# Patient Record
Sex: FEMALE | Race: WHITE | NOT HISPANIC OR LATINO | Employment: OTHER | URBAN - METROPOLITAN AREA
[De-identification: names, ages, dates, MRNs, and addresses within clinical notes are randomized per-mention and may not be internally consistent; named-entity substitution may affect disease eponyms.]

---

## 2017-01-03 ENCOUNTER — TELEPHONE (OUTPATIENT)
Dept: MEDICAL GROUP | Facility: MEDICAL CENTER | Age: 32
End: 2017-01-03

## 2017-01-03 DIAGNOSIS — R31.29 MICROSCOPIC HEMATURIA: ICD-10-CM

## 2017-01-03 NOTE — TELEPHONE ENCOUNTER
1. Caller Name: Faustina caregiver                      Call Back Number: [phone]    2. Message: States that Griselda has some blood in her urine and wants to provide a UA for the lab. Pt is wheelchair bound. Can you write this order for the Pt please. Thanks    3. Patient approves office to leave a detailed voicemail/MyChart message: N\A

## 2017-01-05 ENCOUNTER — HOSPITAL ENCOUNTER (OUTPATIENT)
Facility: MEDICAL CENTER | Age: 32
End: 2017-01-05
Attending: INTERNAL MEDICINE
Payer: MEDICARE

## 2017-01-05 LAB
APPEARANCE UR: ABNORMAL
BILIRUB UR QL STRIP.AUTO: NEGATIVE
COLOR UR AUTO: YELLOW
CULTURE IF INDICATED INDCX: YES UA CULTURE
EPITHELIAL CELLS 1715: ABNORMAL /HPF
GLUCOSE UR STRIP.AUTO-MCNC: NEGATIVE MG/DL
KETONES UR STRIP.AUTO-MCNC: NEGATIVE MG/DL
LEUKOCYTE ESTERASE UR QL STRIP.AUTO: ABNORMAL
MICRO URNS: ABNORMAL
MUCOUS THREADS URNS QL MICRO: ABNORMAL /HPF
NITRITE UR QL STRIP.AUTO: NEGATIVE
PH UR: 6.5 [PH]
PROT UR QL STRIP: NEGATIVE MG/DL
RBC #/AREA URNS HPF: ABNORMAL /HPF
RBC UR QL AUTO: ABNORMAL
SP GR UR STRIP.AUTO: 1.02
WBC #/AREA URNS HPF: ABNORMAL /HPF

## 2017-01-05 PROCEDURE — 87086 URINE CULTURE/COLONY COUNT: CPT

## 2017-01-05 PROCEDURE — 81001 URINALYSIS AUTO W/SCOPE: CPT

## 2017-01-07 LAB
BACTERIA UR CULT: ABNORMAL
BACTERIA UR CULT: ABNORMAL
SIGNIFICANT IND 70042: ABNORMAL
SOURCE SOURCE: ABNORMAL

## 2017-01-16 RX ORDER — CITALOPRAM 20 MG/1
TABLET ORAL
Qty: 90 TAB | Refills: 0 | Status: SHIPPED | OUTPATIENT
Start: 2017-01-16 | End: 2017-04-04 | Stop reason: SDUPTHER

## 2017-01-24 ENCOUNTER — APPOINTMENT (OUTPATIENT)
Dept: MEDICAL GROUP | Facility: MEDICAL CENTER | Age: 32
End: 2017-01-24
Payer: MEDICARE

## 2017-01-27 RX ORDER — MELOXICAM 15 MG/1
TABLET ORAL
Qty: 90 TAB | Refills: 0 | Status: SHIPPED | OUTPATIENT
Start: 2017-01-27 | End: 2017-03-31

## 2017-01-27 NOTE — TELEPHONE ENCOUNTER
Was the patient seen in the last year in this department? Yes 10/27/17 Diallo    Does patient have an active prescription for medications requested? No     Received Request Via: Pharmacy

## 2017-02-09 ENCOUNTER — APPOINTMENT (OUTPATIENT)
Dept: RADIOLOGY | Facility: MEDICAL CENTER | Age: 32
End: 2017-02-09
Attending: NURSE PRACTITIONER
Payer: MEDICARE

## 2017-02-13 RX ORDER — TRAMADOL HYDROCHLORIDE 50 MG/1
TABLET ORAL
Qty: 60 TAB | Refills: 1 | Status: SHIPPED
Start: 2017-02-13 | End: 2017-03-06 | Stop reason: SDUPTHER

## 2017-02-16 DIAGNOSIS — G43.809 OTHER MIGRAINE WITHOUT STATUS MIGRAINOSUS, NOT INTRACTABLE: ICD-10-CM

## 2017-02-17 RX ORDER — TOPIRAMATE 25 MG/1
CAPSULE, EXTENDED RELEASE ORAL
Qty: 30 EACH | Refills: 5 | Status: SHIPPED | OUTPATIENT
Start: 2017-02-17 | End: 2017-03-16 | Stop reason: SDUPTHER

## 2017-02-22 RX ORDER — ZOLPIDEM TARTRATE 5 MG/1
TABLET ORAL
Refills: 2 | Status: CANCELLED | OUTPATIENT
Start: 2017-02-22

## 2017-02-22 RX ORDER — ZOLPIDEM TARTRATE 5 MG/1
TABLET ORAL
Qty: 30 TAB | Refills: 2 | Status: SHIPPED
Start: 2017-02-22 | End: 2017-03-16 | Stop reason: SDUPTHER

## 2017-03-06 RX ORDER — TRAMADOL HYDROCHLORIDE 50 MG/1
TABLET ORAL
Qty: 60 TAB | Refills: 0 | Status: SHIPPED
Start: 2017-03-06 | End: 2017-06-21 | Stop reason: SDUPTHER

## 2017-03-15 ENCOUNTER — APPOINTMENT (OUTPATIENT)
Dept: RADIOLOGY | Facility: MEDICAL CENTER | Age: 32
End: 2017-03-15
Attending: NURSE PRACTITIONER
Payer: MEDICARE

## 2017-03-16 DIAGNOSIS — G43.819 OTHER MIGRAINE WITHOUT STATUS MIGRAINOSUS, INTRACTABLE: ICD-10-CM

## 2017-03-16 RX ORDER — ZOLPIDEM TARTRATE 5 MG/1
TABLET ORAL
Qty: 30 TAB | Refills: 2 | Status: SHIPPED | OUTPATIENT
Start: 2017-03-16 | End: 2017-07-17 | Stop reason: SDUPTHER

## 2017-03-17 RX ORDER — TOPIRAMATE 25 MG/1
CAPSULE, EXTENDED RELEASE ORAL
Qty: 90 EACH | Refills: 0 | Status: SHIPPED | OUTPATIENT
Start: 2017-03-17 | End: 2017-03-21

## 2017-03-21 ENCOUNTER — TELEPHONE (OUTPATIENT)
Dept: NEUROLOGY | Facility: MEDICAL CENTER | Age: 32
End: 2017-03-21

## 2017-03-24 ENCOUNTER — OFFICE VISIT (OUTPATIENT)
Dept: MEDICAL GROUP | Facility: MEDICAL CENTER | Age: 32
End: 2017-03-24
Payer: MEDICARE

## 2017-03-24 VITALS
DIASTOLIC BLOOD PRESSURE: 64 MMHG | BODY MASS INDEX: 20.4 KG/M2 | RESPIRATION RATE: 16 BRPM | TEMPERATURE: 97.4 F | HEIGHT: 67 IN | SYSTOLIC BLOOD PRESSURE: 118 MMHG | OXYGEN SATURATION: 96 % | WEIGHT: 130 LBS | HEART RATE: 76 BPM

## 2017-03-24 DIAGNOSIS — G89.29 CHRONIC LOW BACK PAIN, UNSPECIFIED BACK PAIN LATERALITY, WITH SCIATICA PRESENCE UNSPECIFIED: ICD-10-CM

## 2017-03-24 DIAGNOSIS — N39.0 RECURRENT URINARY TRACT INFECTION: ICD-10-CM

## 2017-03-24 DIAGNOSIS — R53.81 DEBILITY: ICD-10-CM

## 2017-03-24 DIAGNOSIS — R47.01 APHASIA: ICD-10-CM

## 2017-03-24 DIAGNOSIS — R25.2 SPASTICITY: ICD-10-CM

## 2017-03-24 DIAGNOSIS — G89.4 CHRONIC PAIN SYNDROME: ICD-10-CM

## 2017-03-24 DIAGNOSIS — M54.5 CHRONIC LOW BACK PAIN, UNSPECIFIED BACK PAIN LATERALITY, WITH SCIATICA PRESENCE UNSPECIFIED: ICD-10-CM

## 2017-03-24 DIAGNOSIS — R13.10 DYSPHAGIA, UNSPECIFIED TYPE: ICD-10-CM

## 2017-03-24 DIAGNOSIS — F33.42 RECURRENT MAJOR DEPRESSIVE DISORDER, IN FULL REMISSION (HCC): Chronic | ICD-10-CM

## 2017-03-24 DIAGNOSIS — G35 MS (MULTIPLE SCLEROSIS) (HCC): ICD-10-CM

## 2017-03-24 DIAGNOSIS — Z91.81 AT HIGH RISK FOR FALLS: ICD-10-CM

## 2017-03-24 DIAGNOSIS — Z00.00 MEDICARE ANNUAL WELLNESS VISIT, SUBSEQUENT: ICD-10-CM

## 2017-03-24 PROCEDURE — G0439 PPPS, SUBSEQ VISIT: HCPCS | Mod: 25 | Performed by: NURSE PRACTITIONER

## 2017-03-24 PROCEDURE — 99213 OFFICE O/P EST LOW 20 MIN: CPT | Mod: 25 | Performed by: NURSE PRACTITIONER

## 2017-03-24 PROCEDURE — G8420 CALC BMI NORM PARAMETERS: HCPCS | Performed by: NURSE PRACTITIONER

## 2017-03-24 PROCEDURE — G8510 SCR DEP NEG, NO PLAN REQD: HCPCS | Performed by: NURSE PRACTITIONER

## 2017-03-24 PROCEDURE — 1036F TOBACCO NON-USER: CPT | Performed by: NURSE PRACTITIONER

## 2017-03-24 PROCEDURE — G8482 FLU IMMUNIZE ORDER/ADMIN: HCPCS | Performed by: NURSE PRACTITIONER

## 2017-03-24 RX ORDER — BACLOFEN 10 MG/1
10 TABLET ORAL 3 TIMES DAILY
Qty: 90 TAB | Refills: 11
Start: 2017-03-24 | End: 2017-09-26

## 2017-03-24 RX ORDER — POLYETHYLENE GLYCOL 3350 17 G/17G
17 POWDER, FOR SOLUTION ORAL DAILY
COMMUNITY
End: 2017-03-24

## 2017-03-24 ASSESSMENT — PATIENT HEALTH QUESTIONNAIRE - PHQ9: CLINICAL INTERPRETATION OF PHQ2 SCORE: 1

## 2017-03-24 NOTE — PROGRESS NOTES
CC: Prior patient of Dr. Castro here today for annual Medicare wellness visit as well as for need of letters and referrals and to discuss pain management.    HPI:   Griselda presents today with the following.    1. Medicare annual wellness visit, subsequent  Screening performed below.    2. Recurrent urinary tract infection  Patient states she does tend to have recurrent urinary tract infections possibly related to her debility from her MS but she is not currently symptomatic.    3. MS (multiple sclerosis) (CMS-HCC)  Patient follows with Dr. renteria for this. She has caregivers who visit her house frequently and she has an aunt who also helps her out and is with her today.    4. Aphasia  Patient uses a Ipad for communication.    5. Chronic pain syndrome  Patient is currently on tramadol for pain. They are asking today whether I can give her a medical marijuana card. She uses medications for generalized back and body pain.    6. Dysphagia, unspecified type  Patient occasionally has difficulty with swallowing.    7. Recurrent major depressive disorder, in full remission (CMS-HCC)  Patient on medication and reporting no new depression.    8. Debility  Family is requesting a prescription to have her electronic wheelchair repaired as well as any new additional devices. We are also asking today for a letter for support animal.    9. Chronic low back pain, unspecified back pain laterality, with sciatica presence unspecified  Patient currently on tramadol for this.    10. 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    11. At high risk for falls  Patient needs assistance with any mobility.      Depression Screening    Little interest or pleasure in doing things?  0 - not at all  Feeling down, depressed , or hopeless? 1 - several days  Trouble falling or staying asleep, or sleeping too much?     Feeling tired or having little energy?     Poor appetite or overeating?     Feeling bad about yourself - or that you are a failure  or have let yourself or your family down?    Trouble concentrating on things, such as reading the newspaper or watching television?    Moving or speaking so slowly that other people could have noticed.  Or the opposite - being so fidgety or restless that you have been moving around a lot more than usual?     Thoughts that you would be better off dead, or of hurting yourself?     Patient Health Questionnaire Score:    If depressive symptoms identified deferred to follow up visit unless specifically addressed in assessment and plan.      Screening for Cognitive Impairment    Three Minute Recall (banana, sunrise, fence)  3/3    Draw clock face with all 12 numbers set to the hand to show 10 minures past 11 o'clock  0    If cognitive concerns identified deferred to follow up visit unless specifically addressed in assessment and plan.    Fall Risk Assessment    Has the patient had two or more falls in the last year or any fall with injury in the last year?  No  If Fall Risk identified deferred to follow up visit unless specifically addressed in assessment and plan.    Safety Assessment    Throw rugs on floor.  Yes  Handrails on all stairs.  Yes  Good lighting in all hallways.  Yes  Difficulty hearing.  No  Patient counseled about all safety risks that were identified.    Functional Assessment ADLs    Are there any barriers preventing you from cooking for yourself or meeting nutritional needs?  Yes.    Are there any barriers preventing you from driving safely or obtaining transportation?  Yes.    Are there any barriers preventing you from using a telephone or calling for help?  No.    Are there any barriers preventing you from shopping?  No.    Are there any barriers preventing you from taking care of your own finances?  Yes.    Are there any barriers preventing you from managing your medications?  No.    Are currently engaing any exercise or physical activity?  No.       Health Maintenance Summary                IMM HEP B  VACCINE Overdue 1985     IMM DTaP/Tdap/Td Vaccine Overdue 6/21/2002     Annual Wellness Visit Overdue 8/3/2016      Done 8/3/2015 Visit Dx: Medicare annual wellness visit, subsequent    PAP SMEAR Next Due 4/7/2017      Done 4/7/2014 PATHOLOGY GYN SPECIMEN          Patient Care Team:  SELVIN Garcia as PCP - General (Family Medicine)  Osman Orellana M.D. as Consulting Physician (Pain Management)  Catrachito Vega M.D. as Consulting Physician (Anesthesia)  Melissa P Bloch, M.D. as Consulting Physician (Neurology)  Madhu Lobo M.D. as Consulting Physician (Urology)        Patient Active Problem List    Diagnosis Date Noted   • MS (multiple sclerosis) (CMS-HCC) 03/14/2013     Priority: Medium   • Aphasia 08/10/2010     Priority: Medium   • Chronic pain 07/03/2016     Priority: Low   • Debility 07/03/2016     Priority: Low   • Recurrent urinary tract infection 03/24/2017   • Chronic pain syndrome 03/24/2017   • Recurrent major depressive disorder, in full remission (CMS-HCC) 03/24/2017   • Dysphagia 07/01/2016   • At high risk for falls 08/03/2015   • Chronic low back pain 06/15/2015   • Spasticity 10/31/2013       Current Outpatient Prescriptions   Medication Sig Dispense Refill   • baclofen (LIORESAL) 10 MG Tab Take 1 Tab by mouth 3 times a day. 90 Tab 11   • Misc. Devices Misc Please eval and fix electric W/C. Please eval also for W/C needs. 1 Each 11   • Misc. Devices Misc Please allow patient to have support/therapy dog in appt. Regardless of weight due to multiple chronic illnesses and debility. 1 Each 11   • topiramate (TOPAMAX) 25 MG Tab Take 1 Tab by mouth 2 times a day. 60 Tab 11   • zolpidem (AMBIEN) 5 MG Tab TAKE 1 TABLET BY MOUTH EVERY NIGHT AT BEDTIME AS NEEDED 30 Tab 2   • tramadol (ULTRAM) 50 MG Tab TAKE 1 TO 2 TABLETS BY MOUTH EVERY 4 HOURS AS NEEDED FOR MODERATE PAIN 60 Tab 0   • meloxicam (MOBIC) 15 MG tablet TAKE 1 TABLET BY MOUTH EVERY DAY 90 Tab 0   • citalopram (CELEXA) 20 MG Tab  "TAKE 1 TABLET BY MOUTH EVERY DAY 90 Tab 0   • promethazine (PHENERGAN) 25 MG Tab TAKE 1 TABLET BY MOUTH EVERY 6 HOURS AS NEEDED FOR NAUSEA AND VOMITING 30 Tab 0   • SUMAtriptan Succinate 6 MG/0.5ML Solution Auto-injector INJECT 0.5 MLS IN THE MUSCLE ONCE FOR 1 DOSE 4 mL 0   • tamsulosin (FLOMAX) 0.4 MG capsule TAKE ONE CAPSULE BY MOUTH EVERY DAY 90 Cap 1   • baclofen (LIORESAL) 20 MG tablet Take 2 Tabs by mouth every 6 hours. 90 Tab 3   • Calcium Carbonate Antacid (TUMS PO) Take  by mouth as needed.     • sumatriptan (IMITREX) 100 MG tablet TAKE 1 TABLET BY MOUTH AT ONSET OF HEADACHE. MAY REPEAT IN 2 HOURS. MAX 2 TABLETS A DAY 9 Tab 0   • Multiple Vitamins-Minerals (MULTIVITAMIN GUMMIES ADULTS PO) Take 1 Tab by mouth every day at 6 PM.     • vitamin D (CHOLECALCIFEROL) 1000 UNIT Tab Take 1,000 Units by mouth 4 times a day.     • Diclofenac Sodium 1 % Gel Apply 4 g to knees and 3 g to ankles up to qid prn 100 g 2   • lactulose 10 GM/15ML Solution Take 15 mL by mouth every four hours as needed.     • clindamycin-benzoyl peroxide (BENZACLIN) gel APPLY TO THE FACE EVERY DAY 50 g 0   • Melatonin 5 MG Cap Take 1 Cap by mouth every bedtime.     • [] CASCARA SAGRADA PO Take 2 Tabs by mouth every bedtime.     • [] baclofen (LIORESAL) 10 MG Tab Take 20 mg by mouth every day at 6 PM. prn       No current facility-administered medications for this visit.         Allergies as of 2017 - Manish as Reviewed 2017   Allergen Reaction Noted   • Bactrim  2016   • Fentanyl  2016        ROS: As per HPI.    /64 mmHg  Pulse 76  Temp(Src) 36.3 °C (97.4 °F)  Resp 16  Ht 1.702 m (5' 7\")  Wt 58.968 kg (130 lb)  BMI 20.36 kg/m2  SpO2 96%    Physical Exam:  Gen:         Alert and oriented, No apparent distress. Patient unable to speak but alert and uses touch pad for communication.  Neck:        No Lymphadenopathy or Bruits.  Lungs:     Clear to auscultation bilaterally  CV:          Regular rate " and rhythm. No murmurs, rubs or gallops.  Abd:         Soft non tender, non distended. Normal active bowel sounds.  No  Hepatosplenomegaly, No pulsatile masses.                   Ext:          No clubbing, cyanosis, edema. Spasticity of the joints and immobility.      Assessment and Plan.   31 y.o. female with the following issues.    1. Medicare annual wellness visit, subsequent  Annual wellness topics discussed, review of chronic medical problems completed    - Annual Wellness Visit - Includes PPPS Subsequent ()    2. Recurrent urinary tract infection  Patient will be monitored for any changes or UTI symptoms immediately.    3. MS (multiple sclerosis) (CMS-HCC)  Request for support dog and wheelchair evaluation provided.  - Misc. Devices Misc; Please eval and fix electric W/C. Please eval also for W/C needs.  Dispense: 1 Each; Refill: 11  - Misc. Devices Misc; Please allow patient to have support/therapy dog in appt. Regardless of weight due to multiple chronic illnesses and debility.  Dispense: 1 Each; Refill: 11    4. Aphasia  Patient does well with touch pad communication device.    5. Chronic pain syndrome  I advised patient that we do not provide medical marijuana cards and if she feels she needs to go on marijuana, she will need to come off tramadol because we cannot prescribe tramadol when somebody is on a drug which is considered illegal in the federal system.    6. Dysphagia, unspecified type  No recent episodes of aspiration.    7. Recurrent major depressive disorder, in full remission (CMS-HCC)  Patient will continue on her antidepressant.    8. Debility  Patient has previously been through therapy.    9. Chronic low back pain, unspecified back pain laterality, with sciatica presence unspecified  Patient currently treating with tramadol and meloxicam as needed. She does not appear to be abusing her medications.    10. Spasticity  She takes muscle relaxants regularly.    11. At high risk for  falls  Patient has currently good home assistance.

## 2017-03-24 NOTE — MR AVS SNAPSHOT
"        Griselda Swanson   3/24/2017 8:20 AM   Office Visit   MRN: [id number]    Department:  [address] [place] [address]   Dept Phone:  [phone]    Description:  Female : 1985   Provider:  SELVIN Garcia           Reason for Visit     Establish Care esteban patient    Orders Needed therapy dog/ medical mariguana card      Allergies as of 3/24/2017     Allergen Noted Reactions    Bactrim 2016       Reaction unknown    Fentanyl 2016       Makes her agressive      You were diagnosed with     Medicare annual wellness visit, subsequent   [032047]       Recurrent urinary tract infection   [234942]       MS (multiple sclerosis) (CMS-HCC)   [561777]       Aphasia   [784.3.ICD-9-CM]       Chronic pain syndrome   [338.4.ICD-9-CM]       Dysphagia, unspecified type   [3196665]       Recurrent major depressive disorder, in full remission (CMS-HCC)   [1695816]       Debility   [708961]       Chronic low back pain, unspecified back pain laterality, with sciatica presence unspecified   [8406949]       Spasticity   [807485]       At high risk for falls   [248213]         Vital Signs     Blood Pressure Pulse Temperature Respirations Height Weight    118/64 mmHg 76 36.3 °C (97.4 °F) 16 1.702 m (5' 7\") 58.968 kg (130 lb)    Body Mass Index Oxygen Saturation Smoking Status             20.36 kg/m2 96% Former Smoker         Basic Information     Date Of Birth Sex Race Ethnicity Preferred Language    1985 Female White Non- English      Your appointments     2017 10:00 AM   ANNUAL EXAM PREVENTATIVE with LA[facility] Taylor   [facility] [address]    [address]  [place] [id number] [phone]            Sep 25, 2017 10:00 AM   Established Patient with SELVIN Garcia   [facility] [address]    [address]  [place] [id number] [phone]           You will be receiving a confirmation call a few days before your " appointment from our automated call confirmation system.              Problem List              ICD-10-CM Priority Class Noted - Resolved    Aphasia R47.01 Medium  8/10/2010 - Present    MS (multiple sclerosis) (CMS-HCC) G35 Medium  3/14/2013 - Present    Spasticity R25.2   10/31/2013 - Present    Chronic low back pain M54.5, G89.29   6/15/2015 - Present    At high risk for falls Z91.81   8/3/2015 - Present    Dysphagia R13.10   7/1/2016 - Present    Chronic pain G89.29 Low  7/3/2016 - Present    Debility R53.81 Low  7/3/2016 - Present    Recurrent urinary tract infection N39.0   3/24/2017 - Present    Chronic pain syndrome G89.4   3/24/2017 - Present    Recurrent major depressive disorder, in full remission (CMS-HCC) (Chronic) F33.42   3/24/2017 - Present      Health Maintenance        Date Due Completion Dates    IMM HEP B VACCINE (1 of 3 - Primary Series) 1983 ---    IMM DTaP/Tdap/Td Vaccine (1 - Tdap) 6/21/2002 ---    PAP SMEAR 4/7/2017 4/7/2014            Current Immunizations     Influenza TIV (IM) 9/20/2013  9:00 PM, 11/2/2011  3:46 PM    Influenza Vaccine Quad Inj (Pf) 1/1/2016, 10/20/2014  4:41 PM    Influenza Vaccine Quad Inj (Preserved) 10/27/2016  1:52 PM, 11/1/2015 11:01 AM    Pneumococcal Vaccine (PCV7) Historical Data 1/1/2016    Tuberculin Skin Test  Incomplete      Below and/or attached are the medications your provider expects you to take. Review all of your home medications and newly ordered medications with your provider and/or pharmacist. Follow medication instructions as directed by your provider and/or pharmacist. Please keep your medication list with you and share with your provider. Update the information when medications are discontinued, doses are changed, or new medications (including over-the-counter products) are added; and carry medication information at all times in the event of emergency situations     Allergies:  BACTRIM - (reactions not documented)     FENTANYL - (reactions  not documented)               Medications  Valid as of: March 24, 2017 -  8:52 AM    Generic Name Brand Name Tablet Size Instructions for use    Baclofen (Tab) LIORESAL 10 MG Take 20 mg by mouth every day at 6 PM. prn        Baclofen (Tab) LIORESAL 20 MG Take 2 Tabs by mouth every 6 hours.        Baclofen (Tab) LIORESAL 10 MG Take 1 Tab by mouth 3 times a day.        Calcium Carbonate Antacid   Take  by mouth as needed.        Cascara Sagrada   Take 2 Tabs by mouth every bedtime.        Cholecalciferol (Tab) cholecalciferol 1000 UNIT Take 1,000 Units by mouth 4 times a day.        Citalopram Hydrobromide (Tab) CELEXA 20 MG TAKE 1 TABLET BY MOUTH EVERY DAY        Clindamycin Phos-Benzoyl Perox (Gel) BENZACLIN 1-5 % APPLY TO THE FACE EVERY DAY        Diclofenac Sodium (Gel) Diclofenac Sodium 1 % Apply 4 g to knees and 3 g to ankles up to qid prn        Lactulose Encephalopathy (Solution) lactulose 10 GM/15ML Take 15 mL by mouth every four hours as needed.        Melatonin (Cap) Melatonin 5 MG Take 1 Cap by mouth every bedtime.        Meloxicam (Tab) MOBIC 15 MG TAKE 1 TABLET BY MOUTH EVERY DAY        Misc. Devices (Misc) Misc. Devices  Please eval and fix electric W/C. Please eval also for W/C needs.        Misc. Devices (Misc) Misc. Devices  Please allow patient to have support/therapy dog in appt. Regardless of weight due to multiple chronic illnesses and debility.        Multiple Vitamins-Minerals   Take 1 Tab by mouth every day at 6 PM.        Promethazine HCl (Tab) PHENERGAN 25 MG TAKE 1 TABLET BY MOUTH EVERY 6 HOURS AS NEEDED FOR NAUSEA AND VOMITING        SUMAtriptan Succinate (Tab) IMITREX 100 MG TAKE 1 TABLET BY MOUTH AT ONSET OF HEADACHE. MAY REPEAT IN 2 HOURS. MAX 2 TABLETS A DAY        SUMAtriptan Succinate (Solution Auto-injector) SUMAtriptan Succinate 6 MG/0.5ML INJECT 0.5 MLS IN THE MUSCLE ONCE FOR 1 DOSE        Tamsulosin HCl (Cap) FLOMAX 0.4 MG TAKE ONE CAPSULE BY MOUTH EVERY DAY        Topiramate  (Tab) TOPAMAX 25 MG Take 1 Tab by mouth 2 times a day.        TraMADol HCl (Tab) ULTRAM 50 MG TAKE 1 TO 2 TABLETS BY MOUTH EVERY 4 HOURS AS NEEDED FOR MODERATE PAIN        Zolpidem Tartrate (Tab) AMBIEN 5 MG TAKE 1 TABLET BY MOUTH EVERY NIGHT AT BEDTIME AS NEEDED        .                 Medicines prescribed today were sent to:     [id number] - [place], [place] - [address] [employer] [facility] [address]    [address] [place] [address]    Phone: [phone] Fax: [phone]    Open 24 Hours?: No    [facility] - [place], [place] - [address]    [address] [place] [address]    Phone: [phone] Fax: [phone]    Open 24 Hours?: No      Medication refill instructions:       If your prescription bottle indicates you have medication refills left, it is not necessary to call your provider’s office. Please contact your pharmacy and they will refill your medication.    If your prescription bottle indicates you do not have any refills left, you may request refills at any time through one of the following ways: The online [facility] system (except Urgent Care), by calling your provider’s office, or by asking your pharmacy to contact your provider’s office with a refill request. Medication refills are processed only during regular business hours and may not be available until the next business day. Your provider may request additional information or to have a follow-up visit with you prior to refilling your medication.   *Please Note: Medication refills are assigned a new Rx number when refilled electronically. Your pharmacy may indicate that no refills were authorized even though a new prescription for the same medication is available at the pharmacy. Please request the medicine by name with the pharmacy before contacting your provider for a refill.        Other Notes About Your Plan     Patient is enrolled in [facility] with Dr. Castro.           Amy Access Code: Activation code  not generated  Current MyChart Status: Active

## 2017-03-31 DIAGNOSIS — G89.29 OTHER CHRONIC PAIN: ICD-10-CM

## 2017-03-31 RX ORDER — MELOXICAM 15 MG/1
15 TABLET ORAL
Qty: 90 TAB | Refills: 0 | Status: CANCELLED | OUTPATIENT
Start: 2017-03-31

## 2017-03-31 RX ORDER — MELOXICAM 15 MG/1
15 TABLET ORAL
Qty: 90 TAB | Refills: 3 | Status: SHIPPED | OUTPATIENT
Start: 2017-03-31 | End: 2018-05-03 | Stop reason: SDUPTHER

## 2017-04-04 RX ORDER — CITALOPRAM 20 MG/1
TABLET ORAL
Qty: 90 TAB | Refills: 3 | Status: SHIPPED | OUTPATIENT
Start: 2017-04-04 | End: 2018-04-04 | Stop reason: SDUPTHER

## 2017-04-06 DIAGNOSIS — G35 MS (MULTIPLE SCLEROSIS) (HCC): ICD-10-CM

## 2017-04-24 RX ORDER — TAMSULOSIN HYDROCHLORIDE 0.4 MG/1
0.4 CAPSULE ORAL
Qty: 90 CAP | Refills: 11 | Status: SHIPPED | OUTPATIENT
Start: 2017-04-24 | End: 2018-08-20 | Stop reason: SDUPTHER

## 2017-04-24 RX ORDER — TAMSULOSIN HYDROCHLORIDE 0.4 MG/1
CAPSULE ORAL
Refills: 0 | Status: CANCELLED | OUTPATIENT
Start: 2017-04-24

## 2017-04-25 DIAGNOSIS — Z79.899 MEDICATION MANAGEMENT: ICD-10-CM

## 2017-04-25 RX ORDER — CLINDAMYCIN AND BENZOYL PEROXIDE 10; 50 MG/G; MG/G
GEL TOPICAL
Qty: 50 G | Refills: 2 | Status: SHIPPED | OUTPATIENT
Start: 2017-04-25 | End: 2017-04-26 | Stop reason: SDUPTHER

## 2017-04-26 DIAGNOSIS — Z79.899 MEDICATION MANAGEMENT: ICD-10-CM

## 2017-04-26 RX ORDER — CLINDAMYCIN AND BENZOYL PEROXIDE 10; 50 MG/G; MG/G
GEL TOPICAL
Qty: 50 G | Refills: 11 | Status: SHIPPED | OUTPATIENT
Start: 2017-04-26 | End: 2019-04-19 | Stop reason: SDUPTHER

## 2017-05-02 ENCOUNTER — HOSPITAL ENCOUNTER (OUTPATIENT)
Dept: RADIOLOGY | Facility: MEDICAL CENTER | Age: 32
End: 2017-05-02
Attending: NURSE PRACTITIONER
Payer: MEDICARE

## 2017-05-02 DIAGNOSIS — R10.84 ABDOMINAL PAIN, GENERALIZED: ICD-10-CM

## 2017-05-02 DIAGNOSIS — Z87.440 HISTORY OF URINARY TRACT INFECTION: ICD-10-CM

## 2017-05-02 PROCEDURE — 76700 US EXAM ABDOM COMPLETE: CPT

## 2017-05-03 ENCOUNTER — TELEPHONE (OUTPATIENT)
Dept: MEDICAL GROUP | Facility: MEDICAL CENTER | Age: 32
End: 2017-05-03

## 2017-05-03 NOTE — TELEPHONE ENCOUNTER
Future Appointments       Provider Department Center    5/9/2017 1:40 PM SELVIN Garcia; [facility]  [address]    9/25/2017 10:00 AM LYNNE Garcia. [address]        ANNUAL WELLNESS VISIT PRE-VISIT PLANNING     1.  Reviewed note from last office visit with PCP: YES    2.  If any orders were placed at last visit, do we have Results/Consult Notes?        •  Labs - Labs ordered, completed and results are in chart.       •  Imaging - Imaging was not ordered at last office visit.       •  Referrals - No referrals were ordered at last office visit.    3.  Immunizations were updated in [facility] using [employer]?: Yes       •  [employer] Recommendations: HEPATITIS A , HEPATITIS B and TDAP       •  Is patient due for Tdap? YES. Patient was not notified of copay.       •  Is patient due for Shingles? NO     4.  Patient is due for the following Health Maintenance Topics:   Health Maintenance Due   Topic Date Due   • IMM HEP B VACCINE (1 of 3 - Primary Series) 1985   • IMM DTaP/Tdap/Td Vaccine (1 - Tdap) 06/21/2002   • PAP SMEAR  04/07/2017           5.  Reviewed/Updated the following with patient:       •   Preferred Pharmacy? YES       •   Preferred Lab? YES       •   Medications? YES. Was Abstract Encounter opened and chart updated? YES       •   Social History? YES. Was Abstract Encounter opened and chart updated? YES       •   Family History? YES. Was Abstract Encounter opened and chart updated? YES    6.  Care Team Updated:       •   DME Company (gait device, O2, CPAP, etc.): NO       •   Other Specialists (eye doctor, derm, GYN, cardiology, endo, etc): YES    7.  Patient has the following Care Path diagnoses on Problem List:  NONE    8.  Specialty Comments was updated with diagnosis information provided by [facility]: N\A    9.  Patient was advised: “This is a free wellness visit. The provider will screen for medical conditions to help you stay  healthy. If you have other concerns to address you may be asked to discuss these at a separate visit or there may be an additional fee.”     10.  Patient was informed to arrive 15 min prior to their scheduled appointment and bring in their medication bottles?  YES

## 2017-05-09 ENCOUNTER — HOSPITAL ENCOUNTER (OUTPATIENT)
Facility: MEDICAL CENTER | Age: 32
End: 2017-05-09
Attending: NURSE PRACTITIONER
Payer: MEDICARE

## 2017-05-09 ENCOUNTER — OFFICE VISIT (OUTPATIENT)
Dept: MEDICAL GROUP | Facility: MEDICAL CENTER | Age: 32
End: 2017-05-09
Payer: MEDICARE

## 2017-05-09 VITALS
RESPIRATION RATE: 16 BRPM | WEIGHT: 130 LBS | OXYGEN SATURATION: 100 % | TEMPERATURE: 98.6 F | HEIGHT: 67 IN | SYSTOLIC BLOOD PRESSURE: 100 MMHG | HEART RATE: 90 BPM | DIASTOLIC BLOOD PRESSURE: 60 MMHG | BODY MASS INDEX: 20.4 KG/M2

## 2017-05-09 DIAGNOSIS — Z23 NEED FOR TDAP VACCINATION: ICD-10-CM

## 2017-05-09 DIAGNOSIS — F33.42 RECURRENT MAJOR DEPRESSIVE DISORDER, IN FULL REMISSION (HCC): Chronic | ICD-10-CM

## 2017-05-09 DIAGNOSIS — G35 MS (MULTIPLE SCLEROSIS) (HCC): ICD-10-CM

## 2017-05-09 DIAGNOSIS — N39.0 RECURRENT URINARY TRACT INFECTION: ICD-10-CM

## 2017-05-09 DIAGNOSIS — Z01.419 ROUTINE GYNECOLOGICAL EXAMINATION: ICD-10-CM

## 2017-05-09 DIAGNOSIS — R53.81 DEBILITY: ICD-10-CM

## 2017-05-09 DIAGNOSIS — R30.0 DYSURIA: ICD-10-CM

## 2017-05-09 DIAGNOSIS — G43.009 MIGRAINE WITHOUT AURA AND WITHOUT STATUS MIGRAINOSUS, NOT INTRACTABLE: ICD-10-CM

## 2017-05-09 LAB
APPEARANCE UR: CLEAR
BACTERIA #/AREA URNS HPF: ABNORMAL /HPF
BILIRUB UR QL STRIP.AUTO: NEGATIVE
COLOR UR: ABNORMAL
CULTURE IF INDICATED INDCX: YES UA CULTURE
EPI CELLS #/AREA URNS HPF: ABNORMAL /HPF
GLUCOSE UR STRIP.AUTO-MCNC: NEGATIVE MG/DL
KETONES UR STRIP.AUTO-MCNC: NEGATIVE MG/DL
LEUKOCYTE ESTERASE UR QL STRIP.AUTO: ABNORMAL
MICRO URNS: ABNORMAL
NITRITE UR QL STRIP.AUTO: NEGATIVE
PH UR STRIP.AUTO: 6.5 [PH]
PROT UR QL STRIP: NEGATIVE MG/DL
RBC # URNS HPF: ABNORMAL /HPF
RBC UR QL AUTO: NEGATIVE
SP GR UR STRIP.AUTO: 1.01
WBC #/AREA URNS HPF: ABNORMAL /HPF

## 2017-05-09 PROCEDURE — 87086 URINE CULTURE/COLONY COUNT: CPT

## 2017-05-09 PROCEDURE — 1036F TOBACCO NON-USER: CPT | Performed by: NURSE PRACTITIONER

## 2017-05-09 PROCEDURE — 90471 IMMUNIZATION ADMIN: CPT | Performed by: NURSE PRACTITIONER

## 2017-05-09 PROCEDURE — 90715 TDAP VACCINE 7 YRS/> IM: CPT | Performed by: NURSE PRACTITIONER

## 2017-05-09 PROCEDURE — 99214 OFFICE O/P EST MOD 30 MIN: CPT | Mod: 25 | Performed by: NURSE PRACTITIONER

## 2017-05-09 PROCEDURE — G8420 CALC BMI NORM PARAMETERS: HCPCS | Performed by: NURSE PRACTITIONER

## 2017-05-09 PROCEDURE — 81001 URINALYSIS AUTO W/SCOPE: CPT

## 2017-05-09 RX ORDER — SUMATRIPTAN 100 MG/1
TABLET, FILM COATED ORAL
Qty: 9 TAB | Refills: 11 | Status: SHIPPED | OUTPATIENT
Start: 2017-05-09 | End: 2018-07-14 | Stop reason: SDUPTHER

## 2017-05-09 ASSESSMENT — ENCOUNTER SYMPTOMS
FOCAL WEAKNESS: 1
DEPRESSION: 1

## 2017-05-09 ASSESSMENT — PAIN SCALES - GENERAL: PAINLEVEL: NO PAIN

## 2017-05-09 NOTE — MR AVS SNAPSHOT
"        Griselda Swanson   2017 2:00 PM   Office Visit   MRN: [id number]    Department:  [address] [place] [address]   Dept Phone:  [phone]    Description:  Female : 1985   Provider:  SELVIN Garcia           Reason for Visit     Annual Exam PAP      Allergies as of 2017     Allergen Noted Reactions    Bactrim 2016       Reaction unknown    Fentanyl 2016       Makes her agressive      You were diagnosed with     Routine gynecological examination   [V72.31.ICD-9-CM]       Need for Tdap vaccination   [236330]       Migraine without aura and without status migrainosus, not intractable   [052806]       Dysuria   [788.1.ICD-9-CM]       MS (multiple sclerosis) (CMS-[facility])   [102539]       Debility   [220223]         Vital Signs     Blood Pressure Pulse Temperature Respirations Height Weight    100/60 mmHg 90 37 °C (98.6 °F) 16 1.702 m (5' 7.01\") 58.968 kg (130 lb)    Body Mass Index Oxygen Saturation Smoking Status             20.36 kg/m2 100% Former Smoker         Basic Information     Date Of Birth Sex Race Ethnicity Preferred Language    1985 Female White Non- English      Your appointments     Sep 25, 2017 10:00 AM   Established Patient with SELVIN Garcia   [facility] [address]    [address] [facility] [address]  [place] [address]   [phone]           You will be receiving a confirmation call a few days before your appointment from our automated call confirmation system.              Problem List              ICD-10-CM Priority Class Noted - Resolved    Aphasia R47.01 Medium  8/10/2010 - Present    MS (multiple sclerosis) (CMS-[facility]) G35 Medium  3/14/2013 - Present    Spasticity R25.2   10/31/2013 - Present    Chronic low back pain M54.5, G89.29   6/15/2015 - Present    At high risk for falls Z91.81   8/3/2015 - Present    Dysphagia R13.10   2016 - Present    Chronic pain G89.29 Low  7/3/2016 - Present    Debility R53.81 Low  7/3/2016 - " Present    Recurrent urinary tract infection N39.0   3/24/2017 - Present    Chronic pain syndrome G89.4   3/24/2017 - Present    Recurrent major depressive disorder, in full remission (CMS-HCC) (Chronic) F33.42   3/24/2017 - Present      Health Maintenance        Date Due Completion Dates    IMM HEP B VACCINE (1 of 3 - Primary Series) 1985 ---    IMM DTaP/Tdap/Td Vaccine (1 - Tdap) 6/21/2002 ---    PAP SMEAR 4/7/2017 4/7/2014            Current Immunizations     Influenza TIV (IM) 9/20/2013  9:00 PM, 11/2/2011  3:46 PM    Influenza Vaccine Quad Inj (Pf) 1/1/2016, 10/20/2014  4:41 PM    Influenza Vaccine Quad Inj (Preserved) 10/27/2016  1:52 PM, 11/1/2015 11:01 AM    Pneumococcal Vaccine (PCV7) Historical Data 1/1/2016    Tdap Vaccine  Incomplete    Tuberculin Skin Test  Incomplete      Below and/or attached are the medications your provider expects you to take. Review all of your home medications and newly ordered medications with your provider and/or pharmacist. Follow medication instructions as directed by your provider and/or pharmacist. Please keep your medication list with you and share with your provider. Update the information when medications are discontinued, doses are changed, or new medications (including over-the-counter products) are added; and carry medication information at all times in the event of emergency situations     Allergies:  BACTRIM - (reactions not documented)     FENTANYL - (reactions not documented)               Medications  Valid as of: May 09, 2017 -  2:18 PM    Generic Name Brand Name Tablet Size Instructions for use    Baclofen (Tab) LIORESAL 10 MG Take 20 mg by mouth every day at 6 PM. prn        Baclofen (Tab) LIORESAL 20 MG Take 2 Tabs by mouth every 6 hours.        Baclofen (Tab) LIORESAL 10 MG Take 1 Tab by mouth 3 times a day.        Calcium Carbonate Antacid   Take  by mouth as needed.        Cascara [place]   Take 2 Tabs by mouth every bedtime.        Cholecalciferol  (Tab) cholecalciferol 1000 UNIT Take 1,000 Units by mouth 4 times a day.        Citalopram Hydrobromide (Tab) CELEXA 20 MG TAKE 1 TABLET BY MOUTH EVERY DAY        Clindamycin Phos-Benzoyl Perox (Gel) BENZACLIN 1-5 % APPLY TO THE FACE EVERY DAY        Diclofenac Sodium (Gel) Diclofenac Sodium 1 % APPLY 4 GRAMS TO KNEES AND 3 GRAMS TO ANKLES UP TO FOUR TIMES DAILY AS NEEDED        Lactulose Encephalopathy (Solution) lactulose 10 GM/15ML Take 15 mL by mouth every four hours as needed.        Meloxicam (Tab) MOBIC 15 MG Take 1 Tab by mouth 1 time daily as needed.        Misc. Devices (Misc) Misc. Devices  Please allow patient to have support/therapy dog in appt. Regardless of weight due to multiple chronic illnesses and debility.        Misc. Devices (Misc) Misc. Devices  Please eval and fix electric W/C. Please eval also for W/C needs.        Misc. Devices (Misc) Misc. Devices  W/C stabilizer needs eval and possible replacement        Multiple Vitamins-Minerals   Take 1 Tab by mouth every day at 6 PM.        Promethazine HCl (Tab) PHENERGAN 25 MG TAKE 1 TABLET BY MOUTH EVERY 6 HOURS AS NEEDED FOR NAUSEA AND VOMITING        SUMAtriptan Succinate (Solution Auto-injector) SUMAtriptan Succinate 6 MG/0.5ML INJECT 0.5 MLS IN THE MUSCLE ONCE FOR 1 DOSE        SUMAtriptan Succinate (Tab) IMITREX 100 MG TAKE 1 TABLET BY MOUTH AT ONSET OF HEADACHE. MAY REPEAT IN 2 HOURS. MAX 2 TABLETS A DAY        Tamsulosin HCl (Cap) FLOMAX 0.4 MG Take 1 Cap by mouth every day.        Topiramate (Tab) TOPAMAX 25 MG Take 1 Tab by mouth 2 times a day.        TraMADol HCl (Tab) ULTRAM 50 MG TAKE 1 TO 2 TABLETS BY MOUTH EVERY 4 HOURS AS NEEDED FOR MODERATE PAIN        Zolpidem Tartrate (Tab) AMBIEN 5 MG TAKE 1 TABLET BY MOUTH EVERY NIGHT AT BEDTIME AS NEEDED        .                 Medicines prescribed today were sent to:     [id number] - [place] - [address] AT [facility]  [address]    [address]  [address]    Phone: [phone] Fax: [phone]    Open 24 Hours?: No      Medication refill instructions:       If your prescription bottle indicates you have medication refills left, it is not necessary to call your provider’s office. Please contact your pharmacy and they will refill your medication.    If your prescription bottle indicates you do not have any refills left, you may request refills at any time through one of the following ways: The online Ep[employer] system (except Urgent Care), by calling your provider’s office, or by asking your pharmacy to contact your provider’s office with a refill request. Medication refills are processed only during regular business hours and may not be available until the next business day. Your provider may request additional information or to have a follow-up visit with you prior to refilling your medication.   *Please Note: Medication refills are assigned a new Rx number when refilled electronically. Your pharmacy may indicate that no refills were authorized even though a new prescription for the same medication is available at the pharmacy. Please request the medicine by name with the pharmacy before contacting your provider for a refill.        Your To Do List     Standing Orders Interval Expires    URINALYSIS,CULTURE IF INDICATED   5/9/2018      Referral     A referral request has been sent to our patient care coordination department. Please allow 3-5 business days for us to process this request and contact you either by phone or mail. If you do not hear from us by the 5th business day, please call us at [phone].        Other Notes About Your Plan     Patient is enrolled in [facility] with Dr. Castro.           Ep[employer]hyte Access Code: Activation code not generated  Current Ep[employer] Status: Active

## 2017-05-09 NOTE — PROGRESS NOTES
Subjective:      Griselda Swanson is a 31 y.o. female who presents with Annual Exam            Annual Exam    Griselda Swanson is here today accompanied by her mother and caregiver for her urinary concerns and need for wheelchair equipment and physical therapy. She also needs refills on her headache medication.        1. Routine gynecological examination  Patient reports her last GYN exam was about 2 years ago. Her previous gynecologist is now retired so she needs to establish with a new GYN. She is not sexually active and believes that her last Pap smear was normal.    2. Need for Tdap vaccination  Patient does not believe she has had a TD AP in 10 years.    3. Migraine without aura and without status migrainosus, not intractable  Patient has occasional migraines for which she uses Imitrex and is running out of medication and needs refills.    4. Dysuria  Patient has been complaining to her caregiver that she has had some dysuria. This is been present for about 2 days she has had no flank pain, fever or nausea. They bring with them a urine test today.    5. MS (multiple sclerosis) (CMS-HCC)  Patient has spasticity and contractures for which she is in a wheelchair and family would like to have her return to physical therapy.    6. Debility  Patient would like physical therapy because of her debility secondary to her MS.    7. Recurrent urinary tract infection  Patient has history of recurrent UTI because of her debility although she has not recently had a urinary tract infection.    8. Recurrent major depressive disorder, in full remission (CMS-HCC)  Patient continues on citalopram which apparently has been helpful.    Current Outpatient Prescriptions   Medication Sig Dispense Refill   • sumatriptan (IMITREX) 100 MG tablet TAKE 1 TABLET BY MOUTH AT ONSET OF HEADACHE. MAY REPEAT IN 2 HOURS. MAX 2 TABLETS A DAY 9 Tab 11   • Misc. Devices Misc W/C stabilizer needs eval and possible replacement 1 Each 11   •  clindamycin-benzoyl peroxide (BENZACLIN) gel APPLY TO THE FACE EVERY DAY 50 g 11   • Diclofenac Sodium 1 % Gel APPLY 4 GRAMS TO KNEES AND 3 GRAMS TO ANKLES UP TO FOUR TIMES DAILY AS NEEDED 100 g 0   • tamsulosin (FLOMAX) 0.4 MG capsule Take 1 Cap by mouth every day. 90 Cap 11   • Misc. Devices Misc Please eval and fix electric W/C. Please eval also for W/C needs. 1 Each 11   • citalopram (CELEXA) 20 MG Tab TAKE 1 TABLET BY MOUTH EVERY DAY 90 Tab 3   • meloxicam (MOBIC) 15 MG tablet Take 1 Tab by mouth 1 time daily as needed. 90 Tab 3   • baclofen (LIORESAL) 10 MG Tab Take 1 Tab by mouth 3 times a day. 90 Tab 11   • Misc. Devices Misc Please allow patient to have support/therapy dog in appt. Regardless of weight due to multiple chronic illnesses and debility. 1 Each 11   • topiramate (TOPAMAX) 25 MG Tab Take 1 Tab by mouth 2 times a day. 60 Tab 11   • zolpidem (AMBIEN) 5 MG Tab TAKE 1 TABLET BY MOUTH EVERY NIGHT AT BEDTIME AS NEEDED 30 Tab 2   • tramadol (ULTRAM) 50 MG Tab TAKE 1 TO 2 TABLETS BY MOUTH EVERY 4 HOURS AS NEEDED FOR MODERATE PAIN 60 Tab 0   • promethazine (PHENERGAN) 25 MG Tab TAKE 1 TABLET BY MOUTH EVERY 6 HOURS AS NEEDED FOR NAUSEA AND VOMITING 30 Tab 0   • SUMAtriptan Succinate 6 MG/0.5ML Solution Auto-injector INJECT 0.5 MLS IN THE MUSCLE ONCE FOR 1 DOSE 4 mL 0   • baclofen (LIORESAL) 20 MG tablet Take 2 Tabs by mouth every 6 hours. 90 Tab 3   • lactulose 10 GM/15ML Solution Take 15 mL by mouth every four hours as needed.     • Calcium Carbonate Antacid (TUMS PO) Take  by mouth as needed.     • Multiple Vitamins-Minerals (MULTIVITAMIN GUMMIES ADULTS PO) Take 1 Tab by mouth every day at 6 PM.     • [] CASCARA SAGRADA PO Take 2 Tabs by mouth every bedtime.     • [] baclofen (LIORESAL) 10 MG Tab Take 20 mg by mouth every day at 6 PM. prn     • vitamin D (CHOLECALCIFEROL) 1000 UNIT Tab Take 1,000 Units by mouth 4 times a day.       No current facility-administered medications for this  "visit.     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 difficile colitis    • Other specified disorder of intestines      constipation   • Indigestion    • MEDICAL HOME    • Coma (CMS-HCC) 2008     Spontaneous -    • ADEM (acute disseminated encephalomyelitis)    • Renal disorder      kidney stones   • Pain    • Heart burn    • Back pain    • Psychiatric problem      depression and anxiety   • Breath shortness      since 2014   • Urinary incontinence        Review of Systems   Neurological: Positive for focal weakness.   Psychiatric/Behavioral: Positive for depression.   All other systems reviewed and are negative.         Objective:     /60 mmHg  Pulse 90  Temp(Src) 37 °C (98.6 °F)  Resp 16  Ht 1.702 m (5' 7.01\")  Wt 58.968 kg (130 lb)  BMI 20.36 kg/m2  SpO2 100%     Physical Exam   Constitutional: She is oriented to person, place, and time. She appears well-developed and well-nourished. No distress.   HENT:   Head: Normocephalic and atraumatic.   Right Ear: External ear normal.   Left Ear: External ear normal.   Nose: Nose normal.   Eyes: Right eye exhibits no discharge. Left eye exhibits no discharge.   Neck: Normal range of motion. Neck supple. No thyromegaly present.   Cardiovascular: Normal rate, regular rhythm and normal heart sounds.  Exam reveals no gallop and no friction rub.    No murmur heard.  Pulmonary/Chest: Effort normal and " breath sounds normal. She has no wheezes. She has no rales.   Musculoskeletal: She exhibits no edema or tenderness.   Contractures of the legs and spasticity of the upper and lower extremities.   Neurological: She is alert and oriented to person, place, and time. She displays normal reflexes.   Patient unable to speak and uses a BiPAP for communication. She is alert and orientated.   Skin: Skin is warm and dry. No rash noted. She is not diaphoretic.   Psychiatric: She has a normal mood and affect. Her behavior is normal. Judgment and thought content normal.   Nursing note and vitals reviewed.              Assessment/Plan:     1. Routine gynecological examination  Patient will be referred to gynecology for her routine GYN exam.  - REFERRAL TO GYNECOLOGY    2. Need for Tdap vaccination  I have placed the below orders and discussed them with an approved delegating provider. The MA is performing the below orders under the direction of Dr. Doss    - Tdap =>6yo IM    3. Migraine without aura and without status migrainosus, not intractable  I have refilled her Imitrex with the understanding she should not overuse the medication.  - sumatriptan (IMITREX) 100 MG tablet; TAKE 1 TABLET BY MOUTH AT ONSET OF HEADACHE. MAY REPEAT IN 2 HOURS. MAX 2 TABLETS A DAY  Dispense: 9 Tab; Refill: 11    4. Dysuria  Urine will be sent out for culture and a standing order was placed as needed.  - URINALYSIS,CULTURE IF INDICATED; Standing    5. MS (multiple sclerosis) (CMS-[facility])  Patient will get into physical therapy if possible to prevent further debilitation.  - REFERRAL TO PHYSICAL THERAPY Reason for Therapy: Eval/Treat/Report    6. Debility  Patient referred to physical therapy and wheelchair accessories or repair requested.  - REFERRAL TO PHYSICAL THERAPY Reason for Therapy: Eval/Treat/Report    7. Recurrent urinary tract infection  Prevention of UTI's teaching was provided including:    Wipe front to back to prevent e.coli  contamination. Increase fluid intake, especially water. Do not hold urine , void when feel need. Finally, be sure to void after intercourse.  - URINALYSIS,CULTURE IF INDICATED; Standing    8. Recurrent major depressive disorder, in full remission (CMS-[facility])  Patient satisfied with current medication.

## 2017-05-11 LAB
BACTERIA UR CULT: NORMAL
SIGNIFICANT IND 70042: NORMAL
SOURCE SOURCE: NORMAL

## 2017-06-14 ENCOUNTER — APPOINTMENT (OUTPATIENT)
Dept: RADIOLOGY | Facility: MEDICAL CENTER | Age: 32
End: 2017-06-14
Attending: EMERGENCY MEDICINE
Payer: MEDICARE

## 2017-06-14 ENCOUNTER — HOSPITAL ENCOUNTER (EMERGENCY)
Facility: MEDICAL CENTER | Age: 32
End: 2017-06-14
Attending: EMERGENCY MEDICINE
Payer: MEDICARE

## 2017-06-14 VITALS
HEIGHT: 67 IN | RESPIRATION RATE: 18 BRPM | WEIGHT: 130 LBS | OXYGEN SATURATION: 98 % | BODY MASS INDEX: 20.4 KG/M2 | DIASTOLIC BLOOD PRESSURE: 62 MMHG | SYSTOLIC BLOOD PRESSURE: 102 MMHG | HEART RATE: 107 BPM | TEMPERATURE: 99 F

## 2017-06-14 DIAGNOSIS — T07.XXXA MULTIPLE CONTUSIONS: ICD-10-CM

## 2017-06-14 PROCEDURE — 99284 EMERGENCY DEPT VISIT MOD MDM: CPT

## 2017-06-14 PROCEDURE — 700111 HCHG RX REV CODE 636 W/ 250 OVERRIDE (IP): Performed by: EMERGENCY MEDICINE

## 2017-06-14 PROCEDURE — 73590 X-RAY EXAM OF LOWER LEG: CPT | Mod: RT

## 2017-06-14 PROCEDURE — 73620 X-RAY EXAM OF FOOT: CPT | Mod: RT

## 2017-06-14 PROCEDURE — 73560 X-RAY EXAM OF KNEE 1 OR 2: CPT | Mod: RT

## 2017-06-14 PROCEDURE — 96375 TX/PRO/DX INJ NEW DRUG ADDON: CPT

## 2017-06-14 PROCEDURE — 700105 HCHG RX REV CODE 258: Performed by: EMERGENCY MEDICINE

## 2017-06-14 PROCEDURE — 96374 THER/PROPH/DIAG INJ IV PUSH: CPT

## 2017-06-14 PROCEDURE — 302128 INFUSION PUMP: Performed by: EMERGENCY MEDICINE

## 2017-06-14 PROCEDURE — 36415 COLL VENOUS BLD VENIPUNCTURE: CPT

## 2017-06-14 PROCEDURE — 96361 HYDRATE IV INFUSION ADD-ON: CPT

## 2017-06-14 PROCEDURE — 73600 X-RAY EXAM OF ANKLE: CPT | Mod: RT

## 2017-06-14 RX ORDER — SODIUM CHLORIDE 9 MG/ML
1000 INJECTION, SOLUTION INTRAVENOUS ONCE
Status: COMPLETED | OUTPATIENT
Start: 2017-06-14 | End: 2017-06-14

## 2017-06-14 RX ORDER — MORPHINE SULFATE 4 MG/ML
4 INJECTION, SOLUTION INTRAMUSCULAR; INTRAVENOUS ONCE
Status: COMPLETED | OUTPATIENT
Start: 2017-06-14 | End: 2017-06-14

## 2017-06-14 RX ORDER — ONDANSETRON 2 MG/ML
4 INJECTION INTRAMUSCULAR; INTRAVENOUS
Status: DISCONTINUED | OUTPATIENT
Start: 2017-06-14 | End: 2017-06-15 | Stop reason: HOSPADM

## 2017-06-14 RX ADMIN — ONDANSETRON 4 MG: 2 INJECTION INTRAMUSCULAR; INTRAVENOUS at 20:27

## 2017-06-14 RX ADMIN — MORPHINE SULFATE 4 MG: 4 INJECTION INTRAVENOUS at 20:27

## 2017-06-14 RX ADMIN — SODIUM CHLORIDE 1000 ML: 9 INJECTION, SOLUTION INTRAVENOUS at 20:26

## 2017-06-14 ASSESSMENT — LIFESTYLE VARIABLES: DO YOU DRINK ALCOHOL: NO

## 2017-06-14 ASSESSMENT — PAIN SCALES - WONG BAKER: WONGBAKER_NUMERICALRESPONSE: HURTS EVEN MORE

## 2017-06-14 NOTE — ED AVS SNAPSHOT
Home Care Instructions                                                                                                                Griselda Swanson   MRN: [id number]    Department:  [facility], Emergency Dept   Date of Visit:  6/14/2017            [facility], Emergency Dept    [address]    [place] [address]    Phone:  [phone]      You were seen by     Manish Stack M.D.      Your Diagnosis Was     Multiple contusions     T14.8       These are the medications you received during your hospitalization from 06/14/2017 1921 to 06/14/2017 2220     Date/Time Order Dose Route Action    06/14/2017 2027 morphine (pf) 4 mg/ml injection 4 mg 4 mg Intravenous Given    06/14/2017 2027 ondansetron (ZOFRAN) syringe/vial injection 4 mg 4 mg Intravenous Given    06/14/2017 2026 NS infusion 1,000 mL 1,000 mL Intravenous New Bag      Follow-up Information     1. Follow up with Your Physician.    Specialty:  Emergency Medicine    Why:  tomorrow as planned    Contact information    Varies        Medication Information     Review all of your home medications and newly ordered medications with your primary doctor and/or pharmacist as soon as possible. Follow medication instructions as directed by your doctor and/or pharmacist.     Please keep your complete medication list with you and share with your physician. Update the information when medications are discontinued, doses are changed, or new medications (including over-the-counter products) are added; and carry medication information at all times in the event of emergency situations.               Medication List      ASK your doctor about these medications        Instructions    Morning Afternoon Evening Bedtime    * baclofen 10 MG Tabs   Commonly known as:  LIORESAL   Ask about: Should I take this medication?        Take 20 mg by mouth every day at 6 PM. prn   Dose:  20 mg                        * baclofen 20 MG tablet   Commonly  known as:  LIORESAL        Take 2 Tabs by mouth every 6 hours.   Dose:  40 mg                        * baclofen 10 MG Tabs   Commonly known as:  LIORESAL        Take 1 Tab by mouth 3 times a day.   Dose:  10 mg                        CASCARA SAGRADA PO   Ask about: Should I take this medication?        Take 2 Tabs by mouth every bedtime.   Dose:  2 Tab                        citalopram 20 MG Tabs   Commonly known as:  CELEXA        TAKE 1 TABLET BY MOUTH EVERY DAY                        clindamycin-benzoyl peroxide gel   Commonly known as:  BENZACLIN        APPLY TO THE FACE EVERY DAY                        Diclofenac Sodium 1 % Gel        APPLY 4 GRAMS TO KNEE AND 3 GRAMS TO ANKLES UP TO FOUR TIMES DAILY AS NEEDED                        lactulose 10 GM/15ML Soln        Take 15 mL by mouth every four hours as needed.   Dose:  15 mL                        meloxicam 15 MG tablet   Commonly known as:  MOBIC        Take 1 Tab by mouth 1 time daily as needed.   Dose:  15 mg                        * Misc. Devices Misc        Please allow patient to have support/therapy dog in appt. Regardless of weight due to multiple chronic illnesses and debility.                        * Misc. Devices Misc        Please eval and fix electric W/C. Please eval also for W/C needs.                        * Misc. Devices Misc        W/C stabilizer needs eval and possible replacement                        MULTIVITAMIN GUMMIES ADULTS PO        Take 1 Tab by mouth every day at 6 PM.   Dose:  1 Tab                        promethazine 25 MG Tabs   Commonly known as:  PHENERGAN        TAKE 1 TABLET BY MOUTH EVERY 6 HOURS AS NEEDED FOR NAUSEA AND VOMITING                        * SUMAtriptan Succinate 6 MG/0.5ML Soaj        INJECT 0.5 MLS IN THE MUSCLE ONCE FOR 1 DOSE                        * sumatriptan 100 MG tablet   Commonly known as:  IMITREX        TAKE 1 TABLET BY MOUTH AT ONSET OF HEADACHE. MAY REPEAT IN 2 HOURS. MAX 2 TABLETS A DAY                          tamsulosin 0.4 MG capsule   Commonly known as:  FLOMAX        Take 1 Cap by mouth every day.   Dose:  0.4 mg                        topiramate 25 MG Tabs   Commonly known as:  TOPAMAX        Take 1 Tab by mouth 2 times a day.   Dose:  25 mg                        tramadol 50 MG Tabs   Commonly known as:  ULTRAM        TAKE 1 TO 2 TABLETS BY MOUTH EVERY 4 HOURS AS NEEDED FOR MODERATE PAIN                        TUMS PO        Take  by mouth as needed.                        vitamin D 1000 UNIT Tabs   Commonly known as:  cholecalciferol        Take 1,000 Units by mouth 4 times a day.   Dose:  1000 Units                        zolpidem 5 MG Tabs   Commonly known as:  AMBIEN        Doctor's comments:  We do not fill controlled substances for 90 day supply.   TAKE 1 TABLET BY MOUTH EVERY NIGHT AT BEDTIME AS NEEDED                        * Notice:  This list has 8 medication(s) that are the same as other medications prescribed for you. Read the directions carefully, and ask your doctor or other care provider to review them with you.            Procedures and tests performed during your visit     DX-ANKLE 2- VIEWS RIGHT    DX-FOOT-2- RIGHT    DX-KNEE 2- RIGHT    DX-TIBIA AND FIBULA RIGHT    INFUSION PUMP        Discharge Instructions       Take Ultram 100 mg every 6 hours and Tylenol 650 mg as needed for pain. Apply ice to the areas which are painful. Follow-up with ortho tomorrow as planned.          Patient Information     Patient Information    Following emergency treatment: all patient requiring follow-up care must return either to a private physician or a clinic if your condition worsens before you are able to obtain further medical attention, please return to the emergency room.     Billing Information    At [facility], we work to make the billing process streamlined for our patients.  Our Representatives are here to answer any questions you may have regarding your hospital bill.  If you have  insurance coverage and have supplied your insurance information to us, we will submit a claim to your insurer on your behalf.  Should you have any questions regarding your bill, we can be reached online or by phone as follows:  Online: You are able pay your bills online or live chat with our representatives about any billing questions you may have. We are here to help Monday - Friday from 8:00am to 7:30pm and 9:00am - 12:00pm on Saturdays.  Please visit [email or web address]  for more information.   Phone:  [phone] or [phone]    Please note that your emergency physician, surgeon, pathologist, radiologist, anesthesiologist, and other specialists are not employed by [facility] and will therefore bill separately for their services.  Please contact them directly for any questions concerning their bills at the numbers below:     Emergency Physician Services:  [phone]  [facility]:  [phone]  [facility]:  [phone]  [facility]:  [phone]    1. Your final bill may vary from the amount quoted upon discharge if all procedures are not complete at that time, or if your doctor has additional procedures of which we are not aware. You will receive an additional bill if you return to the Emergency Department at [facility] hospital for suture removal regardless of the facility of which the sutures were placed.     2. Please arrange for settlement of this account at the emergency registration.    3. All self-pay accounts are due in full at the time of treatment.  If you are unable to meet this obligation then payment is expected within 4-5 days.     4. If you have had radiology studies (CT, X-ray, Ultrasound, MRI), you have received a preliminary result during your emergency department visit. Please contact the radiology department [phone] to receive a copy of your final result. Please discuss the Final result with your  primary physician or with the follow up physician provided.     Crisis Hotline:  [place] Crisis Hotline:  0-337-IMTRYCV or 1-576.239.7514  [place] Crisis Hotline:    1-645.446.6862 or 883-625-2085         ED Discharge Follow Up Questions    1. In order to provide you with very good care, we would like to follow up with a phone call in the next few days.  May we have your permission to contact you?     YES /  NO    2. What is the best phone number to call you? (       )_____-__________    3. What is the best time to call you?      Morning  /  Afternoon  /  Evening                   Patient Signature:  ____________________________________________________________    Date:  ____________________________________________________________      Your appointments     Aug 10, 2017 11:00 AM   New Patient with Katherine Velazquez M.D.   [facility] ([address], [address]  [place] [address]   [phone]            Sep 25, 2017 10:00 AM   Established Patient with SELVIN Garcia   [address]    [address] [facility] [address]  [place] [address]   [phone]           You will be receiving a confirmation call a few days before your appointment from our automated call confirmation system.

## 2017-06-14 NOTE — ED AVS SNAPSHOT
6/14/2017    Griselda Diallochristos Doranreji  [address] [facility] [place] [address]    Dear Griselda:    [facility] wants to ensure your discharge home is safe and you or your loved ones have had all of your questions answered regarding your care after you leave the hospital.    Below is a list of resources and contact information should you have any questions regarding your hospital stay, follow-up instructions, or active medical symptoms.    Questions or Concerns Regarding… Contact   Medical Questions Related to Your Discharge  (7 days a week, 8am-5pm) Contact a Nurse Care Coordinator   [phone]   Medical Questions Not Related to Your Discharge  (24 hours a day / 7 days a week)  Contact the Nurse Health Line   [phone]    Medications or Discharge Instructions Refer to your discharge packet   or contact your [facility] Primary Care Provider   [phone]   Follow-up Appointment(s) Schedule your appointment via Taecanet   or contact Scheduling [phone]   Billing Review your statement via Taecanet  or contact Billing [phone]   Medical Records Review your records via Taecanet   or contact Medical Records [phone]     You may receive a telephone call within two days of discharge. This call is to make certain you understand your discharge instructions and have the opportunity to have any questions answered. You can also easily access your medical information, test results and upcoming appointments via the Taecanet free online health management tool. You can learn more and sign up at IroFit/Taecanet. For assistance setting up your Taecanet account, please call [phone].    Once again, we want to ensure your discharge home is safe and that you have a clear understanding of any next steps in your care. If you have any questions or concerns, please do not hesitate to contact us, we are here for you. Thank you for choosing [facility] for your healthcare needs.    Sincerely,    Your [facility] Healthcare Team

## 2017-06-14 NOTE — ED AVS SNAPSHOT
SeeChange Health Access Code: Activation code not generated  Current SeeChange Health Status: Active    LicenseMetricshart  A secure, online tool to manage your health information     [employer]’s SeeChange Health® is a secure, online tool that connects you to your personalized health information from the privacy of your home -- day or night - making it very easy for you to manage your healthcare. Once the activation process is completed, you can even access your medical information using the SeeChange Health mary anne, which is available for free in the Apple Mary [place] store or Google Play store.     SeeChange Health provides the following levels of access (as shown below):   My Chart Features   [facility] Primary Care Doctor [facility]  Specialists [facility]  Urgent  Care Non-[facility]  Primary Care  Doctor   Email your healthcare team securely and privately 24/7 X X X X   Manage appointments: schedule your next appointment; view details of past/upcoming appointments X      Request prescription refills. X      View recent personal medical records, including lab and immunizations X X X X   View health record, including health history, allergies, medications X X X X   Read reports about your outpatient visits, procedures, consult and ER notes X X X X   See your discharge summary, which is a recap of your hospital and/or ER visit that includes your diagnosis, lab results, and care plan. X X       How to register for SeeChange Health:  1. Go to  [email or web address].  2. Click on the Sign Up Now box, which takes you to the New Member Sign Up page. You will need to provide the following information:  a. Enter your SeeChange Health Access Code exactly as it appears at the top of this page. (You will not need to use this code after you’ve completed the sign-up process. If you do not sign up before the expiration date, you must request a new code.)   b. Enter your date of birth.   c. Enter your home email address.   d. Click Submit, and follow the next screen’s instructions.  3. Create a SeeChange Health ID. This will  be your Bonfire.[email or web address] login ID and cannot be changed, so think of one that is secure and easy to remember.  4. Create a Bonfire.com password. You can change your password at any time.  5. Enter your Password Reset Question and Answer. This can be used at a later time if you forget your password.   6. Enter your e-mail address. This allows you to receive e-mail notifications when new information is available in [email or web address].[email or web address].  7. Click Sign Up. You can now view your health information.    For assistance activating your Bonfire.com account, call [phone]

## 2017-06-15 NOTE — ED NOTES
Discharge instructions given to patient, a verbal understanding of all instructions was stated. IV removed, cathlon intact, site without s/s of infection. Pt preferred to walk out accompanied by caregiver. VSS, all belongings accounted for.
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
## 2017-06-15 NOTE — ED NOTES
Pt. Assisted on and off of bedpan. Caregiver at bedside. Pt. And caregiver updated on the plan of care for X-Ray results to be read. Call light within reach. Will continue to monitor.

## 2017-06-15 NOTE — DISCHARGE INSTRUCTIONS
Take Ultram 100 mg every 6 hours and Tylenol 650 mg as needed for pain. Apply ice to the areas which are painful. Follow-up with ortho tomorrow as planned.

## 2017-06-15 NOTE — ED NOTES
Assumed care of this pt. Labs drawn and sent to lab. Caregiver at bedside. Pt. And caregiver updated on the plan of care for ERP to see.

## 2017-06-15 NOTE — ED NOTES
Pt. Medicated per MAR. Pt. And caregiver updated on the plan of care for X-Rays. Call light within reach. Care giver at bedside. Will continue to monitor.

## 2017-06-21 RX ORDER — TRAMADOL HYDROCHLORIDE 50 MG/1
50 TABLET ORAL EVERY 4 HOURS PRN
Qty: 60 TAB | Refills: 2 | Status: SHIPPED
Start: 2017-06-21 | End: 2017-07-24 | Stop reason: SDUPTHER

## 2017-07-05 ENCOUNTER — TELEPHONE (OUTPATIENT)
Dept: MEDICAL GROUP | Facility: MEDICAL CENTER | Age: 32
End: 2017-07-05

## 2017-07-05 NOTE — TELEPHONE ENCOUNTER
1. Caller Name: Luda sister                      Call Back Number: [phone]    2. Message: States that Griselda is still having right knee pain. States had some xrays done on her knee when she was in the ER and were negative. Pt is still having pain, knee looks crooked, and bent she is requesting an MRI.     3. Patient approves office to leave a detailed voicemail/MyChart message: N\A

## 2017-07-05 NOTE — TELEPHONE ENCOUNTER
It may take a while to get approval on this and make an appointment so I suggested instead going to the [facility] urgent care where she can be seen right away and they can order MRI if they feel it is necessary.

## 2017-07-17 RX ORDER — ZOLPIDEM TARTRATE 5 MG/1
5 TABLET ORAL NIGHTLY PRN
Qty: 30 TAB | Refills: 3 | Status: SHIPPED | OUTPATIENT
Start: 2017-07-17 | End: 2018-02-08 | Stop reason: SDUPTHER

## 2017-07-21 ENCOUNTER — HOSPITAL ENCOUNTER (OUTPATIENT)
Dept: PHYSICAL THERAPY | Facility: REHABILITATION | Age: 32
End: 2017-07-21
Attending: NURSE PRACTITIONER
Payer: MEDICARE

## 2017-07-21 PROCEDURE — G8979 MOBILITY GOAL STATUS: HCPCS | Mod: CM

## 2017-07-21 PROCEDURE — 97163 PT EVAL HIGH COMPLEX 45 MIN: CPT

## 2017-07-21 PROCEDURE — G8978 MOBILITY CURRENT STATUS: HCPCS | Mod: CM

## 2017-07-21 PROCEDURE — G8980 MOBILITY D/C STATUS: HCPCS | Mod: CM

## 2017-07-24 RX ORDER — TRAMADOL HYDROCHLORIDE 50 MG/1
50 TABLET ORAL EVERY 4 HOURS PRN
Qty: 60 TAB | Refills: 4 | Status: SHIPPED
Start: 2017-07-24 | End: 2017-09-18 | Stop reason: SDUPTHER

## 2017-07-25 ENCOUNTER — APPOINTMENT (OUTPATIENT)
Dept: PHYSICAL THERAPY | Facility: REHABILITATION | Age: 32
End: 2017-07-25
Attending: NURSE PRACTITIONER
Payer: MEDICARE

## 2017-07-27 ENCOUNTER — APPOINTMENT (OUTPATIENT)
Dept: PHYSICAL THERAPY | Facility: REHABILITATION | Age: 32
End: 2017-07-27
Attending: NURSE PRACTITIONER
Payer: MEDICARE

## 2017-08-01 ENCOUNTER — APPOINTMENT (OUTPATIENT)
Dept: PHYSICAL THERAPY | Facility: REHABILITATION | Age: 32
End: 2017-08-01
Attending: NURSE PRACTITIONER
Payer: MEDICARE

## 2017-08-04 ENCOUNTER — APPOINTMENT (OUTPATIENT)
Dept: PHYSICAL THERAPY | Facility: REHABILITATION | Age: 32
End: 2017-08-04
Attending: NURSE PRACTITIONER
Payer: MEDICARE

## 2017-08-08 ENCOUNTER — APPOINTMENT (OUTPATIENT)
Dept: PHYSICAL THERAPY | Facility: REHABILITATION | Age: 32
End: 2017-08-08
Attending: NURSE PRACTITIONER
Payer: MEDICARE

## 2017-08-10 ENCOUNTER — APPOINTMENT (OUTPATIENT)
Dept: PHYSICAL THERAPY | Facility: REHABILITATION | Age: 32
End: 2017-08-10
Attending: NURSE PRACTITIONER
Payer: MEDICARE

## 2017-08-14 ENCOUNTER — TELEPHONE (OUTPATIENT)
Dept: MEDICAL GROUP | Facility: MEDICAL CENTER | Age: 32
End: 2017-08-14

## 2017-08-14 NOTE — TELEPHONE ENCOUNTER
This was an outpatient physical therapy referral. If she wanted through home health, please let me know and I will change it.

## 2017-08-14 NOTE — TELEPHONE ENCOUNTER
1. Caller Name: Faustina Patient Aunt                                         Call Back Number: [phone] (home)         Patient approves a detailed voicemail message: N\A    Faustina called for the patient she wanted to know if it was through home health to have someone come to the patients home to work with the patient with physical therapy, patient has a referral for physical therapy? Please advise

## 2017-08-14 NOTE — TELEPHONE ENCOUNTER
1. Caller Name: Faustina                      Call Back Number: [phone] (home)       2. Message: Patient needs a updated referral for home health care for PT, and also a wheelchair evaluation order due to her chair being over 5 yrs old     3. Patient approves office to leave a detailed voicemail/MyChart message: N\A

## 2017-08-15 ENCOUNTER — HOME HEALTH ADMISSION (OUTPATIENT)
Dept: HOME HEALTH SERVICES | Facility: HOME HEALTHCARE | Age: 32
End: 2017-08-15
Payer: MEDICARE

## 2017-08-15 ENCOUNTER — APPOINTMENT (OUTPATIENT)
Dept: PHYSICAL THERAPY | Facility: REHABILITATION | Age: 32
End: 2017-08-15
Attending: NURSE PRACTITIONER
Payer: MEDICARE

## 2017-08-15 DIAGNOSIS — G35 MS (MULTIPLE SCLEROSIS) (HCC): ICD-10-CM

## 2017-08-15 DIAGNOSIS — R53.81 DEBILITY: ICD-10-CM

## 2017-08-15 NOTE — TELEPHONE ENCOUNTER
patient needs to be referred to Occupational therapy  For the wheelchair evaluation, can you please place an order for this as well? Thank you

## 2017-08-15 NOTE — TELEPHONE ENCOUNTER
Faustina has been advised about the message below, I gave Faustina the phone number to [facility] at [phone], to call to set up appointment.

## 2017-08-15 NOTE — TELEPHONE ENCOUNTER
I thought that was done by physical therapy but I went ahead and put in occupational therapy referral through as well.

## 2017-08-16 ENCOUNTER — HOME CARE VISIT (OUTPATIENT)
Dept: HOME HEALTH SERVICES | Facility: HOME HEALTHCARE | Age: 32
End: 2017-08-16
Payer: MEDICARE

## 2017-08-16 VITALS
SYSTOLIC BLOOD PRESSURE: 98 MMHG | TEMPERATURE: 97.9 F | DIASTOLIC BLOOD PRESSURE: 68 MMHG | HEART RATE: 78 BPM | HEIGHT: 68 IN | RESPIRATION RATE: 16 BRPM

## 2017-08-16 PROCEDURE — 665998 HH PPS REVENUE CREDIT

## 2017-08-16 PROCEDURE — 665001 SOC-HOME HEALTH

## 2017-08-16 PROCEDURE — G0162 HHC RN E&M PLAN SVS, 15 MIN: HCPCS

## 2017-08-16 PROCEDURE — 665999 HH PPS REVENUE DEBIT

## 2017-08-16 SDOH — ECONOMIC STABILITY: HOUSING INSECURITY: UNSAFE APPLIANCES: 0

## 2017-08-16 SDOH — ECONOMIC STABILITY: HOUSING INSECURITY: UNSAFE COOKING RANGE AREA: 0

## 2017-08-16 ASSESSMENT — PATIENT HEALTH QUESTIONNAIRE - PHQ9
1. LITTLE INTEREST OR PLEASURE IN DOING THINGS: 00
2. FEELING DOWN, DEPRESSED, IRRITABLE, OR HOPELESS: 00

## 2017-08-16 ASSESSMENT — ACTIVITIES OF DAILY LIVING (ADL)
OASIS_M1830: 06
HOME_HEALTH_OASIS: 01

## 2017-08-16 ASSESSMENT — ENCOUNTER SYMPTOMS
VOMITING: DENIES
NAUSEA: DENIES

## 2017-08-17 PROCEDURE — 665999 HH PPS REVENUE DEBIT

## 2017-08-17 PROCEDURE — 665998 HH PPS REVENUE CREDIT

## 2017-08-18 ENCOUNTER — TELEPHONE (OUTPATIENT)
Dept: MEDICAL GROUP | Facility: MEDICAL CENTER | Age: 32
End: 2017-08-18

## 2017-08-18 ENCOUNTER — HOME CARE VISIT (OUTPATIENT)
Dept: HOME HEALTH SERVICES | Facility: HOME HEALTHCARE | Age: 32
End: 2017-08-18
Payer: MEDICARE

## 2017-08-18 VITALS — SYSTOLIC BLOOD PRESSURE: 98 MMHG | DIASTOLIC BLOOD PRESSURE: 60 MMHG | RESPIRATION RATE: 16 BRPM | TEMPERATURE: 98.1 F

## 2017-08-18 DIAGNOSIS — G35 MS (MULTIPLE SCLEROSIS) (HCC): ICD-10-CM

## 2017-08-18 PROCEDURE — 665999 HH PPS REVENUE DEBIT

## 2017-08-18 PROCEDURE — G0299 HHS/HOSPICE OF RN EA 15 MIN: HCPCS

## 2017-08-18 PROCEDURE — 665998 HH PPS REVENUE CREDIT

## 2017-08-18 SDOH — ECONOMIC STABILITY: HOUSING INSECURITY: UNSAFE COOKING RANGE AREA: 0

## 2017-08-18 SDOH — ECONOMIC STABILITY: HOUSING INSECURITY: UNSAFE APPLIANCES: 0

## 2017-08-18 ASSESSMENT — ENCOUNTER SYMPTOMS
NAUSEA: NONE NOTED
DEPRESSED MOOD: 1
VOMITING: NONE NOTED

## 2017-08-18 NOTE — TELEPHONE ENCOUNTER
1. Caller Name:   Babs,Patty                     Patient approves a detailed voicemail message: N\A    Patient care giver called she would like you to please a referral to speech therapy  To R for patient

## 2017-08-19 PROCEDURE — 665999 HH PPS REVENUE DEBIT

## 2017-08-19 PROCEDURE — 665998 HH PPS REVENUE CREDIT

## 2017-08-20 PROCEDURE — 665998 HH PPS REVENUE CREDIT

## 2017-08-20 PROCEDURE — 665999 HH PPS REVENUE DEBIT

## 2017-08-21 ENCOUNTER — HOME CARE VISIT (OUTPATIENT)
Dept: HOME HEALTH SERVICES | Facility: HOME HEALTHCARE | Age: 32
End: 2017-08-21
Payer: MEDICARE

## 2017-08-21 VITALS
SYSTOLIC BLOOD PRESSURE: 92 MMHG | TEMPERATURE: 98.2 F | HEART RATE: 82 BPM | RESPIRATION RATE: 16 BRPM | DIASTOLIC BLOOD PRESSURE: 60 MMHG

## 2017-08-21 PROCEDURE — 665998 HH PPS REVENUE CREDIT

## 2017-08-21 PROCEDURE — 665999 HH PPS REVENUE DEBIT

## 2017-08-21 PROCEDURE — G0151 HHCP-SERV OF PT,EA 15 MIN: HCPCS

## 2017-08-21 SDOH — ECONOMIC STABILITY: HOUSING INSECURITY: UNSAFE APPLIANCES: 0

## 2017-08-21 SDOH — ECONOMIC STABILITY: HOUSING INSECURITY: UNSAFE COOKING RANGE AREA: 0

## 2017-08-21 ASSESSMENT — ACTIVITIES OF DAILY LIVING (ADL)
MEAL_PREP_ASSISTANCE: 6
DRESSING_LB_ASSISTANCE: 6
DRESSING_UB_ASSISTANCE: 6
EATING_ASSISTANCE: 2
HOUSEKEEPING_ASSISTANCE: 6
TELEPHONE_ASSISTANCE: 6
BATHING_ASSISTANCE: 6
SHOPPING_ASSISTANCE: 6
TRANSPORTATION_ASSISTANCE: 6
EATING_ASSISTANCE: 1
TOILETING_ASSISTANCE: 6
LAUNDRY_ASSISTANCE: 6

## 2017-08-22 ENCOUNTER — HOME CARE VISIT (OUTPATIENT)
Dept: HOME HEALTH SERVICES | Facility: HOME HEALTHCARE | Age: 32
End: 2017-08-22
Payer: MEDICARE

## 2017-08-22 VITALS
SYSTOLIC BLOOD PRESSURE: 100 MMHG | RESPIRATION RATE: 16 BRPM | TEMPERATURE: 98.2 F | HEART RATE: 72 BPM | DIASTOLIC BLOOD PRESSURE: 68 MMHG

## 2017-08-22 PROCEDURE — 665999 HH PPS REVENUE DEBIT

## 2017-08-22 PROCEDURE — G0299 HHS/HOSPICE OF RN EA 15 MIN: HCPCS

## 2017-08-22 PROCEDURE — 665998 HH PPS REVENUE CREDIT

## 2017-08-22 SDOH — ECONOMIC STABILITY: HOUSING INSECURITY: HOME SAFETY: PTS HOME HAS BEEN ADAPTED FOR HER AND IS CLEAN AND UNCLUTTERED AND ACCESSIBLE FOR POWER W/C, HAS NO STAIRS

## 2017-08-22 ASSESSMENT — ACTIVITIES OF DAILY LIVING (ADL)
ORAL_CARE_ASSISTANCE: 6
GROOMING_ASSISTANCE: 6

## 2017-08-23 ENCOUNTER — TELEPHONE (OUTPATIENT)
Dept: MEDICAL GROUP | Facility: MEDICAL CENTER | Age: 32
End: 2017-08-23

## 2017-08-23 ENCOUNTER — HOME CARE VISIT (OUTPATIENT)
Dept: HOME HEALTH SERVICES | Facility: HOME HEALTHCARE | Age: 32
End: 2017-08-23
Payer: MEDICARE

## 2017-08-23 VITALS
RESPIRATION RATE: 16 BRPM | HEART RATE: 75 BPM | TEMPERATURE: 98.8 F | SYSTOLIC BLOOD PRESSURE: 96 MMHG | DIASTOLIC BLOOD PRESSURE: 58 MMHG

## 2017-08-23 PROCEDURE — 665998 HH PPS REVENUE CREDIT

## 2017-08-23 PROCEDURE — G0152 HHCP-SERV OF OT,EA 15 MIN: HCPCS

## 2017-08-23 PROCEDURE — 665999 HH PPS REVENUE DEBIT

## 2017-08-23 SDOH — ECONOMIC STABILITY: HOUSING INSECURITY: HOME SAFETY: RAMP TO ENTER/EXIT HOME. CLEAR HALLWAYS AND LIVING SPACES TO ALLOW FOR MOTORIZED W/C.

## 2017-08-23 ASSESSMENT — ACTIVITIES OF DAILY LIVING (ADL)
EATING_ASSISTANCE: 2
SHOPPING_ASSISTANCE: 6
HOUSEKEEPING_ASSISTANCE: 6
TRANSPORTATION_ASSISTANCE: 6
GROOMING_ASSISTANCE: 4
GROOMING_ASSISTANCE: 5
DRESSING_LB_ASSISTANCE: 6
LAUNDRY_ASSISTANCE: 6
TOILETING_ASSISTANCE: 6
DRESSING_UB_ASSISTANCE: 6
BATHING_ASSISTANCE: 6
TELEPHONE_ASSISTANCE: 6
ORAL_CARE_ASSISTANCE: 1
EATING_ASSISTANCE: 6
MEAL_PREP_ASSISTANCE: 6

## 2017-08-23 NOTE — TELEPHONE ENCOUNTER
1. Caller Name: Faustina patient aunt                                         Call Back Number:[phone]         Patient approves a detailed voicemail message: N\A    Faustina called for the patient she wanted to know if she could get a prescription for a wheelchair evaluation, Faustina also said that the patient gets home care physical therapy and wanted to know if that would effect her chances of getting the wheelchair evaluation? Please advise

## 2017-08-23 NOTE — TELEPHONE ENCOUNTER
I would suggest they first ask home physical therapy if they can do it. If they state it has to be done outpatient, then I can order a outpatient wheelchair evaluation.

## 2017-08-24 ENCOUNTER — HOME CARE VISIT (OUTPATIENT)
Dept: HOME HEALTH SERVICES | Facility: HOME HEALTHCARE | Age: 32
End: 2017-08-24
Payer: MEDICARE

## 2017-08-24 PROCEDURE — 665999 HH PPS REVENUE DEBIT

## 2017-08-24 PROCEDURE — G0180 MD CERTIFICATION HHA PATIENT: HCPCS | Performed by: INTERNAL MEDICINE

## 2017-08-24 PROCEDURE — 665998 HH PPS REVENUE CREDIT

## 2017-08-25 ENCOUNTER — HOME CARE VISIT (OUTPATIENT)
Dept: HOME HEALTH SERVICES | Facility: HOME HEALTHCARE | Age: 32
End: 2017-08-25
Payer: MEDICARE

## 2017-08-25 VITALS
RESPIRATION RATE: 16 BRPM | SYSTOLIC BLOOD PRESSURE: 98 MMHG | TEMPERATURE: 99.3 F | DIASTOLIC BLOOD PRESSURE: 62 MMHG | HEART RATE: 80 BPM

## 2017-08-25 VITALS
TEMPERATURE: 99.3 F | HEART RATE: 80 BPM | SYSTOLIC BLOOD PRESSURE: 98 MMHG | RESPIRATION RATE: 16 BRPM | DIASTOLIC BLOOD PRESSURE: 62 MMHG

## 2017-08-25 DIAGNOSIS — G35 MS (MULTIPLE SCLEROSIS) (HCC): ICD-10-CM

## 2017-08-25 PROCEDURE — G0155 HHCP-SVS OF CSW,EA 15 MIN: HCPCS

## 2017-08-25 PROCEDURE — G0151 HHCP-SERV OF PT,EA 15 MIN: HCPCS

## 2017-08-25 PROCEDURE — 665998 HH PPS REVENUE CREDIT

## 2017-08-25 PROCEDURE — 665999 HH PPS REVENUE DEBIT

## 2017-08-25 PROCEDURE — G0299 HHS/HOSPICE OF RN EA 15 MIN: HCPCS

## 2017-08-26 PROCEDURE — 665998 HH PPS REVENUE CREDIT

## 2017-08-26 PROCEDURE — 665999 HH PPS REVENUE DEBIT

## 2017-08-27 PROCEDURE — 665999 HH PPS REVENUE DEBIT

## 2017-08-27 PROCEDURE — 665998 HH PPS REVENUE CREDIT

## 2017-08-28 PROCEDURE — 665998 HH PPS REVENUE CREDIT

## 2017-08-28 PROCEDURE — 665999 HH PPS REVENUE DEBIT

## 2017-08-29 ENCOUNTER — HOME CARE VISIT (OUTPATIENT)
Dept: HOME HEALTH SERVICES | Facility: HOME HEALTHCARE | Age: 32
End: 2017-08-29
Payer: MEDICARE

## 2017-08-29 VITALS
HEART RATE: 83 BPM | TEMPERATURE: 99.4 F | DIASTOLIC BLOOD PRESSURE: 58 MMHG | SYSTOLIC BLOOD PRESSURE: 92 MMHG | OXYGEN SATURATION: 97 % | RESPIRATION RATE: 16 BRPM

## 2017-08-29 VITALS
OXYGEN SATURATION: 97 % | TEMPERATURE: 98.5 F | SYSTOLIC BLOOD PRESSURE: 92 MMHG | HEART RATE: 70 BPM | DIASTOLIC BLOOD PRESSURE: 60 MMHG | RESPIRATION RATE: 16 BRPM

## 2017-08-29 PROCEDURE — 665998 HH PPS REVENUE CREDIT

## 2017-08-29 PROCEDURE — G0153 HHCP-SVS OF S/L PATH,EA 15MN: HCPCS

## 2017-08-29 PROCEDURE — G0152 HHCP-SERV OF OT,EA 15 MIN: HCPCS

## 2017-08-29 PROCEDURE — G0151 HHCP-SERV OF PT,EA 15 MIN: HCPCS

## 2017-08-29 PROCEDURE — 665999 HH PPS REVENUE DEBIT

## 2017-08-30 PROCEDURE — 665999 HH PPS REVENUE DEBIT

## 2017-08-30 PROCEDURE — 665998 HH PPS REVENUE CREDIT

## 2017-08-31 ENCOUNTER — HOME CARE VISIT (OUTPATIENT)
Dept: HOME HEALTH SERVICES | Facility: HOME HEALTHCARE | Age: 32
End: 2017-08-31
Payer: MEDICARE

## 2017-08-31 VITALS
DIASTOLIC BLOOD PRESSURE: 56 MMHG | RESPIRATION RATE: 16 BRPM | OXYGEN SATURATION: 99 % | TEMPERATURE: 98.3 F | SYSTOLIC BLOOD PRESSURE: 98 MMHG | HEART RATE: 77 BPM

## 2017-08-31 PROCEDURE — G0151 HHCP-SERV OF PT,EA 15 MIN: HCPCS

## 2017-08-31 PROCEDURE — 665998 HH PPS REVENUE CREDIT

## 2017-08-31 PROCEDURE — 665999 HH PPS REVENUE DEBIT

## 2017-08-31 PROCEDURE — G0152 HHCP-SERV OF OT,EA 15 MIN: HCPCS

## 2017-08-31 ASSESSMENT — ENCOUNTER SYMPTOMS: DEBILITATING PAIN: 1

## 2017-09-01 ENCOUNTER — HOME CARE VISIT (OUTPATIENT)
Dept: HOME HEALTH SERVICES | Facility: HOME HEALTHCARE | Age: 32
End: 2017-09-01
Payer: MEDICARE

## 2017-09-01 VITALS
TEMPERATURE: 98.8 F | OXYGEN SATURATION: 99 % | DIASTOLIC BLOOD PRESSURE: 60 MMHG | SYSTOLIC BLOOD PRESSURE: 106 MMHG | RESPIRATION RATE: 16 BRPM | HEART RATE: 79 BPM

## 2017-09-01 PROCEDURE — 665998 HH PPS REVENUE CREDIT

## 2017-09-01 PROCEDURE — 665999 HH PPS REVENUE DEBIT

## 2017-09-01 PROCEDURE — G0153 HHCP-SVS OF S/L PATH,EA 15MN: HCPCS

## 2017-09-02 VITALS
HEART RATE: 83 BPM | DIASTOLIC BLOOD PRESSURE: 64 MMHG | SYSTOLIC BLOOD PRESSURE: 96 MMHG | RESPIRATION RATE: 16 BRPM | TEMPERATURE: 99.3 F

## 2017-09-02 PROCEDURE — 665998 HH PPS REVENUE CREDIT

## 2017-09-02 PROCEDURE — 665999 HH PPS REVENUE DEBIT

## 2017-09-03 PROCEDURE — 665999 HH PPS REVENUE DEBIT

## 2017-09-03 PROCEDURE — 665998 HH PPS REVENUE CREDIT

## 2017-09-04 ENCOUNTER — HOME CARE VISIT (OUTPATIENT)
Dept: HOME HEALTH SERVICES | Facility: HOME HEALTHCARE | Age: 32
End: 2017-09-04
Payer: MEDICARE

## 2017-09-04 VITALS
TEMPERATURE: 99.4 F | HEART RATE: 77 BPM | DIASTOLIC BLOOD PRESSURE: 64 MMHG | RESPIRATION RATE: 16 BRPM | SYSTOLIC BLOOD PRESSURE: 96 MMHG | OXYGEN SATURATION: 98 %

## 2017-09-04 PROCEDURE — 665999 HH PPS REVENUE DEBIT

## 2017-09-04 PROCEDURE — G0151 HHCP-SERV OF PT,EA 15 MIN: HCPCS

## 2017-09-04 PROCEDURE — 665998 HH PPS REVENUE CREDIT

## 2017-09-05 ENCOUNTER — OFFICE VISIT (OUTPATIENT)
Dept: NEUROLOGY | Facility: MEDICAL CENTER | Age: 32
End: 2017-09-05
Payer: MEDICARE

## 2017-09-05 ENCOUNTER — HOME CARE VISIT (OUTPATIENT)
Dept: HOME HEALTH SERVICES | Facility: HOME HEALTHCARE | Age: 32
End: 2017-09-05
Payer: MEDICARE

## 2017-09-05 VITALS
WEIGHT: 130 LBS | BODY MASS INDEX: 20.4 KG/M2 | HEART RATE: 85 BPM | DIASTOLIC BLOOD PRESSURE: 70 MMHG | RESPIRATION RATE: 16 BRPM | HEIGHT: 67 IN | TEMPERATURE: 98.3 F | SYSTOLIC BLOOD PRESSURE: 110 MMHG | OXYGEN SATURATION: 97 %

## 2017-09-05 DIAGNOSIS — G04.00 ADEM (ACUTE DISSEMINATED ENCEPHALOMYELITIS): ICD-10-CM

## 2017-09-05 DIAGNOSIS — G04.01 POSTINFECTIOUS ACUTE DISSEMINATED ENCEPHALITIS AND ENCEPHALOMYELITIS (POSTINFECTIOUS ADEM): ICD-10-CM

## 2017-09-05 PROCEDURE — 99214 OFFICE O/P EST MOD 30 MIN: CPT | Performed by: PSYCHIATRY & NEUROLOGY

## 2017-09-05 PROCEDURE — G0153 HHCP-SVS OF S/L PATH,EA 15MN: HCPCS

## 2017-09-05 PROCEDURE — 665998 HH PPS REVENUE CREDIT

## 2017-09-05 PROCEDURE — 665999 HH PPS REVENUE DEBIT

## 2017-09-05 RX ORDER — CYCLOBENZAPRINE HCL 10 MG
10 TABLET ORAL 3 TIMES DAILY PRN
Qty: 90 TAB | Refills: 5 | Status: SHIPPED | OUTPATIENT
Start: 2017-09-05 | End: 2019-04-19 | Stop reason: SDUPTHER

## 2017-09-06 ENCOUNTER — HOME CARE VISIT (OUTPATIENT)
Dept: HOME HEALTH SERVICES | Facility: HOME HEALTHCARE | Age: 32
End: 2017-09-06
Payer: MEDICARE

## 2017-09-06 VITALS
OXYGEN SATURATION: 98 % | HEART RATE: 77 BPM | DIASTOLIC BLOOD PRESSURE: 70 MMHG | RESPIRATION RATE: 16 BRPM | TEMPERATURE: 98.9 F | SYSTOLIC BLOOD PRESSURE: 100 MMHG

## 2017-09-06 PROCEDURE — 665998 HH PPS REVENUE CREDIT

## 2017-09-06 PROCEDURE — G0151 HHCP-SERV OF PT,EA 15 MIN: HCPCS

## 2017-09-06 PROCEDURE — 665999 HH PPS REVENUE DEBIT

## 2017-09-07 PROCEDURE — 665998 HH PPS REVENUE CREDIT

## 2017-09-07 PROCEDURE — 665999 HH PPS REVENUE DEBIT

## 2017-09-07 NOTE — ASSESSMENT & PLAN NOTE
Pt had a infection last year during a baclofen pump revision and had to have the pump removed after being Septic. Pt and caregivers have noticed a worsening of her leg spasticity which is preventing her from moving as well. Pt has been taking PO baclofen but it has not been enough to help her return to her previous functionality.

## 2017-09-08 ENCOUNTER — HOME CARE VISIT (OUTPATIENT)
Dept: HOME HEALTH SERVICES | Facility: HOME HEALTHCARE | Age: 32
End: 2017-09-08
Payer: MEDICARE

## 2017-09-08 PROCEDURE — 665998 HH PPS REVENUE CREDIT

## 2017-09-08 PROCEDURE — G0153 HHCP-SVS OF S/L PATH,EA 15MN: HCPCS

## 2017-09-08 PROCEDURE — 665999 HH PPS REVENUE DEBIT

## 2017-09-09 VITALS
TEMPERATURE: 99.1 F | RESPIRATION RATE: 16 BRPM | HEART RATE: 68 BPM | SYSTOLIC BLOOD PRESSURE: 92 MMHG | DIASTOLIC BLOOD PRESSURE: 58 MMHG

## 2017-09-09 PROCEDURE — 665998 HH PPS REVENUE CREDIT

## 2017-09-09 PROCEDURE — 665999 HH PPS REVENUE DEBIT

## 2017-09-10 PROCEDURE — 665998 HH PPS REVENUE CREDIT

## 2017-09-10 PROCEDURE — 665999 HH PPS REVENUE DEBIT

## 2017-09-11 PROCEDURE — 665998 HH PPS REVENUE CREDIT

## 2017-09-11 PROCEDURE — 665999 HH PPS REVENUE DEBIT

## 2017-09-12 ENCOUNTER — HOME CARE VISIT (OUTPATIENT)
Dept: HOME HEALTH SERVICES | Facility: HOME HEALTHCARE | Age: 32
End: 2017-09-12
Payer: MEDICARE

## 2017-09-12 PROCEDURE — 665999 HH PPS REVENUE DEBIT

## 2017-09-12 PROCEDURE — 665998 HH PPS REVENUE CREDIT

## 2017-09-12 PROCEDURE — G0153 HHCP-SVS OF S/L PATH,EA 15MN: HCPCS

## 2017-09-13 ENCOUNTER — HOME CARE VISIT (OUTPATIENT)
Dept: HOME HEALTH SERVICES | Facility: HOME HEALTHCARE | Age: 32
End: 2017-09-13
Payer: MEDICARE

## 2017-09-13 VITALS
TEMPERATURE: 98.4 F | OXYGEN SATURATION: 92 % | DIASTOLIC BLOOD PRESSURE: 62 MMHG | HEART RATE: 68 BPM | SYSTOLIC BLOOD PRESSURE: 92 MMHG | RESPIRATION RATE: 16 BRPM

## 2017-09-13 VITALS — TEMPERATURE: 98.1 F | HEART RATE: 75 BPM | RESPIRATION RATE: 16 BRPM

## 2017-09-13 PROCEDURE — 665998 HH PPS REVENUE CREDIT

## 2017-09-13 PROCEDURE — 665999 HH PPS REVENUE DEBIT

## 2017-09-13 PROCEDURE — G0151 HHCP-SERV OF PT,EA 15 MIN: HCPCS

## 2017-09-14 PROCEDURE — 665999 HH PPS REVENUE DEBIT

## 2017-09-14 PROCEDURE — 665998 HH PPS REVENUE CREDIT

## 2017-09-15 ENCOUNTER — HOME CARE VISIT (OUTPATIENT)
Dept: HOME HEALTH SERVICES | Facility: HOME HEALTHCARE | Age: 32
End: 2017-09-15
Payer: MEDICARE

## 2017-09-15 PROCEDURE — 665999 HH PPS REVENUE DEBIT

## 2017-09-15 PROCEDURE — 665998 HH PPS REVENUE CREDIT

## 2017-09-15 PROCEDURE — G0153 HHCP-SVS OF S/L PATH,EA 15MN: HCPCS

## 2017-09-16 PROCEDURE — 665998 HH PPS REVENUE CREDIT

## 2017-09-16 PROCEDURE — 665999 HH PPS REVENUE DEBIT

## 2017-09-17 PROCEDURE — 665998 HH PPS REVENUE CREDIT

## 2017-09-17 PROCEDURE — 665999 HH PPS REVENUE DEBIT

## 2017-09-18 VITALS
TEMPERATURE: 99.3 F | DIASTOLIC BLOOD PRESSURE: 56 MMHG | RESPIRATION RATE: 16 BRPM | SYSTOLIC BLOOD PRESSURE: 90 MMHG | HEART RATE: 82 BPM

## 2017-09-18 PROCEDURE — 665998 HH PPS REVENUE CREDIT

## 2017-09-18 PROCEDURE — 665999 HH PPS REVENUE DEBIT

## 2017-09-18 RX ORDER — TRAMADOL HYDROCHLORIDE 50 MG/1
50 TABLET ORAL EVERY 4 HOURS PRN
Qty: 60 TAB | Refills: 1 | Status: SHIPPED
Start: 2017-09-18 | End: 2017-10-12 | Stop reason: SDUPTHER

## 2017-09-19 PROCEDURE — 665998 HH PPS REVENUE CREDIT

## 2017-09-19 PROCEDURE — 665999 HH PPS REVENUE DEBIT

## 2017-09-20 ENCOUNTER — TELEPHONE (OUTPATIENT)
Dept: MEDICAL GROUP | Facility: MEDICAL CENTER | Age: 32
End: 2017-09-20

## 2017-09-20 ENCOUNTER — HOME CARE VISIT (OUTPATIENT)
Dept: HOME HEALTH SERVICES | Facility: HOME HEALTHCARE | Age: 32
End: 2017-09-20
Payer: MEDICARE

## 2017-09-20 VITALS
TEMPERATURE: 98.3 F | RESPIRATION RATE: 16 BRPM | HEART RATE: 60 BPM | SYSTOLIC BLOOD PRESSURE: 106 MMHG | OXYGEN SATURATION: 93 % | DIASTOLIC BLOOD PRESSURE: 70 MMHG

## 2017-09-20 PROCEDURE — 665997 HH PPS REVENUE ADJ

## 2017-09-20 PROCEDURE — 665999 HH PPS REVENUE DEBIT

## 2017-09-20 PROCEDURE — G0151 HHCP-SERV OF PT,EA 15 MIN: HCPCS

## 2017-09-20 PROCEDURE — 665998 HH PPS REVENUE CREDIT

## 2017-09-20 SDOH — ECONOMIC STABILITY: HOUSING INSECURITY: UNSAFE APPLIANCES: 0

## 2017-09-20 SDOH — ECONOMIC STABILITY: HOUSING INSECURITY: HOME SAFETY: PT HAS ACCESSIBLE HOME FOR POWER CHAIR AND HAS FORMAL CAREGIVERS THROUGHOUT THE DAY AND AT NIGHT.

## 2017-09-20 SDOH — ECONOMIC STABILITY: HOUSING INSECURITY: UNSAFE COOKING RANGE AREA: 0

## 2017-09-20 ASSESSMENT — ACTIVITIES OF DAILY LIVING (ADL)
HOME_HEALTH_OASIS: 01
OASIS_M1830: 06

## 2017-09-21 PROCEDURE — 665998 HH PPS REVENUE CREDIT

## 2017-09-21 PROCEDURE — 665999 HH PPS REVENUE DEBIT

## 2017-09-22 PROCEDURE — 665998 HH PPS REVENUE CREDIT

## 2017-09-22 PROCEDURE — 665999 HH PPS REVENUE DEBIT

## 2017-09-23 PROCEDURE — 665999 HH PPS REVENUE DEBIT

## 2017-09-23 PROCEDURE — 665998 HH PPS REVENUE CREDIT

## 2017-09-24 PROCEDURE — 665999 HH PPS REVENUE DEBIT

## 2017-09-24 PROCEDURE — 665998 HH PPS REVENUE CREDIT

## 2017-09-25 PROCEDURE — 665998 HH PPS REVENUE CREDIT

## 2017-09-25 PROCEDURE — 665999 HH PPS REVENUE DEBIT

## 2017-09-26 ENCOUNTER — OFFICE VISIT (OUTPATIENT)
Dept: MEDICAL GROUP | Facility: MEDICAL CENTER | Age: 32
End: 2017-09-26
Payer: MEDICARE

## 2017-09-26 VITALS
RESPIRATION RATE: 16 BRPM | DIASTOLIC BLOOD PRESSURE: 58 MMHG | SYSTOLIC BLOOD PRESSURE: 98 MMHG | TEMPERATURE: 97.9 F | OXYGEN SATURATION: 98 % | HEART RATE: 88 BPM | HEIGHT: 67 IN

## 2017-09-26 DIAGNOSIS — R25.2 SPASTICITY: ICD-10-CM

## 2017-09-26 DIAGNOSIS — M79.672 LEFT FOOT PAIN: ICD-10-CM

## 2017-09-26 DIAGNOSIS — G04.00 ADEM (ACUTE DISSEMINATED ENCEPHALOMYELITIS): ICD-10-CM

## 2017-09-26 DIAGNOSIS — G89.4 CHRONIC PAIN SYNDROME: ICD-10-CM

## 2017-09-26 DIAGNOSIS — Z23 INFLUENZA VACCINE NEEDED: ICD-10-CM

## 2017-09-26 PROCEDURE — 665998 HH PPS REVENUE CREDIT

## 2017-09-26 PROCEDURE — 99213 OFFICE O/P EST LOW 20 MIN: CPT | Mod: 25 | Performed by: NURSE PRACTITIONER

## 2017-09-26 PROCEDURE — G0008 ADMIN INFLUENZA VIRUS VAC: HCPCS | Performed by: NURSE PRACTITIONER

## 2017-09-26 PROCEDURE — 665999 HH PPS REVENUE DEBIT

## 2017-09-26 PROCEDURE — 90686 IIV4 VACC NO PRSV 0.5 ML IM: CPT | Performed by: NURSE PRACTITIONER

## 2017-09-26 ASSESSMENT — ENCOUNTER SYMPTOMS
MYALGIAS: 1
DEPRESSION: 1
FOCAL WEAKNESS: 1

## 2017-09-26 NOTE — PROGRESS NOTES
Subjective:      Griselda Swanson is a 32 y.o. female who presents with Follow-Up            HPI Griselda Evangelista here today accompanied by her service animal and home health aide for new problem with left foot pain as well as regular follow-up.      1. ADEM (acute disseminated encephalomyelitis)  Patient follows with neurology on a regular basis. Patient has been having more problems with spasticity and Flexeril was added to her baclofen and this seems to be helping. She continues to be wheelchair bound and needs full assistance.    2. Spasticity  Patient reports spasticity has been less of a problem with the addition of the second muscle relaxer. She has been reluctant to follow up with Dr. Vega for baclofen pump because of prior experience with sepsis after failed pump.    3. Chronic pain syndrome  Patient currently using tramadol which she has been getting through this office and goes through 60 tablets per month.    4. Left foot pain  Patient reports that she has been having pain in her left foot for which she would like to see a podiatrist. She has deformity of the foot due to her spasticity and contractures which has contributed to pain at the hallux valgus.    5. Influenza vaccine needed  Patient due for yearly vaccine.  Past Medical History:   Diagnosis Date   • Coma (CMS-HCC) August 2009    1 month, lesions on brain,  unknown etiology   • Coma (CMS-HCC) 2008    Spontaneous -    • ADEM (acute disseminated encephalomyelitis)    • Back pain    • Breath shortness     since 2014   • C. difficile colitis    • Demyelinating disorder (CMS-HCC)     demyelinating encephpomyeltis    • Encephalitis    • Heart burn    • Indigestion    • Lesion of brain     unknown cuase   • MEDICAL HOME    • Migraines    • MS (multiple sclerosis) (CMS-HCC)    • Other specified disorder of intestines     constipation   • Pain    • Psychiatric problem     depression and anxiety   • Renal disorder     kidney stones   • Urinary  incontinence      Current Outpatient Prescriptions   Medication Sig Dispense Refill   • tramadol (ULTRAM) 50 MG Tab Take 1 Tab by mouth every four hours as needed for Moderate Pain. 60 Tab 1   • cyclobenzaprine (FLEXERIL) 10 MG Tab Take 1 Tab by mouth 3 times a day as needed for Muscle Spasms. 90 Tab 5   • simethicone (MYLICON) 125 MG chewable tablet Take 125 mg by mouth 4 times a day as needed. Indications: Gas     • Biotin w/ Vitamins C & E (HAIR SKIN & NAILS GUMMIES PO) Take 2 Each by mouth every day. Indications: supplement     • zolpidem (AMBIEN) 5 MG Tab Take 1 Tab by mouth at bedtime as needed for Sleep. 30 Tab 3   • Diclofenac Sodium 1 % Gel APPLY 4 GRAMS TO KNEE AND 3 GRAMS TO ANKLES UP TO FOUR TIMES DAILY AS NEEDED 100 g 11   • sumatriptan (IMITREX) 100 MG tablet TAKE 1 TABLET BY MOUTH AT ONSET OF HEADACHE. MAY REPEAT IN 2 HOURS. MAX 2 TABLETS A DAY 9 Tab 11   • Misc. Devices Misc W/C stabilizer needs eval and possible replacement 1 Each 11   • clindamycin-benzoyl peroxide (BENZACLIN) gel APPLY TO THE FACE EVERY DAY 50 g 11   • tamsulosin (FLOMAX) 0.4 MG capsule Take 1 Cap by mouth every day. 90 Cap 11   • Misc. Devices Misc Please eval and fix electric W/C. Please eval also for W/C needs. 1 Each 11   • citalopram (CELEXA) 20 MG Tab TAKE 1 TABLET BY MOUTH EVERY DAY 90 Tab 3   • meloxicam (MOBIC) 15 MG tablet Take 1 Tab by mouth 1 time daily as needed. 90 Tab 3   • Misc. Devices Misc Please allow patient to have support/therapy dog in appt. Regardless of weight due to multiple chronic illnesses and debility. 1 Each 11   • topiramate (TOPAMAX) 25 MG Tab Take 1 Tab by mouth 2 times a day. 60 Tab 11   • SUMAtriptan Succinate 6 MG/0.5ML Solution Auto-injector INJECT 0.5 MLS IN THE MUSCLE ONCE FOR 1 DOSE 4 mL 0   • lactulose 10 GM/15ML Solution Take 15-30 mL by mouth every four hours as needed. Indications: Chronic Constipation     • Calcium Carbonate Antacid (TUMS PO) Take 1 tablet by mouth as needed.  "Indications: heartburn     • Multiple Vitamins-Minerals (MULTIVITAMIN GUMMIES ADULTS PO) Take 1 Tab by mouth every day. Indications: vitamin supplement.     • vitamin D (CHOLECALCIFEROL) 1000 UNIT Tab Take 1,000 Units by mouth 4 times a day. Indications: supplement     • baclofen (LIORESAL) 20 MG tablet Take 2 Tabs by mouth every 6 hours. (Patient taking differently: Take 20 mg by mouth 3 times a day.) 90 Tab 3     No current facility-administered medications for this visit.      Social History   Substance Use Topics   • Smoking status: Former Smoker     Packs/day: 1.00     Years: 5.00     Types: Cigarettes     Quit date: 1/1/2008   • Smokeless tobacco: Never Used   • Alcohol use No     Family History   Problem Relation Age of Onset   • Cancer Mother      Sarcoma   • Diabetes         Review of Systems   Musculoskeletal: Positive for myalgias.   Neurological: Positive for focal weakness.   Psychiatric/Behavioral: Positive for depression.   All other systems reviewed and are negative.         Objective:     BP (!) 98/58   Pulse 88   Temp 36.6 °C (97.9 °F)   Resp 16   Ht 1.702 m (5' 7\")   SpO2 98%      Physical Exam   Constitutional: She is oriented to person, place, and time. She appears well-developed and well-nourished. No distress.   HENT:   Head: Normocephalic and atraumatic.   Right Ear: External ear normal.   Left Ear: External ear normal.   Nose: Nose normal.   Eyes: Right eye exhibits no discharge. Left eye exhibits no discharge.   Neck: Normal range of motion. Neck supple. No thyromegaly present.   Cardiovascular: Normal rate, regular rhythm and normal heart sounds.  Exam reveals no gallop and no friction rub.    No murmur heard.  Pulmonary/Chest: Effort normal and breath sounds normal. She has no wheezes. She has no rales.   Musculoskeletal: She exhibits no edema or tenderness.   Deformity of the feet and there is overlapping of the toes of the left foot. There is irritation at the hallux valgus. "   Neurological: She is alert and oriented to person, place, and time. She displays normal reflexes.   Dysarthria, spastic quadriplegia and bilateral hand and foot contractions.   Skin: Skin is warm and dry. No rash noted. She is not diaphoretic.   Psychiatric: She has a normal mood and affect. Her behavior is normal. Judgment and thought content normal.   Nursing note and vitals reviewed.              Assessment/Plan:     1. ADEM (acute disseminated encephalomyelitis)  Patient will continue to follow with neurology.  - COMP METABOLIC PANEL; Future    2. Spasticity  Patient feels this has improved with the addition of the Flexeril with her baclofen. She is reluctant to go for a new baclofen pump.  - TSH; Future    3. Chronic pain syndrome  I will continue to fill her tramadol as needed and reminded her she needs to be seen every 6 months. She does not wish to go to pain management at this point.    4. Left foot pain  Patient will be referred to podiatry to see if surgery or other options are needed for this problem.  - REFERRAL TO PODIATRY    5. Influenza vaccine needed  I have placed the below orders and discussed them with an approved delegating provider. The MA is performing the below orders under the direction of Dr. Doss    - Flu Quad Inj >3 Year Pre-Filled PF

## 2017-09-27 PROCEDURE — 665999 HH PPS REVENUE DEBIT

## 2017-09-27 PROCEDURE — 665998 HH PPS REVENUE CREDIT

## 2017-09-28 PROCEDURE — 665998 HH PPS REVENUE CREDIT

## 2017-09-28 PROCEDURE — 665999 HH PPS REVENUE DEBIT

## 2017-09-29 PROCEDURE — 665998 HH PPS REVENUE CREDIT

## 2017-09-29 PROCEDURE — 665999 HH PPS REVENUE DEBIT

## 2017-09-30 PROCEDURE — 665999 HH PPS REVENUE DEBIT

## 2017-09-30 PROCEDURE — 665998 HH PPS REVENUE CREDIT

## 2017-10-01 PROCEDURE — 665999 HH PPS REVENUE DEBIT

## 2017-10-01 PROCEDURE — 665998 HH PPS REVENUE CREDIT

## 2017-10-02 PROCEDURE — 665998 HH PPS REVENUE CREDIT

## 2017-10-02 PROCEDURE — 665999 HH PPS REVENUE DEBIT

## 2017-10-03 PROCEDURE — 665999 HH PPS REVENUE DEBIT

## 2017-10-03 PROCEDURE — 665998 HH PPS REVENUE CREDIT

## 2017-10-04 PROCEDURE — 665999 HH PPS REVENUE DEBIT

## 2017-10-04 PROCEDURE — 665998 HH PPS REVENUE CREDIT

## 2017-10-05 PROCEDURE — 665999 HH PPS REVENUE DEBIT

## 2017-10-05 PROCEDURE — 665998 HH PPS REVENUE CREDIT

## 2017-10-06 PROCEDURE — 665998 HH PPS REVENUE CREDIT

## 2017-10-06 PROCEDURE — 665999 HH PPS REVENUE DEBIT

## 2017-10-07 PROCEDURE — 665998 HH PPS REVENUE CREDIT

## 2017-10-07 PROCEDURE — 665999 HH PPS REVENUE DEBIT

## 2017-10-08 PROCEDURE — 665999 HH PPS REVENUE DEBIT

## 2017-10-08 PROCEDURE — 665998 HH PPS REVENUE CREDIT

## 2017-10-09 ENCOUNTER — TELEPHONE (OUTPATIENT)
Dept: MEDICAL GROUP | Facility: MEDICAL CENTER | Age: 32
End: 2017-10-09

## 2017-10-09 DIAGNOSIS — G04.00 ADEM (ACUTE DISSEMINATED ENCEPHALOMYELITIS): ICD-10-CM

## 2017-10-09 DIAGNOSIS — R25.2 SPASTICITY: ICD-10-CM

## 2017-10-09 DIAGNOSIS — G35 MS (MULTIPLE SCLEROSIS) (HCC): ICD-10-CM

## 2017-10-09 NOTE — TELEPHONE ENCOUNTER
1. Caller Name: [facility]                                         Call Back Number: N/A      Patient approves a detailed voicemail message: N\A    2. SPECIFIC Action To Be Taken: Referral pending, please sign.    3. Diagnosis/Clinical Reason for Request: MS    4. Specialty & Provider Name/Lab/Imaging Location: Liana Duke    5. Is appointment scheduled for requested order/referral: no    [facility] PT would like patient to see an outpatient Physical Therapist, Liana Duke.

## 2017-10-12 RX ORDER — TRAMADOL HYDROCHLORIDE 50 MG/1
50 TABLET ORAL EVERY 4 HOURS PRN
Qty: 60 TAB | Refills: 5 | Status: SHIPPED
Start: 2017-10-12 | End: 2018-01-23 | Stop reason: SDUPTHER

## 2017-12-28 RX ORDER — LACTULOSE 10 G/15ML
SOLUTION ORAL; RECTAL
Qty: 16200 ML | Refills: 1 | Status: SHIPPED | OUTPATIENT
Start: 2017-12-28 | End: 2018-04-12

## 2017-12-28 RX ORDER — LACTULOSE 10 G/15ML
15-30 SOLUTION ORAL; RECTAL EVERY 4 HOURS PRN
Qty: 2700 ML | Refills: 1 | Status: SHIPPED | OUTPATIENT
Start: 2017-12-28 | End: 2017-12-28 | Stop reason: SDUPTHER

## 2018-01-09 ENCOUNTER — OFFICE VISIT (OUTPATIENT)
Dept: NEUROLOGY | Facility: MEDICAL CENTER | Age: 33
End: 2018-01-09
Payer: MEDICARE

## 2018-01-09 VITALS
BODY MASS INDEX: 20.4 KG/M2 | SYSTOLIC BLOOD PRESSURE: 96 MMHG | WEIGHT: 130 LBS | HEART RATE: 40 BPM | OXYGEN SATURATION: 91 % | HEIGHT: 67 IN | TEMPERATURE: 98.3 F | DIASTOLIC BLOOD PRESSURE: 64 MMHG

## 2018-01-09 DIAGNOSIS — R25.2 SPASTICITY: ICD-10-CM

## 2018-01-09 PROCEDURE — 99214 OFFICE O/P EST MOD 30 MIN: CPT | Performed by: PSYCHIATRY & NEUROLOGY

## 2018-01-09 RX ORDER — BACLOFEN 10 MG/1
30 TABLET ORAL 3 TIMES DAILY
Qty: 180 TAB | Refills: 11 | Status: SHIPPED | OUTPATIENT
Start: 2018-01-09 | End: 2019-04-19 | Stop reason: SDUPTHER

## 2018-01-10 NOTE — PROGRESS NOTES
CHIEF COMPLAINT  Chief Complaint   Patient presents with   • Follow-Up     MS and postinfectious disseminated encephalitis       HPI  Griselda Swanson is a 30 y.o. female who presents for treatment of her neurologic problems and an evaluation recent events.    Spasticity  Pt is going to have surgery on her feet. Pt still has a lot of spasticity.    REVIEW OF SYSTEMS  Pertinent Positives:muscle spasticity, dysarthria, constipation, urinary retention   All other systems are negative.     PAST MEDICAL HISTORY  Past Medical History:   asdfasdfads Date   • ADEM (acute disseminated encephalomyelitis)    • Back pain    • Breath shortness     since 2014   • C. difficile colitis    • Coma (CMS-HCC) August 2009    1 month, lesions on brain,  unknown etiology   • Coma (CMS-HCC) 2008    Spontaneous -    • Demyelinating disorder (CMS-HCC)     demyelinating encephpomyeltis    • Encephalitis    • Heart burn    • Indigestion    • Lesion of brain     unknown cuase   • MEDICAL HOME    • Migraines    • MS (multiple sclerosis) (CMS-HCC)    • Other specified disorder of intestines     constipation   • Pain    • Psychiatric problem     depression and anxiety   • Renal disorder     kidney stones   • Urinary incontinence        SOCIAL HISTORY  Social History     Social History   • Marital status: Single     Spouse name: N/A   • Number of children: N/A   • Years of education: N/A     Occupational History   • Not on file.     Social History Main Topics   • Smoking status: Former Smoker     Packs/day: 1.00     Years: 5.00     Types: Cigarettes     Quit date: 1/1/2008   • Smokeless tobacco: Never Used   • Alcohol use No   • Drug use: No   • Sexual activity: Not Currently     Other Topics Concern   • Not on file     Social History Narrative   • No narrative on file       SURGICAL HISTORY  Past Surgical History:   Procedure Laterality Date   • CATH PLACE PERM EPIDURAL  3/6/2012    Performed by PARI ROBERSON at [facility]   •  CATH PLACE PERM EPIDURAL  6/26/2012    Performed by PARI ROBERSON at [facility]   • CATH PLACE PERM EPIDURAL N/A 3/8/2016    Procedure: BACLOFEN PUMP TRIAL;  Surgeon: Pari Roberson M.D.;  Location: [facility];  Service:    • CATH PLACE PERM EPIDURAL Left 6/14/2016    Procedure: CATH PLACE PERM EPIDURAL - BACLOFEN PUMP AND CATHETER REPLACEMENT  - BACK AND ABDOMEN;  Surgeon: Pari Roberson M.D.;  Location: [facility];  Service:    • MAMMOPLASTY AUGMENTATION  2002   • VT BACLOFEN INTRATHECAL TRIAL  3/8/2016    Dr. Roberson  [facility]   • PUMP INSERT/REMOVE  3/12/2013    Performed by Pari Roberson M.D. at [facility]   • PUMP INSERT/REMOVE Left 7/1/2016    Procedure: PUMP REMOVAL;  Surgeon: Pari Roberson M.D.;  Location: [facility];  Service:    • PUMP REVISION  10/8/2013    Performed by Pari Roberson M.D. at [facility]   • TONSILLECTOMY         CURRENT MEDICATIONS  Current Outpatient Prescriptions   Medication Sig Dispense Refill   • baclofen (LIORESAL) 10 MG Tab Take 3 Tabs by mouth 3 times a day. 180 Tab 11   • tramadol (ULTRAM) 50 MG Tab Take 1 Tab by mouth every four hours as needed for Moderate Pain. 60 Tab 5   • cyclobenzaprine (FLEXERIL) 10 MG Tab Take 1 Tab by mouth 3 times a day as needed for Muscle Spasms. 90 Tab 5   • simethicone (MYLICON) 125 MG chewable tablet Take 125 mg by mouth 4 times a day as needed. Indications: Gas     • Biotin w/ Vitamins C & E (HAIR SKIN & NAILS GUMMIES PO) Take 2 Each by mouth every day. Indications: supplement     • zolpidem (AMBIEN) 5 MG Tab Take 1 Tab by mouth at bedtime as needed for Sleep. 30 Tab 3   • Diclofenac Sodium 1 % Gel APPLY 4 GRAMS TO KNEE AND 3 GRAMS TO ANKLES UP TO FOUR TIMES DAILY AS NEEDED 100 g 11   • sumatriptan (IMITREX) 100 MG tablet TAKE 1 TABLET BY MOUTH AT ONSET OF HEADACHE. MAY REPEAT IN 2 HOURS. MAX 2 TABLETS A DAY 9 Tab 11   • Misc. Devices Misc W/C  "stabilizer needs eval and possible replacement 1 Each 11   • clindamycin-benzoyl peroxide (BENZACLIN) gel APPLY TO THE FACE EVERY DAY 50 g 11   • tamsulosin (FLOMAX) 0.4 MG capsule Take 1 Cap by mouth every day. 90 Cap 11   • Misc. Devices Misc Please eval and fix electric W/C. Please eval also for W/C needs. 1 Each 11   • citalopram (CELEXA) 20 MG Tab TAKE 1 TABLET BY MOUTH EVERY DAY 90 Tab 3   • meloxicam (MOBIC) 15 MG tablet Take 1 Tab by mouth 1 time daily as needed. 90 Tab 3   • Misc. Devices Misc Please allow patient to have support/therapy dog in appt. Regardless of weight due to multiple chronic illnesses and debility. 1 Each 11   • topiramate (TOPAMAX) 25 MG Tab Take 1 Tab by mouth 2 times a day. 60 Tab 11   • SUMAtriptan Succinate 6 MG/0.5ML Solution Auto-injector INJECT 0.5 MLS IN THE MUSCLE ONCE FOR 1 DOSE 4 mL 0   • Calcium Carbonate Antacid (TUMS PO) Take 1 tablet by mouth as needed. Indications: heartburn     • Multiple Vitamins-Minerals (MULTIVITAMIN GUMMIES ADULTS PO) Take 1 Tab by mouth every day. Indications: vitamin supplement.     • vitamin D (CHOLECALCIFEROL) 1000 UNIT Tab Take 1,000 Units by mouth 4 times a day. Indications: supplement     • GENERLAC 10 GM/15ML Solution TAKE 15 TO 30 ML BY MOUTH EVERY 4 HOURS AS NEEDED 72334 mL 1     No current facility-administered medications for this visit.        ALLERGIES  Allergies   Allergen Reactions   • Bactrim      Reaction unknown   • Fentanyl      Makes her agressive       PHYSICAL EXAM  VITAL SIGNS: BP (!) 96/64   Pulse (!) 40   Temp 36.8 °C (98.3 °F)   Ht 1.702 m (5' 7\")   Wt 59 kg (130 lb) Comment: pt in w/c not sure  SpO2 91%   BMI 20.36 kg/m²   Constitutional: Well developed, Well nourished, No acute distress, Non-toxic appearance.   HENT: normocephalic   Eyes: fundi disc sharp   Neck: Normal range of motion, No tenderness, Supple, No stridor.   Cardiovascular: Normal heart rate, Normal rhythm, No murmurs, No rubs, No gallops.   Thorax & " Lungs: Normal breath sounds, No respiratory distress, No wheezing, No chest tenderness.   Abdomen: Bowel sounds normal, Soft, No tenderness, No masses, No pulsatile masses.   Skin: Warm, Dry, No erythema, No rash.   Back: No tenderness, No CVA tenderness.   Extremities: Intact distal pulses, No edema, No tenderness, No cyanosis, No clubbing.   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   Lab: Reviewed with patient in detail and as noted in results.    EEG  FIRDA    RADIOLOGY/PROCEDURES  MRI brain reviewed in the PACS      ASSESSMENT AND PLAN  1. ADEM    Sequale of spasticity and aphasia have gotten worse over the last year post infection. Pt tried botox injections with Dr. patiño and they failed to work per her report.    Refer to Dr Vega for evaluation of Baclofen Pump replacement.    Increase her baclofen baclofen and titrate up as tolerated.       I spent 30 minutes with this patient, here caregiver and home PT, over fifty percent was spent counseling patient on their condition, best management practices, reviewing test results and risks and benefits of treatment.

## 2018-01-16 ENCOUNTER — HOSPITAL ENCOUNTER (OUTPATIENT)
Dept: LAB | Facility: MEDICAL CENTER | Age: 33
End: 2018-01-16
Attending: NURSE PRACTITIONER
Payer: MEDICARE

## 2018-01-16 DIAGNOSIS — R25.2 SPASTICITY: ICD-10-CM

## 2018-01-16 DIAGNOSIS — G04.00 ADEM (ACUTE DISSEMINATED ENCEPHALOMYELITIS): ICD-10-CM

## 2018-01-16 LAB
ALBUMIN SERPL BCP-MCNC: 4.7 G/DL (ref 3.2–4.9)
ALBUMIN/GLOB SERPL: 2 G/DL
ALP SERPL-CCNC: 61 U/L (ref 30–99)
ALT SERPL-CCNC: 24 U/L (ref 2–50)
ANION GAP SERPL CALC-SCNC: 9 MMOL/L (ref 0–11.9)
AST SERPL-CCNC: 22 U/L (ref 12–45)
BILIRUB SERPL-MCNC: 0.6 MG/DL (ref 0.1–1.5)
BUN SERPL-MCNC: 17 MG/DL (ref 8–22)
CALCIUM SERPL-MCNC: 9.3 MG/DL (ref 8.5–10.5)
CHLORIDE SERPL-SCNC: 108 MMOL/L (ref 96–112)
CO2 SERPL-SCNC: 25 MMOL/L (ref 20–33)
CREAT SERPL-MCNC: 0.77 MG/DL (ref 0.5–1.4)
GLOBULIN SER CALC-MCNC: 2.4 G/DL (ref 1.9–3.5)
GLUCOSE SERPL-MCNC: 68 MG/DL (ref 65–99)
POTASSIUM SERPL-SCNC: 3.5 MMOL/L (ref 3.6–5.5)
PROT SERPL-MCNC: 7.1 G/DL (ref 6–8.2)
SODIUM SERPL-SCNC: 142 MMOL/L (ref 135–145)
TSH SERPL DL<=0.005 MIU/L-ACNC: 1.54 UIU/ML (ref 0.38–5.33)

## 2018-01-16 PROCEDURE — 84443 ASSAY THYROID STIM HORMONE: CPT

## 2018-01-16 PROCEDURE — 80053 COMPREHEN METABOLIC PANEL: CPT

## 2018-01-16 PROCEDURE — 36415 COLL VENOUS BLD VENIPUNCTURE: CPT

## 2018-01-23 ENCOUNTER — OFFICE VISIT (OUTPATIENT)
Dept: MEDICAL GROUP | Facility: MEDICAL CENTER | Age: 33
End: 2018-01-23
Payer: MEDICARE

## 2018-01-23 ENCOUNTER — HOSPITAL ENCOUNTER (OUTPATIENT)
Facility: MEDICAL CENTER | Age: 33
End: 2018-01-23
Attending: NURSE PRACTITIONER
Payer: MEDICARE

## 2018-01-23 VITALS
DIASTOLIC BLOOD PRESSURE: 62 MMHG | HEART RATE: 86 BPM | TEMPERATURE: 97.2 F | RESPIRATION RATE: 16 BRPM | SYSTOLIC BLOOD PRESSURE: 110 MMHG | OXYGEN SATURATION: 96 % | HEIGHT: 67 IN

## 2018-01-23 DIAGNOSIS — G89.4 CHRONIC PAIN SYNDROME: ICD-10-CM

## 2018-01-23 DIAGNOSIS — G89.29 OTHER CHRONIC PAIN: ICD-10-CM

## 2018-01-23 DIAGNOSIS — S90.511A ABRASION OF RIGHT ANKLE, INITIAL ENCOUNTER: ICD-10-CM

## 2018-01-23 DIAGNOSIS — R30.0 DYSURIA: ICD-10-CM

## 2018-01-23 DIAGNOSIS — K59.04 CHRONIC IDIOPATHIC CONSTIPATION: ICD-10-CM

## 2018-01-23 DIAGNOSIS — L24.9 IRRITANT DERMATITIS: ICD-10-CM

## 2018-01-23 PROCEDURE — 99214 OFFICE O/P EST MOD 30 MIN: CPT | Performed by: NURSE PRACTITIONER

## 2018-01-23 PROCEDURE — 81001 URINALYSIS AUTO W/SCOPE: CPT

## 2018-01-23 RX ORDER — TRIAMCINOLONE ACETONIDE 1 MG/G
1 OINTMENT TOPICAL 2 TIMES DAILY
Qty: 1 TUBE | Refills: 1 | Status: SHIPPED | OUTPATIENT
Start: 2018-01-23 | End: 2019-04-19

## 2018-01-23 RX ORDER — POLYETHYLENE GLYCOL 3350 17 G/17G
17 POWDER, FOR SOLUTION ORAL DAILY
Qty: 1 EACH | Refills: 3 | Status: SHIPPED | OUTPATIENT
Start: 2018-01-23 | End: 2018-04-12

## 2018-01-23 RX ORDER — TRAMADOL HYDROCHLORIDE 50 MG/1
50 TABLET ORAL EVERY 4 HOURS PRN
Qty: 60 TAB | Refills: 2 | Status: SHIPPED
Start: 2018-01-23 | End: 2018-02-22

## 2018-01-23 RX ORDER — DOCUSATE SODIUM 100 MG/1
100 CAPSULE, LIQUID FILLED ORAL 2 TIMES DAILY PRN
Qty: 60 CAP | Refills: 11 | Status: SHIPPED | OUTPATIENT
Start: 2018-01-23 | End: 2019-01-18 | Stop reason: SDUPTHER

## 2018-01-23 ASSESSMENT — ENCOUNTER SYMPTOMS: CONSTIPATION: 1

## 2018-01-23 NOTE — PROGRESS NOTES
Subjective:      Griselda Swanson is a 32 y.o. female who presents with Follow-Up (labwork); UTI (concerns); and Medication Refill (tramadol)        CC; patient is here today accompanied by her service animal and caregiver for concerns about constipation as well as to discuss pain medication.    HPI Griselda Swanson      1. Chronic idiopathic constipation  Patient's caregiver gives most of the information because patient has aphasia although she is fairly proficient with use of a iPad voice device. Apparently she normally moves her bowels about every other day but recently has been having constipation issues. They tried Generlac solution with limited success. They then tried Fleet soda once. She still is having issues where she feels the need to move her bowels but is not doing so. She is wheelchair bound because of her encephalitis but states she is eating fruits and vegetables.    2. Dysuria  Patient's caregiver is requesting a urine test today because patient is prone to frequent UTIs.    3. Irritant dermatitis  Patient reports some irritation of the finger joints bilaterally. She does not know if she has rubbed him on something but they're pruritic and there is no bleeding.    4. Chronic pain syndrome  Patient is currently on tramadol and usually takes 2 pills in the evening for back pain and general pain due to her immobility. She however is also on Ambien for sleep. She does take anti-inflammatories and muscle relaxers as well. She has a neurologist she follows with prescribed her baclofen and Flexeril.    5. Abrasion of right ankle, initial encounter  Patient's caregiver states she accidentally abraded patient's right foot the other day and she wanted me to check to be sure it is not infected. There is no pain reported.  Social History   Substance Use Topics   • Smoking status: Former Smoker     Packs/day: 1.00     Years: 5.00     Types: Cigarettes     Quit date: 1/1/2008   • Smokeless tobacco: Never Used    • Alcohol use No     Current Outpatient Prescriptions   Medication Sig Dispense Refill   • tramadol (ULTRAM) 50 MG Tab Take 1 Tab by mouth every four hours as needed for Moderate Pain for up to 30 days. 60 Tab 2   • docusate sodium (COLACE) 100 MG Cap Take 1 Cap by mouth 2 times a day as needed for Constipation. 60 Cap 11   • polyethylene glycol/lytes (MIRALAX) Pack Take 1 Packet by mouth every day. 1 Each 3   • triamcinolone acetonide (KENALOG) 0.1 % Ointment Apply 1 Application to affected area(s) 2 times a day. 1 Tube 1   • baclofen (LIORESAL) 10 MG Tab Take 3 Tabs by mouth 3 times a day. 180 Tab 11   • GENERLAC 10 GM/15ML Solution TAKE 15 TO 30 ML BY MOUTH EVERY 4 HOURS AS NEEDED 88327 mL 1   • cyclobenzaprine (FLEXERIL) 10 MG Tab Take 1 Tab by mouth 3 times a day as needed for Muscle Spasms. 90 Tab 5   • simethicone (MYLICON) 125 MG chewable tablet Take 125 mg by mouth 4 times a day as needed. Indications: Gas     • Biotin w/ Vitamins C & E (HAIR SKIN & NAILS GUMMIES PO) Take 2 Each by mouth every day. Indications: supplement     • zolpidem (AMBIEN) 5 MG Tab Take 1 Tab by mouth at bedtime as needed for Sleep. 30 Tab 3   • Diclofenac Sodium 1 % Gel APPLY 4 GRAMS TO KNEE AND 3 GRAMS TO ANKLES UP TO FOUR TIMES DAILY AS NEEDED 100 g 11   • sumatriptan (IMITREX) 100 MG tablet TAKE 1 TABLET BY MOUTH AT ONSET OF HEADACHE. MAY REPEAT IN 2 HOURS. MAX 2 TABLETS A DAY 9 Tab 11   • Misc. Devices Misc W/C stabilizer needs eval and possible replacement 1 Each 11   • clindamycin-benzoyl peroxide (BENZACLIN) gel APPLY TO THE FACE EVERY DAY 50 g 11   • tamsulosin (FLOMAX) 0.4 MG capsule Take 1 Cap by mouth every day. 90 Cap 11   • Misc. Devices Misc Please eval and fix electric W/C. Please eval also for W/C needs. 1 Each 11   • citalopram (CELEXA) 20 MG Tab TAKE 1 TABLET BY MOUTH EVERY DAY 90 Tab 3   • meloxicam (MOBIC) 15 MG tablet Take 1 Tab by mouth 1 time daily as needed. 90 Tab 3   • Misc. Devices Misc Please allow  "patient to have support/therapy dog in appt. Regardless of weight due to multiple chronic illnesses and debility. 1 Each 11   • topiramate (TOPAMAX) 25 MG Tab Take 1 Tab by mouth 2 times a day. 60 Tab 11   • SUMAtriptan Succinate 6 MG/0.5ML Solution Auto-injector INJECT 0.5 MLS IN THE MUSCLE ONCE FOR 1 DOSE 4 mL 0   • Calcium Carbonate Antacid (TUMS PO) Take 1 tablet by mouth as needed. Indications: heartburn     • Multiple Vitamins-Minerals (MULTIVITAMIN GUMMIES ADULTS PO) Take 1 Tab by mouth every day. Indications: vitamin supplement.     • vitamin D (CHOLECALCIFEROL) 1000 UNIT Tab Take 1,000 Units by mouth 4 times a day. Indications: supplement       No current facility-administered medications for this visit.      Family History   Problem Relation Age of Onset   • Cancer Mother      Sarcoma   • Diabetes       Past Medical History:   Diagnosis Date   • ADEM (acute disseminated encephalomyelitis)    • Back pain    • Breath shortness     since 2014   • C. difficile colitis    • Coma (CMS-HCC) August 2009    1 month, lesions on brain,  unknown etiology   • Coma (CMS-HCC) 2008    Spontaneous -    • Demyelinating disorder (CMS-HCC)     demyelinating encephpomyeltis    • Encephalitis    • Heart burn    • Indigestion    • Lesion of brain     unknown cuase   • MEDICAL HOME    • Migraines    • MS (multiple sclerosis) (CMS-HCC)    • Other specified disorder of intestines     constipation   • Pain    • Psychiatric problem     depression and anxiety   • Renal disorder     kidney stones   • Urinary incontinence        Review of Systems   Gastrointestinal: Positive for constipation.   Genitourinary: Positive for dysuria.   Musculoskeletal: Positive for joint pain.   Skin: Positive for rash.   All other systems reviewed and are negative.         Objective:     /62   Pulse 86   Temp 36.2 °C (97.2 °F)   Resp 16   Ht 1.702 m (5' 7\")   SpO2 96%      Physical Exam   Constitutional: She is oriented to person, place, and " time. She appears well-developed and well-nourished. No distress.   HENT:   Head: Normocephalic and atraumatic.   Right Ear: External ear normal.   Left Ear: External ear normal.   Nose: Nose normal.   Eyes: Right eye exhibits no discharge. Left eye exhibits no discharge.   Neck: Normal range of motion. Neck supple. No thyromegaly present.   Cardiovascular: Normal rate, regular rhythm and normal heart sounds.  Exam reveals no gallop and no friction rub.    No murmur heard.  Pulmonary/Chest: Effort normal and breath sounds normal. She has no wheezes. She has no rales.   Musculoskeletal: She exhibits no edema or tenderness.   Neurological: She is alert and oriented to person, place, and time. She displays normal reflexes.   Patient with limited movement and mobility as well as spasticity.   Skin: Skin is warm and dry. Rash noted. She is not diaphoretic.   Scabbed, abraded area on the right foot anteriorly with no evidence of erythema or cellulitis. There is some scaling and erythema on the flexor surfaces of some of the fingers of the hands bilaterally.   Psychiatric: She has a normal mood and affect. Her behavior is normal. Judgment and thought content normal.   Nursing note and vitals reviewed.              Assessment/Plan:     1. Chronic idiopathic constipation  I will have patient try stool softeners as well as MiraLAX powder. She will continue to eat fiber rich foods. Because of her debility and recurring issues I will refer her to GI to see if there is some sort of bowel program she can also get on. I advised going to the emergency room if she develops abdominal pain, lack of flatus, or inability to defecate at all.  - docusate sodium (COLACE) 100 MG Cap; Take 1 Cap by mouth 2 times a day as needed for Constipation.  Dispense: 60 Cap; Refill: 11  - polyethylene glycol/lytes (MIRALAX) Pack; Take 1 Packet by mouth every day.  Dispense: 1 Each; Refill: 3  - REFERRAL TO GASTROENTEROLOGY    2. Dysuria  I will check  urine for UTI.  - URINALYSIS,CULTURE IF INDICATED; Future    3. Irritant dermatitis  Patient will avoid irritants to the skin and use steroid cream for up to 2 weeks.  - triamcinolone acetonide (KENALOG) 0.1 % Ointment; Apply 1 Application to affected area(s) 2 times a day.  Dispense: 1 Tube; Refill: 1    4. Chronic pain syndrome  Patient advised that I can no longer prescribe Ambien and tramadol together because of the risks with new studies. I asked patient which she would prefer to stay on and she stated Ambien. She will therefore wean off her tramadol and it will not longer be prescribed. She will continue with her muscle relaxers through neurology as well as her anti-inflammatory. If she feels that she has to be on something stronger, I told her I can refer her to pain management in the future.    5. Abrasion of right ankle, initial encounter  No evidence of infection and wound care teaching given.

## 2018-01-24 DIAGNOSIS — R30.0 DYSURIA: ICD-10-CM

## 2018-01-24 LAB
APPEARANCE UR: CLEAR
BACTERIA #/AREA URNS HPF: ABNORMAL /HPF
CAOX CRY #/AREA URNS HPF: ABNORMAL /HPF
COLOR UR: YELLOW
CULTURE IF INDICATED INDCX: NO UA CULTURE
EPI CELLS #/AREA URNS HPF: ABNORMAL /HPF
GLUCOSE UR STRIP.AUTO-MCNC: NEGATIVE MG/DL
HYALINE CASTS #/AREA URNS LPF: ABNORMAL /LPF
KETONES UR STRIP.AUTO-MCNC: NEGATIVE MG/DL
LEUKOCYTE ESTERASE UR QL STRIP.AUTO: NEGATIVE
MICRO URNS: ABNORMAL
MUCOUS THREADS #/AREA URNS HPF: ABNORMAL /HPF
NITRITE UR QL STRIP.AUTO: NEGATIVE
PH UR STRIP.AUTO: 7 [PH]
PROT UR QL STRIP: NEGATIVE MG/DL
RBC # URNS HPF: ABNORMAL /HPF
RBC UR QL AUTO: ABNORMAL
RENAL EPI CELLS #/AREA URNS HPF: ABNORMAL /HPF
SP GR UR STRIP.AUTO: 1.01
WBC #/AREA URNS HPF: ABNORMAL /HPF

## 2018-02-08 DIAGNOSIS — F51.01 PRIMARY INSOMNIA: ICD-10-CM

## 2018-02-08 RX ORDER — ZOLPIDEM TARTRATE 5 MG/1
5 TABLET ORAL NIGHTLY PRN
Qty: 30 TAB | Refills: 5 | Status: SHIPPED
Start: 2018-02-08 | End: 2018-03-10

## 2018-02-13 ENCOUNTER — HOSPITAL ENCOUNTER (OUTPATIENT)
Facility: MEDICAL CENTER | Age: 33
End: 2018-02-13
Attending: ORTHOPAEDIC SURGERY | Admitting: ORTHOPAEDIC SURGERY
Payer: MEDICARE

## 2018-03-05 ENCOUNTER — TELEPHONE (OUTPATIENT)
Dept: MEDICAL GROUP | Facility: MEDICAL CENTER | Age: 33
End: 2018-03-05

## 2018-03-05 DIAGNOSIS — R30.0 DYSURIA: ICD-10-CM

## 2018-03-05 NOTE — TELEPHONE ENCOUNTER
1. Caller Name: [facility]                                         Call Back Number: [phone] (home)       Patient approves a detailed voicemail message: N\A    Mom was wondering if you can order a urine test for pt for possible UTI?

## 2018-03-06 ENCOUNTER — HOSPITAL ENCOUNTER (OUTPATIENT)
Facility: MEDICAL CENTER | Age: 33
End: 2018-03-06
Attending: NURSE PRACTITIONER
Payer: MEDICARE

## 2018-03-06 DIAGNOSIS — R30.0 DYSURIA: ICD-10-CM

## 2018-03-06 LAB
APPEARANCE UR: ABNORMAL
BACTERIA #/AREA URNS HPF: NEGATIVE /HPF
BILIRUB UR QL STRIP.AUTO: NEGATIVE
CAOX CRY #/AREA URNS HPF: ABNORMAL /HPF
COLOR UR: YELLOW
CULTURE IF INDICATED INDCX: YES UA CULTURE
EPI CELLS #/AREA URNS HPF: ABNORMAL /HPF
GLUCOSE UR STRIP.AUTO-MCNC: NEGATIVE MG/DL
HYALINE CASTS #/AREA URNS LPF: ABNORMAL /LPF
KETONES UR STRIP.AUTO-MCNC: NEGATIVE MG/DL
LEUKOCYTE ESTERASE UR QL STRIP.AUTO: ABNORMAL
MICRO URNS: ABNORMAL
NITRITE UR QL STRIP.AUTO: NEGATIVE
PH UR STRIP.AUTO: 6 [PH]
PROT UR QL STRIP: NEGATIVE MG/DL
RBC # URNS HPF: ABNORMAL /HPF
RBC UR QL AUTO: ABNORMAL
SP GR UR STRIP.AUTO: 1.02
UROBILINOGEN UR STRIP.AUTO-MCNC: 0.2 MG/DL
WBC #/AREA URNS HPF: ABNORMAL /HPF

## 2018-03-06 PROCEDURE — 87086 URINE CULTURE/COLONY COUNT: CPT

## 2018-03-06 PROCEDURE — 81001 URINALYSIS AUTO W/SCOPE: CPT

## 2018-03-08 ENCOUNTER — OFFICE VISIT (OUTPATIENT)
Dept: MEDICAL GROUP | Facility: CLINIC | Age: 33
End: 2018-03-08
Payer: MEDICARE

## 2018-03-08 VITALS
TEMPERATURE: 98.5 F | HEART RATE: 92 BPM | RESPIRATION RATE: 16 BRPM | SYSTOLIC BLOOD PRESSURE: 98 MMHG | OXYGEN SATURATION: 99 % | DIASTOLIC BLOOD PRESSURE: 64 MMHG

## 2018-03-08 DIAGNOSIS — L03.031 CELLULITIS OF TOE OF RIGHT FOOT: ICD-10-CM

## 2018-03-08 LAB
BACTERIA UR CULT: ABNORMAL
BACTERIA UR CULT: ABNORMAL
SIGNIFICANT IND 70042: ABNORMAL
SITE SITE: ABNORMAL
SOURCE SOURCE: ABNORMAL

## 2018-03-08 PROCEDURE — 99212 OFFICE O/P EST SF 10 MIN: CPT | Performed by: NURSE PRACTITIONER

## 2018-03-08 RX ORDER — CEPHALEXIN 500 MG/1
500 CAPSULE ORAL 2 TIMES DAILY
Qty: 14 CAP | Refills: 0 | Status: SHIPPED | OUTPATIENT
Start: 2018-03-08 | End: 2018-03-13

## 2018-03-08 ASSESSMENT — ENCOUNTER SYMPTOMS
CHILLS: 0
FEVER: 1

## 2018-03-08 NOTE — PROGRESS NOTES
Chief Complaint   Patient presents with   • Toe Pain     redness, inflamation, pain and warmth to the touch, wound discharge, x 3 days        HISTORY OF PRESENT ILLNESS: Patient is a 32 y.o. female established patient who presents today due to right foot redness and pain. Pt has underlying demyelinating/MS, wheelchair bound, dysphagia and aphagia. She is here today with her sister who is the caregiver. Sister reports that pt has a small blister noted on her right fifth toe first and then it pop on its own. She then started noticing the redness spreading up to lower 1/3 extremity. Pt is also noted to have low grade fever yesterday. No chills. No open wound at this time. The original blister area is closed already. Pt's sister reports it was oozing a lot but now it stop.        Patient Active Problem List    Diagnosis Date Noted   • ADEM (acute disseminated encephalomyelitis) 03/14/2013     Priority: Medium   • Aphasia 08/10/2010     Priority: Medium   • Chronic pain 07/03/2016     Priority: Low   • Debility 07/03/2016     Priority: Low   • Primary insomnia 02/08/2018   • Recurrent urinary tract infection 03/24/2017   • Chronic pain syndrome 03/24/2017   • Recurrent major depressive disorder, in full remission ([facility]) 03/24/2017   • Dysphagia 07/01/2016   • At high risk for falls 08/03/2015   • Chronic low back pain 06/15/2015   • Spasticity 10/31/2013       Allergies:Bactrim and Fentanyl    Current Outpatient Prescriptions   Medication Sig Dispense Refill   • cephALEXin (KEFLEX) 500 MG Cap Take 1 Cap by mouth 2 times a day for 7 days. 14 Cap 0   • zolpidem (AMBIEN) 5 MG Tab Take 1 Tab by mouth at bedtime as needed for Sleep for up to 30 days. 30 Tab 5   • docusate sodium (COLACE) 100 MG Cap Take 1 Cap by mouth 2 times a day as needed for Constipation. 60 Cap 11   • polyethylene glycol/lytes (MIRALAX) Pack Take 1 Packet by mouth every day. 1 Each 3   • triamcinolone acetonide (KENALOG) 0.1 % Ointment Apply 1  Application to affected area(s) 2 times a day. 1 Tube 1   • baclofen (LIORESAL) 10 MG Tab Take 3 Tabs by mouth 3 times a day. 180 Tab 11   • GENERLAC 10 GM/15ML Solution TAKE 15 TO 30 ML BY MOUTH EVERY 4 HOURS AS NEEDED 21505 mL 1   • cyclobenzaprine (FLEXERIL) 10 MG Tab Take 1 Tab by mouth 3 times a day as needed for Muscle Spasms. 90 Tab 5   • simethicone (MYLICON) 125 MG chewable tablet Take 125 mg by mouth 4 times a day as needed. Indications: Gas     • Biotin w/ Vitamins C & E (HAIR SKIN & NAILS GUMMIES PO) Take 2 Each by mouth every day. Indications: supplement     • Diclofenac Sodium 1 % Gel APPLY 4 GRAMS TO KNEE AND 3 GRAMS TO ANKLES UP TO FOUR TIMES DAILY AS NEEDED 100 g 11   • sumatriptan (IMITREX) 100 MG tablet TAKE 1 TABLET BY MOUTH AT ONSET OF HEADACHE. MAY REPEAT IN 2 HOURS. MAX 2 TABLETS A DAY 9 Tab 11   • clindamycin-benzoyl peroxide (BENZACLIN) gel APPLY TO THE FACE EVERY DAY 50 g 11   • tamsulosin (FLOMAX) 0.4 MG capsule Take 1 Cap by mouth every day. 90 Cap 11   • citalopram (CELEXA) 20 MG Tab TAKE 1 TABLET BY MOUTH EVERY DAY 90 Tab 3   • meloxicam (MOBIC) 15 MG tablet Take 1 Tab by mouth 1 time daily as needed. 90 Tab 3   • topiramate (TOPAMAX) 25 MG Tab Take 1 Tab by mouth 2 times a day. 60 Tab 11   • SUMAtriptan Succinate 6 MG/0.5ML Solution Auto-injector INJECT 0.5 MLS IN THE MUSCLE ONCE FOR 1 DOSE 4 mL 0   • Calcium Carbonate Antacid (TUMS PO) Take 1 tablet by mouth as needed. Indications: heartburn     • Multiple Vitamins-Minerals (MULTIVITAMIN GUMMIES ADULTS PO) Take 1 Tab by mouth every day. Indications: vitamin supplement.     • vitamin D (CHOLECALCIFEROL) 1000 UNIT Tab Take 1,000 Units by mouth 4 times a day. Indications: supplement     • Misc. Devices Misc W/C stabilizer needs eval and possible replacement 1 Each 11   • Misc. Devices Misc Please eval and fix electric W/C. Please eval also for W/C needs. 1 Each 11   • Misc. Devices Misc Please allow patient to have support/therapy dog in  appt. Regardless of weight due to multiple chronic illnesses and debility. 1 Each 11     No current facility-administered medications for this visit.        Social History   Substance Use Topics   • Smoking status: Former Smoker     Packs/day: 1.00     Years: 5.00     Types: Cigarettes     Quit date: 2008   • Smokeless tobacco: Never Used   • Alcohol use No       Family Status   Relation Status   • Mother  at age 49    Sarcoma   • Father Alive   • Sister Alive   • Sister Alive   • Brother Alive    spina bifida/bi polar   •       Family History   Problem Relation Age of Onset   • Cancer Mother      Sarcoma   • Diabetes           ROS:  Review of Systems   Constitutional: Positive for fever. Negative for chills.   Skin:        Right foot redness and tender        Objective:     Blood pressure (!) 98/64, pulse 92, temperature 36.9 °C (98.5 °F), resp. rate 16, SpO2 99 %.     Physical Exam:  Physical Exam   Constitutional: She is well-developed, well-nourished, and in no distress.   HENT:   Head: Normocephalic and atraumatic.   Eyes: Conjunctivae are normal.   Neck: Neck supple. No JVD present.   Cardiovascular: Regular rhythm.    Pulmonary/Chest: Effort normal and breath sounds normal. No respiratory distress.   Musculoskeletal: She exhibits tenderness (right foot).   Neurological: She is alert.   Skin: Skin is warm. There is erythema.   Vitals reviewed.        Assessment/Plan:  1. Cellulitis of toe of right foot  Stay hydrated, tylenol prn for pain or fever. Pt's sister was a wound care nurse. She knows what to monitor. She will also help monitor on pt's vital sign. If BP lower with higher heart rate, confusion, persistent fever, chills, she will take pt to ER. Education hand out is provided to pt.   - cephALEXin (KEFLEX) 500 MG Cap; Take 1 Cap by mouth 2 times a day for 7 days.  Dispense: 14 Cap; Refill: 0    Differential diagnoses and indications for immediate follow-up discussed with patient.     Instructed to return to clinic or nearest emergency department for any change in condition, further concerns, or worsening of symptoms.    The patient demonstrated a good understanding and agreed with the treatment plan.

## 2018-03-08 NOTE — PATIENT INSTRUCTIONS
Cellulitis, Adult  Cellulitis is a skin infection. The infected area is usually red and tender. This condition occurs most often in the arms and lower legs. The infection can travel to the muscles, blood, and underlying tissue and become serious. It is very important to get treated for this condition.  What are the causes?  Cellulitis is caused by bacteria. The bacteria enter through a break in the skin, such as a cut, burn, insect bite, open sore, or crack.  What increases the risk?  This condition is more likely to occur in people who:  · Have a weak defense system (immune system).  · Have open wounds on the skin such as cuts, burns, bites, and scrapes. Bacteria can enter the body through these open wounds.  · Are older.  · Have diabetes.  · Have a type of long-lasting (chronic) liver disease (cirrhosis) or kidney disease.  · Use IV drugs.  What are the signs or symptoms?  Symptoms of this condition include:  · Redness, streaking, or spotting on the skin.  · Swollen area of the skin.  · Tenderness or pain when an area of the skin is touched.  · Warm skin.  · Fever.  · Chills.  · Blisters.  How is this diagnosed?  This condition is diagnosed based on a medical history and physical exam. You may also have tests, including:  · Blood tests.  · Lab tests.  · Imaging tests.  How is this treated?  Treatment for this condition may include:  · Medicines, such as antibiotic medicines or antihistamines.  · Supportive care, such as rest and application of cold or warm cloths (cold or warm compresses) to the skin.  · Hospital care, if the condition is severe.  The infection usually gets better within 1-2 days of treatment.  Follow these instructions at home:  · Take over-the-counter and prescription medicines only as told by your health care provider.  · If you were prescribed an antibiotic medicine, take it as told by your health care provider. Do not stop taking the antibiotic even if you start to feel better.  · Drink  enough fluid to keep your urine clear or pale yellow.  · Do not touch or rub the infected area.  · Raise (elevate) the infected area above the level of your heart while you are sitting or lying down.  · Apply warm or cold compresses to the affected area as told by your health care provider.  · Keep all follow-up visits as told by your health care provider. This is important. These visits let your health care provider make sure a more serious infection is not developing.  Contact a health care provider if:  · You have a fever.  · Your symptoms do not improve within 1-2 days of starting treatment.  · Your bone or joint underneath the infected area becomes painful after the skin has healed.  · Your infection returns in the same area or another area.  · You notice a swollen bump in the infected area.  · You develop new symptoms.  · You have a general ill feeling (malaise) with muscle aches and pains.  Get help right away if:  · Your symptoms get worse.  · You feel very sleepy.  · You develop vomiting or diarrhea that persists.  · You notice red streaks coming from the infected area.  · Your red area gets larger or turns dark in color.  This information is not intended to replace advice given to you by your health care provider. Make sure you discuss any questions you have with your health care provider.  Document Released: 09/27/2006 Document Revised: 04/27/2017 Document Reviewed: 10/26/2016  [employer] Interactive Patient Education © 2017 [employer]

## 2018-03-09 RX ORDER — NITROFURANTOIN 25; 75 MG/1; MG/1
100 CAPSULE ORAL 2 TIMES DAILY
Qty: 14 CAP | Refills: 0 | Status: SHIPPED | OUTPATIENT
Start: 2018-03-09 | End: 2018-03-13

## 2018-03-12 ENCOUNTER — TELEPHONE (OUTPATIENT)
Dept: MEDICAL GROUP | Facility: MEDICAL CENTER | Age: 33
End: 2018-03-12

## 2018-03-12 NOTE — TELEPHONE ENCOUNTER
Future Appointments       Provider Department Center    3/13/2018 1:20 PM LYNNE Garcia.; [place] [facility]  [place] [address]    3/26/2018 3:00 PM LYNNE Garcia. [facility] [address]    4/12/2018 12:45 PM PREADMIT 1 PREADMIT TESTS [facility]     4/17/2018 9:40 AM Melissa P Bloch, M.D. [facility] Neurology     7/24/2018 1:00 PM LYNNE Garcia. [place] [address]          [facility] ANNUAL WELLNESS VISIT PRE-VISIT PLANNING WITHOUT OUTREACH     1.  Reviewed note from last office visit with PCP: YES Last office visit 01/23/18    2.  If any orders were placed at last visit, do we have Results/Consult Notes?        •  Labs - Labs ordered, completed on 03/06/18 and results are in chart.       •  Imaging - Imaging was not ordered at last office visit.       •  Referrals - Referral ordered, patient has NOT been seen.    3.  Immunizations were updated in [facility] using WebIZ?: Yes       •  WebIZ Recommendations: VARICELLA        •  Is patient due for Tdap? NO       •  Is patient due for Shingles? NO     4.  Patient has the following [facility] diagnoses on Problem List:  DEPRESSION     5.  MDX printed and highlighted for Provider? MCR    6.  Patient is due for the following Health Maintenance Topics:     Health Maintenance Due   Topic Date Due   • IMM HEP B VACCINE (1 of 3 - Primary Series) 1983   • PAP SMEAR  04/07/2017

## 2018-03-13 ENCOUNTER — OFFICE VISIT (OUTPATIENT)
Dept: MEDICAL GROUP | Facility: MEDICAL CENTER | Age: 33
End: 2018-03-13
Payer: MEDICARE

## 2018-03-13 VITALS
HEART RATE: 107 BPM | SYSTOLIC BLOOD PRESSURE: 100 MMHG | OXYGEN SATURATION: 99 % | RESPIRATION RATE: 16 BRPM | TEMPERATURE: 99.2 F | DIASTOLIC BLOOD PRESSURE: 60 MMHG

## 2018-03-13 DIAGNOSIS — M54.5 CHRONIC LOW BACK PAIN, UNSPECIFIED BACK PAIN LATERALITY, WITH SCIATICA PRESENCE UNSPECIFIED: ICD-10-CM

## 2018-03-13 DIAGNOSIS — F51.01 PRIMARY INSOMNIA: ICD-10-CM

## 2018-03-13 DIAGNOSIS — R53.81 DEBILITY: ICD-10-CM

## 2018-03-13 DIAGNOSIS — L97.511 SKIN ULCER OF TOE OF RIGHT FOOT, LIMITED TO BREAKDOWN OF SKIN (HCC): ICD-10-CM

## 2018-03-13 DIAGNOSIS — G89.29 OTHER CHRONIC PAIN: ICD-10-CM

## 2018-03-13 DIAGNOSIS — G89.29 CHRONIC LOW BACK PAIN, UNSPECIFIED BACK PAIN LATERALITY, WITH SCIATICA PRESENCE UNSPECIFIED: ICD-10-CM

## 2018-03-13 DIAGNOSIS — F33.42 RECURRENT MAJOR DEPRESSIVE DISORDER, IN FULL REMISSION (HCC): Chronic | ICD-10-CM

## 2018-03-13 DIAGNOSIS — Z00.00 MEDICARE ANNUAL WELLNESS VISIT, SUBSEQUENT: ICD-10-CM

## 2018-03-13 DIAGNOSIS — G35 MS (MULTIPLE SCLEROSIS) (HCC): ICD-10-CM

## 2018-03-13 DIAGNOSIS — G89.4 CHRONIC PAIN SYNDROME: ICD-10-CM

## 2018-03-13 DIAGNOSIS — N39.0 RECURRENT URINARY TRACT INFECTION: ICD-10-CM

## 2018-03-13 DIAGNOSIS — N30.00 ACUTE CYSTITIS WITHOUT HEMATURIA: ICD-10-CM

## 2018-03-13 DIAGNOSIS — R13.10 DYSPHAGIA, UNSPECIFIED TYPE: ICD-10-CM

## 2018-03-13 DIAGNOSIS — K59.00 CONSTIPATION, UNSPECIFIED CONSTIPATION TYPE: ICD-10-CM

## 2018-03-13 PROCEDURE — G0439 PPPS, SUBSEQ VISIT: HCPCS | Performed by: NURSE PRACTITIONER

## 2018-03-13 PROCEDURE — 99213 OFFICE O/P EST LOW 20 MIN: CPT | Mod: 25 | Performed by: NURSE PRACTITIONER

## 2018-03-13 RX ORDER — POLYETHYLENE GLYCOL 3350 17 G/17G
POWDER, FOR SOLUTION ORAL
Refills: 3 | COMMUNITY
Start: 2018-01-23 | End: 2020-09-22 | Stop reason: SDUPTHER

## 2018-03-13 RX ORDER — TRAMADOL HYDROCHLORIDE 50 MG/1
50 TABLET ORAL 2 TIMES DAILY
Qty: 60 TAB | Refills: 5 | Status: SHIPPED | OUTPATIENT
Start: 2018-03-13 | End: 2018-09-17 | Stop reason: SDUPTHER

## 2018-03-13 RX ORDER — ZOLPIDEM TARTRATE 5 MG/1
5 TABLET ORAL NIGHTLY PRN
COMMUNITY
End: 2018-03-13

## 2018-03-13 RX ORDER — TRAMADOL HYDROCHLORIDE 50 MG/1
50 TABLET ORAL PRN
Refills: 2 | COMMUNITY
Start: 2018-03-08 | End: 2018-03-13 | Stop reason: SDUPTHER

## 2018-03-13 ASSESSMENT — PATIENT HEALTH QUESTIONNAIRE - PHQ9: CLINICAL INTERPRETATION OF PHQ2 SCORE: 0

## 2018-03-13 ASSESSMENT — ACTIVITIES OF DAILY LIVING (ADL): BATHING_REQUIRES_ASSISTANCE: 1

## 2018-03-13 NOTE — PROGRESS NOTES
Chief Complaint   Patient presents with   • Annual Wellness Visit         HPI:  Griselda is a 32 y.o. here for Medicare Annual Wellness Visit as well as to discuss going back on tramadol for pain and off Ambien for sleep as well as for follow-up on a recent UTI and ulcer of her right fifth toe.    1. Medicare annual wellness visit, subsequent  Screening performed below.    2. Acute cystitis without hematuria  Patient recently seen at urgent care and found to have UTI and placed on antibiotics and she reports no further symptoms.    3. Skin ulcer of toe of right foot, limited to breakdown of skin (CMS-HCC)  Patient who is bedbound developed small blister area and right fifth toe for which she was seen at urgent care and started on Keflex. Her health care visitor who accompanies her today states they have been treating with alcohol and redressing the wound and it does appear slightly better.    4. Chronic low back pain, unspecified back pain laterality, with sciatica presence unspecified  Patient had previously been on tramadol but she also was on Ambien and was given the option of either orbit not both together to be refilled. She had shows Ambien but now would prefer to go back to using tramadol. She typically takes one or 2 per day. She has tried switching over to over-the-counter sleep aids.    5. Primary insomnia  Patient has been using Ambien but less to switch to over-the-counter medicine and go back on tramadol.    6. Constipation, unspecified constipation type  Patient prone to constipation due to her inactivity and recently has been having trouble moving her bowels despite stool softeners and MiraLAX.    7. Debility  Patient mostly wheelchair bound.    8. Recurrent major depressive disorder, in full remission (CMS-HCC)  Patient currently on Celexa and she is in good spirits today.    9. Dysphagia, unspecified type  Patient has difficulty with swallowing.    10. Chronic pain syndrome  Patient would like to get  back on tramadol.    11. Recurrent urinary tract infection  Patient prone to UTIs.    12. MS (multiple sclerosis) (CMS-HCC)  Patient follows with neurology and is currently on baclofen.    Patient Active Problem List    Diagnosis Date Noted   • ADEM (acute disseminated encephalomyelitis) 03/14/2013     Priority: Medium   • Aphasia 08/10/2010     Priority: Medium   • Chronic pain 07/03/2016     Priority: Low   • Debility 07/03/2016     Priority: Low   • Primary insomnia 02/08/2018   • Recurrent urinary tract infection 03/24/2017   • Chronic pain syndrome 03/24/2017   • Recurrent major depressive disorder, in full remission (CMS-HCC) 03/24/2017   • Dysphagia 07/01/2016   • At high risk for falls 08/03/2015   • Chronic low back pain 06/15/2015   • Spasticity 10/31/2013       Current Outpatient Prescriptions   Medication Sig Dispense Refill   • tramadol (ULTRAM) 50 MG Tab Take 50 mg by mouth as needed.  2   • polyethylene glycol 3350 (MIRALAX) Powder MIX AND DRINK 1 CAPFUL WITH 8 OZ OF LIQUID QD  3   • zolpidem (AMBIEN) 5 MG Tab Take 5 mg by mouth at bedtime as needed for Sleep.     • CASCARA SAGRADA PO Take  by mouth.     • CRANBERRY PO Take  by mouth.     • Cyanocobalamin (VITAMIN B 12 PO) Take  by mouth.     • nitrofurantoin monohydr macro (MACROBID) 100 MG Cap Take 1 Cap by mouth 2 times a day for 7 days. 14 Cap 0   • cephALEXin (KEFLEX) 500 MG Cap Take 1 Cap by mouth 2 times a day for 7 days. 14 Cap 0   • docusate sodium (COLACE) 100 MG Cap Take 1 Cap by mouth 2 times a day as needed for Constipation. 60 Cap 11   • polyethylene glycol/lytes (MIRALAX) Pack Take 1 Packet by mouth every day. 1 Each 3   • triamcinolone acetonide (KENALOG) 0.1 % Ointment Apply 1 Application to affected area(s) 2 times a day. 1 Tube 1   • baclofen (LIORESAL) 10 MG Tab Take 3 Tabs by mouth 3 times a day. 180 Tab 11   • GENERLAC 10 GM/15ML Solution TAKE 15 TO 30 ML BY MOUTH EVERY 4 HOURS AS NEEDED 72982 mL 1   • cyclobenzaprine (FLEXERIL)  10 MG Tab Take 1 Tab by mouth 3 times a day as needed for Muscle Spasms. 90 Tab 5   • simethicone (MYLICON) 125 MG chewable tablet Take 125 mg by mouth 4 times a day as needed. Indications: Gas     • Biotin w/ Vitamins C & E (HAIR SKIN & NAILS GUMMIES PO) Take 2 Each by mouth every day. Indications: supplement     • Diclofenac Sodium 1 % Gel APPLY 4 GRAMS TO KNEE AND 3 GRAMS TO ANKLES UP TO FOUR TIMES DAILY AS NEEDED 100 g 11   • sumatriptan (IMITREX) 100 MG tablet TAKE 1 TABLET BY MOUTH AT ONSET OF HEADACHE. MAY REPEAT IN 2 HOURS. MAX 2 TABLETS A DAY 9 Tab 11   • Misc. Devices Misc W/C stabilizer needs eval and possible replacement 1 Each 11   • tamsulosin (FLOMAX) 0.4 MG capsule Take 1 Cap by mouth every day. 90 Cap 11   • Misc. Devices Misc Please eval and fix electric W/C. Please eval also for W/C needs. 1 Each 11   • citalopram (CELEXA) 20 MG Tab TAKE 1 TABLET BY MOUTH EVERY DAY 90 Tab 3   • meloxicam (MOBIC) 15 MG tablet Take 1 Tab by mouth 1 time daily as needed. 90 Tab 3   • Misc. Devices Misc Please allow patient to have support/therapy dog in appt. Regardless of weight due to multiple chronic illnesses and debility. 1 Each 11   • topiramate (TOPAMAX) 25 MG Tab Take 1 Tab by mouth 2 times a day. 60 Tab 11   • SUMAtriptan Succinate 6 MG/0.5ML Solution Auto-injector INJECT 0.5 MLS IN THE MUSCLE ONCE FOR 1 DOSE 4 mL 0   • Calcium Carbonate Antacid (TUMS PO) Take 1 tablet by mouth as needed. Indications: heartburn     • Multiple Vitamins-Minerals (MULTIVITAMIN GUMMIES ADULTS PO) Take 1 Tab by mouth every day. Indications: vitamin supplement.     • vitamin D (CHOLECALCIFEROL) 1000 UNIT Tab Take 1,000 Units by mouth 4 times a day. Indications: supplement     • clindamycin-benzoyl peroxide (BENZACLIN) gel APPLY TO THE FACE EVERY DAY (Patient not taking: Reported on 3/13/2018) 50 g 11     No current facility-administered medications for this visit.         Patient is taking medications as noted in medication  list.  Current supplements as per medication list.     Allergies: Bactrim and Fentanyl    Current social contact/activities: PT has family and friends visit her frequently      Patient's perception of their health: good    Is patient current with immunizations? No, due for HEPATITIS B. Patient is interested in receiving NONE today.    She  reports that she quit smoking about 10 years ago. Her smoking use included Cigarettes. She has a 12.00 pack-year smoking history. She has never used smokeless tobacco. She reports that she does not drink alcohol or use drugs.  Counseling given: Not Answered        DPA/Advanced directive: Patient has Advanced Directive on file.     ROS:    Gait: Uses a power chair   Ostomy: no   Other tubes: no   Amputations: no   Chronic oxygen use no   Last eye exam 2.5 years ago   Wears hearing aids: no   : Denies any urinary leakage during the last 6 months     Screening:    DEPRESSION     Is patient seeing a counselor, psychiatrist or other healthcare provider regarding their mental health? No, and not interested.     Depression Screen (PHQ-2/PHQ-9) 10/27/2016 3/24/2017 3/13/2018   PHQ-2 Total Score 0 - -   PHQ-2 Total Score - 1 0       Interpretation of PHQ-9 Total Score   Score Severity   1-4 No Depression   5-9 Mild Depression   10-14 Moderate Depression   15-19 Moderately Severe Depression   20-27 Severe Depression      Depression Screening    Little interest or pleasure in doing things?  0 - not at all  Feeling down, depressed, or hopeless? 0 - not at all  Patient Health Questionnaire Score: 0    If depressive symptoms identified deferred to follow up visit unless specifically addressed in assessment and plan.      Screening for Cognitive Impairment    Three Minute Recall (Village, Kitchen, Baby)   3/3    Draw clock face with all 12 numbers set to the hand to show 40 minutes past 3 o'clock  PT is unable to write    If cognitive concerns identified, deferred for follow up unless  specifically addressed in assessment and plan.    Fall Risk Assessment    Has the patient had two or more falls in the last year or any fall with injury in the last year?  No  If fall risk identified, deferred for follow up unless specifically addressed in assessment and plan.    Safety Assessment    Throw rugs on floor.  No  Handrails on all stairs.  Yes  Good lighting in all hallways.  Yes  Difficulty hearing.  No  Patient counseled about all safety risks that were identified.    Functional Assessment ADLs    Are there any barriers preventing you from cooking for yourself or meeting nutritional needs?  Yes. Peri Care giver helps with all ADLs  Are there any barriers preventing you from driving safely or obtaining transportation?  Yes.    Are there any barriers preventing you from using a telephone or calling for help?  Yes.    Are there any barriers preventing you from shopping?  Yes.    Are there any barriers preventing you from taking care of your own finances?  Yes.    Are there any barriers preventing you from managing your medications?  Yes.    Are there any barriers preventing you from showering, bathing or dressing yourself?  Yes.    Are you currently engaging any exercise or physical activity?  Yes.       Health Maintenance Summary                IMM HEP B VACCINE Overdue 1983     PAP SMEAR Overdue 4/7/2017      Done 4/7/2014 PATHOLOGY GYN SPECIMEN    Annual Wellness Visit Next Due 3/25/2018      Done 3/24/2017 Visit Dx: Medicare annual wellness visit, subsequent     Patient has more history with this topic...    IMM DTaP/Tdap/Td Vaccine Next Due 5/9/2027      Done 5/9/2017 Imm Admin: Tdap Vaccine          Patient Care Team:  SELVIN Garcia as PCP - General (Family Medicine)  Osman Orellana M.D. as Consulting Physician (Pain Management)  Catrachito Vega M.D. as Consulting Physician (Anesthesia)  Melissa P Bloch, M.D. as Consulting Physician (Neurology)  Madhu Lobo M.D. as  Consulting Physician (Urology)  Your Physician (Emergency Medicine)  [facility] as Home Health Provider    Social History   Substance Use Topics   • Smoking status: Former Smoker     Packs/day: 1.00     Years: 12.00     Types: Cigarettes     Quit date: 1/1/2008   • Smokeless tobacco: Never Used      Comment: Started smoking at age 11   • Alcohol use No     Family History   Problem Relation Age of Onset   • Cancer Mother      Sarcoma   • Bipolar disorder Brother    • Other Brother      spina bifida   • Diabetes Maternal Grandmother    • Other Daughter      Migrains     She  has a past medical history of ADEM (acute disseminated encephalomyelitis); Back pain; Breath shortness; C. difficile colitis; Coma (CMS-HCC) (August 2009); Coma (CMS-HCC) (2008); Demyelinating disorder (CMS-HCC); Encephalitis; Heart burn; Indigestion; Lesion of brain; MEDICAL HOME; Migraines; MS (multiple sclerosis) (CMS-HCC); Other specified disorder of intestines; Pain; Psychiatric problem; Renal disorder; and Urinary incontinence. She also has no past medical history of Chronic airway obstruction, not elsewhere classified or Fall.   Past Surgical History:   Procedure Laterality Date   • PUMP INSERT/REMOVE Left 7/1/2016    Procedure: PUMP REMOVAL;  Surgeon: Catrachito Vega M.D.;  Location: [facility];  Service:    • CATH PLACE PERM EPIDURAL Left 6/14/2016    Procedure: CATH PLACE PERM EPIDURAL - BACLOFEN PUMP AND CATHETER REPLACEMENT  - BACK AND ABDOMEN;  Surgeon: Catrachito Vega M.D.;  Location: [facility];  Service:    • AR BACLOFEN INTRATHECAL TRIAL  3/8/2016    Dr. Vega  [facility]   • CATH PLACE PERM EPIDURAL N/A 3/8/2016    Procedure: BACLOFEN PUMP TRIAL;  Surgeon: Catrachito Vega M.D.;  Location: SURGERY [facility];  Service:    • PUMP REVISION  10/8/2013    Performed by Catrachito Vega M.D. at [facility]   • PUMP INSERT/REMOVE  3/12/2013    Performed by Catrachito Vega M.D. at  SURGERY [facility]   • CATH PLACE PERM EPIDURAL  6/26/2012    Performed by PARI ROBERSON at [facility]   • CATH PLACE PERM EPIDURAL  3/6/2012    Performed by PARI ROBERSON at [facility]   • MAMMOPLASTY AUGMENTATION  2002   • TONSILLECTOMY             Exam:     Blood pressure 100/60, pulse (!) 107, temperature 37.3 °C (99.2 °F), resp. rate 16, SpO2 99 %. There is no height or weight on file to calculate BMI.    Gen.: Patient is able to use hand signals to express herself since she cannot speak. She also has a I pad for translation.  HEENT: Neck is supple negative for lymphadenopathy or thyromegaly.  Chest: Clear bilaterally to A&P without wheeze or rhonchi.  Heart: Regular rate and rhythm without murmur.  Abdomen: Soft, bowel sounds present negative for bruit or masses.  Extremities: There is deformity of the ankles bilaterally but no cyanosis.  Skin: There is a small open blisterlike area on the right small toe with no surrounding erythema.    Assessment and Plan. The following treatment and monitoring plan is recommended:          1. Medicare annual wellness visit, subsequent  Annual wellness topics discussed, review of chronic medical problems completed      2. Acute cystitis without hematuria  Patient's symptoms appear to have cleared and I will do repeat urine in the future if necessary. She will finish her Macrobid.    3. Skin ulcer of toe of right foot, limited to breakdown of skin (CMS-HCC)  I advised no further use of alcohol to the wound but use soap and water and keep it covered as well as using Bactroban. She'll be referred to the wound care center and she will finish her antibiotic.  - REFERRAL TO WOUND CLINIC  - mupirocin (BACTROBAN) 2 % Ointment; Apply 1 Application to affected area(s) every day.  Dispense: 1 Tube; Refill: 0    4. Chronic low back pain, unspecified back pain laterality, with sciatica presence unspecified  Patient signed opiate consent form and  was given prescription for tramadol in place of Ambien.  - tramadol (ULTRAM) 50 MG Tab; Take 1 Tab by mouth 2 Times a Day for 30 days.  Dispense: 60 Tab; Refill: 5  - CONSENT FOR OPIATE PRESCRIPTION    5. Primary insomnia  Patient will stop use of Ambien and may use over-the-counter products if she feels necessary but try not to use on a daily basis.    6. Constipation, unspecified constipation type  Recommended some citrate of magnesia and if that doesn't work to try a enema which are medical caregiver can provide. Otherwise she will need to go to the emergency room.    7. Debility  Patient has full-time caregiver.    8. Recurrent major depressive disorder, in full remission (CMS-HCC)  Patient will continue on Celexa.    9. Dysphagia, unspecified type  Patient has learned to live with this problem.    10. Chronic pain syndrome  Tramadol to be used no more than twice a day.  - tramadol (ULTRAM) 50 MG Tab; Take 1 Tab by mouth 2 Times a Day for 30 days.  Dispense: 60 Tab; Refill: 5  - CONSENT FOR OPIATE PRESCRIPTION    11. Recurrent urinary tract infection  Repeat urine will be done later if necessary.    12. MS (multiple sclerosis) (CMS-HCC)  Patient will continue to follow with neurology.    Services suggested: No services needed at this time  Health Care Screening: Age-appropriate preventive services recommended by USPTF and ACIP covered by Medicare were discussed today. Services ordered if indicated and agreed upon by the patient.  Referrals offered: PT/OT/Nutrition counseling/Behavioral Health/Smoking cessation as per orders if indicated.    Discussion today about general wellness and lifestyle habits:    · Prevent falls and reduce trip hazards; Cautioned about securing or removing rugs.  · Have a working fire alarm and carbon monoxide detector;   · Engage in regular physical activity and social activities       Follow-up: No Follow-up on file.

## 2018-03-21 ENCOUNTER — HOSPITAL ENCOUNTER (OUTPATIENT)
Facility: MEDICAL CENTER | Age: 33
End: 2018-03-21
Attending: NURSE PRACTITIONER
Payer: MEDICARE

## 2018-03-21 LAB
APPEARANCE UR: ABNORMAL
BACTERIA #/AREA URNS HPF: NEGATIVE /HPF
BILIRUB UR QL STRIP.AUTO: NEGATIVE
COLOR UR: YELLOW
CULTURE IF INDICATED INDCX: YES UA CULTURE
EPI CELLS #/AREA URNS HPF: ABNORMAL /HPF
GLUCOSE UR STRIP.AUTO-MCNC: NEGATIVE MG/DL
HYALINE CASTS #/AREA URNS LPF: ABNORMAL /LPF
KETONES UR STRIP.AUTO-MCNC: NEGATIVE MG/DL
LEUKOCYTE ESTERASE UR QL STRIP.AUTO: ABNORMAL
MICRO URNS: ABNORMAL
NITRITE UR QL STRIP.AUTO: NEGATIVE
PH UR STRIP.AUTO: 7.5 [PH]
PROT UR QL STRIP: NEGATIVE MG/DL
RBC # URNS HPF: ABNORMAL /HPF
RBC UR QL AUTO: ABNORMAL
SP GR UR STRIP.AUTO: 1.02
UROBILINOGEN UR STRIP.AUTO-MCNC: 0.2 MG/DL
WBC #/AREA URNS HPF: ABNORMAL /HPF
YEAST BUDDING URNS QL: PRESENT /HPF

## 2018-03-21 PROCEDURE — 81001 URINALYSIS AUTO W/SCOPE: CPT

## 2018-03-21 PROCEDURE — 87086 URINE CULTURE/COLONY COUNT: CPT

## 2018-03-23 LAB
BACTERIA UR CULT: NORMAL
SIGNIFICANT IND 70042: NORMAL
SITE SITE: NORMAL
SOURCE SOURCE: NORMAL

## 2018-03-23 RX ORDER — NITROFURANTOIN 25; 75 MG/1; MG/1
100 CAPSULE ORAL 2 TIMES DAILY
Qty: 14 CAP | Refills: 0 | Status: SHIPPED | OUTPATIENT
Start: 2018-03-23 | End: 2018-03-30

## 2018-04-04 RX ORDER — CITALOPRAM 20 MG/1
TABLET ORAL
Qty: 90 TAB | Refills: 3 | Status: SHIPPED | OUTPATIENT
Start: 2018-04-04 | End: 2019-03-24 | Stop reason: SDUPTHER

## 2018-04-12 ENCOUNTER — APPOINTMENT (OUTPATIENT)
Dept: ADMISSIONS | Facility: MEDICAL CENTER | Age: 33
End: 2018-04-12
Attending: ORTHOPAEDIC SURGERY
Payer: MEDICARE

## 2018-04-17 ENCOUNTER — OFFICE VISIT (OUTPATIENT)
Dept: NEUROLOGY | Facility: MEDICAL CENTER | Age: 33
End: 2018-04-17
Payer: MEDICARE

## 2018-04-17 VITALS
OXYGEN SATURATION: 95 % | SYSTOLIC BLOOD PRESSURE: 102 MMHG | TEMPERATURE: 98.3 F | HEART RATE: 105 BPM | DIASTOLIC BLOOD PRESSURE: 64 MMHG

## 2018-04-17 DIAGNOSIS — F51.01 PRIMARY INSOMNIA: ICD-10-CM

## 2018-04-17 DIAGNOSIS — G04.00 ADEM (ACUTE DISSEMINATED ENCEPHALOMYELITIS): ICD-10-CM

## 2018-04-17 DIAGNOSIS — G43.719 INTRACTABLE CHRONIC MIGRAINE WITHOUT AURA AND WITHOUT STATUS MIGRAINOSUS: ICD-10-CM

## 2018-04-17 DIAGNOSIS — R25.2 SPASTICITY: ICD-10-CM

## 2018-04-17 PROCEDURE — 99214 OFFICE O/P EST MOD 30 MIN: CPT | Mod: 25 | Performed by: PSYCHIATRY & NEUROLOGY

## 2018-04-17 RX ORDER — KETOROLAC TROMETHAMINE 30 MG/ML
30 INJECTION, SOLUTION INTRAMUSCULAR; INTRAVENOUS ONCE
Status: COMPLETED | OUTPATIENT
Start: 2018-04-17 | End: 2018-04-17

## 2018-04-17 RX ORDER — ZOLPIDEM TARTRATE 5 MG/1
5 TABLET ORAL NIGHTLY PRN
Qty: 30 TAB | Refills: 5 | Status: SHIPPED | OUTPATIENT
Start: 2018-04-17 | End: 2018-05-17

## 2018-04-17 RX ADMIN — KETOROLAC TROMETHAMINE 30 MG: 30 INJECTION, SOLUTION INTRAMUSCULAR; INTRAVENOUS at 10:03

## 2018-04-20 NOTE — ASSESSMENT & PLAN NOTE
Pt has had one severe episode of demyelination which occurred in 2011 and has caused quadraparesis. Pt has had some minimal slow recovery from this disease but has had complications of debility.

## 2018-04-20 NOTE — PROGRESS NOTES
CHIEF COMPLAINT  Chief Complaint   Patient presents with   • Follow-Up     MS and spasticity       HPI  Griselda Swanson is a 30 y.o. female who presents for treatment of her neurologic problems and worsening headaches.    Primary insomnia  Pt has issues with sleep.    ADEM (acute disseminated encephalomyelitis)  Pt has had one severe episode of demyelination which occurred in 2011 and has caused quadraparesis. Pt has had some minimal slow recovery from this disease but has had complications of debility.    Intractable chronic migraine without aura and without status migrainosus  Pt with increasing frequency of migraine headache. Pt states that the imitrex does not help as much as it used to help.    Spasticity  Pt is having her feet operated on next month for the contractures in her ankles by Dr. Stanley.    REVIEW OF SYSTEMS  Pertinent Positives:muscle spasticity, dysarthria, constipation, urinary retention   All other systems are negative.     PAST MEDICAL HISTORY  Past Medical History:   Diagnosis Date   • ADEM (acute disseminated encephalomyelitis)    • Back pain    • Breath shortness     since 2014 not an issue at this time (4/2018)   • C. difficile colitis 2015   • Coma (CMS-HCC) August 2009    1 month, lesions on brain,  unknown etiology   • Coma (CMS-HCC) 2008    Spontaneous -    • Demyelinating disorder (CMS-HCC)     demyelinating encephpomyeltis    • Encephalitis 2009   • Heart burn    • Indigestion    • Lesion of brain     unknown cuase   • MEDICAL HOME    • Migraines    • MS (multiple sclerosis) (CMS-HCC)    • Other specified disorder of intestines     constipation   • Pain    • Psychiatric problem     depression and anxiety   • Renal disorder     kidney stones   • Urinary incontinence        SOCIAL HISTORY  Social History     Social History   • Marital status: Single     Spouse name: N/A   • Number of children: N/A   • Years of education: N/A     Occupational History   • Not on file.     Social  History Main Topics   • Smoking status: Former Smoker     Packs/day: 1.00     Years: 12.00     Types: Cigarettes     Quit date: 1/1/2008   • Smokeless tobacco: Never Used      Comment: Started smoking at age 11   • Alcohol use No   • Drug use: No   • Sexual activity: No     Other Topics Concern   • Not on file     Social History Narrative   • No narrative on file       SURGICAL HISTORY  Past Surgical History:   Procedure Laterality Date   • CATH PLACE PERM EPIDURAL  3/6/2012    Performed by PARI ROBERSON at [facility]   • CATH PLACE PERM EPIDURAL  6/26/2012    Performed by PARI ROBERSON at [facility]   • CATH PLACE PERM EPIDURAL N/A 3/8/2016    Procedure: BACLOFEN PUMP TRIAL;  Surgeon: Pari Roberson M.D.;  Location: [facility];  Service:    • CATH PLACE PERM EPIDURAL Left 6/14/2016    Procedure: CATH PLACE PERM EPIDURAL - BACLOFEN PUMP AND CATHETER REPLACEMENT  - BACK AND ABDOMEN;  Surgeon: Pari Roberson M.D.;  Location: [facility];  Service:    • MAMMOPLASTY AUGMENTATION  2002   • [place] BACLOFEN INTRATHECAL TRIAL  3/8/2016    Dr. Roberson  [facility]   • PUMP INSERT/REMOVE  3/12/2013    Performed by Pari Roberson M.D. at [facility]   • PUMP INSERT/REMOVE Left 7/1/2016    Procedure: PUMP REMOVAL;  Surgeon: Pari Roberson M.D.;  Location: [facility];  Service:    • PUMP REVISION  10/8/2013    Performed by Pari Roberson M.D. at [facility]   • TONSILLECTOMY         CURRENT MEDICATIONS  Current Outpatient Prescriptions   Medication Sig Dispense Refill   • zolpidem (AMBIEN) 5 MG Tab Take 1 Tab by mouth at bedtime as needed for Sleep for up to 30 days. 30 Tab 5   • citalopram (CELEXA) 20 MG Tab TAKE 1 TABLET BY MOUTH EVERY DAY 90 Tab 3   • polyethylene glycol 3350 (MIRALAX) Powder MIX AND DRINK 1 CAPFUL WITH 8 OZ OF LIQUID QD  3   • CASCARA SAGRADA PO Take  by mouth.     • CRANBERRY PO Take  by mouth.     •  mupirocin (BACTROBAN) 2 % Ointment Apply 1 Application to affected area(s) every day. 1 Tube 0   • docusate sodium (COLACE) 100 MG Cap Take 1 Cap by mouth 2 times a day as needed for Constipation. 60 Cap 11   • triamcinolone acetonide (KENALOG) 0.1 % Ointment Apply 1 Application to affected area(s) 2 times a day. 1 Tube 1   • baclofen (LIORESAL) 10 MG Tab Take 3 Tabs by mouth 3 times a day. 180 Tab 11   • cyclobenzaprine (FLEXERIL) 10 MG Tab Take 1 Tab by mouth 3 times a day as needed for Muscle Spasms. 90 Tab 5   • simethicone (MYLICON) 125 MG chewable tablet Take 125 mg by mouth 4 times a day as needed. Indications: Gas     • Biotin w/ Vitamins C & E (HAIR SKIN & NAILS GUMMIES PO) Take 2 Each by mouth every day. Indications: supplement     • Diclofenac Sodium 1 % Gel APPLY 4 GRAMS TO KNEE AND 3 GRAMS TO ANKLES UP TO FOUR TIMES DAILY AS NEEDED 100 g 11   • sumatriptan (IMITREX) 100 MG tablet TAKE 1 TABLET BY MOUTH AT ONSET OF HEADACHE. MAY REPEAT IN 2 HOURS. MAX 2 TABLETS A DAY 9 Tab 11   • Misc. Devices Misc W/C stabilizer needs eval and possible replacement 1 Each 11   • clindamycin-benzoyl peroxide (BENZACLIN) gel APPLY TO THE FACE EVERY DAY 50 g 11   • tamsulosin (FLOMAX) 0.4 MG capsule Take 1 Cap by mouth every day. 90 Cap 11   • Misc. Devices Misc Please eval and fix electric W/C. Please eval also for W/C needs. 1 Each 11   • meloxicam (MOBIC) 15 MG tablet Take 1 Tab by mouth 1 time daily as needed. 90 Tab 3   • Misc. Devices Misc Please allow patient to have support/therapy dog in appt. Regardless of weight due to multiple chronic illnesses and debility. 1 Each 11   • topiramate (TOPAMAX) 25 MG Tab Take 1 Tab by mouth 2 times a day. 60 Tab 11   • SUMAtriptan Succinate 6 MG/0.5ML Solution Auto-injector INJECT 0.5 MLS IN THE MUSCLE ONCE FOR 1 DOSE 4 mL 0   • Calcium Carbonate Antacid (TUMS PO) Take 1 tablet by mouth as needed. Indications: heartburn     • Multiple Vitamins-Minerals (MULTIVITAMIN GUMMIES ADULTS  PO) Take 1 Tab by mouth every day. Indications: vitamin supplement.     • vitamin D (CHOLECALCIFEROL) 1000 UNIT Tab Take 1,000 Units by mouth 4 times a day. Indications: supplement       No current facility-administered medications for this visit.        ALLERGIES  Allergies   Allergen Reactions   • Bactrim      Reaction unknown   • Fentanyl      Makes her agressive       PHYSICAL EXAM  VITAL SIGNS: /64   Pulse (!) 105   Temp 36.8 °C (98.3 °F)   SpO2 95%   Constitutional: Well developed, Well nourished, No acute distress, Non-toxic appearance.   HENT: normocephalic   Eyes: fundi disc sharp   Neck: Normal range of motion, No tenderness, Supple, No stridor.   Cardiovascular: Normal heart rate, Normal rhythm, No murmurs, No rubs, No gallops.   Thorax & Lungs: Normal breath sounds, No respiratory distress, No wheezing, No chest tenderness.   Abdomen: Bowel sounds normal, Soft, No tenderness, No masses, No pulsatile masses.   Skin: Warm, Dry, No erythema, No rash.   Back: No tenderness, No CVA tenderness.   Extremities: Intact distal pulses, No edema, No tenderness, No cyanosis, No clubbing.   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   Lab: Reviewed with patient in detail and as noted in results.    EEG  FIRDA    RADIOLOGY/PROCEDURES  MRI brain reviewed in the PACS from 2011 until 2016 and scans have been stable.      ASSESSMENT AND PLAN  1. ADEM   No signs of advancing disease but chronic sequelae of the initial demyelinating event. Recommend continued OT, ST and PT after surgery recovery. Increase in baclofen as tolerated for sedation.    2. Primary insomia    Refill her Ambien 5mg QHS.    3. Intractable migraine headache    Treat with topamax as a preventative and imitrex as an abortive agent. Consider Botox if she fails topamax.  Patient was given Toradol 30mg IM for her headache today.    4. Spasticity  Agree with contracture surgery as planned and muscle relaxers.     I spent 30 minutes with this patient face to face, and her caregiver, over fifty percent was spent counseling patient on their condition, best management practices, reviewing test results and risks and benefits of treatment.

## 2018-04-20 NOTE — ASSESSMENT & PLAN NOTE
Pt with increasing frequency of migraine headache. Pt states that the imitrex does not help as much as it used to help.

## 2018-05-03 DIAGNOSIS — G89.29 OTHER CHRONIC PAIN: ICD-10-CM

## 2018-05-03 RX ORDER — MELOXICAM 15 MG/1
TABLET ORAL
Qty: 90 TAB | Refills: 0 | Status: SHIPPED | OUTPATIENT
Start: 2018-05-03 | End: 2018-08-03 | Stop reason: SDUPTHER

## 2018-06-12 ENCOUNTER — OFFICE VISIT (OUTPATIENT)
Dept: MEDICAL GROUP | Facility: MEDICAL CENTER | Age: 33
End: 2018-06-12
Payer: MEDICARE

## 2018-06-12 VITALS
OXYGEN SATURATION: 95 % | RESPIRATION RATE: 16 BRPM | HEART RATE: 87 BPM | TEMPERATURE: 98.5 F | SYSTOLIC BLOOD PRESSURE: 116 MMHG | DIASTOLIC BLOOD PRESSURE: 72 MMHG

## 2018-06-12 DIAGNOSIS — Z91.81 AT HIGH RISK FOR FALLS: ICD-10-CM

## 2018-06-12 DIAGNOSIS — R53.81 DEBILITY: ICD-10-CM

## 2018-06-12 DIAGNOSIS — R25.2 SPASTICITY: ICD-10-CM

## 2018-06-12 DIAGNOSIS — R32 URINARY INCONTINENCE, UNSPECIFIED TYPE: ICD-10-CM

## 2018-06-12 DIAGNOSIS — Z01.419 ENCOUNTER FOR GYNECOLOGICAL EXAMINATION WITHOUT ABNORMAL FINDING: ICD-10-CM

## 2018-06-12 PROCEDURE — 99213 OFFICE O/P EST LOW 20 MIN: CPT | Performed by: NURSE PRACTITIONER

## 2018-06-12 ASSESSMENT — ENCOUNTER SYMPTOMS: FOCAL WEAKNESS: 1

## 2018-06-12 NOTE — PROGRESS NOTES
Subjective:      Griselda Swanson is a 32 y.o. female who presents with Follow-Up (6 month) and Orders Needed (new wheelchair)        CC: Patient is brought in today by her full-time caregiver mainly for need of wheelchair repair paperwork.    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 She has a electronic wheelchair but the control device is not working correctly and the chair no longer reclines. She and her caregiver would like either a new chair or repair of her current chair.    Patient continues to follow with neurology for her MS and spasticity. Patient reports no change in status although most information is provided by the caregiver because of patient's aphasia. She is alert however and smiles and nods and shows facial expressions.    Patient has urinary incontinence and uses incontinence briefs on a regular basis for this.    Current Outpatient Prescriptions   Medication Sig Dispense Refill   • citalopram (CELEXA) 20 MG Tab TAKE 1 TABLET BY MOUTH EVERY DAY 90 Tab 3   • polyethylene glycol 3350 (MIRALAX) Powder MIX AND DRINK 1 CAPFUL WITH 8 OZ OF LIQUID QD  3   • CASCARA SAGRADA PO Take  by mouth.     • mupirocin (BACTROBAN) 2 % Ointment Apply 1 Application to affected area(s) every day. 1 Tube 0   • docusate sodium (COLACE) 100 MG Cap Take 1 Cap by mouth 2 times a day as needed for Constipation. 60 Cap 11   • triamcinolone acetonide (KENALOG) 0.1 % Ointment Apply 1 Application to affected area(s) 2 times a day. 1 Tube 1   • baclofen (LIORESAL) 10 MG Tab Take 3 Tabs by mouth 3 times a day. 180 Tab 11   • cyclobenzaprine (FLEXERIL) 10 MG Tab Take 1 Tab by mouth 3 times a day as needed for Muscle Spasms. 90 Tab 5   • simethicone (MYLICON) 125 MG chewable tablet Take 125 mg by mouth 4 times a day as needed. Indications: Gas     • Diclofenac Sodium 1 % Gel APPLY 4 GRAMS TO KNEE AND 3 GRAMS TO  ANKLES UP TO FOUR TIMES DAILY AS NEEDED 100 g 11   • sumatriptan (IMITREX) 100 MG tablet TAKE 1 TABLET BY MOUTH AT ONSET OF HEADACHE. MAY REPEAT IN 2 HOURS. MAX 2 TABLETS A DAY 9 Tab 11   • Misc. Devices Misc W/C stabilizer needs eval and possible replacement 1 Each 11   • clindamycin-benzoyl peroxide (BENZACLIN) gel APPLY TO THE FACE EVERY DAY 50 g 11   • tamsulosin (FLOMAX) 0.4 MG capsule Take 1 Cap by mouth every day. 90 Cap 11   • Misc. Devices Misc Please eval and fix electric W/C. Please eval also for W/C needs. 1 Each 11   • Misc. Devices Misc Please allow patient to have support/therapy dog in appt. Regardless of weight due to multiple chronic illnesses and debility. 1 Each 11   • topiramate (TOPAMAX) 25 MG Tab Take 1 Tab by mouth 2 times a day. 60 Tab 11   • SUMAtriptan Succinate 6 MG/0.5ML Solution Auto-injector INJECT 0.5 MLS IN THE MUSCLE ONCE FOR 1 DOSE 4 mL 0   • Calcium Carbonate Antacid (TUMS PO) Take 1 tablet by mouth as needed. Indications: heartburn     • Multiple Vitamins-Minerals (MULTIVITAMIN GUMMIES ADULTS PO) Take 1 Tab by mouth every day. Indications: vitamin supplement.     • vitamin D (CHOLECALCIFEROL) 1000 UNIT Tab Take 1,000 Units by mouth 4 times a day. Indications: supplement     • meloxicam (MOBIC) 15 MG tablet TAKE 1 TABLET BY MOUTH EVERY DAY AS NEEDED 90 Tab 0     No current facility-administered medications for this visit.      Social History   Substance Use Topics   • Smoking status: Former Smoker     Packs/day: 1.00     Years: 12.00     Types: Cigarettes     Quit date: 1/1/2008   • Smokeless tobacco: Never Used      Comment: Started smoking at age 11   • Alcohol use No     Past Medical History:   Diagnosis Date   • ADEM (acute disseminated encephalomyelitis)    • Back pain    • Breath shortness     since 2014 not an issue at this time (4/2018)   • C. difficile colitis 2015   • Coma (HCC) August 2009    1 month, lesions on brain,  unknown etiology   • Coma (HCC) 2008     Spontaneous -    • Demyelinating disorder (HCC)     demyelinating encephpomyeltis    • Encephalitis 2009   • Heart burn    • Indigestion    • Lesion of brain     unknown cuase   • MEDICAL HOME    • Migraines    • MS (multiple sclerosis) (HCC)    • Other specified disorder of intestines     constipation   • Pain    • Psychiatric problem     depression and anxiety   • Renal disorder     kidney stones   • Urinary incontinence      Family History   Problem Relation Age of Onset   • Cancer Mother      Sarcoma   • Bipolar disorder Brother    • Other Brother      spina bifida   • Diabetes Maternal Grandmother    • Other Daughter      Migrains       Review of Systems   Neurological: Positive for focal weakness.   All other systems reviewed and are negative.         Objective:     /72   Pulse 87   Temp 36.9 °C (98.5 °F)   Resp 16   SpO2 95%      Physical Exam   Constitutional: She is oriented to person, place, and time. She appears well-developed and well-nourished. No distress.   HENT:   Head: Normocephalic and atraumatic.   Right Ear: External ear normal.   Left Ear: External ear normal.   Nose: Nose normal.   Eyes: Right eye exhibits no discharge. Left eye exhibits no discharge.   Neck: Normal range of motion. Neck supple. No thyromegaly present.   Cardiovascular: Normal rate, regular rhythm and normal heart sounds.  Exam reveals no gallop and no friction rub.    No murmur heard.  Pulmonary/Chest: Effort normal and breath sounds normal. She has no wheezes. She has no rales.   Musculoskeletal: She exhibits no edema or tenderness.   Neurological: She is alert and oriented to person, place, and time. She displays normal reflexes.   Quadriparesis with bilateral contractures most prevalent of the ankles.   Skin: Skin is warm and dry. No rash noted. She is not diaphoretic.   Psychiatric: She has a normal mood and affect. Her behavior is normal. Judgment and thought content normal.   Nursing note and vitals  reviewed.              Assessment/Plan:     1. Encounter for gynecological examination without abnormal finding  Patient due for Pap smear and we do not have the equipment to deal with this in the office because of her debility so a referral to gynecology as been placed. Her last Pap smear was 3 years ago.  - REFERRAL TO GYNECOLOGY    2. Debility  I will fill out paperwork for patient to have her wheelchair replaced or repaired because it is very important for her to have a working mobility device with her spasticity and debility.    3. At high risk for falls  Patient will get repair of her wheelchair and has full-time caregiver since patient is in need of total care.    4. Spasticity  Patient on muscle relaxers through neurology.    5.  Urinary incontinence.  Patient will continue to use incontinence briefs as needed and her caregiver provides good perineal care to prevent breakdown.

## 2018-07-14 DIAGNOSIS — G43.009 MIGRAINE WITHOUT AURA AND WITHOUT STATUS MIGRAINOSUS, NOT INTRACTABLE: ICD-10-CM

## 2018-07-16 RX ORDER — SUMATRIPTAN 100 MG/1
TABLET, FILM COATED ORAL
Qty: 9 TAB | Refills: 11 | Status: SHIPPED | OUTPATIENT
Start: 2018-07-16 | End: 2019-04-19 | Stop reason: SDUPTHER

## 2018-08-01 ENCOUNTER — TELEPHONE (OUTPATIENT)
Dept: MEDICAL GROUP | Facility: MEDICAL CENTER | Age: 33
End: 2018-08-01

## 2018-08-01 NOTE — TELEPHONE ENCOUNTER
1. Caller Name: Stefania                       Call Back Number: [phone]    2. Message: stefania is going out of town for a week and the pharmacy would like to get the ok to  lauryn's tramadol early before she goes out of town so she has it     3. Patient approves office to leave a detailed voicemail/MyChart message: N\A

## 2018-08-01 NOTE — TELEPHONE ENCOUNTER
I spoke with Stefania and I explained that my understanding was lauryn was going to come off tramadol because she was also taking Ambien. It appears that she filled the prescription for tramadol on July 17 and a prescription for Ambien on July 14. I therefore do not want her taking tramadol and Ambien together and early refill was denied.

## 2018-08-03 DIAGNOSIS — G89.29 OTHER CHRONIC PAIN: ICD-10-CM

## 2018-08-03 RX ORDER — MELOXICAM 15 MG/1
TABLET ORAL
Qty: 90 TAB | Refills: 0 | Status: SHIPPED | OUTPATIENT
Start: 2018-08-03 | End: 2018-10-26 | Stop reason: SDUPTHER

## 2018-08-06 ENCOUNTER — TELEPHONE (OUTPATIENT)
Dept: MEDICAL GROUP | Facility: MEDICAL CENTER | Age: 33
End: 2018-08-06

## 2018-08-06 DIAGNOSIS — R30.0 DYSURIA: ICD-10-CM

## 2018-08-06 NOTE — TELEPHONE ENCOUNTER
Griselda possibly has a UTI and her care giver would like to get an order for UA  So she can drop the specimen off in the morning

## 2018-08-09 ENCOUNTER — TELEPHONE (OUTPATIENT)
Dept: MEDICAL GROUP | Facility: MEDICAL CENTER | Age: 33
End: 2018-08-09

## 2018-08-09 NOTE — TELEPHONE ENCOUNTER
Early refill at pharmacy approved for tramadol. Ambien prescription canceled. Also, her drug contract states no early refills so this will be the last time I can approve an early refill.

## 2018-08-09 NOTE — TELEPHONE ENCOUNTER
1. Caller Name: Stefania                    Call Back Number: [phone]     2. Message: stefania patients care taker got the message regarding taking tramadol and ambien and early  was denied. She stated patient has stopped the ambien and will still take tramadol. Stefania is going out of town and would like to  the tramadol so she has it while she is gone. She will not be taking the ambien anymore. She is requesting an early refill for tramadol     3. Patient approves office to leave a detailed voicemail/MyChart message: N\A

## 2018-08-20 RX ORDER — TAMSULOSIN HYDROCHLORIDE 0.4 MG/1
CAPSULE ORAL
Qty: 90 CAP | Refills: 3 | Status: SHIPPED | OUTPATIENT
Start: 2018-08-20 | End: 2019-04-19 | Stop reason: SDUPTHER

## 2018-09-17 ENCOUNTER — TELEPHONE (OUTPATIENT)
Dept: MEDICAL GROUP | Facility: MEDICAL CENTER | Age: 33
End: 2018-09-17

## 2018-09-17 DIAGNOSIS — G89.29 CHRONIC LOW BACK PAIN, UNSPECIFIED BACK PAIN LATERALITY, WITH SCIATICA PRESENCE UNSPECIFIED: ICD-10-CM

## 2018-09-17 DIAGNOSIS — G89.4 CHRONIC PAIN SYNDROME: ICD-10-CM

## 2018-09-17 DIAGNOSIS — M54.5 CHRONIC LOW BACK PAIN, UNSPECIFIED BACK PAIN LATERALITY, WITH SCIATICA PRESENCE UNSPECIFIED: ICD-10-CM

## 2018-09-17 RX ORDER — TRAMADOL HYDROCHLORIDE 50 MG/1
50 TABLET ORAL 2 TIMES DAILY
Qty: 60 TAB | Refills: 0 | Status: SHIPPED | OUTPATIENT
Start: 2018-09-17 | End: 2018-10-17

## 2018-09-17 NOTE — TELEPHONE ENCOUNTER
1. Caller Name: Emi (sister)                  Call Back Number [phone] (home)         2. Message: Called she stated  They cannot find pt ultram rx they tibk somebody took it.... I told her she needs to file a police report and bring it in to the  Office and is up to the provider if he gives a new rx or not.....she will be coming in to drop it off     3. Patient approves office to leave a detailed voicemail/MyChart message: yes

## 2018-09-17 NOTE — TELEPHONE ENCOUNTER
I told patient's nurse that she cannot take Ambien and tramadol together. Even though it appears it is getting picked up each month, her caregiver says that she is not using it and it was accidentally picked up. I will give her one month of tramadol with the understanding she will not refill any more Ambien.

## 2018-09-25 ENCOUNTER — TELEPHONE (OUTPATIENT)
Dept: MEDICAL GROUP | Facility: MEDICAL CENTER | Age: 33
End: 2018-09-25

## 2018-09-25 DIAGNOSIS — G89.29 CHRONIC LOW BACK PAIN, UNSPECIFIED BACK PAIN LATERALITY, WITH SCIATICA PRESENCE UNSPECIFIED: ICD-10-CM

## 2018-09-25 DIAGNOSIS — G89.4 CHRONIC PAIN SYNDROME: ICD-10-CM

## 2018-09-25 DIAGNOSIS — M54.5 CHRONIC LOW BACK PAIN, UNSPECIFIED BACK PAIN LATERALITY, WITH SCIATICA PRESENCE UNSPECIFIED: ICD-10-CM

## 2018-09-25 NOTE — TELEPHONE ENCOUNTER
Patients care giver neal states insurance will cover the tramadol medication now. The pharmacy is requesting a verbal ok to fill her script for  today.

## 2018-09-25 NOTE — TELEPHONE ENCOUNTER
Patient normally takes #60 tramadol per month for pain and is given to her by her medical caregivers. I had advised her on a previous visit I would not refill tramadol if she is taking Ambien and it looks like Ambien was recently filled although the medical personnel cares for her stated last week they did not give it to her. She did come in for a early refill on tramadol because it was supposedly stolen and a police report was filled. She was given an early refill of #60 pills about a week ago. It was filled at another pharmacy which caused the pharmacist contact us because of some suspicion with this. I then received a request for a refill today under her insurance but it is only a week after #60 pills were just filled. I spoke with her pharmacist and because of the problems above, I am not going to fill any further control substances until she comes in for a visit to discuss this.

## 2018-10-26 DIAGNOSIS — G89.29 OTHER CHRONIC PAIN: ICD-10-CM

## 2018-10-29 RX ORDER — MELOXICAM 15 MG/1
TABLET ORAL
Qty: 90 TAB | Refills: 0 | Status: SHIPPED | OUTPATIENT
Start: 2018-10-29 | End: 2019-04-19

## 2018-12-28 PROBLEM — R32 URINARY INCONTINENCE: Status: ACTIVE | Noted: 2018-12-28

## 2019-01-17 ENCOUNTER — TELEPHONE (OUTPATIENT)
Dept: MEDICAL GROUP | Facility: MEDICAL CENTER | Age: 34
End: 2019-01-17

## 2019-01-17 NOTE — TELEPHONE ENCOUNTER
1. Caller Name: Emi pt sister and guardian                                        Call Back Number: [phone]      [facility] stating she wants office visit notes to be faxed to [facility] supporting the incontinence pt has so the supplies could be delivered.she left the fax number and notes have been sent as requested.          Patient approves a detailed voicemail message: N\A

## 2019-01-18 DIAGNOSIS — K59.04 CHRONIC IDIOPATHIC CONSTIPATION: ICD-10-CM

## 2019-01-18 RX ORDER — DOCUSATE SODIUM 100 MG/1
CAPSULE, LIQUID FILLED ORAL
Qty: 60 CAP | Refills: 3 | Status: SHIPPED | OUTPATIENT
Start: 2019-01-18 | End: 2019-04-19

## 2019-02-06 RX ORDER — LACTULOSE 10 G/15ML
SOLUTION ORAL; RECTAL
Qty: 16200 ML | Refills: 0 | Status: SHIPPED | OUTPATIENT
Start: 2019-02-06 | End: 2019-04-19

## 2019-03-25 RX ORDER — CITALOPRAM 20 MG/1
TABLET ORAL
Qty: 90 TAB | Refills: 0 | Status: SHIPPED | OUTPATIENT
Start: 2019-03-25 | End: 2019-04-19 | Stop reason: SDUPTHER

## 2019-04-19 ENCOUNTER — OFFICE VISIT (OUTPATIENT)
Dept: NEUROLOGY | Facility: MEDICAL CENTER | Age: 34
End: 2019-04-19
Payer: MEDICARE

## 2019-04-19 ENCOUNTER — OFFICE VISIT (OUTPATIENT)
Dept: MEDICAL GROUP | Facility: MEDICAL CENTER | Age: 34
End: 2019-04-19
Payer: MEDICARE

## 2019-04-19 ENCOUNTER — HOME HEALTH ADMISSION (OUTPATIENT)
Dept: HOME HEALTH SERVICES | Facility: HOME HEALTHCARE | Age: 34
End: 2019-04-19
Payer: MEDICARE

## 2019-04-19 VITALS
HEART RATE: 82 BPM | HEIGHT: 67 IN | OXYGEN SATURATION: 98 % | BODY MASS INDEX: 20.36 KG/M2 | DIASTOLIC BLOOD PRESSURE: 74 MMHG | SYSTOLIC BLOOD PRESSURE: 116 MMHG | TEMPERATURE: 98 F

## 2019-04-19 VITALS
HEART RATE: 89 BPM | RESPIRATION RATE: 16 BRPM | DIASTOLIC BLOOD PRESSURE: 72 MMHG | SYSTOLIC BLOOD PRESSURE: 118 MMHG | OXYGEN SATURATION: 96 % | HEIGHT: 67 IN | BODY MASS INDEX: 20.36 KG/M2

## 2019-04-19 DIAGNOSIS — M21.969 ACQUIRED DEFORMITY OF FOOT, UNSPECIFIED LATERALITY: ICD-10-CM

## 2019-04-19 DIAGNOSIS — G82.50 SPASTIC QUADRIPARESIS (HCC): ICD-10-CM

## 2019-04-19 DIAGNOSIS — G04.00 ADEM (ACUTE DISSEMINATED ENCEPHALOMYELITIS): ICD-10-CM

## 2019-04-19 DIAGNOSIS — R53.81 DEBILITY: ICD-10-CM

## 2019-04-19 DIAGNOSIS — F51.01 PRIMARY INSOMNIA: ICD-10-CM

## 2019-04-19 DIAGNOSIS — L70.0 ACNE VULGARIS: ICD-10-CM

## 2019-04-19 DIAGNOSIS — G43.109 MIGRAINE AURA WITHOUT HEADACHE: ICD-10-CM

## 2019-04-19 DIAGNOSIS — G43.719 INTRACTABLE CHRONIC MIGRAINE WITHOUT AURA AND WITHOUT STATUS MIGRAINOSUS: ICD-10-CM

## 2019-04-19 DIAGNOSIS — G35 MS (MULTIPLE SCLEROSIS) (HCC): ICD-10-CM

## 2019-04-19 DIAGNOSIS — F33.42 RECURRENT MAJOR DEPRESSIVE DISORDER, IN FULL REMISSION (HCC): Chronic | ICD-10-CM

## 2019-04-19 DIAGNOSIS — R32 URINARY INCONTINENCE, UNSPECIFIED TYPE: ICD-10-CM

## 2019-04-19 DIAGNOSIS — G43.009 MIGRAINE WITHOUT AURA AND WITHOUT STATUS MIGRAINOSUS, NOT INTRACTABLE: ICD-10-CM

## 2019-04-19 DIAGNOSIS — Z13.220 SCREENING CHOLESTEROL LEVEL: ICD-10-CM

## 2019-04-19 DIAGNOSIS — N39.0 RECURRENT URINARY TRACT INFECTION: ICD-10-CM

## 2019-04-19 DIAGNOSIS — R51.9 HEADACHE, UNSPECIFIED HEADACHE TYPE: ICD-10-CM

## 2019-04-19 DIAGNOSIS — R25.2 SPASTICITY: ICD-10-CM

## 2019-04-19 PROBLEM — G89.4 CHRONIC PAIN SYNDROME: Status: RESOLVED | Noted: 2017-03-24 | Resolved: 2019-04-19

## 2019-04-19 PROCEDURE — 99214 OFFICE O/P EST MOD 30 MIN: CPT | Performed by: NURSE PRACTITIONER

## 2019-04-19 PROCEDURE — 99214 OFFICE O/P EST MOD 30 MIN: CPT | Performed by: PSYCHIATRY & NEUROLOGY

## 2019-04-19 RX ORDER — TAMSULOSIN HYDROCHLORIDE 0.4 MG/1
0.4 CAPSULE ORAL
Qty: 90 CAP | Refills: 3 | Status: SHIPPED | OUTPATIENT
Start: 2019-04-19 | End: 2019-12-26 | Stop reason: SDUPTHER

## 2019-04-19 RX ORDER — CLINDAMYCIN AND BENZOYL PEROXIDE 10; 50 MG/G; MG/G
GEL TOPICAL
Qty: 50 G | Refills: 11 | Status: SHIPPED | OUTPATIENT
Start: 2019-04-19 | End: 2019-04-24 | Stop reason: SDUPTHER

## 2019-04-19 RX ORDER — CITALOPRAM 20 MG/1
20 TABLET ORAL
Qty: 90 TAB | Refills: 0 | Status: SHIPPED | OUTPATIENT
Start: 2019-04-19 | End: 2019-07-01 | Stop reason: SDUPTHER

## 2019-04-19 RX ORDER — ZOLPIDEM TARTRATE 5 MG/1
5 TABLET ORAL NIGHTLY PRN
Qty: 30 TAB | Refills: 2 | Status: SHIPPED | OUTPATIENT
Start: 2019-04-19 | End: 2019-07-01 | Stop reason: SDUPTHER

## 2019-04-19 RX ORDER — CEFUROXIME AXETIL 250 MG/1
6 TABLET ORAL DAILY
Qty: 4 ML | Refills: 2 | Status: SHIPPED | OUTPATIENT
Start: 2019-04-19 | End: 2019-07-18

## 2019-04-19 RX ORDER — SUMATRIPTAN 100 MG/1
TABLET, FILM COATED ORAL
Qty: 9 TAB | Refills: 11 | Status: SHIPPED | OUTPATIENT
Start: 2019-04-19 | End: 2021-01-18 | Stop reason: SDUPTHER

## 2019-04-19 RX ORDER — CYCLOBENZAPRINE HCL 10 MG
10 TABLET ORAL 3 TIMES DAILY PRN
Qty: 90 TAB | Refills: 2 | Status: SHIPPED | OUTPATIENT
Start: 2019-04-19 | End: 2019-07-18

## 2019-04-19 RX ORDER — BACLOFEN 10 MG/1
30 TABLET ORAL 4 TIMES DAILY
Qty: 120 TAB | Refills: 2 | Status: SHIPPED | OUTPATIENT
Start: 2019-04-19 | End: 2019-06-22 | Stop reason: SDUPTHER

## 2019-04-19 RX ORDER — TOPIRAMATE 25 MG/1
25 TABLET ORAL 2 TIMES DAILY
Qty: 60 TAB | Refills: 11 | Status: SHIPPED | OUTPATIENT
Start: 2019-04-19 | End: 2021-01-18 | Stop reason: SDUPTHER

## 2019-04-19 ASSESSMENT — PATIENT HEALTH QUESTIONNAIRE - PHQ9
SUM OF ALL RESPONSES TO PHQ QUESTIONS 1-9: 6
5. POOR APPETITE OR OVEREATING: NOT AT ALL
SUM OF ALL RESPONSES TO PHQ9 QUESTIONS 1 AND 2: 3
8. MOVING OR SPEAKING SO SLOWLY THAT OTHER PEOPLE COULD HAVE NOTICED. OR THE OPPOSITE, BEING SO FIGETY OR RESTLESS THAT YOU HAVE BEEN MOVING AROUND A LOT MORE THAN USUAL: NOT AT ALL
6. FEELING BAD ABOUT YOURSELF - OR THAT YOU ARE A FAILURE OR HAVE LET YOURSELF OR YOUR FAMILY DOWN: NOT AL ALL
4. FEELING TIRED OR HAVING LITTLE ENERGY: SEVERAL DAYS
2. FEELING DOWN, DEPRESSED, IRRITABLE, OR HOPELESS: SEVERAL DAYS
9. THOUGHTS THAT YOU WOULD BE BETTER OFF DEAD, OR OF HURTING YOURSELF: NOT AT ALL
3. TROUBLE FALLING OR STAYING ASLEEP OR SLEEPING TOO MUCH: SEVERAL DAYS
7. TROUBLE CONCENTRATING ON THINGS, SUCH AS READING THE NEWSPAPER OR WATCHING TELEVISION: SEVERAL DAYS
1. LITTLE INTEREST OR PLEASURE IN DOING THINGS: MORE THAN HALF THE DAYS

## 2019-04-19 ASSESSMENT — ENCOUNTER SYMPTOMS
FOCAL WEAKNESS: 1
HEADACHES: 1
INSOMNIA: 1

## 2019-04-19 NOTE — LETTER
"April 19, 2019        Griselda Swanson  [address] [facility] [place] [address]        To whom it may concern:    I feel a \"Ring Doorbell\" would be beneficial for patient due to her immobility and chronic health issues.    If you have any questions or concerns, please don't hesitate to call.        Sincerely,        LYNNE Garcia.    Electronically Signed     "

## 2019-04-19 NOTE — PROGRESS NOTES
Subjective:      Griselda Swanson is a 33 y.o. female who presents with Other (medication review ) and Orders Needed        CC: Patient here today for follow-up on multiple health problems as well as to discuss mobility needs.  Patient has not been to the office since June of last year.  She is accompanied by her sister who lives out of state and another relative.  2    HPI Griselda Swanson      1. MS (multiple sclerosis) (HCC)  Patient has history of MS since 2011 with subsequent quadriparesis.  She had been following with Dr.bloch in neurology with her last visit in April of last year.  Patient reports no significant changes since last seen and is full-time needing of care.    2. ADEM (acute disseminated encephalomyelitis)  As of last year, she was not showing signs of advancing disease and she has full-time caregivers with her.    3. Debility  Patient has mobility limitations that impair her participation in mobility related activities of daily living in the home because of her MS causing quadriparesis and spasticity.  Her limitations can be compensated for by the use of a power wheelchair.  She is capable and willing to safely operate the power mobility device and they are within her physical and mental abilities as she is currently using 1.  She is unable to use a cane or walker because of her spasticity and quadriparesis.  It does appear that her home environment will be sufficiently suited to support the use of a power wheelchair as it is currently being used at home.  She is unable to use a manual wheelchair because of her weakness.    4. Primary insomnia  Patient would like to get back on Ambien which she used in the past for sleep assistance.   shows she has not gotten any controlled substances in a while    5. Recurrent urinary tract infection  Patient apparently was recently seen by urology and there is self-catheterization and incontinence.  She does use incontinence pads 4 times a day because of  this.    6. Intractable chronic migraine without aura and without status migrainosus  Patient to be seen by neurology today and is on Topamax for prevention and uses Imitrex for breakthrough.    7. Urinary incontinence, unspecified type  Patient has urinary incontinence and will need incontinence pads reordered in the near future.    8. Acquired deformity of foot, unspecified laterality  Patient has spasticity and contracture of the feet bilaterally with toes overlapping bunions.  She did see podiatry in the past and there was some talk of surgical intervention and she would like to get back to them now.    9. Acne vulgaris  Patient would like refills on her BenzaClin gel which she uses for acne prevention.    10. Recurrent major depressive disorder, in full remission (HCC)  Patient would like refills on Celexa which she finds helpful for her depression.    11. Screening cholesterol level  Patient due for yearly blood work.  Social History   Substance Use Topics   • Smoking status: Former Smoker     Packs/day: 1.00     Years: 12.00     Types: Cigarettes     Quit date: 1/1/2008   • Smokeless tobacco: Never Used      Comment: Started smoking at age 11   • Alcohol use No     Current Outpatient Prescriptions   Medication Sig Dispense Refill   • citalopram (CELEXA) 20 MG Tab Take 1 Tab by mouth every day. 90 Tab 0   • clindamycin-benzoyl peroxide (BENZACLIN) gel APPLY TO THE FACE EVERY DAY 50 g 11   • Diclofenac Sodium 1 % Gel APPLY 4 GRAMS TO KNEE AND 3 GRAMS TO ANKLES UP TO FOUR TIMES DAILY AS NEEDED 100 g 10   • tamsulosin (FLOMAX) 0.4 MG capsule Take 1 Cap by mouth every day. 90 Cap 3   • zolpidem (AMBIEN) 5 MG Tab Take 1 Tab by mouth at bedtime as needed for Sleep for up to 30 days. 30 Tab 2   • Misc. Devices Misc Bedside commode 1 Each 11   • sumatriptan (IMITREX) 100 MG tablet TAKE 1 TABLET BY MOUTH AT ONSET OF HEADACHE. MAY REPEAT IN 2 HOURS. MAX 2 TABLETS A DAY 9 Tab 11   • polyethylene glycol 3350 (MIRALAX)  Powder MIX AND DRINK 1 CAPFUL WITH 8 OZ OF LIQUID QD  3   • baclofen (LIORESAL) 10 MG Tab Take 3 Tabs by mouth 3 times a day. 180 Tab 11   • cyclobenzaprine (FLEXERIL) 10 MG Tab Take 1 Tab by mouth 3 times a day as needed for Muscle Spasms. 90 Tab 5   • topiramate (TOPAMAX) 25 MG Tab Take 1 Tab by mouth 2 times a day. 60 Tab 11   • SUMAtriptan Succinate 6 MG/0.5ML Solution Auto-injector INJECT 0.5 MLS IN THE MUSCLE ONCE FOR 1 DOSE 4 mL 0   • simethicone (MYLICON) 125 MG chewable tablet Take 125 mg by mouth 4 times a day as needed. Indications: Gas     • Misc. Devices Misc W/C stabilizer needs eval and possible replacement 1 Each 11   • Misc. Devices Misc Please eval and fix electric W/C. Please eval also for W/C needs. 1 Each 11   • Misc. Devices Misc Please allow patient to have support/therapy dog in appt. Regardless of weight due to multiple chronic illnesses and debility. 1 Each 11   • Calcium Carbonate Antacid (TUMS PO) Take 1 tablet by mouth as needed. Indications: heartburn     • Multiple Vitamins-Minerals (MULTIVITAMIN GUMMIES ADULTS PO) Take 1 Tab by mouth every day. Indications: vitamin supplement.     • vitamin D (CHOLECALCIFEROL) 1000 UNIT Tab Take 1,000 Units by mouth 4 times a day. Indications: supplement       No current facility-administered medications for this visit.      Past Medical History:   Diagnosis Date   • ADEM (acute disseminated encephalomyelitis)    • Back pain    • Breath shortness     since 2014 not an issue at this time (4/2018)   • C. difficile colitis 2015   • Coma ([facility]) August 2009    1 month, lesions on brain,  unknown etiology   • Coma ([facility]) 2008    Spontaneous -    • Demyelinating disorder ([facility])     demyelinating encephpomyeltis    • Encephalitis 2009   • Heart burn    • Indigestion    • Lesion of brain     unknown cuase   • MEDICAL HOME    • Migraines    • MS (multiple sclerosis) ([facility])    • Other specified disorder of intestines     constipation   • Pain    • Psychiatric  "problem     depression and anxiety   • Renal disorder     kidney stones   • Urinary incontinence      Family History   Problem Relation Age of Onset   • Cancer Mother         Sarcoma   • Bipolar disorder Brother    • Other Brother         spina bifida   • Diabetes Maternal Grandmother    • Other Daughter         Migrains       Review of Systems   Genitourinary: Positive for frequency.   Neurological: Positive for focal weakness and headaches.   Psychiatric/Behavioral: The patient has insomnia.    All other systems reviewed and are negative.         Objective:     /72 (BP Location: Left arm, Patient Position: Sitting, BP Cuff Size: Adult)   Pulse 89   Resp 16   Ht 1.702 m (5' 7\")   SpO2 96%   BMI 20.36 kg/m²      Physical Exam   Constitutional: She is oriented to person, place, and time. She appears well-developed and well-nourished. No distress.   HENT:   Head: Normocephalic and atraumatic.   Right Ear: External ear normal.   Left Ear: External ear normal.   Nose: Nose normal.   Eyes: Right eye exhibits no discharge. Left eye exhibits no discharge.   Neck: Normal range of motion. Neck supple. No thyromegaly present.   Cardiovascular: Normal rate, regular rhythm and normal heart sounds.  Exam reveals no gallop and no friction rub.    No murmur heard.  Pulmonary/Chest: Effort normal and breath sounds normal. She has no wheezes. She has no rales.   Musculoskeletal: She exhibits no edema or tenderness.   Patient has quadriplegia with no use of her lower extremities and limited upper extremity use.  She uses an iPad device for communication because she cannot speak.   Neurological: She is alert and oriented to person, place, and time. She displays normal reflexes.   Skin: Skin is warm and dry. No rash noted. She is not diaphoretic.   Patient wearing pressure pads on her hallux valgus bilaterally to prevent rubbing.  Toes overlap.   Psychiatric: She has a normal mood and affect. Her behavior is normal. Judgment " and thought content normal.   Nursing note and vitals reviewed.              Assessment/Plan:     1. MS (multiple sclerosis) ([facility])  Patient is meeting with her new neurologist today to review her situation.  - REFERRAL TO PHYSICAL THERAPY Reason for Therapy: Eval/Treat/Report    2. ADEM (acute disseminated encephalomyelitis)  Patient to see her new neurologist today and I recommended routine yearly blood work.  - REFERRAL TO PHYSICAL THERAPY Reason for Therapy: Eval/Treat/Report  - Comp Metabolic Panel; Future  - CBC WITH DIFFERENTIAL; Future    3. Debility  Patient currently uses a power mobility device and it is absolutely necessary for her to have one because of her severe debility from her MS.  We will send the notes and to [employer] to attempt to get a working chair for her and she will do a physical therapy evaluation prior to this.  - REFERRAL TO PHYSICAL THERAPY Reason for Therapy: Eval/Treat/Report  - Diclofenac Sodium 1 % Gel; APPLY 4 GRAMS TO KNEE AND 3 GRAMS TO ANKLES UP TO FOUR TIMES DAILY AS NEEDED  Dispense: 100 g; Refill: 10  - Misc. Devices Misc; Bedside commode  Dispense: 1 Each; Refill: 11    4. Primary insomnia  I will refill patient's Ambien after review of  and ORT.  She is not on any other controlled substances.  She does not want to try any other medications.  - zolpidem (AMBIEN) 5 MG Tab; Take 1 Tab by mouth at bedtime as needed for Sleep for up to 30 days.  Dispense: 30 Tab; Refill: 2    5. Recurrent urinary tract infection  I have refilled her medication although she needs to see her urologist.  - tamsulosin (FLOMAX) 0.4 MG capsule; Take 1 Cap by mouth every day.  Dispense: 90 Cap; Refill: 3    6. Intractable chronic migraine without aura and without status migrainosus  Patient on Topamax and Imitrex as needed.    7. Urinary incontinence, unspecified type  Patient self caths although someone actually does the catheterization for her.    8. Acquired deformity of foot, unspecified  laterality  Patient needs to get back to podiatry to evaluate her contractures and to see if there is anything that can be done ahead of time to prevent worsening symptoms.  - REFERRAL TO PODIATRY    9. Acne vulgaris    - clindamycin-benzoyl peroxide (BENZACLIN) gel; APPLY TO THE FACE EVERY DAY  Dispense: 50 g; Refill: 11    10. Recurrent major depressive disorder, in full remission (HCC)    - citalopram (CELEXA) 20 MG Tab; Take 1 Tab by mouth every day.  Dispense: 90 Tab; Refill: 0    11. Screening cholesterol level    - Lipid Profile; Future

## 2019-04-19 NOTE — PROGRESS NOTES
CC: ADEM      HPI:    Griselda Swanson is a pleasant 33 y.o. female who presents today in neurologic follow up for ADEM. She was being monitored by Dr. Melissa Bloch and was last seen on 4/17/19.  She is accompanied by her sisters to today's visit.     Ms. 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  She continues to suffer from fairly significant bilateral lower extremity spasticity.  At her last visit with Dr. Bloch in April 2018, she was considering having on her ankles for contractures.  She never followed through with the surgery.  She previously had a baclofen pump placed by Dr. Vega, but the pump became infected and had to be removed.  She has also had Botox for spasticity in the past through Dr. Vega's office, 3 cycles, but did not note significant improvement.  She currently takes baclofen 20 mg 4 times a day in addition to cyclobenzaprine.  She feels the cyclobenzaprine helps considerably.  She has a chronic deformity in her right knee after an injury.    She complains of chronic daily headaches associated with photophobia and phonophobia.  She had previously been taking sumatriptan and and topiramate, but ran out of each of these.  She has been untreated for several months.        ROS:   A comprehensive review of systems was performed and was negative except for that mentioned in the HPI.    Past Medical History:   Past Medical History:   Diagnosis Date   • C. difficile colitis 2015   • Coma ([facility]) August 2009    1 month, lesions on brain,  unknown etiology   • Encephalitis 2009   • Coma ([facility]) 2008    Spontaneous -    • ADEM (acute disseminated encephalomyelitis)    • Back pain    • Breath shortness     since 2014 not an issue at this time (4/2018)   • Demyelinating disorder ([facility])     demyelinating encephpomyeltis    • Heart burn    • Indigestion    • Lesion of brain     unknown cuase   •  MEDICAL HOME    • Migraines    • MS (multiple sclerosis) ([facility])    • Other specified disorder of intestines     constipation   • Pain    • Psychiatric problem     depression and anxiety   • Renal disorder     kidney stones   • Urinary incontinence        Past Surgical History:   Past Surgical History:   Procedure Laterality Date   • PUMP INSERT/REMOVE Left 7/1/2016    Procedure: PUMP REMOVAL;  Surgeon: Pari Roberson M.D.;  Location: [facility];  Service:    • CATH PLACE PERM EPIDURAL Left 6/14/2016    Procedure: CATH PLACE PERM EPIDURAL - BACLOFEN PUMP AND CATHETER REPLACEMENT  - BACK AND ABDOMEN;  Surgeon: Pari Roberson M.D.;  Location: [facility];  Service:    • MN BACLOFEN INTRATHECAL TRIAL  3/8/2016    Dr. Roberson  [facility]   • CATH PLACE PERM EPIDURAL N/A 3/8/2016    Procedure: BACLOFEN PUMP TRIAL;  Surgeon: Pari Roberson M.D.;  Location: [facility];  Service:    • PUMP REVISION  10/8/2013    Performed by Pari Roberson M.D. at [facility]   • PUMP INSERT/REMOVE  3/12/2013    Performed by Pari Roberson M.D. at [facility]   • CATH PLACE PERM EPIDURAL  6/26/2012    Performed by PARI ROBERSON at [facility]   • CATH PLACE PERM EPIDURAL  3/6/2012    Performed by PARI ROBERSON at [facility]   • MAMMOPLASTY AUGMENTATION  2002   • TONSILLECTOMY         Social History:   Social History     Social History   • Marital status: Single     Spouse name: N/A   • Number of children: N/A   • Years of education: N/A     Occupational History   • Not on file.     Social History Main Topics   • Smoking status: Former Smoker     Packs/day: 1.00     Years: 12.00     Types: Cigarettes     Quit date: 1/1/2008   • Smokeless tobacco: Never Used      Comment: Started smoking at age 11   • Alcohol use No   • Drug use: No   • Sexual activity: No     Other Topics Concern   • Not on file     Social History Narrative   • No  narrative on file       Family Hx:   Family History   Problem Relation Age of Onset   • Cancer Mother         Sarcoma   • Bipolar disorder Brother    • Other Brother         spina bifida   • Diabetes Maternal Grandmother    • Other Daughter         Migrains       Current Medications:   Current Outpatient Prescriptions:   •  citalopram (CELEXA) 20 MG Tab, Take 1 Tab by mouth every day., Disp: 90 Tab, Rfl: 0  •  clindamycin-benzoyl peroxide (BENZACLIN) gel, APPLY TO THE FACE EVERY DAY, Disp: 50 g, Rfl: 11  •  Diclofenac Sodium 1 % Gel, APPLY 4 GRAMS TO KNEE AND 3 GRAMS TO ANKLES UP TO FOUR TIMES DAILY AS NEEDED, Disp: 100 g, Rfl: 10  •  tamsulosin (FLOMAX) 0.4 MG capsule, Take 1 Cap by mouth every day., Disp: 90 Cap, Rfl: 3  •  zolpidem (AMBIEN) 5 MG Tab, Take 1 Tab by mouth at bedtime as needed for Sleep for up to 30 days., Disp: 30 Tab, Rfl: 2  •  Misc. Devices Misc, Bedside commode, Disp: 1 Each, Rfl: 11  •  topiramate (TOPAMAX) 25 MG Tab, Take 1 Tab by mouth 2 times a day., Disp: 60 Tab, Rfl: 11  •  baclofen (LIORESAL) 10 MG Tab, Take 3 Tabs by mouth 4 times a day for 90 days., Disp: 120 Tab, Rfl: 2  •  cyclobenzaprine (FLEXERIL) 10 MG Tab, Take 1 Tab by mouth 3 times a day as needed for Muscle Spasms for up to 90 days., Disp: 90 Tab, Rfl: 2  •  sumatriptan (IMITREX) 100 MG tablet, TAKE 1 TABLET BY MOUTH AT ONSET OF HEADACHE. MAY REPEAT IN 2 HOURS. MAX 2 TABLETS A DAY, Disp: 9 Tab, Rfl: 11  •  SUMAtriptan Succinate 6 MG/0.5ML Solution Auto-injector, 6 mg by Intramuscular route every day for 90 days., Disp: 4 mL, Rfl: 2  •  polyethylene glycol 3350 (MIRALAX) Powder, MIX AND DRINK 1 CAPFUL WITH 8 OZ OF LIQUID QD, Disp: , Rfl: 3  •  simethicone (MYLICON) 125 MG chewable tablet, Take 125 mg by mouth 4 times a day as needed. Indications: Gas, Disp: , Rfl:   •  Misc. Devices Misc, W/C stabilizer needs eval and possible replacement, Disp: 1 Each, Rfl: 11  •  Misc. Devices Misc, Please eval and fix electric W/C. Please  "eval also for W/C needs., Disp: 1 Each, Rfl: 11  •  Misc. Devices Misc, Please allow patient to have support/therapy dog in appt. Regardless of weight due to multiple chronic illnesses and debility., Disp: 1 Each, Rfl: 11  •  Calcium Carbonate Antacid (TUMS PO), Take 1 tablet by mouth as needed. Indications: heartburn, Disp: , Rfl:   •  Multiple Vitamins-Minerals (MULTIVITAMIN GUMMIES ADULTS PO), Take 1 Tab by mouth every day. Indications: vitamin supplement., Disp: , Rfl:   •  vitamin D (CHOLECALCIFEROL) 1000 UNIT Tab, Take 1,000 Units by mouth 4 times a day. Indications: supplement, Disp: , Rfl:     Allergies:   Allergies   Allergen Reactions   • Bactrim      Reaction unknown   • Fentanyl      Makes her agressive         Physical Exam:   Ambulatory Vitals  Encounter Vitals  Temperature: 36.7 °C (98 °F)  Temp src: Temporal  Blood Pressure: 116/74  Pulse: 82  Pulse Oximetry: 98 %  Weight:  (wheelchair bound)  Height: 170.2 cm (5' 7\")      Constitutional: Well-developed, well-nourished, good hygiene. Appears stated age.  Cardiovascular: RRR, with no murmurs, rubs or gallops. No carotid bruits. No peripheral edema, pedal pulses intact.   Respiratory: Lungs CTA B/L, no W/R/R.   Skin: Warm, dry, intact. No rashes observed.  Eyes: Sclera anicteric.  Eyes conjugate.  Neurologic:   Mental Status: Awake, alert.   Speech: Severe dysarthria   Memory: Able to recall recent and remote events accurately.    Concentration: Attentive. Able to focus on history and follow multi-step commands.   Fund of Knowledge: Appropriate.   Cranial Nerves:    CN II: PERRL. No afferent pupillary defect.    CN III, IV, VI: Good eye closure, EOMI.     CN V: Facial sensation intact and symmetric.     CN VII: No facial asymmetry.     CN VIII: Hearing intact.     CN IX and X: Palate elevates symmetrically. Normal gag reflex.    CN XI: Symmetric shoulder shrug.     CN XII: Tongue midline.    Sensory: Intact light touch, vibration and temperature. "    Coordination: Decreased coordination in the bilateral upper extremities   DTR's: Hyperpathic.   Babinski: Toes downgoing bilaterally.   Movements: No tremors observed.   Musculoskeletal:    Strength: Spastic quadriparesis.  Right knee is bent and bows outward. There are fairly severe contractures in both of her ankles.   Gait: Steady, narrow based.    Tone: Increased in the bilateral upper and lower extremities.   Joints: No swelling.     Imaging:   Today I reviewed the patient's most recent MRI images with her in the examination room and explained to the patient and her sisters findings on imaging.     MRI of the brain with and without contrast from February 25, 2016  1.   stable extensive supratentorial white matter abnormalities consistent with demyelination. No new or enhancing lesions.  2. Mild sinusitis.    Assessment/Plan:  ADEM (acute disseminated encephalomyelitis)  Ms. Swanson had a one-time demyelinating event following infection in 2009 for which she has never fully recovered and has been left with spastic quadriparesis as a result.  She has never had another demyelinating event to classify her as multiple sclerosis.  At this point in time her care is mostly supportive.  She is ambulatory in an electric wheelchair.  Her sisters as well as outside caregivers provide support.  I do not recommend any immunotherapy at this time, and since there has been no clinical change, I do not recommend new MRI imaging.    Spasticity  Significant bilateral lower extremity spasticity with contractures of both ankles and deformity of the right knee following fracture.  She feels that the combination of baclofen plus cyclobenzaprine has been effective for her.  I agreed to refill the cyclobenzaprine for her today in addition to the baclofen.  I also recommended she make a follow-up appointment with her orthopedist to further discuss the contracture surgery of her ankles.  They were concerned about the recovery time as  she requires use of her feet in order to stand and pivot.  We also discussed that it may also be worthwhile to try Botox again to help with her positioning. They will contact Dr. Vega's office.    Plan:  1.  Continue baclofen 20 mg 4 times a day  2.  Continue cyclobenzaprine 10 mg 3 times a day  3.  They will contact her orthopedist to further discuss surgery for her ankles  4.  They will contact Dr. Vega's office to potentially pursue Botox for spasticity    Intractable chronic migraine without aura and without status migrainosus  Chronic daily migraine.  She has been untreated for several months and I recommended she resume her previous therapy until we pursue other options such as Botox.    Plan:  1.  Resume topiramate 25 mg twice a day  2.  Refilled sumatriptan oral and injectable today.      Viji Carver D.O., M.P.H  MS specialist.   Board Certified Neurologist.  Neurology Clerkship Director, [facility].    Neurology,  [facility].   Tel: [phone]  Fax: [phone]

## 2019-04-20 ENCOUNTER — HOME CARE VISIT (OUTPATIENT)
Dept: HOME HEALTH SERVICES | Facility: HOME HEALTHCARE | Age: 34
End: 2019-04-20
Payer: MEDICARE

## 2019-04-20 VITALS
BODY MASS INDEX: 20.4 KG/M2 | TEMPERATURE: 97.3 F | OXYGEN SATURATION: 96 % | WEIGHT: 130 LBS | DIASTOLIC BLOOD PRESSURE: 58 MMHG | SYSTOLIC BLOOD PRESSURE: 92 MMHG | RESPIRATION RATE: 18 BRPM | HEIGHT: 67 IN | HEART RATE: 83 BPM

## 2019-04-20 PROCEDURE — G0493 RN CARE EA 15 MIN HH/HOSPICE: HCPCS

## 2019-04-20 PROCEDURE — 665999 HH PPS REVENUE DEBIT

## 2019-04-20 PROCEDURE — 665998 HH PPS REVENUE CREDIT

## 2019-04-20 PROCEDURE — 665001 SOC-HOME HEALTH

## 2019-04-20 ASSESSMENT — PATIENT HEALTH QUESTIONNAIRE - PHQ9
1. LITTLE INTEREST OR PLEASURE IN DOING THINGS: 00
2. FEELING DOWN, DEPRESSED, IRRITABLE, OR HOPELESS: 00
CLINICAL INTERPRETATION OF PHQ2 SCORE: 0

## 2019-04-20 ASSESSMENT — ACTIVITIES OF DAILY LIVING (ADL): OASIS_M1830: 06

## 2019-04-20 NOTE — ASSESSMENT & PLAN NOTE
Significant bilateral lower extremity spasticity with contractures of both ankles and deformity of the right knee following fracture.  She feels that the combination of baclofen plus cyclobenzaprine has been effective for her.  I agreed to refill the cyclobenzaprine for her today in addition to the baclofen.  I also recommended she make a follow-up appointment with her orthopedist to further discuss the contracture surgery of her ankles.  They were concerned about the recovery time as she requires use of her feet in order to stand and pivot.  We also discussed that it may also be worthwhile to try Botox again to help with her positioning. They will contact Dr. Vega's office.    Plan:  1.  Continue baclofen 20 mg 4 times a day  2.  Continue cyclobenzaprine 10 mg 3 times a day  3.  They will contact her orthopedist to further discuss surgery for her ankles  4.  They will contact Dr. Vega's office to potentially pursue Botox for spasticity

## 2019-04-20 NOTE — ASSESSMENT & PLAN NOTE
Ms. Swanson had a one-time demyelinating event following infection in 2009 for which she has never fully recovered and has been left with spastic quadriparesis as a result.  She has never had another demyelinating event to classify her as multiple sclerosis.  At this point in time her care is mostly supportive.  She is ambulatory in an electric wheelchair.  Her sisters as well as outside caregivers provide support.  I do not recommend any immunotherapy at this time, and since there has been no clinical change, I do not recommend new MRI imaging.

## 2019-04-21 PROCEDURE — 665998 HH PPS REVENUE CREDIT

## 2019-04-21 PROCEDURE — 665999 HH PPS REVENUE DEBIT

## 2019-04-22 ENCOUNTER — HOME CARE VISIT (OUTPATIENT)
Dept: HOME HEALTH SERVICES | Facility: HOME HEALTHCARE | Age: 34
End: 2019-04-22
Payer: MEDICARE

## 2019-04-22 ENCOUNTER — ANTICOAGULATION MONITORING (OUTPATIENT)
Dept: MEDICAL GROUP | Facility: PHYSICIAN GROUP | Age: 34
End: 2019-04-22

## 2019-04-22 PROCEDURE — 665998 HH PPS REVENUE CREDIT

## 2019-04-22 PROCEDURE — G0152 HHCP-SERV OF OT,EA 15 MIN: HCPCS

## 2019-04-22 PROCEDURE — 665999 HH PPS REVENUE DEBIT

## 2019-04-22 NOTE — PROGRESS NOTES
Medication list reviewed.  Possible drug interactions between SSRIs and Flexeril and Imitrex.  Monitor for QT prolongation with this combination.  As well as serotonin syndrome.  If she has been on this combination for quite some time the odds of serotonin syndrome are rare.  Defer to psychiatry for dose adjustments.  Additive sedative effect with baclofen and zolpidem and topiramate-caution with driving and tasks that require concentrations.  No medication discharge note to compare her medications to at this point.

## 2019-04-23 ENCOUNTER — HOME CARE VISIT (OUTPATIENT)
Dept: HOME HEALTH SERVICES | Facility: HOME HEALTHCARE | Age: 34
End: 2019-04-23
Payer: MEDICARE

## 2019-04-23 VITALS
DIASTOLIC BLOOD PRESSURE: 60 MMHG | HEART RATE: 80 BPM | TEMPERATURE: 98.5 F | RESPIRATION RATE: 16 BRPM | SYSTOLIC BLOOD PRESSURE: 110 MMHG

## 2019-04-23 VITALS
DIASTOLIC BLOOD PRESSURE: 60 MMHG | TEMPERATURE: 98 F | SYSTOLIC BLOOD PRESSURE: 98 MMHG | HEART RATE: 69 BPM | OXYGEN SATURATION: 96 % | RESPIRATION RATE: 16 BRPM

## 2019-04-23 PROCEDURE — 665998 HH PPS REVENUE CREDIT

## 2019-04-23 PROCEDURE — 665999 HH PPS REVENUE DEBIT

## 2019-04-23 PROCEDURE — G0493 RN CARE EA 15 MIN HH/HOSPICE: HCPCS

## 2019-04-23 SDOH — ECONOMIC STABILITY: HOUSING INSECURITY: HOME SAFETY: HAS LIFE LINE

## 2019-04-23 ASSESSMENT — ACTIVITIES OF DAILY LIVING (ADL)
DRESSING_LB_ASSISTANCE: 6
ORAL_CARE_ASSISTANCE: 6
LAUNDRY_ASSISTANCE: 6
TRANSPORTATION_ASSISTANCE: 6
HOUSEKEEPING_ASSISTANCE: 6
EATING_ASSISTANCE: 5
DRESSING_UB_ASSISTANCE: 6
TOILETING_ASSISTANCE: 6
GROOMING_ASSISTANCE: 6
SHOPPING_ASSISTANCE: 6
MEAL_PREP_ASSISTANCE: 6
BATHING_ASSISTANCE: 6

## 2019-04-23 ASSESSMENT — ENCOUNTER SYMPTOMS
SHORTNESS OF BREATH: T
NAUSEA: DENIES
VOMITING: DENIES

## 2019-04-24 DIAGNOSIS — L70.0 ACNE VULGARIS: ICD-10-CM

## 2019-04-24 PROCEDURE — 665999 HH PPS REVENUE DEBIT

## 2019-04-24 PROCEDURE — 665998 HH PPS REVENUE CREDIT

## 2019-04-24 RX ORDER — CLINDAMYCIN AND BENZOYL PEROXIDE 10; 50 MG/G; MG/G
GEL TOPICAL
Qty: 50 G | Refills: 11 | Status: SHIPPED | OUTPATIENT
Start: 2019-04-24 | End: 2021-07-24

## 2019-04-25 ENCOUNTER — HOME CARE VISIT (OUTPATIENT)
Dept: HOME HEALTH SERVICES | Facility: HOME HEALTHCARE | Age: 34
End: 2019-04-25
Payer: MEDICARE

## 2019-04-25 VITALS
TEMPERATURE: 98.8 F | RESPIRATION RATE: 16 BRPM | SYSTOLIC BLOOD PRESSURE: 98 MMHG | HEART RATE: 87 BPM | OXYGEN SATURATION: 98 % | DIASTOLIC BLOOD PRESSURE: 64 MMHG

## 2019-04-25 PROCEDURE — 665999 HH PPS REVENUE DEBIT

## 2019-04-25 PROCEDURE — G0152 HHCP-SERV OF OT,EA 15 MIN: HCPCS

## 2019-04-25 PROCEDURE — 665998 HH PPS REVENUE CREDIT

## 2019-04-26 ENCOUNTER — HOME CARE VISIT (OUTPATIENT)
Dept: HOME HEALTH SERVICES | Facility: HOME HEALTHCARE | Age: 34
End: 2019-04-26
Payer: MEDICARE

## 2019-04-26 PROCEDURE — G0151 HHCP-SERV OF PT,EA 15 MIN: HCPCS

## 2019-04-26 PROCEDURE — 665999 HH PPS REVENUE DEBIT

## 2019-04-26 PROCEDURE — 665998 HH PPS REVENUE CREDIT

## 2019-04-27 VITALS
OXYGEN SATURATION: 99 % | SYSTOLIC BLOOD PRESSURE: 102 MMHG | HEART RATE: 80 BPM | RESPIRATION RATE: 18 BRPM | DIASTOLIC BLOOD PRESSURE: 64 MMHG | TEMPERATURE: 98 F

## 2019-04-27 PROCEDURE — 665998 HH PPS REVENUE CREDIT

## 2019-04-27 PROCEDURE — 665999 HH PPS REVENUE DEBIT

## 2019-04-28 PROCEDURE — 665998 HH PPS REVENUE CREDIT

## 2019-04-28 PROCEDURE — 665999 HH PPS REVENUE DEBIT

## 2019-04-29 PROCEDURE — 665999 HH PPS REVENUE DEBIT

## 2019-04-29 PROCEDURE — 665998 HH PPS REVENUE CREDIT

## 2019-04-29 SDOH — ECONOMIC STABILITY: HOUSING INSECURITY: HOME SAFETY: PT LIVES ALONE BUT DOES HAVE PAID CAREGIVERS AROUND THE CLOCK

## 2019-04-29 ASSESSMENT — ACTIVITIES OF DAILY LIVING (ADL)
IADLS_COMMENTS: <!--EPICS-->SEE OT EVAL FOR MORE DETAILS<!--EPICE-->
ADLS_COMMENTS: <!--EPICS-->SEE OT EVAL FOR MORE DETAILS<!--EPICE-->

## 2019-04-30 ENCOUNTER — HOME CARE VISIT (OUTPATIENT)
Dept: HOME HEALTH SERVICES | Facility: HOME HEALTHCARE | Age: 34
End: 2019-04-30
Payer: MEDICARE

## 2019-04-30 PROCEDURE — 665999 HH PPS REVENUE DEBIT

## 2019-04-30 PROCEDURE — 665998 HH PPS REVENUE CREDIT

## 2019-04-30 PROCEDURE — G0152 HHCP-SERV OF OT,EA 15 MIN: HCPCS

## 2019-04-30 ASSESSMENT — ENCOUNTER SYMPTOMS: DEPRESSED MOOD: 1

## 2019-05-01 PROCEDURE — 665999 HH PPS REVENUE DEBIT

## 2019-05-01 PROCEDURE — 665998 HH PPS REVENUE CREDIT

## 2019-05-02 ENCOUNTER — HOME CARE VISIT (OUTPATIENT)
Dept: HOME HEALTH SERVICES | Facility: HOME HEALTHCARE | Age: 34
End: 2019-05-02
Payer: MEDICARE

## 2019-05-02 VITALS
TEMPERATURE: 98.7 F | HEART RATE: 85 BPM | RESPIRATION RATE: 17 BRPM | SYSTOLIC BLOOD PRESSURE: 98 MMHG | OXYGEN SATURATION: 98 % | DIASTOLIC BLOOD PRESSURE: 60 MMHG

## 2019-05-02 PROCEDURE — G0152 HHCP-SERV OF OT,EA 15 MIN: HCPCS

## 2019-05-02 PROCEDURE — G0151 HHCP-SERV OF PT,EA 15 MIN: HCPCS

## 2019-05-02 PROCEDURE — 665998 HH PPS REVENUE CREDIT

## 2019-05-02 PROCEDURE — 665999 HH PPS REVENUE DEBIT

## 2019-05-03 PROCEDURE — 665999 HH PPS REVENUE DEBIT

## 2019-05-03 PROCEDURE — 665998 HH PPS REVENUE CREDIT

## 2019-05-04 PROCEDURE — 665998 HH PPS REVENUE CREDIT

## 2019-05-04 PROCEDURE — 665999 HH PPS REVENUE DEBIT

## 2019-05-05 VITALS
HEART RATE: 84 BPM | RESPIRATION RATE: 18 BRPM | DIASTOLIC BLOOD PRESSURE: 66 MMHG | TEMPERATURE: 98.2 F | OXYGEN SATURATION: 99 % | SYSTOLIC BLOOD PRESSURE: 98 MMHG

## 2019-05-05 VITALS
OXYGEN SATURATION: 99 % | RESPIRATION RATE: 18 BRPM | DIASTOLIC BLOOD PRESSURE: 66 MMHG | HEART RATE: 84 BPM | SYSTOLIC BLOOD PRESSURE: 98 MMHG | TEMPERATURE: 98.2 F

## 2019-05-05 PROCEDURE — 665998 HH PPS REVENUE CREDIT

## 2019-05-05 PROCEDURE — 665999 HH PPS REVENUE DEBIT

## 2019-05-06 PROCEDURE — G0180 MD CERTIFICATION HHA PATIENT: HCPCS | Performed by: INTERNAL MEDICINE

## 2019-05-06 PROCEDURE — 665998 HH PPS REVENUE CREDIT

## 2019-05-06 PROCEDURE — 665999 HH PPS REVENUE DEBIT

## 2019-05-07 ENCOUNTER — HOME CARE VISIT (OUTPATIENT)
Dept: HOME HEALTH SERVICES | Facility: HOME HEALTHCARE | Age: 34
End: 2019-05-07
Payer: MEDICARE

## 2019-05-07 VITALS
DIASTOLIC BLOOD PRESSURE: 66 MMHG | TEMPERATURE: 98.7 F | OXYGEN SATURATION: 98 % | RESPIRATION RATE: 18 BRPM | SYSTOLIC BLOOD PRESSURE: 94 MMHG | HEART RATE: 94 BPM

## 2019-05-07 PROCEDURE — G0152 HHCP-SERV OF OT,EA 15 MIN: HCPCS

## 2019-05-07 PROCEDURE — G0151 HHCP-SERV OF PT,EA 15 MIN: HCPCS

## 2019-05-07 PROCEDURE — 665998 HH PPS REVENUE CREDIT

## 2019-05-07 PROCEDURE — 665999 HH PPS REVENUE DEBIT

## 2019-05-08 ENCOUNTER — HOME CARE VISIT (OUTPATIENT)
Dept: HOME HEALTH SERVICES | Facility: HOME HEALTHCARE | Age: 34
End: 2019-05-08
Payer: MEDICARE

## 2019-05-08 VITALS
SYSTOLIC BLOOD PRESSURE: 94 MMHG | RESPIRATION RATE: 18 BRPM | HEART RATE: 94 BPM | DIASTOLIC BLOOD PRESSURE: 66 MMHG | TEMPERATURE: 98.7 F | OXYGEN SATURATION: 98 %

## 2019-05-08 VITALS
HEART RATE: 90 BPM | OXYGEN SATURATION: 98 % | RESPIRATION RATE: 18 BRPM | SYSTOLIC BLOOD PRESSURE: 102 MMHG | DIASTOLIC BLOOD PRESSURE: 68 MMHG | TEMPERATURE: 98.5 F

## 2019-05-08 PROCEDURE — G0152 HHCP-SERV OF OT,EA 15 MIN: HCPCS

## 2019-05-08 PROCEDURE — 665999 HH PPS REVENUE DEBIT

## 2019-05-08 PROCEDURE — 665998 HH PPS REVENUE CREDIT

## 2019-05-09 PROCEDURE — 665998 HH PPS REVENUE CREDIT

## 2019-05-09 PROCEDURE — 665999 HH PPS REVENUE DEBIT

## 2019-05-10 ENCOUNTER — HOME CARE VISIT (OUTPATIENT)
Dept: HOME HEALTH SERVICES | Facility: HOME HEALTHCARE | Age: 34
End: 2019-05-10
Payer: MEDICARE

## 2019-05-10 PROCEDURE — 665999 HH PPS REVENUE DEBIT

## 2019-05-10 PROCEDURE — 665998 HH PPS REVENUE CREDIT

## 2019-05-10 PROCEDURE — G0151 HHCP-SERV OF PT,EA 15 MIN: HCPCS

## 2019-05-11 VITALS
RESPIRATION RATE: 18 BRPM | HEART RATE: 106 BPM | OXYGEN SATURATION: 99 % | TEMPERATURE: 98.5 F | DIASTOLIC BLOOD PRESSURE: 72 MMHG | SYSTOLIC BLOOD PRESSURE: 106 MMHG

## 2019-05-11 PROCEDURE — 665999 HH PPS REVENUE DEBIT

## 2019-05-11 PROCEDURE — 665998 HH PPS REVENUE CREDIT

## 2019-05-12 PROCEDURE — 665998 HH PPS REVENUE CREDIT

## 2019-05-12 PROCEDURE — 665999 HH PPS REVENUE DEBIT

## 2019-05-13 PROCEDURE — 665998 HH PPS REVENUE CREDIT

## 2019-05-13 PROCEDURE — 665999 HH PPS REVENUE DEBIT

## 2019-05-14 PROCEDURE — 665999 HH PPS REVENUE DEBIT

## 2019-05-14 PROCEDURE — 665998 HH PPS REVENUE CREDIT

## 2019-05-15 ENCOUNTER — TELEPHONE (OUTPATIENT)
Dept: MEDICAL GROUP | Facility: MEDICAL CENTER | Age: 34
End: 2019-05-15

## 2019-05-15 ENCOUNTER — HOME CARE VISIT (OUTPATIENT)
Dept: HOME HEALTH SERVICES | Facility: HOME HEALTHCARE | Age: 34
End: 2019-05-15
Payer: MEDICARE

## 2019-05-15 VITALS
TEMPERATURE: 98.6 F | DIASTOLIC BLOOD PRESSURE: 60 MMHG | RESPIRATION RATE: 18 BRPM | OXYGEN SATURATION: 97 % | SYSTOLIC BLOOD PRESSURE: 102 MMHG | HEART RATE: 84 BPM

## 2019-05-15 PROCEDURE — 665998 HH PPS REVENUE CREDIT

## 2019-05-15 PROCEDURE — G0151 HHCP-SERV OF PT,EA 15 MIN: HCPCS

## 2019-05-15 PROCEDURE — 665999 HH PPS REVENUE DEBIT

## 2019-05-15 NOTE — TELEPHONE ENCOUNTER
1. Caller Name: [employer]                                      Call Back Number: [phone]      LVM stating pt needs to be re certified for incontinence supplies and they need office notes to be faxed to [phone]. They have been faxed as requested            Patient approves a detailed voicemail message: N\A

## 2019-05-16 DIAGNOSIS — R25.2 SPASTICITY: ICD-10-CM

## 2019-05-16 DIAGNOSIS — R53.81 DEBILITY: ICD-10-CM

## 2019-05-16 PROCEDURE — 665998 HH PPS REVENUE CREDIT

## 2019-05-16 PROCEDURE — 665999 HH PPS REVENUE DEBIT

## 2019-05-17 ENCOUNTER — HOME CARE VISIT (OUTPATIENT)
Dept: HOME HEALTH SERVICES | Facility: HOME HEALTHCARE | Age: 34
End: 2019-05-17
Payer: MEDICARE

## 2019-05-17 PROCEDURE — 665998 HH PPS REVENUE CREDIT

## 2019-05-17 PROCEDURE — 665999 HH PPS REVENUE DEBIT

## 2019-05-17 PROCEDURE — G0151 HHCP-SERV OF PT,EA 15 MIN: HCPCS

## 2019-05-18 VITALS
OXYGEN SATURATION: 96 % | DIASTOLIC BLOOD PRESSURE: 62 MMHG | TEMPERATURE: 99 F | HEART RATE: 91 BPM | SYSTOLIC BLOOD PRESSURE: 104 MMHG | RESPIRATION RATE: 18 BRPM

## 2019-05-18 PROCEDURE — 665998 HH PPS REVENUE CREDIT

## 2019-05-18 PROCEDURE — 665999 HH PPS REVENUE DEBIT

## 2019-05-19 PROCEDURE — 665998 HH PPS REVENUE CREDIT

## 2019-05-19 PROCEDURE — 665999 HH PPS REVENUE DEBIT

## 2019-05-20 ENCOUNTER — HOME CARE VISIT (OUTPATIENT)
Dept: HOME HEALTH SERVICES | Facility: HOME HEALTHCARE | Age: 34
End: 2019-05-20
Payer: MEDICARE

## 2019-05-20 PROCEDURE — 665999 HH PPS REVENUE DEBIT

## 2019-05-20 PROCEDURE — G0153 HHCP-SVS OF S/L PATH,EA 15MN: HCPCS

## 2019-05-20 PROCEDURE — 665998 HH PPS REVENUE CREDIT

## 2019-05-21 ENCOUNTER — HOME CARE VISIT (OUTPATIENT)
Dept: HOME HEALTH SERVICES | Facility: HOME HEALTHCARE | Age: 34
End: 2019-05-21
Payer: MEDICARE

## 2019-05-21 VITALS
DIASTOLIC BLOOD PRESSURE: 62 MMHG | OXYGEN SATURATION: 98 % | TEMPERATURE: 98.3 F | RESPIRATION RATE: 18 BRPM | HEART RATE: 80 BPM | SYSTOLIC BLOOD PRESSURE: 108 MMHG

## 2019-05-21 VITALS
SYSTOLIC BLOOD PRESSURE: 108 MMHG | RESPIRATION RATE: 18 BRPM | TEMPERATURE: 98.1 F | OXYGEN SATURATION: 98 % | HEART RATE: 91 BPM | DIASTOLIC BLOOD PRESSURE: 72 MMHG

## 2019-05-21 PROCEDURE — G0151 HHCP-SERV OF PT,EA 15 MIN: HCPCS

## 2019-05-21 PROCEDURE — 665999 HH PPS REVENUE DEBIT

## 2019-05-21 PROCEDURE — 665998 HH PPS REVENUE CREDIT

## 2019-05-21 ASSESSMENT — ACTIVITIES OF DAILY LIVING (ADL): TRANSPORTATION COMMENTS: NON-VERBAL

## 2019-05-22 PROCEDURE — 665999 HH PPS REVENUE DEBIT

## 2019-05-22 PROCEDURE — 665998 HH PPS REVENUE CREDIT

## 2019-05-23 ENCOUNTER — HOME CARE VISIT (OUTPATIENT)
Dept: HOME HEALTH SERVICES | Facility: HOME HEALTHCARE | Age: 34
End: 2019-05-23
Payer: MEDICARE

## 2019-05-23 VITALS
SYSTOLIC BLOOD PRESSURE: 102 MMHG | OXYGEN SATURATION: 98 % | TEMPERATURE: 98.1 F | DIASTOLIC BLOOD PRESSURE: 72 MMHG | RESPIRATION RATE: 18 BRPM | HEART RATE: 78 BPM

## 2019-05-23 PROCEDURE — 665998 HH PPS REVENUE CREDIT

## 2019-05-23 PROCEDURE — 665999 HH PPS REVENUE DEBIT

## 2019-05-23 PROCEDURE — G0493 RN CARE EA 15 MIN HH/HOSPICE: HCPCS

## 2019-05-23 ASSESSMENT — ENCOUNTER SYMPTOMS
NAUSEA: DENIES
VOMITING: DENIES

## 2019-05-24 ENCOUNTER — HOME CARE VISIT (OUTPATIENT)
Dept: HOME HEALTH SERVICES | Facility: HOME HEALTHCARE | Age: 34
End: 2019-05-24
Payer: MEDICARE

## 2019-05-24 PROCEDURE — 665998 HH PPS REVENUE CREDIT

## 2019-05-24 PROCEDURE — G0151 HHCP-SERV OF PT,EA 15 MIN: HCPCS

## 2019-05-24 PROCEDURE — 665999 HH PPS REVENUE DEBIT

## 2019-05-24 PROCEDURE — 665997 HH PPS REVENUE ADJ

## 2019-05-25 VITALS
RESPIRATION RATE: 16 BRPM | HEART RATE: 94 BPM | DIASTOLIC BLOOD PRESSURE: 62 MMHG | TEMPERATURE: 98.4 F | OXYGEN SATURATION: 99 % | SYSTOLIC BLOOD PRESSURE: 96 MMHG

## 2019-05-25 PROCEDURE — 665998 HH PPS REVENUE CREDIT

## 2019-05-25 PROCEDURE — 665999 HH PPS REVENUE DEBIT

## 2019-05-25 SDOH — ECONOMIC STABILITY: HOUSING INSECURITY: HOME SAFETY: PT LIVES ALONE AND HAS CAREGIVERS THROUGHOUT THE DAY

## 2019-05-25 ASSESSMENT — ACTIVITIES OF DAILY LIVING (ADL)
OASIS_M1830: 06
HOME_HEALTH_OASIS: 01

## 2019-05-26 PROCEDURE — 665998 HH PPS REVENUE CREDIT

## 2019-05-26 PROCEDURE — 665999 HH PPS REVENUE DEBIT

## 2019-05-27 PROCEDURE — 665998 HH PPS REVENUE CREDIT

## 2019-05-27 PROCEDURE — 665999 HH PPS REVENUE DEBIT

## 2019-05-28 PROCEDURE — 665999 HH PPS REVENUE DEBIT

## 2019-05-28 PROCEDURE — 665998 HH PPS REVENUE CREDIT

## 2019-06-22 DIAGNOSIS — R25.2 SPASTICITY: ICD-10-CM

## 2019-06-24 NOTE — TELEPHONE ENCOUNTER
Was the patient seen in the last year in this department? Yes    Does patient have an active prescription for medications requested? Yes    Received Request Via: Pharmacy       Plan:  1.  Continue baclofen 20 mg 4 times a day

## 2019-06-25 RX ORDER — BACLOFEN 10 MG/1
TABLET ORAL
Refills: 3 | OUTPATIENT
Start: 2019-06-25

## 2019-06-25 RX ORDER — BACLOFEN 10 MG/1
TABLET ORAL
Qty: 810 TAB | Refills: 0 | Status: SHIPPED | OUTPATIENT
Start: 2019-06-25 | End: 2019-09-23

## 2019-06-30 ENCOUNTER — HOSPITAL ENCOUNTER (OUTPATIENT)
Facility: MEDICAL CENTER | Age: 34
End: 2019-06-30
Attending: NURSE PRACTITIONER
Payer: MEDICARE

## 2019-06-30 PROCEDURE — 81001 URINALYSIS AUTO W/SCOPE: CPT

## 2019-07-01 ENCOUNTER — OFFICE VISIT (OUTPATIENT)
Dept: PHYSICAL MEDICINE AND REHAB | Facility: MEDICAL CENTER | Age: 34
End: 2019-07-01
Payer: MEDICARE

## 2019-07-01 ENCOUNTER — OFFICE VISIT (OUTPATIENT)
Dept: MEDICAL GROUP | Facility: MEDICAL CENTER | Age: 34
End: 2019-07-01
Payer: MEDICARE

## 2019-07-01 VITALS
SYSTOLIC BLOOD PRESSURE: 102 MMHG | HEART RATE: 103 BPM | OXYGEN SATURATION: 100 % | BODY MASS INDEX: 20.36 KG/M2 | DIASTOLIC BLOOD PRESSURE: 60 MMHG | TEMPERATURE: 98.2 F | HEIGHT: 67 IN

## 2019-07-01 VITALS
HEIGHT: 67 IN | HEART RATE: 87 BPM | SYSTOLIC BLOOD PRESSURE: 116 MMHG | DIASTOLIC BLOOD PRESSURE: 68 MMHG | RESPIRATION RATE: 16 BRPM | OXYGEN SATURATION: 99 % | BODY MASS INDEX: 20.36 KG/M2

## 2019-07-01 DIAGNOSIS — M79.673 PAIN OF FOOT, UNSPECIFIED LATERALITY: ICD-10-CM

## 2019-07-01 DIAGNOSIS — R25.2 SPASTICITY: ICD-10-CM

## 2019-07-01 DIAGNOSIS — F33.42 RECURRENT MAJOR DEPRESSIVE DISORDER, IN FULL REMISSION (HCC): Chronic | ICD-10-CM

## 2019-07-01 DIAGNOSIS — G82.50 TETRAPLEGIA (HCC): ICD-10-CM

## 2019-07-01 DIAGNOSIS — M25.559 ARTHRALGIA OF HIP, UNSPECIFIED LATERALITY: ICD-10-CM

## 2019-07-01 DIAGNOSIS — F51.01 PRIMARY INSOMNIA: ICD-10-CM

## 2019-07-01 DIAGNOSIS — M25.579 ACUTE ANKLE PAIN, UNSPECIFIED LATERALITY: ICD-10-CM

## 2019-07-01 DIAGNOSIS — M25.569 KNEE PAIN, UNSPECIFIED CHRONICITY, UNSPECIFIED LATERALITY: ICD-10-CM

## 2019-07-01 DIAGNOSIS — R30.0 DYSURIA: ICD-10-CM

## 2019-07-01 DIAGNOSIS — G04.00 ADEM (ACUTE DISSEMINATED ENCEPHALOMYELITIS): ICD-10-CM

## 2019-07-01 LAB
APPEARANCE UR: ABNORMAL
BACTERIA #/AREA URNS HPF: ABNORMAL /HPF
BILIRUB UR QL STRIP.AUTO: NEGATIVE
COLOR UR: YELLOW
EPI CELLS #/AREA URNS HPF: ABNORMAL /HPF
GLUCOSE UR STRIP.AUTO-MCNC: NEGATIVE MG/DL
KETONES UR STRIP.AUTO-MCNC: NEGATIVE MG/DL
LEUKOCYTE ESTERASE UR QL STRIP.AUTO: ABNORMAL
MICRO URNS: ABNORMAL
NITRITE UR QL STRIP.AUTO: NEGATIVE
PH UR STRIP.AUTO: 8 [PH]
PROT UR QL STRIP: NEGATIVE MG/DL
RBC # URNS HPF: ABNORMAL /HPF
RBC UR QL AUTO: NEGATIVE
SP GR UR STRIP.AUTO: 1.01
UROBILINOGEN UR STRIP.AUTO-MCNC: 0.2 MG/DL
WBC #/AREA URNS HPF: ABNORMAL /HPF

## 2019-07-01 PROCEDURE — 99204 OFFICE O/P NEW MOD 45 MIN: CPT | Performed by: PHYSICAL MEDICINE & REHABILITATION

## 2019-07-01 PROCEDURE — 99213 OFFICE O/P EST LOW 20 MIN: CPT | Performed by: NURSE PRACTITIONER

## 2019-07-01 RX ORDER — CITALOPRAM 20 MG/1
20 TABLET ORAL
Qty: 90 TAB | Refills: 3 | Status: SHIPPED | OUTPATIENT
Start: 2019-07-01 | End: 2020-10-06 | Stop reason: SDUPTHER

## 2019-07-01 RX ORDER — ZOLPIDEM TARTRATE 5 MG/1
5 TABLET ORAL NIGHTLY PRN
Qty: 30 TAB | Refills: 2 | Status: SHIPPED | OUTPATIENT
Start: 2019-07-01 | End: 2019-10-11 | Stop reason: SDUPTHER

## 2019-07-01 ASSESSMENT — ENCOUNTER SYMPTOMS
PALPITATIONS: 0
MYALGIAS: 1
INSOMNIA: 1
DEPRESSION: 1
CHILLS: 0
EYE PAIN: 0
NECK PAIN: 1
EYE DISCHARGE: 0
BACK PAIN: 1
TINGLING: 1
SPUTUM PRODUCTION: 0
ABDOMINAL PAIN: 0
HEMOPTYSIS: 0
FEVER: 0
ORTHOPNEA: 0
VOMITING: 0
BACK PAIN: 1

## 2019-07-01 ASSESSMENT — PATIENT HEALTH QUESTIONNAIRE - PHQ9: CLINICAL INTERPRETATION OF PHQ2 SCORE: 0

## 2019-07-01 NOTE — PROGRESS NOTES
Patient reminded subjective:      Griselda Swanson is a 34 y.o. female who presents with Medication Refill        CC: Patient here today accompanied by family members mainly for refills on insomnia medicine but also for refills on depression medicine possible UTI.    HPI Griselda Swanson        1. Primary insomnia  Patient would like to refill her Ambien 5 mg which she uses nightly for sleep.  He does not wish to switch to other medicine ORT risk tool is low.    2. ADEM (acute disseminated encephalomyelitis)  At one point there was a possible diagnosis of MS but from review of neurology notes appears this is ADEM.  They are now following with Dr. Carver who is prescribing her baclofen Topamax and Flexeril.  Patient states she has an appointment today with Dr. Stanley as well as Dr. Forbes to look into possibility of surgical intervention for her contractures of her ankles bilaterally.    3. Dysuria  Patient tends to have recurrent UTI and she was complaining of some recent dysuria so they bring a urine sample with them today to be tested.  She denies flank pain, fever chills, nausea or vomiting    4. Recurrent major depressive disorder, in full remission (HCC)  Patient would like to get a refill on Celexa which she does feel is helpful for her depression.    5. Spasticity  Family is requesting a bedside commode and patient is on muscle relaxants.  Her family did take her parasailing this weekend patient was able to do effectively much assistance.  Current Outpatient Prescriptions   Medication Sig Dispense Refill   • zolpidem (AMBIEN) 5 MG Tab Take 1 Tab by mouth at bedtime as needed for Sleep for up to 30 days. 30 Tab 2   • citalopram (CELEXA) 20 MG Tab Take 1 Tab by mouth every day. 90 Tab 3   • Misc. Devices Misc Bedside commode with padding 1 Each 11   • baclofen (LIORESAL) 10 MG Tab TAKE 3 TABLETS BY MOUTH THREE TIMES DAILY 810 Tab 0   • Misc. Devices Misc New latch for new dynavox  Wheel chair 1 Each 11   •  clindamycin-benzoyl peroxide (BENZACLIN) gel APPLY TO THE FACE EVERY DAY 50 g 11   • D-MANNOSE PO Take 2 Tabs by mouth every day. Per Urology     • Diclofenac Sodium 1 % Gel APPLY 4 GRAMS TO KNEE AND 3 GRAMS TO ANKLES UP TO FOUR TIMES DAILY AS NEEDED 100 g 10   • tamsulosin (FLOMAX) 0.4 MG capsule Take 1 Cap by mouth every day. 90 Cap 3   • Misc. Devices Misc Bedside commode 1 Each 11   • topiramate (TOPAMAX) 25 MG Tab Take 1 Tab by mouth 2 times a day. 60 Tab 11   • cyclobenzaprine (FLEXERIL) 10 MG Tab Take 1 Tab by mouth 3 times a day as needed for Muscle Spasms for up to 90 days. 90 Tab 2   • sumatriptan (IMITREX) 100 MG tablet TAKE 1 TABLET BY MOUTH AT ONSET OF HEADACHE. MAY REPEAT IN 2 HOURS. MAX 2 TABLETS A DAY 9 Tab 11   • SUMAtriptan Succinate 6 MG/0.5ML Solution Auto-injector 6 mg by Intramuscular route every day for 90 days. 4 mL 2   • polyethylene glycol 3350 (MIRALAX) Powder MIX AND DRINK 1 CAPFUL WITH 8 OZ OF LIQUID QD  3   • simethicone (MYLICON) 125 MG chewable tablet Take 125 mg by mouth 4 times a day as needed. Indications: Gas     • Misc. Devices Misc W/C stabilizer needs eval and possible replacement 1 Each 11   • Misc. Devices Misc Please eval and fix electric W/C. Please eval also for W/C needs. 1 Each 11   • Misc. Devices Misc Please allow patient to have support/therapy dog in appt. Regardless of weight due to multiple chronic illnesses and debility. 1 Each 11   • Calcium Carbonate Antacid (TUMS PO) Take 1 tablet by mouth as needed. Indications: heartburn     • Multiple Vitamins-Minerals (MULTIVITAMIN GUMMIES ADULTS PO) Take 1 Tab by mouth every day. Indications: vitamin supplement.     • vitamin D (CHOLECALCIFEROL) 1000 UNIT Tab Take 1,000 Units by mouth 4 times a day. Indications: supplement       No current facility-administered medications for this visit.      Social History   Substance Use Topics   • Smoking status: Former Smoker     Packs/day: 1.00     Years: 12.00     Types: Cigarettes  "    Quit date: 1/1/2008   • Smokeless tobacco: Never Used      Comment: Started smoking at age 11   • Alcohol use No     Past Medical History:   Diagnosis Date   • ADEM (acute disseminated encephalomyelitis)    • Back pain    • Breath shortness     since 2014 not an issue at this time (4/2018)   • C. difficile colitis 2015   • Coma ([facility]) August 2009    1 month, lesions on brain,  unknown etiology   • Coma ([facility]) 2008    Spontaneous -    • Demyelinating disorder ([facility])     demyelinating encephpomyeltis    • Encephalitis 2009   • Heart burn    • Indigestion    • Lesion of brain     unknown cuase   • MEDICAL HOME    • Migraines    • MS (multiple sclerosis) ([facility])    • Other specified disorder of intestines     constipation   • Pain    • Psychiatric problem     depression and anxiety   • Renal disorder     kidney stones   • Urinary incontinence      Family History   Problem Relation Age of Onset   • Cancer Mother         Sarcoma   • Bipolar disorder Brother    • Other Brother         spina bifida   • Diabetes Maternal Grandmother    • Other Daughter         Migrains       Review of Systems   Musculoskeletal: Positive for back pain.   Psychiatric/Behavioral: Positive for depression. The patient has insomnia.           Objective:     /68 (BP Location: Right arm, Patient Position: Sitting, BP Cuff Size: Adult)   Pulse 87   Resp 16   Ht 1.702 m (5' 7\")   SpO2 99%   BMI 20.36 kg/m²      Physical Exam   Constitutional: She is oriented to person, place, and time. She appears well-developed and well-nourished. No distress.   HENT:   Head: Normocephalic and atraumatic.   Right Ear: External ear normal.   Left Ear: External ear normal.   Nose: Nose normal.   Eyes: Right eye exhibits no discharge. Left eye exhibits no discharge.   Neck: Normal range of motion. Neck supple. No thyromegaly present.   Cardiovascular: Normal rate, regular rhythm and normal heart sounds.  Exam reveals no gallop and no friction rub.    No murmur " heard.  Pulmonary/Chest: Effort normal and breath sounds normal. She has no wheezes. She has no rales.   Musculoskeletal: She exhibits no edema or tenderness.   Patient wheelchair-bound basic.  She is alert and able to use hand signals and had movement to express her self.  Spasticity of the joints present.   Neurological: She is alert and oriented to person, place, and time. She displays normal reflexes.   Skin: Skin is warm and dry. No rash noted. She is not diaphoretic.   Psychiatric: She has a normal mood and affect. Her behavior is normal. Judgment and thought content normal.   Nursing note and vitals reviewed.              Assessment/Plan:     1. Primary insomnia  Patient and her family wish her to continue on the Ambien and a 3-month supply refill today with previous ORT completed.  - zolpidem (AMBIEN) 5 MG Tab; Take 1 Tab by mouth at bedtime as needed for Sleep for up to 30 days.  Dispense: 30 Tab; Refill: 2    2. ADEM (acute disseminated encephalomyelitis)  Patient will continue to follow with neurology and will also be looking into possibility of Botox treatments to Dr. Forbes.    3. Dysuria  Urine will be sent downstairs for urinalysis and culture and she will be treated if necessary.  He is not very symptomatic at this point.  - URINALYSIS,CULTURE IF INDICATED; Future    4. Recurrent major depressive disorder, in full remission (HCC)  Patient may continue with her Celexa which she finds helpful.  - citalopram (CELEXA) 20 MG Tab; Take 1 Tab by mouth every day.  Dispense: 90 Tab; Refill: 3  - Misc. Devices Misc; Bedside commode with padding  Dispense: 1 Each; Refill: 11    5. Spasticity  We will attempt to get a bedside commode for patient.  - Misc. Devices Misc; Bedside commode with padding  Dispense: 1 Each; Refill: 11    Patient reminded to complete fasting lab work ordered in December.

## 2019-07-01 NOTE — LETTER
Current Outpatient Prescriptions:   •  zolpidem, 5 mg, Oral, HS PRN, Taking  •  baclofen, TAKE 3 TABLETS BY MOUTH THREE TIMES DAILY, Taking  •  Misc. Devices, New latch for new dynavox  Wheel chair, Taking  •  clindamycin-benzoyl peroxide, APPLY TO THE FACE EVERY DAY, Taking  •  D-MANNOSE PO, 2 Tab, Oral, DAILY, Taking  •  citalopram, 20 mg, Oral, QDAY, Taking  •  Diclofenac Sodium, APPLY 4 GRAMS TO KNEE AND 3 GRAMS TO ANKLES UP TO FOUR TIMES DAILY AS NEEDED, Taking  •  tamsulosin, 0.4 mg, Oral, QDAY, Taking  •  Misc. Devices, Bedside commode, Taking  •  topiramate, 25 mg, Oral, BID, Taking  •  cyclobenzaprine, 10 mg, Oral, TID PRN, Taking  •  sumatriptan, TAKE 1 TABLET BY MOUTH AT ONSET OF HEADACHE. MAY REPEAT IN 2 HOURS. MAX 2 TABLETS A DAY, Taking  •  SUMAtriptan Succinate, 6 mg, Intramuscular, DAILY, Taking  •  polyethylene glycol 3350, MIX AND DRINK 1 CAPFUL WITH 8 OZ OF LIQUID QD, Taking  •  simethicone, 125 mg, Oral, 4X/DAY PRN, Taking  •  Misc. Devices, W/C stabilizer needs eval and possible replacement, Taking  •  Misc. Devices, Please eval and fix electric W/C. Please eval also for W/C needs., Taking  •  Misc. Devices, Please allow patient to have support/therapy dog in appt. Regardless of weight due to multiple chronic illnesses and debility., Taking  •  Calcium Carbonate Antacid (TUMS PO), 1 tablet, Oral, PRN, Taking  •  Multiple Vitamins-Minerals (MULTIVITAMIN GUMMIES ADULTS PO), 1 Tab, Oral, DAILY, Taking  •  vitamin D, 1,000 Units, Oral, 4X/DAY, Taking

## 2019-07-01 NOTE — PROGRESS NOTES
Subjective:      Griselda Swanson is a 34 y.o. female who presents with New Patient    Chief complaint: Lower limb spasticity      HPI Ms. Swanson presents for evaluation of ongoing lower limb spasticity.     The patient has history of postinfectious acute disseminated encephalomyelitis in 2009, with neurologic sequelae, including incomplete tetraplegia, communication with occultly, impaired mobility, wheelchair bound.  The patient is followed by neurology, reviewed records.    Note, the patient's sister was present for the evaluation    Regarding today's visit:    The patient notes ongoing lower limb spasticity.  The spasticity is most prominent about the knees, also foot and ankles.  She has previously tried physical therapy as well as bracing.  She had intrathecal baclofen pump placed, unfortunately got infected, had to be removed per Dr. Vega.  She is also had courses of home health over the years, some records available for review    The patient notes history of injuries to the lower limbs, including the right knee, without overt fracture noted, nonsurgical management.    Regarding the foot/ankles, patient has equinovarus deformities, podiatry consulted, has pending appointment with orthopedics to evaluate surgical options.    The patient has an electric wheelchair for mobility.  The patient needs assistance with ADLs. She lives alone with home aide, has a [employer] lift.  She has history of UTIs.  She has a sister that lives in the region, another sister the lives in the [place] area.      The patient is inquiring about additional treatment options regarding the ongoing lower limb spasticity      MEDICAL RECORDS REVIEW/DATA REVIEW: Reviewed in epic.    Records Reviewed: Reviewed referring provider notes.     I reviewed medications.  For spasticity, using baclofen    I reviewed  profile 7/1/2019    I reviewed diagnostic studies:     I reviewed radiographs.     Reviewed right knee x-rays 6/2017,  unremarkable.    Reviewed right tibia/fibula x-rays 6/2017 showed flexion deformity right foot, otherwise unremarkable    Reviewed right ankle x-rays 6/2017 revealed osteopenia, otherwise unremarkable    Reviewed right foot x-rays 6/2017 revealed osteopenia, otherwise unremarkable    Reviewed CT abdomen/pelvis 2/2016 r revealed lumbar scoliosis    Reviewed MRI brain 2/2016    I reviewed lab studies.   Reviewed labs 1/2018, including CMP.  Reviewed labs/2019, including urinalysis/urine culture.    I reviewed medical issues.  Followed by primary care provider, records reviewed.    I reviewed family history: No neuromuscular disorders noted.    I reviewed social issues.      PAST MEDICAL HISTORY:   Past Medical History:   Diagnosis Date   • ADEM (acute disseminated encephalomyelitis)    • Back pain    • Breath shortness     since 2014 not an issue at this time (4/2018)   • C. difficile colitis 2015   • Coma ([facility]) August 2009    1 month, lesions on brain,  unknown etiology   • Coma ([facility]) 2008    Spontaneous -    • Demyelinating disorder ([facility])     demyelinating encephpomyeltis    • Encephalitis 2009   • Heart burn    • Indigestion    • Lesion of brain     unknown cuase   • MEDICAL HOME    • Migraines    • MS (multiple sclerosis) ([facility])    • Other specified disorder of intestines     constipation   • Pain    • Psychiatric problem     depression and anxiety   • Renal disorder     kidney stones   • Urinary incontinence        PAST SURGICAL HISTORY:    Past Surgical History:   Procedure Laterality Date   • PUMP INSERT/REMOVE Left 7/1/2016    Procedure: PUMP REMOVAL;  Surgeon: Catrachito Vega M.D.;  Location: SURGERY [facility];  Service:    • CATH PLACE PERM EPIDURAL Left 6/14/2016    Procedure: CATH PLACE PERM EPIDURAL - BACLOFEN PUMP AND CATHETER REPLACEMENT  - BACK AND ABDOMEN;  Surgeon: Catrachito Vega M.D.;  Location: SURGERY [facility];  Service:    • [place] BACLOFEN INTRATHECAL TRIAL  3/8/2016    Dr. Vega   [facility]   • CATH PLACE PERM EPIDURAL N/A 3/8/2016    Procedure: BACLOFEN PUMP TRIAL;  Surgeon: Pari Roberson M.D.;  Location: SURGERY [facility];  Service:    • PUMP REVISION  10/8/2013    Performed by Pari Roberson M.D. at [facility]   • PUMP INSERT/REMOVE  3/12/2013    Performed by Pari Roberson M.D. at [facility]   • CATH PLACE PERM EPIDURAL  6/26/2012    Performed by PARI ROBERSON at [facility]   • CATH PLACE PERM EPIDURAL  3/6/2012    Performed by PARI ROBERSON at [facility]   • MAMMOPLASTY AUGMENTATION  2002   • TONSILLECTOMY         ALLERGIES:  Bactrim and Fentanyl    MEDICATIONS:    Outpatient Encounter Prescriptions as of 7/1/2019   Medication Sig Dispense Refill   • zolpidem (AMBIEN) 5 MG Tab Take 1 Tab by mouth at bedtime as needed for Sleep for up to 30 days. 30 Tab 2   • citalopram (CELEXA) 20 MG Tab Take 1 Tab by mouth every day. 90 Tab 3   • baclofen (LIORESAL) 10 MG Tab TAKE 3 TABLETS BY MOUTH THREE TIMES DAILY 810 Tab 0   • clindamycin-benzoyl peroxide (BENZACLIN) gel APPLY TO THE FACE EVERY DAY 50 g 11   • D-MANNOSE PO Take 2 Tabs by mouth every day. Per Urology     • Diclofenac Sodium 1 % Gel APPLY 4 GRAMS TO KNEE AND 3 GRAMS TO ANKLES UP TO FOUR TIMES DAILY AS NEEDED 100 g 10   • tamsulosin (FLOMAX) 0.4 MG capsule Take 1 Cap by mouth every day. 90 Cap 3   • topiramate (TOPAMAX) 25 MG Tab Take 1 Tab by mouth 2 times a day. 60 Tab 11   • cyclobenzaprine (FLEXERIL) 10 MG Tab Take 1 Tab by mouth 3 times a day as needed for Muscle Spasms for up to 90 days. 90 Tab 2   • simethicone (MYLICON) 125 MG chewable tablet Take 125 mg by mouth 4 times a day as needed. Indications: Gas     • Calcium Carbonate Antacid (TUMS PO) Take 1 tablet by mouth as needed. Indications: heartburn     • Multiple Vitamins-Minerals (MULTIVITAMIN GUMMIES ADULTS PO) Take 1 Tab by mouth every day. Indications: vitamin supplement.     • Misc. Devices Misc  Bedside commode with padding 1 Each 11   • Misc. Devices Misc New latch for new dynavox  Wheel chair 1 Each 11   • Misc. Devices Misc Bedside commode 1 Each 11   • sumatriptan (IMITREX) 100 MG tablet TAKE 1 TABLET BY MOUTH AT ONSET OF HEADACHE. MAY REPEAT IN 2 HOURS. MAX 2 TABLETS A DAY 9 Tab 11   • SUMAtriptan Succinate 6 MG/0.5ML Solution Auto-injector 6 mg by Intramuscular route every day for 90 days. 4 mL 2   • [DISCONTINUED] citalopram (CELEXA) 20 MG Tab Take 1 Tab by mouth every day. 90 Tab 0   • polyethylene glycol 3350 (MIRALAX) Powder MIX AND DRINK 1 CAPFUL WITH 8 OZ OF LIQUID QD  3   • Misc. Devices Misc W/C stabilizer needs eval and possible replacement 1 Each 11   • Misc. Devices Misc Please eval and fix electric W/C. Please eval also for W/C needs. 1 Each 11   • Misc. Devices Misc Please allow patient to have support/therapy dog in appt. Regardless of weight due to multiple chronic illnesses and debility. 1 Each 11   • vitamin D (CHOLECALCIFEROL) 1000 UNIT Tab Take 1,000 Units by mouth 4 times a day. Indications: supplement       No facility-administered encounter medications on file as of 7/1/2019.        SOCIAL HISTORY:    Social History     Social History   • Marital status: Single     Spouse name: N/A   • Number of children: N/A   • Years of education: N/A     Social History Main Topics   • Smoking status: Former Smoker     Packs/day: 1.00     Years: 12.00     Types: Cigarettes     Quit date: 1/1/2008   • Smokeless tobacco: Never Used      Comment: Started smoking at age 11   • Alcohol use No   • Drug use: No   • Sexual activity: No     Other Topics Concern   • Not on file     Social History Narrative   • No narrative on file       Review of Systems   Constitutional: Negative for chills and fever.   HENT: Negative for ear pain and tinnitus.    Eyes: Negative for pain and discharge.   Respiratory: Negative for hemoptysis and sputum production.    Cardiovascular: Negative for palpitations and  "orthopnea.   Gastrointestinal: Negative for abdominal pain and vomiting.   Genitourinary: Negative for urgency.   Musculoskeletal: Positive for back pain, joint pain, myalgias and neck pain.   Skin: Negative.    Neurological: Positive for tingling.   All other systems reviewed and are negative.        Objective:     /60   Pulse (!) 103   Temp 36.8 °C (98.2 °F) (Temporal)   Ht 1.702 m (5' 7\") Comment: verbally given  SpO2 100%   BMI 20.36 kg/m²      Physical Exam   Constitutional: Awake, alert, no acute distress, has augmentative communication device  HEENT: Normocephalic atraumatic, neck supple, no JVD noted,  no meningeal signs noted  Lymphadenopathy: no cervical, supraclavicular, or inguinal lymphadenopathy noted  Cardiovascular: Intact distal pulses, including at wrists and ankles, no significant limb swelling noted  Pulmonary: No tachypnea noted, no accessory muscle use noted, no dyspnea noted  Abdominal: Soft, nontender, exhibits no distension, no peritoneal signs, no HSM  Musculoskeletal:   Cervical: No significant tenderness, no significant pain with range of motion testing  Thoracolumbar: Mild tenderness lumbosacral region, negative straight leg test  Hip: Spasticity noted, decreased range of motion, pain with testing  Knee: Valgus deformity noted, most prominent on right, decreased range of motion, spasticity noted, pain with testing  Foot/ankle: Equinovarus deformities noted, pain with range of motion testing  Neurological: oriented.  Limited volitional movement in upper and lower limb, stance/gait not tested  Skin: Skin is intact. no rashes or lesions noted  Psychiatric: mood and affect consistent with condition       Assessment/Plan:       ASSESSMENT:    1.  History of postinfectious acute disseminated encephalomyelitis in 2009, with a neurologic sequelae, including incomplete tetraplegia, spasticity, impaired mobility    -Review/consider speech therapy evaluation regarding " evaluation/management to improve communication    2.  Lower limb spasticity, including involving hips, knees, foot/ankle with equinovarus deformity, consider underlying joint/soft tissue pathology, podiatry consulted    - Patient notes pending appointment with orthopedics, I would be interested in reviewing the records, when available  - DX-HIP-BILATERAL-WITH PELVIS-2 VIEWS; Future  - DX-KNEE COMPLETE 4+ LEFT; Future  - DX-KNEE COMPLETE 4+ RIGHT; Future  - DX-FOOT-COMPLETE 3+ LEFT; Future  - DX-FOOT-COMPLETE 3+ RIGHT; Future  - Reviewed bracing/orthotics    3.  Comorbid medical issues, with care per primary care provider      DISCUSSION/PLAN:    - I discussed management options. I reviewed symptomatic care    - I would like to obtain/review additional records, including records from [facility]    - I reviewed home exercise program, with medical precautions    - The patient can consider complementary trials with acupuncture or TENS unit    - I reviewed medication monitoring.  I reviewed medication adjustments. I reviewed further symptomatic medications.  I did not prescribe any medications today    - I reviewed additional diagnostic options, including further/advanced imaging, electrodiagnostic testing, vascular studies, and further lab screen    - I reviewed additional therapeutic options, including injection/interventional therapy and additional consultative input    - I reviewed psychosocial interventions    - Follow-up with Dr. Marquez, consideration of Botox, or an as-needed basis      Please note that this dictation was created using voice recognition software. I have made every reasonable attempt to correct obvious errors but there may be errors of grammar and content that I may have overlooked prior to finalization of this note.

## 2019-08-17 RX ORDER — CYCLOBENZAPRINE HCL 10 MG
TABLET ORAL
Qty: 90 TAB | Refills: 0 | Status: SHIPPED | OUTPATIENT
Start: 2019-08-17 | End: 2019-10-11 | Stop reason: SDUPTHER

## 2019-10-11 ENCOUNTER — OFFICE VISIT (OUTPATIENT)
Dept: MEDICAL GROUP | Facility: MEDICAL CENTER | Age: 34
End: 2019-10-11
Payer: MEDICARE

## 2019-10-11 ENCOUNTER — HOSPITAL ENCOUNTER (OUTPATIENT)
Dept: RADIOLOGY | Facility: MEDICAL CENTER | Age: 34
End: 2019-10-11
Attending: PHYSICAL MEDICINE & REHABILITATION
Payer: MEDICARE

## 2019-10-11 ENCOUNTER — HOSPITAL ENCOUNTER (OUTPATIENT)
Dept: LAB | Facility: MEDICAL CENTER | Age: 34
End: 2019-10-11
Attending: PHYSICAL MEDICINE & REHABILITATION
Payer: MEDICARE

## 2019-10-11 VITALS
HEIGHT: 67 IN | SYSTOLIC BLOOD PRESSURE: 124 MMHG | OXYGEN SATURATION: 96 % | HEART RATE: 69 BPM | RESPIRATION RATE: 16 BRPM | BODY MASS INDEX: 20.36 KG/M2 | DIASTOLIC BLOOD PRESSURE: 68 MMHG

## 2019-10-11 DIAGNOSIS — M25.569 KNEE PAIN, UNSPECIFIED CHRONICITY, UNSPECIFIED LATERALITY: ICD-10-CM

## 2019-10-11 DIAGNOSIS — Z01.419 ENCOUNTER FOR GYNECOLOGICAL EXAMINATION WITHOUT ABNORMAL FINDING: ICD-10-CM

## 2019-10-11 DIAGNOSIS — R25.2 SPASTICITY: ICD-10-CM

## 2019-10-11 DIAGNOSIS — M25.579 ACUTE ANKLE PAIN, UNSPECIFIED LATERALITY: ICD-10-CM

## 2019-10-11 DIAGNOSIS — G82.50 TETRAPLEGIA (HCC): ICD-10-CM

## 2019-10-11 DIAGNOSIS — G04.00 ADEM (ACUTE DISSEMINATED ENCEPHALOMYELITIS): ICD-10-CM

## 2019-10-11 DIAGNOSIS — M25.559 ARTHRALGIA OF HIP, UNSPECIFIED LATERALITY: ICD-10-CM

## 2019-10-11 DIAGNOSIS — Z23 INFLUENZA VACCINE NEEDED: ICD-10-CM

## 2019-10-11 DIAGNOSIS — N39.0 RECURRENT URINARY TRACT INFECTION: ICD-10-CM

## 2019-10-11 DIAGNOSIS — F51.01 PRIMARY INSOMNIA: ICD-10-CM

## 2019-10-11 DIAGNOSIS — K59.09 CHRONIC CONSTIPATION: ICD-10-CM

## 2019-10-11 DIAGNOSIS — Z13.220 SCREENING CHOLESTEROL LEVEL: ICD-10-CM

## 2019-10-11 DIAGNOSIS — M79.673 PAIN OF FOOT, UNSPECIFIED LATERALITY: ICD-10-CM

## 2019-10-11 LAB
ALBUMIN SERPL BCP-MCNC: 4.5 G/DL (ref 3.2–4.9)
ALBUMIN/GLOB SERPL: 1.6 G/DL
ALP SERPL-CCNC: 65 U/L (ref 30–99)
ALT SERPL-CCNC: 24 U/L (ref 2–50)
ANION GAP SERPL CALC-SCNC: 9 MMOL/L (ref 0–11.9)
AST SERPL-CCNC: 22 U/L (ref 12–45)
BASOPHILS # BLD AUTO: 1 % (ref 0–1.8)
BASOPHILS # BLD: 0.08 K/UL (ref 0–0.12)
BILIRUB SERPL-MCNC: 0.8 MG/DL (ref 0.1–1.5)
BUN SERPL-MCNC: 14 MG/DL (ref 8–22)
CALCIUM SERPL-MCNC: 9.7 MG/DL (ref 8.5–10.5)
CHLORIDE SERPL-SCNC: 109 MMOL/L (ref 96–112)
CHOLEST SERPL-MCNC: 196 MG/DL (ref 100–199)
CO2 SERPL-SCNC: 27 MMOL/L (ref 20–33)
CREAT SERPL-MCNC: 0.73 MG/DL (ref 0.5–1.4)
EOSINOPHIL # BLD AUTO: 0.04 K/UL (ref 0–0.51)
EOSINOPHIL NFR BLD: 0.5 % (ref 0–6.9)
ERYTHROCYTE [DISTWIDTH] IN BLOOD BY AUTOMATED COUNT: 44.1 FL (ref 35.9–50)
GLOBULIN SER CALC-MCNC: 2.9 G/DL (ref 1.9–3.5)
GLUCOSE SERPL-MCNC: 83 MG/DL (ref 65–99)
HCT VFR BLD AUTO: 48.9 % (ref 37–47)
HDLC SERPL-MCNC: 59 MG/DL
HGB BLD-MCNC: 15.5 G/DL (ref 12–16)
IMM GRANULOCYTES # BLD AUTO: 0.02 K/UL (ref 0–0.11)
IMM GRANULOCYTES NFR BLD AUTO: 0.2 % (ref 0–0.9)
LDLC SERPL CALC-MCNC: 126 MG/DL
LYMPHOCYTES # BLD AUTO: 2.62 K/UL (ref 1–4.8)
LYMPHOCYTES NFR BLD: 32.3 % (ref 22–41)
MCH RBC QN AUTO: 30.7 PG (ref 27–33)
MCHC RBC AUTO-ENTMCNC: 31.7 G/DL (ref 33.6–35)
MCV RBC AUTO: 96.8 FL (ref 81.4–97.8)
MONOCYTES # BLD AUTO: 0.4 K/UL (ref 0–0.85)
MONOCYTES NFR BLD AUTO: 4.9 % (ref 0–13.4)
NEUTROPHILS # BLD AUTO: 4.96 K/UL (ref 2–7.15)
NEUTROPHILS NFR BLD: 61.1 % (ref 44–72)
NRBC # BLD AUTO: 0 K/UL
NRBC BLD-RTO: 0 /100 WBC
PLATELET # BLD AUTO: 263 K/UL (ref 164–446)
PMV BLD AUTO: 10.4 FL (ref 9–12.9)
POTASSIUM SERPL-SCNC: 4.2 MMOL/L (ref 3.6–5.5)
PROT SERPL-MCNC: 7.4 G/DL (ref 6–8.2)
RBC # BLD AUTO: 5.05 M/UL (ref 4.2–5.4)
SODIUM SERPL-SCNC: 145 MMOL/L (ref 135–145)
TRIGL SERPL-MCNC: 55 MG/DL (ref 0–149)
WBC # BLD AUTO: 8.1 K/UL (ref 4.8–10.8)

## 2019-10-11 PROCEDURE — 85025 COMPLETE CBC W/AUTO DIFF WBC: CPT

## 2019-10-11 PROCEDURE — 80053 COMPREHEN METABOLIC PANEL: CPT

## 2019-10-11 PROCEDURE — G0008 ADMIN INFLUENZA VIRUS VAC: HCPCS | Performed by: NURSE PRACTITIONER

## 2019-10-11 PROCEDURE — 73620 X-RAY EXAM OF FOOT: CPT | Mod: LT

## 2019-10-11 PROCEDURE — 90686 IIV4 VACC NO PRSV 0.5 ML IM: CPT | Performed by: NURSE PRACTITIONER

## 2019-10-11 PROCEDURE — 99214 OFFICE O/P EST MOD 30 MIN: CPT | Mod: 25 | Performed by: NURSE PRACTITIONER

## 2019-10-11 PROCEDURE — 73521 X-RAY EXAM HIPS BI 2 VIEWS: CPT

## 2019-10-11 PROCEDURE — 80061 LIPID PANEL: CPT | Mod: GA

## 2019-10-11 PROCEDURE — 36415 COLL VENOUS BLD VENIPUNCTURE: CPT

## 2019-10-11 PROCEDURE — 73560 X-RAY EXAM OF KNEE 1 OR 2: CPT | Mod: LT

## 2019-10-11 RX ORDER — CYCLOBENZAPRINE HCL 10 MG
10 TABLET ORAL 3 TIMES DAILY PRN
Qty: 90 TAB | Refills: 1 | Status: SHIPPED | OUTPATIENT
Start: 2019-10-11 | End: 2020-04-29 | Stop reason: SDUPTHER

## 2019-10-11 RX ORDER — ZOLPIDEM TARTRATE 5 MG/1
5 TABLET ORAL NIGHTLY PRN
Qty: 30 TAB | Refills: 2 | Status: SHIPPED | OUTPATIENT
Start: 2019-10-11 | End: 2019-12-26 | Stop reason: SDUPTHER

## 2019-10-11 RX ORDER — BACLOFEN 10 MG/1
30 TABLET ORAL 4 TIMES DAILY
Qty: 360 TAB | Refills: 1 | Status: SHIPPED | OUTPATIENT
Start: 2019-10-11 | End: 2020-01-08 | Stop reason: SDUPTHER

## 2019-10-11 RX ORDER — BACLOFEN 10 MG/1
10 TABLET ORAL 3 TIMES DAILY
COMMUNITY
End: 2019-10-11 | Stop reason: SDUPTHER

## 2019-10-11 ASSESSMENT — ENCOUNTER SYMPTOMS
INSOMNIA: 1
WEAKNESS: 1
CONSTIPATION: 1

## 2019-10-11 NOTE — PROGRESS NOTES
Subjective:      Griselda Swanson is a 34 y.o. female who presents with Follow-Up (3 month ) and Medication Refill        Patient here today with her sister from [place] to get refills on Ambien as well as her medications for spasticity and to talk about recurrent UTI and constipation.    HPI       1. Primary insomnia  Patient is currently on Ambien as she typically uses it once each evening for sleep.  Her sister is wondering if she does not need to take it every night.  She does have issues with sleeping due to her spasticity    2. Spasticity  Patient was following with Dr. Carver in neurology who prescribed her high-dose baclofen at 30 mg 4 times a day as well as cyclobenzaprine for breakthrough.  He is no longer with [facility] and they are in the process of trying to establish with a new neurologist but need refills on medication.  She does find that these medicines help and she typically takes the baclofen 3 tablets, 3 times a day and 2 at night.  She occasionally uses cyclobenzaprine for breakthrough spasticity.    3. Recurrent urinary tract infection  Patient follows with urology and her sister is wondering if there is anything else they can do to help prevent recurrent UTIs.  Patient is wheelchair-bound.    4. Chronic constipation  Patient is currently on MiraLAX powder twice a day and she typically will have large bowel movements after a few days of no bowel movements.  Her sister is wondering if she can use Colace as well.    5. Encounter for gynecological examination without abnormal finding  Patient due for 3-year Pap smear but also has an IUD which apparently is due to be removed.    6. Influenza vaccine needed  Patient due for flu vaccine and has had prior pneumonia vaccine.  Past Medical History:   Diagnosis Date   • ADEM (acute disseminated encephalomyelitis)    • Back pain    • Breath shortness     since 2014 not an issue at this time (4/2018)   • C. difficile colitis 2015   • Coma ([facility]) August 2009     1 month, lesions on brain,  unknown etiology   • Coma ([facility]) 2008    Spontaneous -    • Demyelinating disorder ([facility])     demyelinating encephpomyeltis    • Encephalitis    • Heart burn    • Indigestion    • Lesion of brain     unknown cuase   • MEDICAL HOME    • Migraines    • MS (multiple sclerosis) ([facility])    • Other specified disorder of intestines     constipation   • Pain    • Psychiatric problem     depression and anxiety   • Renal disorder     kidney stones   • Urinary incontinence      Social History     Socioeconomic History   • Marital status: Single     Spouse name: Not on file   • Number of children: Not on file   • Years of education: Not on file   • Highest education level: Not on file   Occupational History   • Not on file   Social Needs   • Financial resource strain: Not on file   • Food insecurity:     Worry: Not on file     Inability: Not on file   • Transportation needs:     Medical: Not on file     Non-medical: Not on file   Tobacco Use   • Smoking status: Former Smoker     Packs/day: 1.00     Years: 12.00     Pack years: 12.00     Types: Cigarettes     Last attempt to quit: 2008     Years since quittin.7   • Smokeless tobacco: Never Used   • Tobacco comment: Started smoking at age 11   Substance and Sexual Activity   • Alcohol use: No   • Drug use: No   • Sexual activity: Never     Partners: Male   Lifestyle   • Physical activity:     Days per week: Not on file     Minutes per session: Not on file   • Stress: Not on file   Relationships   • Social connections:     Talks on phone: Not on file     Gets together: Not on file     Attends Hinduism service: Not on file     Active member of club or organization: Not on file     Attends meetings of clubs or organizations: Not on file     Relationship status: Not on file   • Intimate partner violence:     Fear of current or ex partner: Not on file     Emotionally abused: Not on file     Physically abused: Not on file     Forced sexual  activity: Not on file   Other Topics Concern   • Primary/coprimary nurse & associates Not Asked   • Family contact information Not Asked   • OK to release patient information to the following Not Asked   • Patient preferred routine/Privacy concerns Not Asked   • Patient likes and dislikes Not Asked   • Participating In Research Study Not Asked   • Miscellaneous Not Asked   Social History Narrative   • Not on file     Current Outpatient Medications   Medication Sig Dispense Refill   • zolpidem (AMBIEN) 5 MG Tab Take 1 Tab by mouth at bedtime as needed for Sleep for up to 30 days. 30 Tab 2   • baclofen (LIORESAL) 10 MG Tab Take 3 Tabs by mouth 4 times a day. 360 Tab 1   • cyclobenzaprine (FLEXERIL) 10 MG Tab Take 1 Tab by mouth 3 times a day as needed. 90 Tab 1   • citalopram (CELEXA) 20 MG Tab Take 1 Tab by mouth every day. 90 Tab 3   • Misc. Devices Misc Bedside commode with padding 1 Each 11   • Misc. Devices Misc New latch for new dynavox  Wheel chair 1 Each 11   • clindamycin-benzoyl peroxide (BENZACLIN) gel APPLY TO THE FACE EVERY DAY 50 g 11   • D-MANNOSE PO Take 2 Tabs by mouth every day. Per Urology     • Diclofenac Sodium 1 % Gel APPLY 4 GRAMS TO KNEE AND 3 GRAMS TO ANKLES UP TO FOUR TIMES DAILY AS NEEDED 100 g 10   • tamsulosin (FLOMAX) 0.4 MG capsule Take 1 Cap by mouth every day. 90 Cap 3   • Misc. Devices Misc Bedside commode 1 Each 11   • topiramate (TOPAMAX) 25 MG Tab Take 1 Tab by mouth 2 times a day. 60 Tab 11   • sumatriptan (IMITREX) 100 MG tablet TAKE 1 TABLET BY MOUTH AT ONSET OF HEADACHE. MAY REPEAT IN 2 HOURS. MAX 2 TABLETS A DAY 9 Tab 11   • polyethylene glycol 3350 (MIRALAX) Powder MIX AND DRINK 1 CAPFUL WITH 8 OZ OF LIQUID QD  3   • simethicone (MYLICON) 125 MG chewable tablet Take 125 mg by mouth 4 times a day as needed. Indications: Gas     • Misc. Devices Misc W/C stabilizer needs eval and possible replacement 1 Each 11   • Misc. Devices Misc Please eval and fix electric W/C. Please eval  "also for W/C needs. 1 Each 11   • Misc. Devices Misc Please allow patient to have support/therapy dog in appt. Regardless of weight due to multiple chronic illnesses and debility. 1 Each 11   • Calcium Carbonate Antacid (TUMS PO) Take 1 tablet by mouth as needed. Indications: heartburn     • Multiple Vitamins-Minerals (MULTIVITAMIN GUMMIES ADULTS PO) Take 1 Tab by mouth every day. Indications: vitamin supplement.     • vitamin D (CHOLECALCIFEROL) 1000 UNIT Tab Take 1,000 Units by mouth 4 times a day. Indications: supplement       No current facility-administered medications for this visit.      Family History   Problem Relation Age of Onset   • Cancer Mother         Sarcoma   • Bipolar disorder Brother    • Other Brother         spina bifida   • Diabetes Maternal Grandmother    • Other Daughter         Migrains         Review of Systems   Gastrointestinal: Positive for constipation.   Neurological: Positive for weakness.   Psychiatric/Behavioral: The patient has insomnia.    All other systems reviewed and are negative.         Objective:     /68 (BP Location: Right arm, Patient Position: Sitting, BP Cuff Size: Adult)   Pulse 69   Resp 16   Ht 1.702 m (5' 7\") Comment: pt in wheelchair  SpO2 96%   BMI 20.36 kg/m²      Physical Exam   Constitutional: She is oriented to person, place, and time. She appears well-developed and well-nourished. No distress.   HENT:   Head: Normocephalic and atraumatic.   Right Ear: External ear normal.   Left Ear: External ear normal.   Nose: Nose normal.   Eyes: Right eye exhibits no discharge. Left eye exhibits no discharge.   Neck: Normal range of motion. Neck supple. No thyromegaly present.   Cardiovascular: Normal rate, regular rhythm and normal heart sounds. Exam reveals no gallop and no friction rub.   No murmur heard.  Pulmonary/Chest: Effort normal and breath sounds normal. She has no wheezes. She has no rales.   Musculoskeletal: She exhibits no edema or tenderness. "   Neurological: She is alert and oriented to person, place, and time. She displays normal reflexes.   Contractures of the feet bilaterally.  Patient has limited mobility.  She uses an electronic wheelchair and is aphasic.   Skin: Skin is warm and dry. No rash noted. She is not diaphoretic.   Psychiatric: She has a normal mood and affect. Her behavior is normal. Judgment and thought content normal.   Nursing note and vitals reviewed.              Assessment/Plan:     1. Primary insomnia  I have refilled patient's Ambien and it does not appear she is on any other controlled substances.  She is not getting her medicines from other offices.  ORT previously completed.  I advised that it is okay for her not to use it every night.  - zolpidem (AMBIEN) 5 MG Tab; Take 1 Tab by mouth at bedtime as needed for Sleep for up to 30 days.  Dispense: 30 Tab; Refill: 2    2. Spasticity  I have refilled her medication exactly as previously prescribed by her neurologist but advised that they do need to establish with neurology and are working with our neurology group to get this set up.  I warned about the risk for oversedation with these medicines.  - baclofen (LIORESAL) 10 MG Tab; Take 3 Tabs by mouth 4 times a day.  Dispense: 360 Tab; Refill: 1  - cyclobenzaprine (FLEXERIL) 10 MG Tab; Take 1 Tab by mouth 3 times a day as needed.  Dispense: 90 Tab; Refill: 1    3. Recurrent urinary tract infection  I advised patient to contact her urologist to see if daily preventative antibiotics might be useful for her.  She is not currently symptomatic    4. Chronic constipation  I agree with patient's sister that Colace might be helpful as well as adding Metamucil daily.  She is already on MiraLAX.    5. Encounter for gynecological examination without abnormal finding  Patient needs removal of IUD as well as Pap smear  - REFERRAL TO GYNECOLOGY    6. Influenza vaccine needed  I have placed the below orders and discussed them with an approved  delegating provider. The MA is performing the below orders under the direction of Dr. Doss    - Influenza Vaccine Quad Injection (PF)

## 2019-10-23 ENCOUNTER — TELEPHONE (OUTPATIENT)
Dept: PHYSICAL MEDICINE AND REHAB | Facility: REHABILITATION | Age: 34
End: 2019-10-23

## 2019-10-23 NOTE — TELEPHONE ENCOUNTER
----- Message from Franco Feliciano, Med Ass't sent at 10/23/2019 12:28 PM PDT -----  Regarding: Contact this patient for possible referral   Please contact this patient and see if she would like to est care with Dr. Rouse.     Thanks,     Franco

## 2019-10-29 NOTE — TELEPHONE ENCOUNTER
Lis Lares's sister scheduled appt in December with Dr. Rouse and I let her know may need to reschedule due to Dr. Rouse pending baby delivery. She was going to check to see if other sister would be able to bring her to appt sooner.

## 2019-11-25 DIAGNOSIS — R53.81 DEBILITY: ICD-10-CM

## 2019-11-25 DIAGNOSIS — G04.00 ADEM (ACUTE DISSEMINATED ENCEPHALOMYELITIS): ICD-10-CM

## 2019-11-25 DIAGNOSIS — R25.2 SPASTICITY: ICD-10-CM

## 2019-12-01 DIAGNOSIS — K59.04 CHRONIC IDIOPATHIC CONSTIPATION: ICD-10-CM

## 2019-12-02 RX ORDER — DOCUSATE SODIUM 100 MG/1
CAPSULE, LIQUID FILLED ORAL
Qty: 60 CAP | Refills: 10 | Status: SHIPPED | OUTPATIENT
Start: 2019-12-02 | End: 2020-12-07 | Stop reason: SDUPTHER

## 2019-12-06 ENCOUNTER — OFFICE VISIT (OUTPATIENT)
Dept: PHYSICAL MEDICINE AND REHAB | Facility: REHABILITATION | Age: 34
End: 2019-12-06
Payer: MEDICARE

## 2019-12-06 VITALS
DIASTOLIC BLOOD PRESSURE: 60 MMHG | TEMPERATURE: 98.1 F | SYSTOLIC BLOOD PRESSURE: 98 MMHG | HEART RATE: 92 BPM | OXYGEN SATURATION: 98 %

## 2019-12-06 DIAGNOSIS — Z78.9 IMPAIRED MOBILITY AND ACTIVITIES OF DAILY LIVING: ICD-10-CM

## 2019-12-06 DIAGNOSIS — K59.01 SLOW TRANSIT CONSTIPATION: ICD-10-CM

## 2019-12-06 DIAGNOSIS — K59.2 NEUROGENIC BOWEL: ICD-10-CM

## 2019-12-06 DIAGNOSIS — Z74.09 IMPAIRED MOBILITY AND ACTIVITIES OF DAILY LIVING: ICD-10-CM

## 2019-12-06 DIAGNOSIS — E55.9 VITAMIN D DEFICIENCY: ICD-10-CM

## 2019-12-06 DIAGNOSIS — R47.1 ANARTHRIA: ICD-10-CM

## 2019-12-06 DIAGNOSIS — R47.9 POOR SPEECH: ICD-10-CM

## 2019-12-06 DIAGNOSIS — Z78.9 IMPAIRED INSTRUMENTAL ACTIVITIES OF DAILY LIVING (IADL): ICD-10-CM

## 2019-12-06 DIAGNOSIS — G04.00 ADEM (ACUTE DISSEMINATED ENCEPHALOMYELITIS): Primary | ICD-10-CM

## 2019-12-06 DIAGNOSIS — G82.50 CHRONIC INCOMPLETE SPASTIC TETRAPLEGIA (HCC): ICD-10-CM

## 2019-12-06 DIAGNOSIS — M62.838 MUSCLE SPASTICITY: ICD-10-CM

## 2019-12-06 DIAGNOSIS — N31.9 NEUROGENIC BLADDER: ICD-10-CM

## 2019-12-06 DIAGNOSIS — R45.89 DEPRESSED MOOD: ICD-10-CM

## 2019-12-06 PROCEDURE — 99354 PR PROLONGED SVC OUTPATIENT SETTING 1ST HOUR: CPT | Performed by: PHYSICAL MEDICINE & REHABILITATION

## 2019-12-06 PROCEDURE — 99215 OFFICE O/P EST HI 40 MIN: CPT | Mod: 25 | Performed by: PHYSICAL MEDICINE & REHABILITATION

## 2019-12-06 PROCEDURE — 99358 PROLONG SERVICE W/O CONTACT: CPT | Performed by: PHYSICAL MEDICINE & REHABILITATION

## 2019-12-06 RX ORDER — BISACODYL 10 MG
10 SUPPOSITORY, RECTAL RECTAL DAILY
Qty: 30 SUPPOSITORY | Refills: 5 | Status: SHIPPED | OUTPATIENT
Start: 2019-12-06 | End: 2020-09-25

## 2019-12-06 RX ORDER — TIZANIDINE HYDROCHLORIDE 2 MG/1
2 CAPSULE, GELATIN COATED ORAL 3 TIMES DAILY
Qty: 90 CAP | Refills: 3 | Status: SHIPPED | OUTPATIENT
Start: 2019-12-06 | End: 2020-09-25

## 2019-12-06 ASSESSMENT — ENCOUNTER SYMPTOMS
DIARRHEA: 1
TINGLING: 0
ROS SKIN COMMENTS: NO SKIN BREAKDOWN
COUGH: 0
BLURRED VISION: 0
DOUBLE VISION: 0
CHILLS: 0
CONSTIPATION: 1
FEVER: 0

## 2019-12-06 NOTE — PROGRESS NOTES
"Prolonged non-face-to-face time was spent on 12/6/2019  from 08:35 to 09:25 by this reviewer, for a total of 50 minutes.  An extensive review was performed of patient's past records regarding past surgical history relating to current diagnosis admission and discharge note review, op note review, current therapy note review, progress notes. This is in preparation for patient outpatient visit clinic visit in which the patient is new to this writer. This data reviewed and collected is outlined in section titled \"PM&R History to Date\" within my progress note.     Dr. Jessica Rouse  12/6/2019  12:15 PM      "

## 2019-12-06 NOTE — PATIENT INSTRUCTIONS
START Tizanidine (Zanaflex):  - Start 2 mg (1 pill) at night for 4-5 days  - Then take 2 mg twice daily for 4-5 days   - Then take 2mg three times daily until our next visit    Referral sent to Speech to evaluate other communication tools    We will reach out to [facility] to try and get gloves/lube for bowel program.     Ordered kidney/bladder ultrasound    We will get [facility] [place] records    Ordered Suppository, have this done daily.

## 2019-12-06 NOTE — PROGRESS NOTES
[facility]  [facility]  [address] [place] [address]  Ph: [phone]    NEW PATIENT EVALUATION    *Patient established in PM&R practice, however, patient new to writer as patient is transferring care. Therefore, patient billed as established.     Patient Name: Griselda Swanson   Patient : 1985  Patient Age: 34 y.o.   PCP: SELVIN Garcia    Referring Physician: Dr. Helder Forbes  Reason for Referral: Transfer of patient care  Examining Physician: Dr. Jessica Rouse DO  Date of Service: 2019      SUBJECTIVE:   Patient Identification: Griselda Swanson is a 34 y.o. 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 Dominga Mayberry (PM&R): Patient was admitted to acute rehab unit after intrathecal baclofen pump placement.  She had pump placed 3/12/2013 by Dr. Dominic Vega with the catheter tip at level C7-T1.     Last seen in physiatry clinic 2019 by Dr. Helder Forbes: Medical notes reviewed.  Per his last documentation-she was seen for lower limb spasticity primarily.  She has history of overt fracture right knee with nonsurgical management.  Equinovarus deformities of bilateral feet, podiatry consulted and has pending appointments with orthopedics for surgical options.  Follow-up was as needed with home exercise  program and TENS unit and potential referral for Botox if needed.     Last seen by neurology, Dr. Viji Carver 4/19/2019: Records reviewed.  Patient had intrathecal baclofen pump removed due to infection and has had Botox injections in the past through Dr. Vega's office which reportedly did not provide significant improvement.  Reportedly taking baclofen 20 mg 4 times daily with cyclobenzaprine 10 mg 3 times daily.  Chronic deformity right knee after injury.  Also noted significant chronic daily headaches which she had been taking sumatriptan and topiramate for which ran out and she has been untreated for multiple months.  No new imaging nor immunotherapy is recommended given she has had no clinical change.  She never had additional demyelinating events to classify her with multiple sclerosis.     Rehab: Lives alone with home aide and has [employer] lift.  Power wheelchair for mobility.  Has 1 sister that lives within the area and another sister that lives in [place].     Accompanied by Today: Sister, Emi  History of Present Illness:   -Nociceptive pain: Patient has had recent left groin pain which has been ongoing for the past month or 2.  She has received a new chair which is the only major change.  They are not sure if this happened maybe when she was transferring.  She does have home health aides that help her throughout the day.  Has taken Tylenol as needed which does not help.  Sleeps with a heating pad which is uncomfortable.  - Nociceptive pain: Also has chronic right knee pain due to deformity.  Has not been evaluated by orthopedic service for this issue.  Did have recent x-ray.  -Neurogenic bladder: Has occasional incontinence is unsure if she is emptying completely.  Currently voiding volitionally.  Not using catheterization.  Has not had ultrasound recently in the past couple of years.  Does see urology [place].  Last saw them within the past year.  Not taking Flomax at this time.  Does not know if she has  had UDS.  -Neurogenic bowel: inconsistent bowel regimen.  Only recently started increasing medications: Colace, MiraLAX, PRN lactulose, senna in order to have more frequent bowel movement.  Had been having 1 every for 5 days now is having 1 every 3 to 4 days but still is too long for patient.  Unsure if caregivers will be able to do suppository daily.  Has not tried suppository daily in the past.  -Spasticity: On baclofen 30 mg 3 times a day and 20 mg at night.  Worse in the lower extremities.  Has not tried any other anti-spasticity medications.  Baclofen pump did not help per their report.  Stated that has had Botox injections in the past which were not efficacious, do not know amount of Botox used.  -Ortho: Has equinovarus deformities for which she is considering surgery for Achilles tendon release.  - Migraines: Headaches relatively more controlled.  Patient unsure if new medication regimen recently instituted by neurology is helping, however sister states that she is not getting as many messages from her sister stating that she is having migraines so likely improved.  Have discussed Botox but have not had it for migraine yet.  -Mood: On Celexa and patient states mood good all considering.  -Equipment: Have not received bedside commode from accidents despite order being placed in July.  New power wheelchair.  No issues.  -Therapy: Not getting any range of motion or home exercise program.  Is interested in other communicative devices to assist with caregivers.  Has DynaVox system now.  -Neuropathic pain: Denies neuropathic pain.  -Skin: Denies any skin breakdown.  States she does do frequent weight shifts.    Review of Systems:  Review of Systems   Constitutional: Negative for chills and fever.   HENT:        Denies hearing issues.   Eyes: Negative for blurred vision and double vision.        Wears glasses   Respiratory: Negative for cough.    Cardiovascular: Negative for leg swelling.   Gastrointestinal:  Positive for constipation and diarrhea.        Occasional bowel incontinence.   Genitourinary:        Occasional urinary incontinence.  Frequent UTI, 1-2 times per year.   Musculoskeletal: Positive for joint pain ( Right knee, left groin).   Skin:        No skin breakdown   Neurological: Negative for tingling.        Significant spasticity lower extremities.   Psychiatric/Behavioral:        Mood stable.  On Celexa without depression.      All other pertinent positive review of systems are noted above in HPI.   All other systems reviewed and are negative.    Past Medical History:  Past Medical History:   Diagnosis Date   • C. difficile colitis 2015   • Coma ([facility]) August 2009    1 month, lesions on brain,  unknown etiology   • Encephalitis 2009   • Coma ([facility]) 2008    Spontaneous -    • ADEM (acute disseminated encephalomyelitis)    • Back pain    • Breath shortness     since 2014 not an issue at this time (4/2018)   • Demyelinating disorder ([facility])     demyelinating encephpomyeltis    • Heart burn    • Indigestion    • Lesion of brain     unknown cuase   • MEDICAL HOME    • Migraines    • MS (multiple sclerosis) ([facility])    • Other specified disorder of intestines     constipation   • Pain    • Psychiatric problem     depression and anxiety   • Renal disorder     kidney stones   • Urinary incontinence       Past Surgical History:   Procedure Laterality Date   • PUMP INSERT/REMOVE Left 7/1/2016    Procedure: PUMP REMOVAL;  Surgeon: Catrachito Vega M.D.;  Location: [facility] OR[facility];  Service:    • CATH PLACE PERM EPIDURAL Left 6/14/2016    Procedure: CATH PLACE PERM EPIDURAL - BACLOFEN PUMP AND CATHETER REPLACEMENT  - BACK AND ABDOMEN;  Surgeon: Catrachito Vega M.D.;  Location: [facility];  Service:    • [place] BACLOFEN INTRATHECAL TRIAL  3/8/2016    Dr. Vega  [facility]   • CATH PLACE PERM EPIDURAL N/A 3/8/2016    Procedure: BACLOFEN PUMP TRIAL;  Surgeon: Catrachito Vega M.D.;  Location[facility];  Service:    • PUMP REVISION  10/8/2013    Performed by Pari Roberson M.D. at [facility]   • PUMP INSERT/REMOVE  3/12/2013    Performed by Pari Roberson M.D. at [facility]   • CATH PLACE PERM EPIDURAL  6/26/2012    Performed by PARI ROBERSON at [facility]   • CATH PLACE PERM EPIDURAL  3/6/2012    Performed by PARI ROBERSON at [facility]   • MAMMOPLASTY AUGMENTATION  2002   • TONSILLECTOMY          Current Outpatient Medications:   •  tizanidine (ZANAFLEX) 2 MG capsule, Take 1 Cap by mouth 3 times a day., Disp: 90 Cap, Rfl: 3  •  bisacodyl (DULCOLAX) 10 MG Suppos, Insert 1 Suppository in rectum every day., Disp: 30 Suppository, Rfl: 5  •   MG Cap, TAKE 1 CAPSULE BY MOUTH TWICE DAILY AS NEEDED FOR CONSTIPATION, Disp: 60 Cap, Rfl: 10  •  Diclofenac Sodium 1 % Gel, Apply 2 g to skin as directed 4 times a day as needed., Disp: 1 Tube, Rfl: 11  •  baclofen (LIORESAL) 10 MG Tab, Take 3 Tabs by mouth 4 times a day., Disp: 360 Tab, Rfl: 1  •  cyclobenzaprine (FLEXERIL) 10 MG Tab, Take 1 Tab by mouth 3 times a day as needed., Disp: 90 Tab, Rfl: 1  •  citalopram (CELEXA) 20 MG Tab, Take 1 Tab by mouth every day., Disp: 90 Tab, Rfl: 3  •  clindamycin-benzoyl peroxide (BENZACLIN) gel, APPLY TO THE FACE EVERY DAY, Disp: 50 g, Rfl: 11  •  D-MANNOSE PO, Take 2 Tabs by mouth every day. Per Urology, Disp: , Rfl:   •  Diclofenac Sodium 1 % Gel, APPLY 4 GRAMS TO KNEE AND 3 GRAMS TO ANKLES UP TO FOUR TIMES DAILY AS NEEDED, Disp: 100 g, Rfl: 10  •  tamsulosin (FLOMAX) 0.4 MG capsule, Take 1 Cap by mouth every day., Disp: 90 Cap, Rfl: 3  •  topiramate (TOPAMAX) 25 MG Tab, Take 1 Tab by mouth 2 times a day., Disp: 60 Tab, Rfl: 11  •  sumatriptan (IMITREX) 100 MG tablet, TAKE 1 TABLET BY MOUTH AT ONSET OF HEADACHE. MAY REPEAT IN 2 HOURS. MAX 2 TABLETS A DAY, Disp: 9 Tab, Rfl: 11  •  polyethylene glycol 3350 (MIRALAX) Powder, MIX AND DRINK 1 CAPFUL  WITH 8 OZ OF LIQUID QD, Disp: , Rfl: 3  •  Misc. Devices Misc, W/C stabilizer needs eval and possible replacement, Disp: 1 Each, Rfl: 11  •  Misc. Devices Misc, Please eval and fix electric W/C. Please eval also for W/C needs., Disp: 1 Each, Rfl: 11  •  Multiple Vitamins-Minerals (MULTIVITAMIN GUMMIES ADULTS PO), Take 1 Tab by mouth every day. Indications: vitamin supplement., Disp: , Rfl:   •  vitamin D (CHOLECALCIFEROL) 1000 UNIT Tab, Take 1,000 Units by mouth 4 times a day. Indications: supplement, Disp: , Rfl:   •  Misc. Devices Misc, Bedside commode with padding, Disp: 1 Each, Rfl: 11  •  Misc. Devices Misc, New latch for new dynavox  Wheel chair, Disp: 1 Each, Rfl: 11  •  Misc. Devices Misc, Bedside commode (Patient not taking: Reported on 12/6/2019), Disp: 1 Each, Rfl: 11  •  simethicone (MYLICON) 125 MG chewable tablet, Take 125 mg by mouth 4 times a day as needed. Indications: Gas, Disp: , Rfl:   •  Misc. Devices Misc, Please allow patient to have support/therapy dog in appt. Regardless of weight due to multiple chronic illnesses and debility. (Patient not taking: Reported on 12/6/2019), Disp: 1 Each, Rfl: 11  •  Calcium Carbonate Antacid (TUMS PO), Take 1 tablet by mouth as needed. Indications: heartburn, Disp: , Rfl:   Allergies   Allergen Reactions   • Bactrim      Reaction unknown   • Fentanyl      Makes her agressive        Past Social History:  Social History     Socioeconomic History   • Marital status: Single     Spouse name: Not on file   • Number of children: Not on file   • Years of education: Not on file   • Highest education level: Not on file   Occupational History   • Not on file   Social Needs   • Financial resource strain: Not on file   • Food insecurity:     Worry: Not on file     Inability: Not on file   • Transportation needs:     Medical: Not on file     Non-medical: Not on file   Tobacco Use   • Smoking status: Former Smoker     Packs/day: 1.00     Years: 12.00     Pack years: 12.00      Types: Cigarettes     Last attempt to quit: 2008     Years since quittin.9   • Smokeless tobacco: Never Used   • Tobacco comment: Started smoking at age 11   Substance and Sexual Activity   • Alcohol use: No   • Drug use: No   • Sexual activity: Never     Partners: Male   Lifestyle   • Physical activity:     Days per week: Not on file     Minutes per session: Not on file   • Stress: Not on file   Relationships   • Social connections:     Talks on phone: Not on file     Gets together: Not on file     Attends [employer]ism service: Not on file     Active member of club or organization: Not on file     Attends meetings of clubs or organizations: Not on file     Relationship status: Not on file   • Intimate partner violence:     Fear of current or ex partner: Not on file     Emotionally abused: Not on file     Physically abused: Not on file     Forced sexual activity: Not on file   Other Topics Concern   • Primary/coprimary nurse & associates Not Asked   • Family contact information Not Asked   • OK to release patient information to the following Not Asked   • Patient preferred routine/Privacy concerns Not Asked   • Patient likes and dislikes Not Asked   • Participating In Research Study Not Asked   • Miscellaneous Not Asked   •  Service No   • Blood Transfusions No   • Caffeine Concern No   • Occupational Exposure No   • Hobby Hazards No   • Sleep Concern Yes   • Stress Concern No   • Weight Concern No   • Special Diet Yes     Comment: Keto   • Back Care No   • Exercise No   • Bike Helmet No   • Seat Belt Yes   • Self-Exams No   Social History Narrative   • Not on file        Family History:  Family History   Problem Relation Age of Onset   • Cancer Mother         Sarcoma   • Bipolar disorder Brother    • Other Brother         spina bifida   • Diabetes Maternal Grandmother    • Other Daughter         Migrains       Depression and Opioid Screening  PHQ-9:  Depression Screen (PHQ-2/PHQ-9) 2019  4/20/2019 7/1/2019   PHQ-2 Total Score 3 - -   PHQ-2 Total Score - - -   PHQ-2 Total Score - 0 0   PHQ-9 Total Score 6 - -     Interpretation of PHQ-9 Total Score   Score Severity   1-4 No Depression   5-9 Mild Depression   10-14 Moderate Depression   15-19 Moderately Severe Depression   20-27 Severe Depression     Opioid Risk Score: 2  Interpretation of Opioid Risk Score   Score 0-3 = Low risk of abuse. Do UDS at least once per year.  Score 4-7 = Moderate risk of abuse. Do UDS 1-4 times per year.  Score 8+ = High risk of abuse. Refer to specialist.      OBJECTIVE:   Vital Signs:  Vitals:    12/06/19 0918   BP: (!) 98/60   Pulse: 92   Temp: 36.7 °C (98.1 °F)   SpO2: 98%        Pertinent Labs:  Lab Results   Component Value Date/Time    SODIUM 145 10/11/2019 11:54 AM    POTASSIUM 4.2 10/11/2019 11:54 AM    CHLORIDE 109 10/11/2019 11:54 AM    CO2 27 10/11/2019 11:54 AM    GLUCOSE 83 10/11/2019 11:54 AM    BUN 14 10/11/2019 11:54 AM    CREATININE 0.73 10/11/2019 11:54 AM       No results found for: HBA1C    Lab Results   Component Value Date/Time    WBC 8.1 10/11/2019 11:54 AM    RBC 5.05 10/11/2019 11:54 AM    HEMOGLOBIN 15.5 10/11/2019 11:54 AM    HEMATOCRIT 48.9 (H) 10/11/2019 11:54 AM    MCV 96.8 10/11/2019 11:54 AM    MCH 30.7 10/11/2019 11:54 AM    MCHC 31.7 (L) 10/11/2019 11:54 AM    MPV 10.4 10/11/2019 11:54 AM    NEUTSPOLYS 61.10 10/11/2019 11:54 AM    LYMPHOCYTES 32.30 10/11/2019 11:54 AM    MONOCYTES 4.90 10/11/2019 11:54 AM    EOSINOPHILS 0.50 10/11/2019 11:54 AM    BASOPHILS 1.00 10/11/2019 11:54 AM    ANISOCYTOSIS 1+ 07/01/2016 04:41 AM       Lab Results   Component Value Date/Time    ASTSGOT 22 10/11/2019 11:54 AM    ALTSGPT 24 10/11/2019 11:54 AM        Physical Exam:   Physical Exam   Constitutional: She is oriented to person, place, and time and well-developed, well-nourished, and in no distress.   HENT:   Head: Normocephalic and atraumatic.   Eyes: Conjunctivae are normal. No scleral icterus.    Cardiovascular:   No significant peripheral edema appreciated.  Extremities are warm and well-perfused.   Pulmonary/Chest: Effort normal. No respiratory distress.   Abdominal: Soft. She exhibits no distension.   Musculoskeletal:         General: Deformity present.      Comments: Equinovarus deformities bilateral feet.  Plantar flexion contractures bilaterally.  Knee extension contractures versus severe spasticity bilaterally.  Right knee valgus deformity with laterally placed patella.  Flexion of MCP and extension of DIP and PIP of hands bilaterally, worse on the right compared to the left.   Neurological: She is alert and oriented to person, place, and time. Coordination abnormal.   Skin: Skin is warm and dry.   Psychiatric: Mood and affect normal.   Nursing note and vitals reviewed.    Neuro Cont'd:  Alert, awake. Conversational. Logical thought content.   Significant dysmetria of bilateral upper extremities.  Communicates via [place].  Anarthric.  Difficult to fully assess spasticity in lower extremities given that this is chronic and she has plantar flexion contracture, equinovarus deformities bilaterally, significant quadricep spasticity which cannot be distinguished from contracture, though she does have some knee flexion capability to about 10 degrees when I personally range knee on the left.  Did not range knee on the right given under lying significant valgus deformation with laterally placed patella.  Unable to assess clonus or Babinski's due to underlying equinovarus deformities.  Unable to assess lower extremity reflexes due to underlying spasticity and orthopedic deformities.    Imaging:   Right knee x-ray 10/11/2019  IMPRESSION: Valgus angulation right knee with marked narrowing and sclerosis of the lateral knee joint compartment. No acute fracture identified.        Left knee x-ray 10/11/2019: Moderate osteopenia with no acute fracture identified.    Bilateral hip and pelvis x-ray 10/11/2019:  Moderate osteopenia with no acute fracture identified.    Left foot x-ray 10/11/2019: Flexion deformity left foot varus angulation of the left ankle. Moderate osteopenia. No acute fracture identified.      Right foot x-ray 10/11/2019: Flexion deformity left foot varus angulation of the left ankle. Moderate osteopenia. No acute fracture identified.    ASSESSMENT/PLAN: Griselda Swanson  is a 34 y.o. female with rehabilitation history significant for postinfectious ADEM 2009, here 12/6/2019 for outpatient follow-up for spasticity, neurogenic bladder, neurogenic bowel, equipment, nociceptive pain complaints. The following plan was discussed with the patient who is in agreement.     Visit Diagnoses     ICD-10-CM   1. ADEM (acute disseminated encephalomyelitis) G04.00   2. Muscle spasticity M62.838   3. Neurogenic bladder N31.9   4. Poor speech R47.9   5. Anarthria R47.1   6. Neurogenic bowel K59.2   7. Slow transit constipation K59.01   8. Vitamin D deficiency E55.9   9. Depressed mood F32.9   10. Chronic incomplete spastic tetraplegia (HCC) G82.50   11. Impaired instrumental activities of daily living (IADL) R68.89   12. Impaired mobility and activities of daily living Z74.09        Rehab/Neuro:   1. Incomplete tetraplegia secondary to postinfectious ADEM in August 2009: Differential diagnosis initially included tumefactive MS versus ADEM, patient initially evaluated at [facility] and then went to [facility].  Neurology has been following and given that her MRIs have been completely stable this favors ADEM. ARU at [facility] in 2010. Complicated by spastic tetraplegia, neurogenic bowel, neurogenic bladder.  Reviewed all pertinent previous medical records leading up to today's clinic visit.  Recent imaging reports reviewed.   -Equipment: Power wheelchair thru [employer] (7/2/2019)  -Equipment: Bedside commode ordered by PCP which has not been received by patient yet. Ordered through [employer].  Patient sister will call and figure  out what the status of the order is. If they need any supplemental documentation for necessity we will be happy to provide that.  -REFERRAL to SLP for assistance with adaptive equipment to facilitate communication with caregivers. Has Dynavox while in power wheelchair. Impaired dexterity is an issue.   -Discussed with patient and sister that it would be of benefit to have a complete rehabilitation reevaluation at some point, whether that is it [facility] or another center.  However, they are currently still considering orthopedic surgical intervention for equina varus and plantar flexion deformity and I think it would be who have them to wait until the surgeries are performed to complete acute rehab stay after. Patient and sister are very open to the idea that patient's current functional status may be permanent, but they still would like to explore all possible options for functional improvement which is completely reasonable.  -Currently has home health aides who do not perform any range of motion on patient's lower extremities.  I think it would be extremely beneficial for patient to have referral to home health physical therapy for caregiver training on range of motion in the future.  Will await to refer until patient has been maximized on anti-spasticity medications and/or has been evaluated by [facility].    2.  Chronic daily migraine: Being managed by neurology service.  Restarted on topiramate 25 mg twice daily and oral sumatriptan at last neurology appointment 4/19/2019.   -To be reevaluated for efficacy of pharmacologic management for migraine by neurology service prior to initiating Botox for migraine.  According to patient's sister, patient has not been complaining of headache as often in the recent months and thinks that it is improved on oral therapy.  -We are offering Botox for headache in this clinic should neurology not be able to schedule patient due to clinic availability  "restrictions and they deem her a candidate for Botox for headache in future.    Spasticity:   1. Secondary to #1 in \"rehab/neuro\" section: Status post implantation of intrathecal baclofen pump 3/12/2013, revision 6/14/2016, and subsequent removal secondary to infection 7/1/2016 (Dr. Catrachito Vega).  Despite having had IBP placed, patient and sister state that they did not notice any additional efficacy.  Has had multiple rounds of Botox injections which have been unsuccessful.  On oral anti-spasticity medications (baclofen 4 times daily-30/30/30/20 milligrams, cyclobenzaprine 10 mg 3 times daily).  LFTs within normal limits 10/11/2019 at AST/ALT 22/24.  Patient has not tried any other anti-spasticity medications in the past.  -Continue cyclobenzaprine 10 mg 3 times a day: Recently refilled by Dr. Bloch 10/15/2019  -Continue baclofen 4 times daily 30/30/30/20 milligrams  -START Tizanidine 2mg TID (on titration schedule - 2mg QHS 4-5 days, then 2mg ).  Very likely that this low dose of tizanidine 2 mg 3 times daily will not provide additional assistance for spasticity given how severe it is, we will plan to continue to titrate tizanidine.  Counseled patient and sister on side effects and need for frequent LFT monitoring.  - LABS: Placed order for CMP to be completed prior to next visit.  Last LFTs were within normal limits.    Neurogenic Bladder:   1. Neurogenic Bladder secondary to #1 in \"rehab/neuro\" section: Most recent BUN/Cr reviewed from 10/11/2019 and was 14/0.73 which appears to be at patient's baseline, and is normal.  MAR review shows patient on Flomax 0.4 mg daily, but patient is not actually taking this. Patient gets frequent UTIs, 1-2 per year. Voids volitionally without catheterizing, unclear if patient still retains after volitional voiding. Had to cath in distant past.  Patient likely with spastic bladder, not on any pharmacologic bladder agents.   - On D Mannose per [facility] (last seen within the " "past year)  - No recent RBUS  - ORDER placed for RBUS to be done prior to next visit  - Recent BUN/Cr WNL  - Not on any PO medications for bladder management other than D mannose.   - REQUEST RECORDS: [facility] (PNs, imaging, procedures)  -May request that urodynamic study be performed to fully evaluate bladder function for potential to pharmacologically treat spastic bladder.  Additionally, if patient is having spastic bladder it is been shown that antimuscarinic agents over time can prevent renal bladder deterioration even in the setting of adequate neurogenic bladder management.    Neurogenic Bowel:   1. Neurogenic Bowel secondary to #1 in \"rehab/neuro\" section: Has had issues with constipation in the past. Taking Colace 100mg BID + Senna qAM (25mg) + Miralax BID + lactulose PRN. This only started in past month. Had been with BM q4-5 days and now has one q3-4 days with intermittent incontinence.   - START suppository daily in addition to aforementioned BTP  - Goal to get suppository inserted daily.   - Gloves and lubricant needed. Will reach out to [employer] for these necessary medical supplies. If [employer] does not provide will reach out to other vendors to order.      Endo:   1. Vitamin D Deficiency secondary to #1 in \"rehab/neuro\" section: Vitamin D has not been drawn in several years.  On cholecalciferol 4000 units daily.  -Lab: Vitamin D ordered and to be drawn prior to next visit.      Mood/Sleep:   1. Secondary to adjustment disorder resulting from #1 in \"neuro/rehab section\": On Celexa 20 mg daily.  Mood stable.     Skin: Due to neurologic diagnosis of nontraumatic spinal cord injury and subsequent sensorimotor impairments, patient is at great risk for skin breakdown.   - Counseled on continued frequency of weight shifting q2hrs and to avoid shearing with transfers or other positionally related movements to prevent development of skin breakdown.      Nociceptive Pain/Ortho:   1.  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  Managed at [place] [facility].  2.  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     Neuropathic Pain: Denies neuropathic pain.  GI/Diet: No dysphagia.     Follow up: 3 months    Total time spent face to face with patient was 45 minutes. Greater then 50% of my visit was spent on counseling and coordination of care regarding the primary medical diagnosis and secondary medical complications as aforementioned in the assessment and plan. Extensive discussion involved the patient and sister.    In addition to my E/M charge today, which totaled 45 minutes, I spent 42 minutes face to face with the patient and her sister, Emi discussing multiple medical issues. This prolonged face to face encounter extended outside the scope of today's medical visit and included counseling, discussion, answering questions related to the aforementioned issues.     Please note that this dictation was created using voice recognition software. I have made every reasonable attempt to correct obvious errors but there may be errors of grammar and content that I may have overlooked prior to finalization of this note.    Dr. Jessica Rouse DO, MS  Department of Physical Medicine & Rehabilitation  Ne[facility] Rehabilitation Clinic  [facility]  12/6/2019 8:37 AM

## 2019-12-10 NOTE — PROGRESS NOTES
"Prolonged non-face-to-face time was spent on 12/10/2019  from 15:32 to 16:03 by this reviewer, for a total of 31 minutes.  An extensive review was performed of patient's past records requested from urology [place] regarding urologic history. This data reviewed and collected is outlined below.     Clinic visits:   -April 18, 2019 Dr. Stephen Garibay PA: History of intermittent infections with dysuria and urinary frequency.  Had been using 1-2 pull-ups with pads daily for incontinence.  Had been on Flomax to facilitate emptying.  Began d-mannose at this visit for prevention and was to follow-up in 1 year (April 2020).  -March 29, 2018: UA and culture ordered for urinary complaints -negative.  - January 17, 2017: Note is made that patient used to participate in roller [place] and her name was \"miss arias \".  She has history of renal colic and CT scan had showed multiple punctate stones in both kidneys (9/20/2015).  She has history of hematuria and recurrent urinary tract infections for many years.  -March 21, 2016: Followed up with urology to have Swan catheter removed given it was placed in ER 2/21/2016 for UTI and urinary retention.    Radiologic history:  -5/2/2017 abdominal ultrasound showed minimal left pelvicaliectasis    Procedure history:  -9/28/2015 cystoscopy, Dr. Madhu Lobo: Slightly stenosed urethra around the urethral meatus.  Required dilators.  Otherwise urethra within normal limits.  Bladder showed no abnormalities, trabeculation, foreign bodies, evidence of tumor or stones.  Normal bladder expansion.  Ureters had clear reflux noted bilaterally and were normal configuration and location.    Labs:   7/30/2015: BUN/creatinine 11/0.74  2/27/2016: BUN/creatinine 5/0.48    Dr. Jessica Rouse DO, MS  Department of Physical Medicine & Rehabilitation  Neuro Rehabilitation Clinic  [facility]      "

## 2019-12-26 ENCOUNTER — OFFICE VISIT (OUTPATIENT)
Dept: MEDICAL GROUP | Facility: MEDICAL CENTER | Age: 34
End: 2019-12-26
Payer: MEDICARE

## 2019-12-26 VITALS
BODY MASS INDEX: 20.36 KG/M2 | SYSTOLIC BLOOD PRESSURE: 116 MMHG | DIASTOLIC BLOOD PRESSURE: 72 MMHG | HEART RATE: 70 BPM | HEIGHT: 67 IN | RESPIRATION RATE: 16 BRPM | OXYGEN SATURATION: 98 %

## 2019-12-26 DIAGNOSIS — N39.0 RECURRENT URINARY TRACT INFECTION: ICD-10-CM

## 2019-12-26 DIAGNOSIS — J18.9 PNEUMONIA DUE TO INFECTIOUS ORGANISM, UNSPECIFIED LATERALITY, UNSPECIFIED PART OF LUNG: ICD-10-CM

## 2019-12-26 DIAGNOSIS — K59.09 CHRONIC CONSTIPATION: ICD-10-CM

## 2019-12-26 DIAGNOSIS — F51.01 PRIMARY INSOMNIA: ICD-10-CM

## 2019-12-26 PROCEDURE — 99214 OFFICE O/P EST MOD 30 MIN: CPT | Performed by: NURSE PRACTITIONER

## 2019-12-26 RX ORDER — ALBUTEROL SULFATE 90 UG/1
2 AEROSOL, METERED RESPIRATORY (INHALATION) EVERY 6 HOURS PRN
Qty: 8.5 G | Refills: 11 | Status: SHIPPED | OUTPATIENT
Start: 2019-12-26 | End: 2021-07-24

## 2019-12-26 RX ORDER — TAMSULOSIN HYDROCHLORIDE 0.4 MG/1
0.4 CAPSULE ORAL
Qty: 90 CAP | Refills: 3 | Status: SHIPPED | OUTPATIENT
Start: 2019-12-26 | End: 2020-12-07 | Stop reason: SDUPTHER

## 2019-12-26 RX ORDER — ZOLPIDEM TARTRATE 5 MG/1
5 TABLET ORAL NIGHTLY PRN
Qty: 30 TAB | Refills: 2 | Status: SHIPPED | OUTPATIENT
Start: 2019-12-26 | End: 2020-01-25

## 2019-12-26 ASSESSMENT — ENCOUNTER SYMPTOMS
CONSTIPATION: 1
COUGH: 1
INSOMNIA: 1

## 2019-12-26 NOTE — PROGRESS NOTES
Subjective:      Griselda Swanson is a 34 y.o. female who presents with Medication Refill (flomax and ambien ) and Lab Results         CC: Patient is brought in today by her sister for refills on insomnia medication and urinary medication as well as new issues with pneumonia and recurring chronic constipation    HPI       1. 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  Apparently patient had increasing cough and went to the emergency room at [facility] 2 days ago where she was diagnosed with pneumonia and constipation.  She was sent home on antibiotics and was not admitted.  We have not received any notes as of yet.  Patient apparently still has mild cough but is feeling better and reports no fever, chills, dizzyness or nausea    2. Chronic constipation  Patient sister states that a CT of the abdomen was done on patient at the emergency room and besides constipation, nothing else abnormal was found.  She does use MiraLAX powder daily and stool softeners and wonders if there is anything else she can do.    3. Primary insomnia  Patient would like to get a refill on her Ambien which she uses nightly for sleep.    4. Recurrent urinary tract infection  Patient requesting refills on her Flomax which helps with urination  Past Medical History:   Diagnosis Date   • ADEM (acute disseminated encephalomyelitis)    • Back pain    • Breath shortness     since 2014 not an issue at this time (4/2018)   • C. difficile colitis 2015   • Coma (HCC) August 2009    1 month, lesions on brain,  unknown etiology   • Coma (HCC) 2008    Spontaneous -    • Demyelinating disorder (HCC)     demyelinating encephpomyeltis    • Encephalitis 2009   • Heart burn    • Indigestion    • Lesion of brain     unknown cuase   • MEDICAL HOME    •  Migraines    • MS (multiple sclerosis) (HCC)    • Other specified disorder of intestines     constipation   • Pain    • Psychiatric problem     depression and anxiety   • Renal disorder     kidney stones   • Urinary incontinence      Social History     Socioeconomic History   • Marital status: Single     Spouse name: Not on file   • Number of children: Not on file   • Years of education: Not on file   • Highest education level: Not on file   Occupational History   • Not on file   Social Needs   • Financial resource strain: Not on file   • Food insecurity:     Worry: Not on file     Inability: Not on file   • Transportation needs:     Medical: Not on file     Non-medical: Not on file   Tobacco Use   • Smoking status: Former Smoker     Packs/day: 1.00     Years: 12.00     Pack years: 12.00     Types: Cigarettes     Last attempt to quit: 2008     Years since quittin.9   • Smokeless tobacco: Never Used   • Tobacco comment: Started smoking at age 11   Substance and Sexual Activity   • Alcohol use: No   • Drug use: No   • Sexual activity: Never     Partners: Male   Lifestyle   • Physical activity:     Days per week: Not on file     Minutes per session: Not on file   • Stress: Not on file   Relationships   • Social connections:     Talks on phone: Not on file     Gets together: Not on file     Attends [employer] service: Not on file     Active member of club or organization: Not on file     Attends meetings of clubs or organizations: Not on file     Relationship status: Not on file   • Intimate partner violence:     Fear of current or ex partner: Not on file     Emotionally abused: Not on file     Physically abused: Not on file     Forced sexual activity: Not on file   Other Topics Concern   • Primary/coprimary nurse & associates Not Asked   • Family contact information Not Asked   • OK to release patient information to the following Not Asked   • Patient preferred routine/Privacy concerns Not Asked   • Patient  likes and dislikes Not Asked   • Participating In Research Study Not Asked   • Miscellaneous Not Asked   •  Service No   • Blood Transfusions No   • Caffeine Concern No   • Occupational Exposure No   • Hobby Hazards No   • Sleep Concern Yes   • Stress Concern No   • Weight Concern No   • Special Diet Yes     Comment: Keto   • Back Care No   • Exercise No   • Bike Helmet No   • Seat Belt Yes   • Self-Exams No   Social History Narrative   • Not on file     Current Outpatient Medications   Medication Sig Dispense Refill   • zolpidem (AMBIEN) 5 MG Tab Take 1 Tab by mouth at bedtime as needed for Sleep for up to 30 days. 30 Tab 2   • tamsulosin (FLOMAX) 0.4 MG capsule Take 1 Cap by mouth every day. 90 Cap 3   • albuterol 108 (90 Base) MCG/ACT Aero Soln inhalation aerosol Inhale 2 Puffs by mouth every 6 hours as needed. 8.5 g 11   • tizanidine (ZANAFLEX) 2 MG capsule Take 1 Cap by mouth 3 times a day. 90 Cap 3   •  MG Cap TAKE 1 CAPSULE BY MOUTH TWICE DAILY AS NEEDED FOR CONSTIPATION 60 Cap 10   • Diclofenac Sodium 1 % Gel Apply 2 g to skin as directed 4 times a day as needed. 1 Tube 11   • baclofen (LIORESAL) 10 MG Tab Take 3 Tabs by mouth 4 times a day. 360 Tab 1   • cyclobenzaprine (FLEXERIL) 10 MG Tab Take 1 Tab by mouth 3 times a day as needed. 90 Tab 1   • citalopram (CELEXA) 20 MG Tab Take 1 Tab by mouth every day. 90 Tab 3   • Misc. Devices [facility] Bedside commode with padding 1 Each 11   • Misc. Devices Misc New latch for new dynavox  Wheel chair 1 Each 11   • clindamycin-benzoyl peroxide (BENZACLIN) gel APPLY TO THE FACE EVERY DAY 50 g 11   • D-MANNOSE PO Take 2 Tabs by mouth every day. Per Urology     • Diclofenac Sodium 1 % Gel APPLY 4 GRAMS TO KNEE AND 3 GRAMS TO ANKLES UP TO FOUR TIMES DAILY AS NEEDED 100 g 10   • Misc. Devices [facility] Bedside commode 1 Each 11   • topiramate (TOPAMAX) 25 MG Tab Take 1 Tab by mouth 2 times a day. 60 Tab 11   • sumatriptan (IMITREX) 100 MG tablet TAKE 1 TABLET BY  "MOUTH AT ONSET OF HEADACHE. MAY REPEAT IN 2 HOURS. MAX 2 TABLETS A DAY 9 Tab 11   • polyethylene glycol 3350 (MIRALAX) Powder MIX AND DRINK 1 CAPFUL WITH 8 OZ OF LIQUID QD  3   • simethicone (MYLICON) 125 MG chewable tablet Take 125 mg by mouth 4 times a day as needed. Indications: Gas     • Misc. Devices Misc W/C stabilizer needs eval and possible replacement 1 Each 11   • Misc. Devices Misc Please eval and fix electric W/C. Please eval also for W/C needs. 1 Each 11   • Misc. Devices Misc Please allow patient to have support/therapy dog in appt. Regardless of weight due to multiple chronic illnesses and debility. 1 Each 11   • Calcium Carbonate Antacid (TUMS PO) Take 1 tablet by mouth as needed. Indications: heartburn     • Multiple Vitamins-Minerals (MULTIVITAMIN GUMMIES ADULTS PO) Take 1 Tab by mouth every day. Indications: vitamin supplement.     • vitamin D (CHOLECALCIFEROL) 1000 UNIT Tab Take 1,000 Units by mouth 4 times a day. Indications: supplement     • bisacodyl (DULCOLAX) 10 MG Suppos Insert 1 Suppository in rectum every day. 30 Suppository 5     No current facility-administered medications for this visit.      Family History   Problem Relation Age of Onset   • Cancer Mother         Sarcoma   • Bipolar disorder Brother    • Other Brother         spina bifida   • Diabetes Maternal Grandmother    • Other Daughter         Migrains         Review of Systems   Respiratory: Positive for cough.    Gastrointestinal: Positive for constipation.   Psychiatric/Behavioral: The patient has insomnia.    All other systems reviewed and are negative.         Objective:     /72 (BP Location: Right arm, Patient Position: Sitting, BP Cuff Size: Adult)   Pulse 70   Resp 16   Ht 1.702 m (5' 7\")   SpO2 98%   BMI 20.36 kg/m²      Physical Exam  Vitals signs and nursing note reviewed.   Constitutional:       General: She is not in acute distress.     Appearance: She is well-developed. She is not diaphoretic.   HENT:    "   Head: Normocephalic and atraumatic.      Right Ear: External ear normal.      Left Ear: External ear normal.      Nose: Nose normal.   Eyes:      General:         Right eye: No discharge.         Left eye: No discharge.   Neck:      Musculoskeletal: Normal range of motion and neck supple.      Thyroid: No thyromegaly.   Cardiovascular:      Rate and Rhythm: Normal rate and regular rhythm.      Heart sounds: Normal heart sounds. No murmur. No friction rub. No gallop.    Pulmonary:      Effort: Pulmonary effort is normal.      Breath sounds: Normal breath sounds. No wheezing or rales.      Comments: Occasional cough noted during the exam and difficult to auscultate lungs due to difficulty taking deep breaths.  Musculoskeletal:         General: No tenderness.      Comments: Contractures of the feet and hands bilaterally patient is wheelchair-bound.   Skin:     General: Skin is warm and dry.      Findings: No rash.   Neurological:      Mental Status: She is alert and oriented to person, place, and time.      Deep Tendon Reflexes: Reflexes normal.   Psychiatric:         Behavior: Behavior normal.         Thought Content: Thought content normal.         Judgment: Judgment normal.                 Assessment/Plan:       1. Pneumonia due to infectious organism, unspecified laterality, unspecified part of lung  We will request medical records and she will continue with her antibiotics and I will add albuterol with demonstration and usage of an MDI.  I will have her do repeat chest x-ray in 4 weeks.  I advised her going back to the ER should she develop fever or trouble with breathing  - DX-CHEST-2 VIEWS; Future  - albuterol 108 (90 Base) MCG/ACT Aero Soln inhalation aerosol; Inhale 2 Puffs by mouth every 6 hours as needed.  Dispense: 8.5 g; Refill: 11    2. Chronic constipation  I recommended adding daily Metamucil and a glass of water with her stool softener and MiraLAX.  She can use milk of magnesia 1 time to help start  the process    3. Primary insomnia  Medication refilled for the next 3 months  - zolpidem (AMBIEN) 5 MG Tab; Take 1 Tab by mouth at bedtime as needed for Sleep for up to 30 days.  Dispense: 30 Tab; Refill: 2    4. Recurrent urinary tract infection    - tamsulosin (FLOMAX) 0.4 MG capsule; Take 1 Cap by mouth every day.  Dispense: 90 Cap; Refill: 3

## 2020-01-01 NOTE — TELEPHONE ENCOUNTER
"I called neal to let her know and she said that \"nevermind her sister paid for the prescription she was recently given\"  " Gestational Age  32.4 (26 May 2020 19:45)            Current Age:  9d        Corrected Gestational Age: 33.6    ADMISSION DIAGNOSIS: Prematurity     INTERVAL HISTORY: Last 24 hours significant for tolerating advancement of feeds and initiation of vitamin supplements    GROWTH PARAMETERS:  Daily Weight Gm: 1760 (04 Jun 2020 01:00)    VITAL SIGNS:  T(C): 36.8 (06-04-20 @ 10:00), Max: 37.5 (06-04-20 @ 01:00)  HR: 139 (06-04-20 @ 10:00)  BP: 64/39 (06-04-20 @ 10:00)  BP(mean): 48 (06-04-20 @ 10:00)  RR: 30 (06-04-20 @ 10:00) (30 - 60)  SpO2: 98% (06-04-20 @ 10:00) (96% - 100%)    PHYSICAL EXAM:  General: Awake and active; in no acute distress  Head: AFOF  Eyes: present; symmetrical  Ears: Patent bilaterally, no deformities  Nose: Nares patent  Mouth: palate intact   Neck: No masses, intact clavicles  Chest: Breath sounds equal to auscultation. No retractions  CV: No murmurs appreciated  Abdomen: Soft nontender nondistended, no masses, bowel sounds present  : Normal for gestational age  Spine: Intact, no sacral dimples or tags  Anus: Grossly patent  Extremities: FROM  Skin: pink, no lesions    RESPIRATORY: Stable on room air    INFECTIOUS DISEASE: low clinical suspicion for sepsis    CARDIOVASCULAR: hemodynamically stable    HEMATOLOGY: premature infant at risk for anemia prematurity  Last HCT 5/27 was 54.7%    METABOLIC:  Total Fluid Goal: 162 mL/kG/day    Enteral: Feeding 35 cc every 3 hours of EBM/donor milk fortified to 24 calories with HMF for a total of 162 mL/kg/day.    Medications:  multivitamin Oral Drops - Peds Oral daily    NEUROLOGY: Premature infants at risk for neurodevelopmental delay   HUS 6/3 was normal     OTHER ACTIVE MEDICAL ISSUES:    SOCIAL: parents not present at morning rounds; to be updated    DISCHARGE PLANNING: ongoing  Primary Care Provider:  Hepatitis B vaccine:  Circumcision:  CHD Screen:  Hearing Screen:  Car Seat Challenge:  CPR Training:  Follow Up Program:  Other Follow Up Appointments:

## 2020-01-07 DIAGNOSIS — R25.2 SPASTICITY: ICD-10-CM

## 2020-01-08 RX ORDER — BACLOFEN 10 MG/1
30 TABLET ORAL 4 TIMES DAILY
Qty: 360 TAB | Refills: 1 | Status: SHIPPED | OUTPATIENT
Start: 2020-01-08 | End: 2020-03-10 | Stop reason: SDUPTHER

## 2020-02-25 ENCOUNTER — TELEPHONE (OUTPATIENT)
Dept: PHYSICAL MEDICINE AND REHAB | Facility: MEDICAL CENTER | Age: 35
End: 2020-02-25

## 2020-02-25 NOTE — TELEPHONE ENCOUNTER
Returned Emi's phone call. No answer, did not leave VM as mailbox did not confirm sister's identity - will try again later.     Dr. Jessica Rouse DO, MS  Department of Physical Medicine & Rehabilitation  Neuro Rehabilitation Clinic  [facility]

## 2020-02-25 NOTE — TELEPHONE ENCOUNTER
Luda, Griselda's sister, called and scheduled appt for fv with Dr. Rouse.     She said she has had Xrays done. Upon investigation it was not Xrays, but Renal US. Luda said Griselda may not be able to do this because she cannot lie flat. I let her know to call [phone], radiology scheduling to see if they have options for Griselda.     She said Griselda was unable to do speech therapy because she is not able to get out of home and asked if there was home care for speech therapy and to have a referral for that if there was.     She said Griselda is scheduled for surgery with Dr. Stanley next Monday for ankle tendon release. She would like to know if she could have Dr. Rouse opinion of surgery.     I let her know I will try to get records from Dr. Stanley's office for Dr. Rouse to review, but I am not sure if she will have time to review this week.

## 2020-02-28 NOTE — TELEPHONE ENCOUNTER
Spoke with Luda via phone. Discussed Griselda's surgery, which has been postponed for the time being. Discussed goal of sending her to OP [facility] instead of HH. We discussed RBUS. Luda had no other questions by end of phone call.

## 2020-03-10 DIAGNOSIS — R25.2 SPASTICITY: ICD-10-CM

## 2020-03-10 RX ORDER — BACLOFEN 10 MG/1
30 TABLET ORAL 4 TIMES DAILY
Qty: 360 TAB | Refills: 1 | Status: SHIPPED | OUTPATIENT
Start: 2020-03-10 | End: 2020-05-04 | Stop reason: SDUPTHER

## 2020-03-20 ENCOUNTER — TELEPHONE (OUTPATIENT)
Dept: PHYSICAL MEDICINE AND REHAB | Facility: REHABILITATION | Age: 35
End: 2020-03-20

## 2020-03-20 NOTE — TELEPHONE ENCOUNTER
I left [facility] to see if Griselda has had Renal US and blood work done that Dr. Rouse had ordered at last visit.

## 2020-03-23 NOTE — TELEPHONE ENCOUNTER
I spoke with Griselda Lares's sister and she said she has not had renal US done or blood work that Dr. Rouse ordered.     She is going to check and see if one of the sisters that live locally are going to be able to bring her to appt with Dr. Rouse 4/1/2020. She is going to also see if one of them will be able to take Griselda to get US and blood work done prior to appt, if not, she will call to cancel.

## 2020-04-01 ENCOUNTER — APPOINTMENT (OUTPATIENT)
Dept: PHYSICAL MEDICINE AND REHAB | Facility: REHABILITATION | Age: 35
End: 2020-04-01
Payer: MEDICARE

## 2020-04-29 DIAGNOSIS — R25.2 SPASTICITY: ICD-10-CM

## 2020-04-29 RX ORDER — CYCLOBENZAPRINE HCL 10 MG
10 TABLET ORAL 3 TIMES DAILY PRN
Qty: 90 TAB | Refills: 1 | Status: SHIPPED | OUTPATIENT
Start: 2020-04-29 | End: 2020-06-29 | Stop reason: SDUPTHER

## 2020-05-04 DIAGNOSIS — R25.2 SPASTICITY: ICD-10-CM

## 2020-05-04 RX ORDER — BACLOFEN 10 MG/1
30 TABLET ORAL 4 TIMES DAILY
Qty: 360 TAB | Refills: 3 | Status: SHIPPED | OUTPATIENT
Start: 2020-05-04 | End: 2020-09-28 | Stop reason: SDUPTHER

## 2020-05-07 ENCOUNTER — TELEMEDICINE (OUTPATIENT)
Dept: PHYSICAL MEDICINE AND REHAB | Facility: REHABILITATION | Age: 35
End: 2020-05-07
Payer: MEDICARE

## 2020-05-07 ENCOUNTER — TELEPHONE (OUTPATIENT)
Dept: PHYSICAL MEDICINE AND REHAB | Facility: REHABILITATION | Age: 35
End: 2020-05-07

## 2020-05-07 DIAGNOSIS — R47.1 ANARTHRIA: ICD-10-CM

## 2020-05-07 DIAGNOSIS — N31.9 NEUROGENIC BLADDER: ICD-10-CM

## 2020-05-07 DIAGNOSIS — G82.50 CHRONIC INCOMPLETE SPASTIC TETRAPLEGIA (HCC): ICD-10-CM

## 2020-05-07 DIAGNOSIS — K59.2 NEUROGENIC BOWEL: ICD-10-CM

## 2020-05-07 DIAGNOSIS — G47.09 OTHER INSOMNIA: ICD-10-CM

## 2020-05-07 DIAGNOSIS — R25.2 SPASTICITY: ICD-10-CM

## 2020-05-07 DIAGNOSIS — Z74.09 IMPAIRED MOBILITY AND ACTIVITIES OF DAILY LIVING: ICD-10-CM

## 2020-05-07 DIAGNOSIS — Z78.9 IMPAIRED MOBILITY AND ACTIVITIES OF DAILY LIVING: ICD-10-CM

## 2020-05-07 DIAGNOSIS — Z78.9 IMPAIRED INSTRUMENTAL ACTIVITIES OF DAILY LIVING (IADL): ICD-10-CM

## 2020-05-07 DIAGNOSIS — G04.00 ADEM (ACUTE DISSEMINATED ENCEPHALOMYELITIS): Primary | ICD-10-CM

## 2020-05-07 PROCEDURE — 99215 OFFICE O/P EST HI 40 MIN: CPT | Mod: 95,CR | Performed by: PHYSICAL MEDICINE & REHABILITATION

## 2020-05-07 RX ORDER — TIZANIDINE 2 MG/1
TABLET ORAL 3 TIMES DAILY
COMMUNITY
Start: 2020-03-14 | End: 2020-05-07 | Stop reason: SDUPTHER

## 2020-05-07 RX ORDER — TIZANIDINE 2 MG/1
4 TABLET ORAL 3 TIMES DAILY
Qty: 180 TAB | Refills: 2 | Status: SHIPPED | OUTPATIENT
Start: 2020-05-07 | End: 2020-09-25

## 2020-05-07 RX ORDER — NITROFURANTOIN 25; 75 MG/1; MG/1
CAPSULE ORAL
COMMUNITY
Start: 2020-03-18 | End: 2021-07-24

## 2020-05-07 RX ORDER — DOXYCYCLINE HYCLATE 100 MG/1
CAPSULE ORAL
COMMUNITY
Start: 2020-04-06 | End: 2020-11-08

## 2020-05-07 RX ORDER — ZOLPIDEM TARTRATE 5 MG/1
5 TABLET ORAL NIGHTLY PRN
Qty: 30 TAB | Refills: 0 | Status: SHIPPED | OUTPATIENT
Start: 2020-05-07 | End: 2020-06-06

## 2020-05-07 ASSESSMENT — ENCOUNTER SYMPTOMS
PALPITATIONS: 0
SORE THROAT: 0
BRUISES/BLEEDS EASILY: 0
MEMORY LOSS: 0
SHORTNESS OF BREATH: 0
CHILLS: 0
CONSTIPATION: 0
FEVER: 0
COUGH: 0
FALLS: 0
DIARRHEA: 0

## 2020-05-07 ASSESSMENT — PATIENT HEALTH QUESTIONNAIRE - PHQ9: CLINICAL INTERPRETATION OF PHQ2 SCORE: 0

## 2020-05-07 NOTE — PROGRESS NOTES
[facility]  PM&R Neuro Rehabilitation Clinic  [address] [place] [id number]  Ph: [phone]    FOLLOW UP PATIENT EVALUATION    This encounter was conducted via Zoom .   Verbal consent was obtained. Patient's identity was verified.    Patient Name: Griselda Swanson   Patient : 1985  Patient Age: 34 y.o.   PCP: SELVIN Garcia    Examining Physician: Dr. Jessiac Rouse,   Date of Service: 2020      SUBJECTIVE:   Patient Identification: Griselda Swanson is a 34 y.o. 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 She is taking Flexeril and Tizanidine. Is now on Tizanidine 2mg TID. Pt has actually been out of the Flexeril for a couple weeks and does notice the difference. Is tolerating the Tizanidine and thinks it is helping. Has not had RBUS yes, positioning difficulty. Patient had 2 recent UTIs - which were treated by [facility]. Patient gets up at least TID to bathroom. BMs getting more consistent. Taking Husk caps which is helping - 500mg TID. Not using suppositories, is taking docusate as well. Taking senna 1 tab 25 mg. Colace 100 mg BID. Griselda types that she thinks she is retaining urine. Follows with [facility].     Review of Systems:  Review of Systems   Constitutional: Negative for chills and fever.   HENT: Negative for congestion and  sore throat.    Respiratory: Negative for cough and shortness of breath.    Cardiovascular: Negative for palpitations and leg swelling.   Gastrointestinal: Negative for constipation and diarrhea.   Genitourinary:        Feels retaining urine   Musculoskeletal: Negative for falls and joint pain.   Neurological:        Spastic hemiparesis BLE and BUE   Endo/Heme/Allergies: Does not bruise/bleed easily.   Psychiatric/Behavioral: Negative for memory loss.      All other pertinent positive review of systems are noted above in HPI.     Past Medical History:  Past Medical History:   Diagnosis Date   • C. difficile colitis 2015   • Coma ([facility]) August 2009    1 month, lesions on brain,  unknown etiology   • Encephalitis 2009   • Coma ([facility]) 2008    Spontaneous -    • ADEM (acute disseminated encephalomyelitis)    • Back pain    • Breath shortness     since 2014 not an issue at this time (4/2018)   • Demyelinating disorder ([facility])     demyelinating encephpomyeltis    • Heart burn    • Indigestion    • Lesion of brain     unknown cuase   • MEDICAL HOME    • Migraines    • MS (multiple sclerosis) ([facility])    • Other specified disorder of intestines     constipation   • Pain    • Psychiatric problem     depression and anxiety   • Renal disorder     kidney stones   • Urinary incontinence       Past Surgical History:   Procedure Laterality Date   • PUMP INSERT/REMOVE Left 7/1/2016    Procedure: PUMP REMOVAL;  Surgeon: Catrachito Vega M.D.;  Location: SURGERY [facility];  Service:    • CATH PLACE PERM EPIDURAL Left 6/14/2016    Procedure: CATH PLACE PERM EPIDURAL - BACLOFEN PUMP AND CATHETER REPLACEMENT  - BACK AND ABDOMEN;  Surgeon: Catrachito Vega M.D.;  Location: SURGERY [facility];  Service:    • IN BACLOFEN INTRATHECAL TRIAL  3/8/2016    Dr. Vega  [facility]   • CATH PLACE PERM EPIDURAL N/A 3/8/2016    Procedure: BACLOFEN PUMP TRIAL;  Surgeon: Catrachito Vega M.D.;  Location: SURGERY [facility];  Service:    • PUMP  REVISION  10/8/2013    Performed by Pari Roberson M.D. at SURGERY [facility]   • PUMP INSERT/REMOVE  3/12/2013    Performed by Pari Roberson M.D. at SURGERY [facility]   • CATH PLACE PERM EPIDURAL  6/26/2012    Performed by PARI ROBERSON at [facility]   • CATH PLACE PERM EPIDURAL  3/6/2012    Performed by PARI ROBERSON at SURGERY [facility]   • MAMMOPLASTY AUGMENTATION  2002   • TONSILLECTOMY          Current Outpatient Medications:   •  zolpidem (AMBIEN) 5 MG Tab, Take 1 Tab by mouth at bedtime as needed for Sleep for up to 30 days., Disp: 30 Tab, Rfl: 0  •  tizanidine (ZANAFLEX) 2 MG tablet, Take 2 Tabs by mouth 3 times a day. Take 1 capsule by mouth three times a day, Disp: 180 Tab, Rfl: 2  •  baclofen (LIORESAL) 10 MG Tab, Take 3 Tabs by mouth 4 times a day., Disp: 360 Tab, Rfl: 3  •  cyclobenzaprine (FLEXERIL) 10 MG Tab, Take 1 Tab by mouth 3 times a day as needed., Disp: 90 Tab, Rfl: 1  •  tamsulosin (FLOMAX) 0.4 MG capsule, Take 1 Cap by mouth every day., Disp: 90 Cap, Rfl: 3  •  albuterol 108 (90 Base) MCG/ACT Aero Soln inhalation aerosol, Inhale 2 Puffs by mouth every 6 hours as needed., Disp: 8.5 g, Rfl: 11  •  tizanidine (ZANAFLEX) 2 MG capsule, Take 1 Cap by mouth 3 times a day., Disp: 90 Cap, Rfl: 3  •   MG Cap, TAKE 1 CAPSULE BY MOUTH TWICE DAILY AS NEEDED FOR CONSTIPATION, Disp: 60 Cap, Rfl: 10  •  citalopram (CELEXA) 20 MG Tab, Take 1 Tab by mouth every day., Disp: 90 Tab, Rfl: 3  •  clindamycin-benzoyl peroxide (BENZACLIN) gel, APPLY TO THE FACE EVERY DAY, Disp: 50 g, Rfl: 11  •  D-MANNOSE PO, Take 2 Tabs by mouth every day. Per Urology, Disp: , Rfl:   •  Diclofenac Sodium 1 % Gel, APPLY 4 GRAMS TO KNEE AND 3 GRAMS TO ANKLES UP TO FOUR TIMES DAILY AS NEEDED, Disp: 100 g, Rfl: 10  •  topiramate (TOPAMAX) 25 MG Tab, Take 1 Tab by mouth 2 times a day., Disp: 60 Tab, Rfl: 11  •  sumatriptan (IMITREX) 100 MG tablet, TAKE 1 TABLET BY MOUTH AT ONSET OF  HEADACHE. MAY REPEAT IN 2 HOURS. MAX 2 TABLETS A DAY, Disp: 9 Tab, Rfl: 11  •  polyethylene glycol 3350 (MIRALAX) Powder, MIX AND DRINK 1 CAPFUL WITH 8 OZ OF LIQUID QD, Disp: , Rfl: 3  •  simethicone (MYLICON) 125 MG chewable tablet, Take 125 mg by mouth 4 times a day as needed. Indications: Gas, Disp: , Rfl:   •  Calcium Carbonate Antacid (TUMS PO), Take 1 tablet by mouth as needed. Indications: heartburn, Disp: , Rfl:   •  Multiple Vitamins-Minerals (MULTIVITAMIN GUMMIES ADULTS PO), Take 1 Tab by mouth every day. Indications: vitamin supplement., Disp: , Rfl:   •  vitamin D (CHOLECALCIFEROL) 1000 UNIT Tab, Take 1,000 Units by mouth 4 times a day. Indications: supplement, Disp: , Rfl:   •  nitrofurantoin (MACROBID) 100 MG Cap, TK 1 C PO Q 12 H, Disp: , Rfl:   •  doxycycline (VIBRAMYCIN) 100 MG Cap, TK ONE C PO BID, Disp: , Rfl:   •  bisacodyl (DULCOLAX) 10 MG Suppos, Insert 1 Suppository in rectum every day. (Patient not taking: Reported on 5/7/2020), Disp: 30 Suppository, Rfl: 5  •  Diclofenac Sodium 1 % Gel, Apply 2 g to skin as directed 4 times a day as needed., Disp: 1 Tube, Rfl: 11  •  Misc. Devices Misc, Bedside commode with padding (Patient not taking: Reported on 5/7/2020), Disp: 1 Each, Rfl: 11  •  Misc. Devices Misc, New latch for new dynavox  Wheel chair (Patient not taking: Reported on 5/7/2020), Disp: 1 Each, Rfl: 11  •  Misc. Devices Misc, Bedside commode (Patient not taking: Reported on 5/7/2020), Disp: 1 Each, Rfl: 11  •  Misc. Devices Misc, W/C stabilizer needs eval and possible replacement (Patient not taking: Reported on 5/7/2020), Disp: 1 Each, Rfl: 11  •  Misc. Devices Misc, Please eval and fix electric W/C. Please eval also for W/C needs. (Patient not taking: Reported on 5/7/2020), Disp: 1 Each, Rfl: 11  •  Misc. Devices Misc, Please allow patient to have support/therapy dog in appt. Regardless of weight due to multiple chronic illnesses and debility. (Patient not taking: Reported on  2020), Disp: 1 Each, Rfl: 11  Allergies   Allergen Reactions   • Bactrim      Reaction unknown   • Fentanyl      Makes her agressive        Past Social History:  Social History     Socioeconomic History   • Marital status: Single     Spouse name: Not on file   • Number of children: Not on file   • Years of education: Not on file   • Highest education level: Not on file   Occupational History   • Not on file   Social Needs   • Financial resource strain: Not on file   • Food insecurity     Worry: Not on file     Inability: Not on file   • Transportation needs     Medical: Not on file     Non-medical: Not on file   Tobacco Use   • Smoking status: Former Smoker     Packs/day: 1.00     Years: 12.00     Pack years: 12.00     Types: Cigarettes     Last attempt to quit: 2008     Years since quittin.3   • Smokeless tobacco: Never Used   • Tobacco comment: Started smoking at age 11   Substance and Sexual Activity   • Alcohol use: No   • Drug use: No   • Sexual activity: Never     Partners: Male   Lifestyle   • Physical activity     Days per week: Not on file     Minutes per session: Not on file   • Stress: Not on file   Relationships   • Social connections     Talks on phone: Not on file     Gets together: Not on file     Attends Hinduism service: Not on file     Active member of club or organization: Not on file     Attends meetings of clubs or organizations: Not on file     Relationship status: Not on file   • Intimate partner violence     Fear of current or ex partner: Not on file     Emotionally abused: Not on file     Physically abused: Not on file     Forced sexual activity: Not on file   Other Topics Concern   • Primary/coprimary nurse & associates Not Asked   • Family contact information Not Asked   • OK to release patient information to the following Not Asked   • Patient preferred routine/Privacy concerns Not Asked   • Patient likes and dislikes Not Asked   • Participating In Research Study Not Asked    • Miscellaneous Not Asked   •  Service No   • Blood Transfusions No   • Caffeine Concern No   • Occupational Exposure No   • Hobby Hazards No   • Sleep Concern Yes   • Stress Concern No   • Weight Concern No   • Special Diet Yes     Comment: Keto   • Back Care No   • Exercise No   • Bike Helmet No   • Seat Belt Yes   • Self-Exams No   Social History Narrative   • Not on file        Family History:  Family History   Problem Relation Age of Onset   • Cancer Mother         Sarcoma   • Bipolar disorder Brother    • Other Brother         spina bifida   • Diabetes Maternal Grandmother    • Other Daughter         Migrains       Depression and Opioid Screening  PHQ-9:  Depression Screen (PHQ-2/PHQ-9) 4/20/2019 7/1/2019 5/7/2020   PHQ-2 Total Score - - -   PHQ-2 Total Score - - -   PHQ-2 Total Score 0 0 0   PHQ-9 Total Score - - -     Interpretation of PHQ-9 Total Score   Score Severity   1-4 No Depression   5-9 Mild Depression   10-14 Moderate Depression   15-19 Moderately Severe Depression   20-27 Severe Depression     Opioid Risk Score: 2  Interpretation of Opioid Risk Score   Score 0-3 = Low risk of abuse. Do UDS at least once per year.  Score 4-7 = Moderate risk of abuse. Do UDS 1-4 times per year.  Score 8+ = High risk of abuse. Refer to specialist.      OBJECTIVE:   Vital Signs:  There were no vitals filed for this visit.     Pertinent Labs:  Lab Results   Component Value Date/Time    SODIUM 145 10/11/2019 11:54 AM    POTASSIUM 4.2 10/11/2019 11:54 AM    CHLORIDE 109 10/11/2019 11:54 AM    CO2 27 10/11/2019 11:54 AM    GLUCOSE 83 10/11/2019 11:54 AM    BUN 14 10/11/2019 11:54 AM    CREATININE 0.73 10/11/2019 11:54 AM       No results found for: HBA1C    Lab Results   Component Value Date/Time    WBC 8.1 10/11/2019 11:54 AM    RBC 5.05 10/11/2019 11:54 AM    HEMOGLOBIN 15.5 10/11/2019 11:54 AM    HEMATOCRIT 48.9 (H) 10/11/2019 11:54 AM    MCV 96.8 10/11/2019 11:54 AM    MCH 30.7 10/11/2019 11:54 AM    MCHC  31.7 (L) 10/11/2019 11:54 AM    MPV 10.4 10/11/2019 11:54 AM    NEUTSPOLYS 61.10 10/11/2019 11:54 AM    LYMPHOCYTES 32.30 10/11/2019 11:54 AM    MONOCYTES 4.90 10/11/2019 11:54 AM    EOSINOPHILS 0.50 10/11/2019 11:54 AM    BASOPHILS 1.00 10/11/2019 11:54 AM    ANISOCYTOSIS 1+ 07/01/2016 04:41 AM       Lab Results   Component Value Date/Time    ASTSGOT 22 10/11/2019 11:54 AM    ALTSGPT 24 10/11/2019 11:54 AM        Physical Exam:   GEN: No apparent distress  HEENT: Head normocephalic, atraumatic.  Sclera nonicteric bilaterally, no ocular discharge appreciated bilaterally.  CV: Extremities with no peripheral edema appreciated bilaterally.  PULMONARY: Breathing nonlabored on room air, no respiratory accessory muscle use.  Not requiring supplemental oxygen.  PSYCH: Mood and affect within normal limits.  NEURO: Anarthric at baseline. Spastic paresis UEs and LEs    Imaging: No recent imaging.     ASSESSMENT/PLAN: Griselda Swanson  is a 34 y.o. female with rehabilitation history significant for Griselda Swanson  is a 34 y.o. female with rehabilitation history significant for postinfectious ADEM 2009, here 5/7/2020 for non-TBI f/u. The following plan was discussed with the patient who is in agreement.     Visit Diagnoses     ICD-10-CM   1. ADEM (acute disseminated encephalomyelitis) G04.00   2. Other insomnia G47.09   3. Neurogenic bladder N31.9   4. Anarthria R47.1   5. Chronic incomplete spastic tetraplegia (HCC) G82.50   6. Impaired instrumental activities of daily living (IADL) Z78.9   7. Neurogenic bowel K59.2   8. Impaired mobility and activities of daily living Z74.09    Z78.9   9. Spasticity R25.2      Rehab/Neuro:   1. Incomplete tetraplegia secondary to postinfectious ADEM in August 2009: Differential diagnosis initially included tumefactive MS versus ADEM, patient initially evaluated at [facility] and then went to [facility].  Neurology has been following and given that her MRIs have been completely stable this  "favors ADEM. ARU at [facility] in 2010. Complicated by spastic tetraplegia, neurogenic bowel, neurogenic bladder.  Reviewed all pertinent previous medical records leading up to today's clinic visit.  Recent imaging reports reviewed.   -Equipment: Power wheelchair thru [employer] (7/2/2019)  -Equipment: Have not received BSC yet. [facility] Current BSC has broken handle and significantly compromises patient's safety when transferred to BSC. Requires new one.      Spasticity:   1. Secondary to #1 in \"rehab/neuro\" section: Status post implantation of intrathecal baclofen pump 3/12/2013, revision 6/14/2016, and subsequent removal secondary to infection 7/1/2016 (Dr. Catrachito Vega).  Despite having had IBP placed, patient and sister state that they did not notice any additional efficacy.  Has had multiple rounds of Botox injections which have been unsuccessful.  On oral anti-spasticity medications (baclofen 4 times daily-30/30/30/20 milligrams, cyclobenzaprine 10 mg 3 times daily).  LFTs within normal limits 10/11/2019 at AST/ALT 22/24.   -Continue cyclobenzaprine 10 mg 3 times a day  -Continue baclofen 4 times daily 30/30/30/20 milligrams  -Continue Tizanidine; increase dose to 4mg TID as patient noticing relief.  -LABS: Vit D, CMP  -Counseled on importance of obtaining labs     Neurogenic Bladder:   1. Neurogenic Bladder secondary to #1 in \"rehab/neuro\" section: Most recent BUN/Cr reviewed from 10/11/2019 and was 14/0.73 which appears to be at patient's baseline, and is normal. Patient gets frequent UTIs, 1-2 per year. Voids volitionally without catheterizing, unclear if patient still retains after volitional voiding. Had to cath in distant past.  Last cystoscopy ~2015 without abnormalities with exception of Slight urethral stricture at the meatus. 2x UTI Jan-Feb 2020.   - On D Mannose per [facility] (last seen within the past year)  - No recent RBUS; could not tolerate due to positioning. Had " "CT abd/pel from [facility] ~11/2019, will request.   - Counseled on neurogenic bladder extensively, patient feels as if retaining, encouraged Urology follow up for PVR and potential UDS.      Neurogenic Bowel:   1. Neurogenic Bowel secondary to #1 in \"rehab/neuro\" section: Has had issues with constipation in the past. Taking Colace 100mg BID + Senna qAM (25mg) + Miralax BID + lactulose PRN. This only started in past month. Had been with BM q4-5 days and now has one q3-4 days with intermittent incontinence. More controlled lately on following regimen:  - Continue colace 100mg BID  - Continue Senna 25mg daily  - Continue Husk tabs (pt taking 500mg TID)  - Counseled on neurogenic bowel; r/o perforation with inadequate tx of constipation   - Counseled on caution with psyllium  - Counseled on adequate hydration     Endo:   1. Vitamin D Deficiency secondary to #1 in \"rehab/neuro\" section: Vitamin D has not been drawn in several years.  On cholecalciferol 4000 units daily.  -Lab: Vitamin D ordered and to be drawn prior to next visit.       Skin:   1. Blanching Erythema: BLE posterior thigh after sitting.   - Counseled extensively on weight shifting adequately  - Next appnt for pressure mapping     Nociceptive Pain/Ortho:   1.  Valgus deformity of right knee: Chronic.  Managed at [facility].  2.  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 Declined per patient and sisters, had been scheduled for 2/2020.   - Pt and sisters will likely move forward with this after COVID concern dissipates.      Follow up: 6-8 weeks for pressure mapping    Total time spent face to face with patient was 49 minutes. Greater then 50% of my visit was spent on counseling and coordination of care regarding the primary medical diagnosis, secondary medical complications, patient on their condition, best management " practices, and risks and benefits of treatment as aforementioned in the assessment and plan. Extensive discussion involved the patient and sister, Emi.    Please note that this dictation was created using voice recognition software. I have made every reasonable attempt to correct obvious errors but there may be errors of grammar and content that I may have overlooked prior to finalization of this note.    Dr. Jessica Rouse DO, MS  Department of Physical Medicine & Rehabilitation  Neuro Rehabilitation Clinic  [facility]  5/7/2020 12:38 PM

## 2020-05-07 NOTE — TELEPHONE ENCOUNTER
I left msg to call me back to schedule for fv in late June with Dr. Rouse for 60 min visit for pressure mapping

## 2020-05-08 ENCOUNTER — HOSPITAL ENCOUNTER (OUTPATIENT)
Dept: RADIOLOGY | Facility: MEDICAL CENTER | Age: 35
End: 2020-05-08
Payer: MEDICARE

## 2020-06-12 ENCOUNTER — TELEPHONE (OUTPATIENT)
Dept: PHYSICAL MEDICINE AND REHAB | Facility: REHABILITATION | Age: 35
End: 2020-06-12

## 2020-06-12 NOTE — TELEPHONE ENCOUNTER
I left ms to see if labs had been done and if the bedside commode has been received from [email or web address].

## 2020-06-18 NOTE — TELEPHONE ENCOUNTER
Emi Griselda's sister said Griselda has not done any of the labs or US renal Dr. Rouse order since last visit and her caregiver will be bringing her tomorrow.

## 2020-06-19 ENCOUNTER — OFFICE VISIT (OUTPATIENT)
Dept: PHYSICAL MEDICINE AND REHAB | Facility: REHABILITATION | Age: 35
End: 2020-06-19
Payer: MEDICARE

## 2020-06-19 VITALS
DIASTOLIC BLOOD PRESSURE: 62 MMHG | OXYGEN SATURATION: 98 % | SYSTOLIC BLOOD PRESSURE: 102 MMHG | TEMPERATURE: 98.6 F | HEIGHT: 67 IN | BODY MASS INDEX: 20.36 KG/M2 | HEART RATE: 91 BPM

## 2020-06-19 DIAGNOSIS — R47.1 ANARTHRIA: ICD-10-CM

## 2020-06-19 DIAGNOSIS — R25.2 SPASTICITY: ICD-10-CM

## 2020-06-19 DIAGNOSIS — Z74.09 IMPAIRED MOBILITY AND ACTIVITIES OF DAILY LIVING: ICD-10-CM

## 2020-06-19 DIAGNOSIS — M25.569 KNEE PAIN, UNSPECIFIED CHRONICITY, UNSPECIFIED LATERALITY: ICD-10-CM

## 2020-06-19 DIAGNOSIS — Z78.9 IMPAIRED MOBILITY AND ACTIVITIES OF DAILY LIVING: ICD-10-CM

## 2020-06-19 DIAGNOSIS — G82.50 TETRAPLEGIA (HCC): Primary | ICD-10-CM

## 2020-06-19 DIAGNOSIS — L53.9 REDNESS OF SKIN: ICD-10-CM

## 2020-06-19 DIAGNOSIS — G47.09 OTHER INSOMNIA: ICD-10-CM

## 2020-06-19 DIAGNOSIS — N31.9 NEUROGENIC BLADDER: ICD-10-CM

## 2020-06-19 DIAGNOSIS — M25.471 ANKLE SWELLING, RIGHT: ICD-10-CM

## 2020-06-19 DIAGNOSIS — K59.2 NEUROGENIC BOWEL: ICD-10-CM

## 2020-06-19 DIAGNOSIS — G04.00 ADEM (ACUTE DISSEMINATED ENCEPHALOMYELITIS): ICD-10-CM

## 2020-06-19 PROCEDURE — 99214 OFFICE O/P EST MOD 30 MIN: CPT | Performed by: PHYSICAL MEDICINE & REHABILITATION

## 2020-06-19 RX ORDER — ZOLPIDEM TARTRATE 5 MG/1
5 TABLET ORAL NIGHTLY PRN
Qty: 30 TAB | Refills: 2 | Status: SHIPPED | OUTPATIENT
Start: 2020-06-19 | End: 2020-09-17

## 2020-06-19 ASSESSMENT — ENCOUNTER SYMPTOMS
CONSTIPATION: 0
DIARRHEA: 0

## 2020-06-19 ASSESSMENT — PATIENT HEALTH QUESTIONNAIRE - PHQ9: CLINICAL INTERPRETATION OF PHQ2 SCORE: 0

## 2020-06-19 NOTE — PATIENT INSTRUCTIONS
Luda and Emi - at today's visit we did the followin. We did pressure mapping and the pressure under Griselda's thighs due to the pressure from her chair is not actually that bad. I still highly recommend that she perform weight shifting AT MINIMUM every 2 hours. To do this she can tilt-in-space for at least 2 minutes, but more if possible. As long as the redness of her skin in areas of pressure is blanching (turns white when you touch it) then that is okay. If it ever DOES NOT turn white and remains red when you touch it, that is concerning and reason to reach out to me.     I'm going to contact her PT who wrote the initial Rx for the chair and see if there might be any alterations that can be made to assist with pressure relief and her back alignment.     2. Bladder: I have provided y'all with catheters and urine specimen bottles. If you want to see if Griselda is retaining the quick and easy way to do it is to catheterize her immediately after she voids OR if she has not voided in 6 hours catheterize her. If she has not voided after 6 hours and you perform the catheterization, she is likely retaining if the volume is greater than about 400cc or more. Make sure you follow up with Urology. The CT scan from 2019 did not show any problems with urine going back up to the kidneys. There is still an order for the ultrasound to be performed of the bladder and kidneys - in light of the concern for her retaining, it could be helpful to have this done. If you do see Urology they will likely order an Ultrasound as well. If you will be seeing Urology soon, I do not think you need to get the ultrasound I ordered as it would be duplicate. Urology is not within our health record system so they wouldn't be able to see the results if you did it through [facility].     3. Spasticity: Griselda is feeling sleepy on Tizanidine 4mg (2 tabs) three times daily. It looks like these can be cut. We discussed taking 1.5 tabs three  times a day or 3mg three times a day to see if this helps with the sleepiness.     Please call with any questions you might have  Dr. Jessica Rouse   [phone]

## 2020-06-19 NOTE — PROGRESS NOTES
[facility]  [facility]  [address] [place] [address]  Ph: [phone]    FOLLOW UP PATIENT EVALUATION    Patient Name: Griselda Swanson   Patient : 1985  Patient Age: 34 y.o.   PCP: SELVIN Garcia    Examining Physician: Dr. Jessica Rouse DO  Date of Service: 2020    SUBJECTIVE:   Patient Identification: Griselda Swanson is a 34 y.o. 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  She uses iPad to text questions and to communicate.  Takes prolonged time to communicate with patient.  Some history acquired from caregiver who is present today.  - Patient is here for pressure mapping because at her last visit it was noted by caregivers that Griselda has been having significant redness on the posterior thighs from sitting up in her chair all day.  Caregiver today reports that Griselda is up in her chair all day and likes to be on the go.  When they do transfer her at times they noticed that the posterior thighs are very red.  They do not indicate that the redness persists, but states that it does go away, but worsens after she has been up in the wheelchair all day.  - They deny any skin breakdown.  - Regarding bladder, Griselda is still having retention.  She has had UTIs in the past.  They do have a urine sample right now but it has not been refrigerated, they are not sure who ordered this.  Griselda  states that she has felt as though she is retaining urine lately.  Additionally, caregiver reports that she does drink quite a bit of fluids and it will be quite some time before Griselda gets the urge to urinate.  Catheterization is very difficult as patient has significant lower extremity spasticity.  - Griselda reports that she is getting sleepy from 1 of the medications.  Thinks it is most likely tizanidine as we doubled this at last visit.  She does think the tizanidine helps her spasticity.  She is wondering if she can go down on this but not all the way down to 2 mg 3 times daily as she had been on before since she does get relief.  - Griselda also has questions regarding sleeping pill.  She has not been sleeping well at all.  At last visit we prescribed Ambien and she indicates that this helped really well with her sleep. This medication had previously been prescribed by patient's primary care physician.  She has trialed other sleep medications without effect.  -Caregiver also states that she has some swelling in her right ankle after she hurt her knee a little while ago.    Review of Systems:  Review of Systems   Cardiovascular:        Minimal ankle swelling at right ankle.   Gastrointestinal: Negative for constipation and diarrhea.   Genitourinary:        Patient suspects urinary retention.  Strong smell.   Musculoskeletal: Positive for joint pain ( Knee pain.).   Neurological:        Significant spasticity bilateral lower extremities.      All other pertinent positive review of systems are noted above in HPI.     Past Medical History:  Past Medical History:   Diagnosis Date   • C. difficile colitis 2015   • Coma ([facility]) August 2009    1 month, lesions on brain,  unknown etiology   • Encephalitis 2009   • Coma ([facility]) 2008    Spontaneous -    • ADEM (acute disseminated encephalomyelitis)    • Back pain    • Breath shortness     since 2014 not an issue at this time (4/2018)   • Demyelinating disorder ([facility])      demyelinating encephpomyeltis    • Heart burn    • Indigestion    • Lesion of brain     unknown cuase   • MEDICAL HOME    • Migraines    • MS (multiple sclerosis) (HCC)    • Other specified disorder of intestines     constipation   • Pain    • Psychiatric problem     depression and anxiety   • Renal disorder     kidney stones   • Urinary incontinence       Past Surgical History:   Procedure Laterality Date   • PUMP INSERT/REMOVE Left 7/1/2016    Procedure: PUMP REMOVAL;  Surgeon: Pari Roberson M.D.;  Location: [facility];  Service:    • CATH PLACE PERM EPIDURAL Left 6/14/2016    Procedure: CATH PLACE PERM EPIDURAL - BACLOFEN PUMP AND CATHETER REPLACEMENT  - BACK AND ABDOMEN;  Surgeon: Pari Roberson M.D.;  Location: [facility];  Service:    • PA BACLOFEN INTRATHECAL TRIAL  3/8/2016    Dr. Roberson  [facility]   • CATH PLACE PERM EPIDURAL N/A 3/8/2016    Procedure: BACLOFEN PUMP TRIAL;  Surgeon: Pari Roberson M.D.;  Location: [facility];  Service:    • PUMP REVISION  10/8/2013    Performed by Pari Roberson M.D. at [facility]   • PUMP INSERT/REMOVE  3/12/2013    Performed by Pari Roberson M.D. at [facility]   • CATH PLACE PERM EPIDURAL  6/26/2012    Performed by PARI ROBERSON at [facility]   • CATH PLACE PERM EPIDURAL  3/6/2012    Performed by PARI ROBERSON at [facility]   • MAMMOPLASTY AUGMENTATION  2002   • TONSILLECTOMY          Current Outpatient Medications:   •  zolpidem (AMBIEN) 5 MG Tab, Take 1 Tab by mouth at bedtime as needed for Sleep for up to 90 days., Disp: 30 Tab, Rfl: 2  •  doxycycline (VIBRAMYCIN) 100 MG Cap, TK ONE C PO BID, Disp: , Rfl:   •  baclofen (LIORESAL) 10 MG Tab, Take 3 Tabs by mouth 4 times a day., Disp: 360 Tab, Rfl: 3  •  cyclobenzaprine (FLEXERIL) 10 MG Tab, Take 1 Tab by mouth 3 times a day as needed., Disp: 90 Tab, Rfl: 1  •  tamsulosin (FLOMAX) 0.4 MG capsule, Take 1 Cap  by mouth every day., Disp: 90 Cap, Rfl: 3  •  albuterol 108 (90 Base) MCG/ACT Aero Soln inhalation aerosol, Inhale 2 Puffs by mouth every 6 hours as needed., Disp: 8.5 g, Rfl: 11  •  tizanidine (ZANAFLEX) 2 MG capsule, Take 1 Cap by mouth 3 times a day., Disp: 90 Cap, Rfl: 3  •   MG Cap, TAKE 1 CAPSULE BY MOUTH TWICE DAILY AS NEEDED FOR CONSTIPATION, Disp: 60 Cap, Rfl: 10  •  Diclofenac Sodium 1 % Gel, Apply 2 g to skin as directed 4 times a day as needed., Disp: 1 Tube, Rfl: 11  •  citalopram (CELEXA) 20 MG Tab, Take 1 Tab by mouth every day., Disp: 90 Tab, Rfl: 3  •  clindamycin-benzoyl peroxide (BENZACLIN) gel, APPLY TO THE FACE EVERY DAY, Disp: 50 g, Rfl: 11  •  D-MANNOSE PO, Take 2 Tabs by mouth every day. Per Urology, Disp: , Rfl:   •  topiramate (TOPAMAX) 25 MG Tab, Take 1 Tab by mouth 2 times a day., Disp: 60 Tab, Rfl: 11  •  sumatriptan (IMITREX) 100 MG tablet, TAKE 1 TABLET BY MOUTH AT ONSET OF HEADACHE. MAY REPEAT IN 2 HOURS. MAX 2 TABLETS A DAY, Disp: 9 Tab, Rfl: 11  •  Calcium Carbonate Antacid (TUMS PO), Take 1 tablet by mouth as needed. Indications: heartburn, Disp: , Rfl:   •  Multiple Vitamins-Minerals (MULTIVITAMIN GUMMIES ADULTS PO), Take 1 Tab by mouth every day. Indications: vitamin supplement., Disp: , Rfl:   •  vitamin D (CHOLECALCIFEROL) 1000 UNIT Tab, Take 1,000 Units by mouth 4 times a day. Indications: supplement, Disp: , Rfl:   •  nitrofurantoin (MACROBID) 100 MG Cap, TK 1 C PO Q 12 H, Disp: , Rfl:   •  tizanidine (ZANAFLEX) 2 MG tablet, Take 2 Tabs by mouth 3 times a day. Take 1 capsule by mouth three times a day, Disp: 180 Tab, Rfl: 2  •  bisacodyl (DULCOLAX) 10 MG Suppos, Insert 1 Suppository in rectum every day. (Patient not taking: Reported on 5/7/2020), Disp: 30 Suppository, Rfl: 5  •  Misc. Devices Misc, Bedside commode with padding (Patient not taking: Reported on 5/7/2020), Disp: 1 Each, Rfl: 11  •  Misc. Devices Misc, New latch for new dynavox  Wheel chair (Patient not  taking: Reported on 2020), Disp: 1 Each, Rfl: 11  •  Diclofenac Sodium 1 % Gel, APPLY 4 GRAMS TO KNEE AND 3 GRAMS TO ANKLES UP TO FOUR TIMES DAILY AS NEEDED, Disp: 100 g, Rfl: 10  •  Misc. Devices Misc, Bedside commode (Patient not taking: Reported on 2020), Disp: 1 Each, Rfl: 11  •  polyethylene glycol 3350 (MIRALAX) Powder, MIX AND DRINK 1 CAPFUL WITH 8 OZ OF LIQUID QD, Disp: , Rfl: 3  •  simethicone (MYLICON) 125 MG chewable tablet, Take 125 mg by mouth 4 times a day as needed. Indications: Gas, Disp: , Rfl:   •  Misc. Devices Misc, W/C stabilizer needs eval and possible replacement (Patient not taking: Reported on 2020), Disp: 1 Each, Rfl: 11  •  Misc. Devices Misc, Please eval and fix electric W/C. Please eval also for W/C needs. (Patient not taking: Reported on 2020), Disp: 1 Each, Rfl: 11  •  Misc. Devices Misc, Please allow patient to have support/therapy dog in appt. Regardless of weight due to multiple chronic illnesses and debility. (Patient not taking: Reported on 2020), Disp: 1 Each, Rfl: 11  Allergies   Allergen Reactions   • Bactrim      Reaction unknown   • Fentanyl      Makes her agressive        Past Social History:  Social History     Socioeconomic History   • Marital status: Single     Spouse name: Not on file   • Number of children: Not on file   • Years of education: Not on file   • Highest education level: Not on file   Occupational History   • Not on file   Social Needs   • Financial resource strain: Not on file   • Food insecurity     Worry: Not on file     Inability: Not on file   • Transportation needs     Medical: Not on file     Non-medical: Not on file   Tobacco Use   • Smoking status: Former Smoker     Packs/day: 1.00     Years: 12.00     Pack years: 12.00     Types: Cigarettes     Last attempt to quit: 2008     Years since quittin.4   • Smokeless tobacco: Never Used   • Tobacco comment: Started smoking at age 11   Substance and Sexual Activity   • Alcohol  use: No   • Drug use: No   • Sexual activity: Never     Partners: Male   Lifestyle   • Physical activity     Days per week: Not on file     Minutes per session: Not on file   • Stress: Not on file   Relationships   • Social connections     Talks on phone: Not on file     Gets together: Not on file     Attends Rastafarian service: Not on file     Active member of club or organization: Not on file     Attends meetings of clubs or organizations: Not on file     Relationship status: Not on file   • Intimate partner violence     Fear of current or ex partner: Not on file     Emotionally abused: Not on file     Physically abused: Not on file     Forced sexual activity: Not on file   Other Topics Concern   • Primary/coprimary nurse & associates Not Asked   • Family contact information Not Asked   • OK to release patient information to the following Not Asked   • Patient preferred routine/Privacy concerns Not Asked   • Patient likes and dislikes Not Asked   • Participating In Research Study Not Asked   • Miscellaneous Not Asked   •  Service No   • Blood Transfusions No   • Caffeine Concern No   • Occupational Exposure No   • Hobby Hazards No   • Sleep Concern Yes   • Stress Concern No   • Weight Concern No   • Special Diet Yes     Comment: Keto   • Back Care No   • Exercise No   • Bike Helmet No   • Seat Belt Yes   • Self-Exams No   Social History Narrative   • Not on file        Family History:  Family History   Problem Relation Age of Onset   • Cancer Mother         Sarcoma   • Bipolar disorder Brother    • Other Brother         spina bifida   • Diabetes Maternal Grandmother    • Other Daughter         Migrains       Depression and Opioid Screening  PHQ-9:  Depression Screen (PHQ-2/PHQ-9) 7/1/2019 5/7/2020 6/19/2020   PHQ-2 Total Score - - -   PHQ-2 Total Score - - -   PHQ-2 Total Score 0 0 0   PHQ-9 Total Score - - -     Interpretation of PHQ-9 Total Score   Score Severity   1-4 No Depression   5-9 Mild Depression    10-14 Moderate Depression   15-19 Moderately Severe Depression   20-27 Severe Depression     Opioid Risk Score: 2  Interpretation of Opioid Risk Score   Score 0-3 = Low risk of abuse. Do UDS at least once per year.  Score 4-7 = Moderate risk of abuse. Do UDS 1-4 times per year.  Score 8+ = High risk of abuse. Refer to specialist.      OBJECTIVE:   Vital Signs:  Vitals:    06/19/20 1006   BP: 102/62   Pulse: 91   Temp: 37 °C (98.6 °F)   SpO2: 98%        Pertinent Labs:  Lab Results   Component Value Date/Time    SODIUM 145 10/11/2019 11:54 AM    POTASSIUM 4.2 10/11/2019 11:54 AM    CHLORIDE 109 10/11/2019 11:54 AM    CO2 27 10/11/2019 11:54 AM    GLUCOSE 83 10/11/2019 11:54 AM    BUN 14 10/11/2019 11:54 AM    CREATININE 0.73 10/11/2019 11:54 AM       No results found for: HBA1C    Lab Results   Component Value Date/Time    WBC 8.1 10/11/2019 11:54 AM    RBC 5.05 10/11/2019 11:54 AM    HEMOGLOBIN 15.5 10/11/2019 11:54 AM    HEMATOCRIT 48.9 (H) 10/11/2019 11:54 AM    MCV 96.8 10/11/2019 11:54 AM    MCH 30.7 10/11/2019 11:54 AM    MCHC 31.7 (L) 10/11/2019 11:54 AM    MPV 10.4 10/11/2019 11:54 AM    NEUTSPOLYS 61.10 10/11/2019 11:54 AM    LYMPHOCYTES 32.30 10/11/2019 11:54 AM    MONOCYTES 4.90 10/11/2019 11:54 AM    EOSINOPHILS 0.50 10/11/2019 11:54 AM    BASOPHILS 1.00 10/11/2019 11:54 AM    ANISOCYTOSIS 1+ 07/01/2016 04:41 AM       Lab Results   Component Value Date/Time    ASTSGOT 22 10/11/2019 11:54 AM    ALTSGPT 24 10/11/2019 11:54 AM        Physical Exam:   GEN: No apparent distress.   HEENT: Head normocephalic, atraumatic.  Sclera nonicteric bilaterally, no ocular discharge appreciated bilaterally.  CV: Extremities warm and well-perfused.  Mild, less than 1+/4 pitting edema at the right ankle.  No lower extremity swelling on the left.  PULMONARY: Breathing nonlabored on room air, no respiratory accessory muscle use.  Not requiring supplemental oxygen.  ABD: Soft, nontender.  SKIN: No significant erythema  appreciated on posterior thighs during today's visit, though patient does have 1 area of slight callus formation at the distal posterior right thigh close to but just proximal to the popliteal fossa by approximately 3 inches.  PSYCH: Mood and affect within normal limits.  NEURO: Awake alert.  Patient has logical thought content.  She answers questions appropriately.  Patient uses left hand predominantly to communicate via iPad.    Tone on Modified Karel Scale    R L   Hip extension (testing hip flexors) 3 3   Hip abduction (testing adductors) 3 3   Knee extension (testing knee flexors) LUKE*  LUKE*   Knee flexion (testing knee extensors) 3 3   Dorsiflexion (testing plantarflexors) LUKE** LUKE**   Plantarflexion (testing dorsiflexors) LUKE** LUKE**   LUKE*due to significant knee extensor spasticity  LUKE**due to underlying bilateral equina varus deformity    Imaging: Pressure mapping performed 6/19/2020.  Image was too large and could not be uploaded into media tab.  No significant areas of severe pressure noted.  Pressure did increase throughout the remainder of the visit, but not to a significantly dangerous level.    ASSESSMENT/PLAN: Griselda Swanson  is a 34 y.o. female with rehabilitation history significant for incomplete tetraplegia secondary to postinfectious ADEM 2009, here 6/19/2020 for spasticity follow-up. The following plan was discussed with the patient who is in agreement.     Visit Diagnoses     ICD-10-CM   1. Tetraplegia (HCC)  G82.50   2. Other insomnia  G47.09   3. Spasticity  R25.2   4. Anarthria  R47.1   5. ADEM (acute disseminated encephalomyelitis)  G04.00   6. Neurogenic bowel  K59.2   7. Neurogenic bladder  N31.9   8. Impaired mobility and activities of daily living  Z74.09    Z78.9   9. Knee pain, unspecified chronicity, unspecified laterality  M25.569   10. Ankle swelling, right  M25.471   11. Redness of skin  L53.9      Rehab/Neuro:   1. Incomplete tetraplegia secondary to postinfectious ADEM  "in August 2009: Differential diagnosis initially included tumefactive MS versus ADEM, patient initially evaluated at [facility] and then went to [facility].  Neurology has been following and given that her MRIs have been completely stable this favors ADEM. ARU at [facility] in 2010. Reviewed all pertinent previous medical records leading up to today's clinic visit.  Recent imaging reports reviewed.   -Equipment: Power wheelchair thru [employer] (7/2/2019); patient with scoliosis, pelvic obliquity, retroverted pelvis causing bilateral femoral increased pressure   -Contacted [facility] who initially wrote wheelchair for prescription for potential reevaluation given asymmetry is appreciated today. Referral placed for re-evaluation.   -Equipment: I had personally called [employer] and they stated that the padded commode is not stocked and they would have to pay difference for $75, personally called Emi and left a message accordingly.      Spasticity:   1. Secondary to #1 in \"rehab/neuro\" section: Status post implantation of intrathecal baclofen pump 3/12/2013, revision 6/14/2016, and subsequent removal secondary to infection 7/1/2016 (Dr. Catrachito Vega).  Despite having had IBP placed, patient and sister state that they did not notice any additional efficacy.  Has had multiple rounds of Botox injections which have been unsuccessful.  On oral anti-spasticity medications (baclofen 4 times daily-30/30/30/20 milligrams).  LFTs within normal limits 10/11/2019 at AST/ALT 22/24.  Tizanidine increased from 2 mg 3 times daily to 4 mg 3 times daily 5/7/2020 -side effect of sleepiness, dose reduced to 3 mg 3 times daily 6/19/2020.  -Last visit patient had run out of Flexeril and they did not notice a great difference.  Will reassess at next visit.  -Continue baclofen 4 times daily 30/30/30/20 milligrams  -Continue Tizanidine; decrease dose to 3 mg or 1-1/2 tablets 3 times daily  -LABS: Vit D, CMP need to be completed  -Counseled importance of " "obtaining labs     Neurogenic Bladder:   1. Neurogenic Bladder secondary to #1 in \"rehab/neuro\" section: Most recent BUN/Cr from 10/11/2019 and was 14/0.73 which appears to be at patient's baseline, and is normal. Patient gets frequent UTIs, 1-2 per year. Voids volitionally without catheterizing, unclear if patient still retains after volitional voiding. Had to cath in distant past.  Last cystoscopy ~2015 without abnormalities with exception of Slight urethral stricture at the meatus. 2x UTI Jan-Feb 2020.   CT abdomen performed at [facility] 12/23/2019 when patient was having abdominal pain, there was no hydronephrosis bilaterally.  -On D Mannose per [facility] (last seen within the past year)  -Patient with continued sensation of urinary retention, has not seen urology.  Counseled on importance of following up with urology for PVRs and UDS.  -Provided catheters and urine specimen containers today.  Counseled to try and catheterize after Griselda voids or if she has not voided in 6 hours to see how much is retained in her bladder.  In setting of more recent sensation of urinary retention might be useful to have updated renal bladder ultrasound, but if family is going to see urology, they will likely do this and they do not need to do it through [facility].     Endo:   1. Vitamin D Deficiency secondary to #1 in \"rehab/neuro\" section: Vitamin D has not been drawn in several years.  On cholecalciferol 4000 units daily.  -Patient has not had labs yet, vitamin D has been ordered     Skin:   1. Blanching Erythema: BLE posterior thigh after sitting.  Pressure mapping performed 6/19/2020.  Surprisingly, no areas of significantly high pressure initial tuberosities, lateral femoral head, femoral shafts.  However, throughout clinic visit map remained in place and pressure did slowly increase though not severely.  Despite these findings, I have no doubt that patient is sustaining significant pressure throughout the day.  She has " "significant hip flexor and knee extensor spasticity which keeps her feet relatively suspended in air.  -Extensive counseling on weight shifting and prevention of skin breakdown  -Referral to physical therapy for wheelchair reevaluation     Nociceptive Pain/Ortho:   1.  Valgus deformity of right knee: Chronic.  Managed at [facility].  Per [facility], reportedly has fracture but is treated nonoperatively.  2.  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 Declined per patient and sisters, had been scheduled for 2/2020.   -Patient and sisters likely to move forward with surgical correction of equinovarus deformity once COVID subsides  -Patient has minimal right ankle swelling secondary to knee trauma    Sleep:  1.  Poor sleep secondary to underlying neurologic condition: Failed alternative medications in the past, had been prescribed Ambien by her PCP which worked really well.  - Refilled Ambien 5 mg nightly as needed with 2 refills.  -NARxCheck did not reveal significant risk; counseled extensively on risk of taking baclofen and Ambien given both can cause sedation, patient has tolerated for many years in the past.   -Narcotic: 150   -Sedative: 180    -Stimulant: 0    Not addressed at today's visit:  Neurogenic Bowel:   1. Neurogenic Bowel secondary to #1 in \"rehab/neuro\" section: Has had issues with constipation in the past. Taking Colace 100mg BID + Senna qAM (25mg) + Miralax BID + lactulose PRN. This only started in past month. Had been with BM q4-5 days and now has one q3-4 days with intermittent incontinence. More controlled lately on following regimen:  - Continue colace 100mg BID  - Continue Senna 25mg daily  - Continue Husk tabs (pt taking 500mg TID)  - Counseled on neurogenic bowel; r/o perforation with inadequate tx of constipation   - Counseled on caution with psyllium  - " Counseled on adequate hydration      Follow up: 3 months    Total time spent face to face with patient was 47 minutes. Greater then 50% of my visit was spent on counseling and coordination of care regarding the primary medical diagnosis, secondary medical complications, patient on their condition, best management practices, and risks and benefits of treatment as aforementioned in the assessment and plan. Extensive discussion involved the patient and caregiver.    Please note that this dictation was created using voice recognition software. I have made every reasonable attempt to correct obvious errors but there may be errors of grammar and content that I may have overlooked prior to finalization of this note.    Dr. Jessica Rouse DO, MS  Department of Physical Medicine & Rehabilitation  Neuro Rehabilitation Clinic  [facility]  6/19/2020 11:41 AM

## 2020-06-29 DIAGNOSIS — R25.2 SPASTICITY: ICD-10-CM

## 2020-06-29 RX ORDER — CYCLOBENZAPRINE HCL 10 MG
10 TABLET ORAL 3 TIMES DAILY PRN
Qty: 90 TAB | Refills: 1 | Status: SHIPPED | OUTPATIENT
Start: 2020-06-29 | End: 2020-09-17 | Stop reason: SDUPTHER

## 2020-08-24 ENCOUNTER — APPOINTMENT (OUTPATIENT)
Dept: NEUROLOGY | Facility: MEDICAL CENTER | Age: 35
End: 2020-08-24
Payer: MEDICARE

## 2020-09-17 DIAGNOSIS — R25.2 SPASTICITY: ICD-10-CM

## 2020-09-17 RX ORDER — CYCLOBENZAPRINE HCL 10 MG
10 TABLET ORAL 3 TIMES DAILY PRN
Qty: 270 TAB | Refills: 1 | Status: SHIPPED | OUTPATIENT
Start: 2020-09-17 | End: 2021-04-19 | Stop reason: SDUPTHER

## 2020-09-22 ENCOUNTER — TELEPHONE (OUTPATIENT)
Dept: PHYSICAL MEDICINE AND REHAB | Facility: REHABILITATION | Age: 35
End: 2020-09-22

## 2020-09-22 RX ORDER — POLYETHYLENE GLYCOL 3350 17 G/17G
POWDER, FOR SOLUTION ORAL
Qty: 289 G | Refills: 3 | Status: SHIPPED | OUTPATIENT
Start: 2020-09-22 | End: 2020-09-25 | Stop reason: SDUPTHER

## 2020-09-22 NOTE — TELEPHONE ENCOUNTER
I spoke with Griselda Middleton's sister to see if they had been called to go to PT since last visit with Dr. Rouse. She said she is not sure if Griselda was called for PT, but she will call to see if PT could be set up if it hasn't already. I let her know Griselda has upcoming appt 9/25/2020. She was going to look into transportation for her.     She asked if Dr. Rouse could send Rx for Miralax to pharmacy for Griselda. I let her know she sometimes likes to have PCP fill Rx's but I would check if she could do this for her.

## 2020-09-25 ENCOUNTER — TELEPHONE (OUTPATIENT)
Dept: PHYSICAL MEDICINE AND REHAB | Facility: REHABILITATION | Age: 35
End: 2020-09-25

## 2020-09-25 ENCOUNTER — OFFICE VISIT (OUTPATIENT)
Dept: PHYSICAL MEDICINE AND REHAB | Facility: REHABILITATION | Age: 35
End: 2020-09-25
Payer: MEDICARE

## 2020-09-25 VITALS
DIASTOLIC BLOOD PRESSURE: 68 MMHG | SYSTOLIC BLOOD PRESSURE: 102 MMHG | HEART RATE: 113 BPM | BODY MASS INDEX: 20.36 KG/M2 | TEMPERATURE: 99.1 F | HEIGHT: 67 IN | OXYGEN SATURATION: 96 %

## 2020-09-25 DIAGNOSIS — K59.2 NEUROGENIC BOWEL: ICD-10-CM

## 2020-09-25 DIAGNOSIS — M62.49 CONTRACTURE OF MUSCLE OF MULTIPLE SITES: ICD-10-CM

## 2020-09-25 DIAGNOSIS — G93.40 ENCEPHALOPATHY, UNSPECIFIED: Primary | ICD-10-CM

## 2020-09-25 DIAGNOSIS — G47.09 OTHER INSOMNIA: ICD-10-CM

## 2020-09-25 DIAGNOSIS — R25.2 SPASTICITY: ICD-10-CM

## 2020-09-25 DIAGNOSIS — N31.9 NEUROGENIC BLADDER: ICD-10-CM

## 2020-09-25 PROCEDURE — 99215 OFFICE O/P EST HI 40 MIN: CPT | Performed by: PHYSICAL MEDICINE & REHABILITATION

## 2020-09-25 RX ORDER — POLYETHYLENE GLYCOL 3350 17 G/17G
POWDER, FOR SOLUTION ORAL
Qty: 578 G | Refills: 5 | Status: SHIPPED | OUTPATIENT
Start: 2020-09-25 | End: 2021-04-19 | Stop reason: SDUPTHER

## 2020-09-25 RX ORDER — BISACODYL 10 MG
10 SUPPOSITORY, RECTAL RECTAL DAILY
Qty: 50 SUPPOSITORY | Refills: 5 | Status: SHIPPED | OUTPATIENT
Start: 2020-09-25 | End: 2021-04-19 | Stop reason: SDUPTHER

## 2020-09-25 RX ORDER — ZOLPIDEM TARTRATE 5 MG/1
5 TABLET ORAL NIGHTLY PRN
Qty: 30 TAB | Refills: 2 | Status: SHIPPED | OUTPATIENT
Start: 2020-09-25 | End: 2020-12-07 | Stop reason: SDUPTHER

## 2020-09-25 RX ORDER — HYDROCODONE BITARTRATE AND ACETAMINOPHEN 5; 325 MG/1; MG/1
TABLET ORAL
COMMUNITY
Start: 2020-08-20 | End: 2021-08-16

## 2020-09-25 ASSESSMENT — ENCOUNTER SYMPTOMS
CONSTIPATION: 1
BRUISES/BLEEDS EASILY: 0
CHILLS: 0
SHORTNESS OF BREATH: 0
COUGH: 0
WEAKNESS: 1
SORE THROAT: 0
FALLS: 0
MEMORY LOSS: 0
DIARRHEA: 0
FEVER: 0
PALPITATIONS: 0

## 2020-09-25 ASSESSMENT — PATIENT HEALTH QUESTIONNAIRE - PHQ9: CLINICAL INTERPRETATION OF PHQ2 SCORE: 0

## 2020-09-25 NOTE — TELEPHONE ENCOUNTER
"Sherrill from [facility] called to verify Rx for Ambien and wanted to make sure that the sig was correct. \"Sig: Take 1 Tab by mouth at bedtime as needed for Sleep for up to 90 days.\" she questioned the 90 days and asked if it was because of the two refills. I said yes, that was correct. I confirmed with her that it is a 30 day prescription with 2 refills. She confirmed she should fill 30 day qty first and one tab by mouth as needed for sleep for those 30 days.   "

## 2020-09-25 NOTE — PATIENT INSTRUCTIONS
SHANNON GAMEZ  [facility] PHYSICAL THERAPY & REHAB  [address], [address]  [place]  [address]  Phone:  [phone]

## 2020-09-25 NOTE — PROGRESS NOTES
[facility]  PM&R Neuro Rehabilitation Clinic  [facility] [place] [address]  Ph: [phone]    FOLLOW UP PATIENT EVALUATION    Patient Name: Griselda Swanson   Patient : 1985  Patient Age: 34 y.o.   PCP: SELVIN Garcia    Examining Physician: Dr. Jessica Rouse, DO    SUBJECTIVE:   Patient Identification: Griselda Swanson is a 34 y.o. 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  She uses iPad to text questions and to communicate.  Takes prolonged time to communicate with patient.  Some history acquired from caregiver who is present today.  -Patient was referred to physical therapy at last time for one-time only evaluation with the same therapist who initially had written a prescription for patient's wheelchair.  They had not followed up and gone to this appointment yet.  -Still has urinary issues.  She states that her bladder feels painful when she is sleeping at night, especially when she is laying flat.  - Has severe constipation has not gone to the bathroom in 4 to 5 days.  This causes her discomfort  -Her skin is breaking down on the posterior aspect of her right thigh from the bedside commode.  They have been unable to get a new bedside commode yet through [employer].   - Angelo from [facility] stated that there is nothing that they recommend regarding her chair or modifications that could be made.  - Asking about  possible full Botox.  No longer taking tizanidine.  Still taking Flexeril and baclofen.  -Continues to have difficulty sleeping.  - Griselda sister reports that Griselda recently caught her leg on a trash can and has a nondisplaced tibial fracture.  Was seen at the [place] [facility].  No intervention.    Review of Systems:  Review of Systems   Constitutional: Negative for chills and fever.   HENT: Negative for congestion and sore throat.    Respiratory: Negative for cough and shortness of breath.    Cardiovascular: Negative for palpitations and leg swelling.   Gastrointestinal: Positive for constipation. Negative for diarrhea.   Genitourinary:        + urinary issues.    Musculoskeletal: Positive for joint pain ( Bilateral knees). Negative for falls.        Contractures of LEs.    Neurological: Positive for weakness.   Endo/Heme/Allergies: Does not bruise/bleed easily.   Psychiatric/Behavioral: Negative for memory loss.      All other pertinent positive review of systems are noted above in HPI.     Past Medical History:  Past Medical History:   Diagnosis Date   • C. difficile colitis 2015   • Coma ([facility]) August 2009    1 month, lesions on brain,  unknown etiology   • Encephalitis 2009   • Coma ([facility]) 2008    Spontaneous -    • ADEM (acute disseminated encephalomyelitis)    • Back pain    • Breath shortness     since 2014 not an issue at this time (4/2018)   • Demyelinating disorder ([facility])     demyelinating encephpomyeltis    • Heart burn    • Indigestion    • Lesion of brain     unknown cuase   • MEDICAL HOME    • Migraines    • MS (multiple sclerosis) ([facility])    • Other specified disorder of intestines     constipation   • Pain    • Psychiatric problem     depression and anxiety   • Renal disorder     kidney stones   • Urinary incontinence       Past Surgical History:   Procedure Laterality Date   • PUMP INSERT/REMOVE Left 7/1/2016    Procedure: PUMP REMOVAL;  Surgeon: Catrachito Vega M.D.;  Location: SURGERY [facility]  TOWER ORS;  Service:    • CATH PLACE PERM EPIDURAL Left 6/14/2016    Procedure: CATH PLACE PERM EPIDURAL - BACLOFEN PUMP AND CATHETER REPLACEMENT  - BACK AND ABDOMEN;  Surgeon: Pari Roberson M.D.;  Location: [facility];  Service:    • VT BACLOFEN INTRATHECAL TRIAL  3/8/2016    Dr. Roberson  [facility]   • CATH PLACE PERM EPIDURAL N/A 3/8/2016    Procedure: BACLOFEN PUMP TRIAL;  Surgeon: Pari Roberson M.D.;  Location: SURGERY [facility];  Service:    • PUMP REVISION  10/8/2013    Performed by Pari Roberson M.D. at [facility]   • PUMP INSERT/REMOVE  3/12/2013    Performed by Pari Roberson M.D. at [facility]   • CATH PLACE PERM EPIDURAL  6/26/2012    Performed by PARI ROBERSON at [facility]   • CATH PLACE PERM EPIDURAL  3/6/2012    Performed by PARI ROBERSON at [facility]   • MAMMOPLASTY AUGMENTATION  2002   • TONSILLECTOMY          Current Outpatient Medications:   •  bisacodyl (DULCOLAX) 10 MG Suppos, Insert 1 Suppository in rectum every day., Disp: 50 Suppository, Rfl: 5  •  polyethylene glycol 3350 (MIRALAX) 17 GM/SCOOP Powder, MIX AND DRINK 1 CAPFUL WITH 8 OZ OF LIQUID QD, Disp: 578 g, Rfl: 5  •  zolpidem (AMBIEN) 5 MG Tab, Take 1 Tab by mouth at bedtime as needed for Sleep for up to 90 days., Disp: 30 Tab, Rfl: 2  •  cyclobenzaprine (FLEXERIL) 10 MG Tab, Take 1 Tab by mouth 3 times a day as needed., Disp: 270 Tab, Rfl: 1  •  baclofen (LIORESAL) 10 MG Tab, Take 3 Tabs by mouth 4 times a day., Disp: 360 Tab, Rfl: 3  •  tamsulosin (FLOMAX) 0.4 MG capsule, Take 1 Cap by mouth every day., Disp: 90 Cap, Rfl: 3  •   MG Cap, TAKE 1 CAPSULE BY MOUTH TWICE DAILY AS NEEDED FOR CONSTIPATION, Disp: 60 Cap, Rfl: 10  •  citalopram (CELEXA) 20 MG Tab, Take 1 Tab by mouth every day., Disp: 90 Tab, Rfl: 3  •  D-MANNOSE PO, Take 2 Tabs by mouth every day. Per Urology, Disp: , Rfl:   •  Diclofenac Sodium 1 % Gel, APPLY 4 GRAMS TO KNEE  AND 3 GRAMS TO ANKLES UP TO FOUR TIMES DAILY AS NEEDED, Disp: 100 g, Rfl: 10  •  topiramate (TOPAMAX) 25 MG Tab, Take 1 Tab by mouth 2 times a day., Disp: 60 Tab, Rfl: 11  •  sumatriptan (IMITREX) 100 MG tablet, TAKE 1 TABLET BY MOUTH AT ONSET OF HEADACHE. MAY REPEAT IN 2 HOURS. MAX 2 TABLETS A DAY, Disp: 9 Tab, Rfl: 11  •  Calcium Carbonate Antacid (TUMS PO), Take 1 tablet by mouth as needed. Indications: heartburn, Disp: , Rfl:   •  Multiple Vitamins-Minerals (MULTIVITAMIN GUMMIES ADULTS PO), Take 1 Tab by mouth every day. Indications: vitamin supplement., Disp: , Rfl:   •  vitamin D (CHOLECALCIFEROL) 1000 UNIT Tab, Take 1,000 Units by mouth 4 times a day. Indications: supplement, Disp: , Rfl:   •  HYDROcodone-acetaminophen (NORCO) 5-325 MG Tab per tablet, TK 1 T PO Q 6 H PRN P FOR 10 DAYS, Disp: , Rfl:   •  nitrofurantoin (MACROBID) 100 MG Cap, TK 1 C PO Q 12 H, Disp: , Rfl:   •  doxycycline (VIBRAMYCIN) 100 MG Cap, TK ONE C PO BID, Disp: , Rfl:   •  albuterol 108 (90 Base) MCG/ACT Aero Soln inhalation aerosol, Inhale 2 Puffs by mouth every 6 hours as needed. (Patient not taking: Reported on 9/25/2020), Disp: 8.5 g, Rfl: 11  •  Diclofenac Sodium 1 % Gel, Apply 2 g to skin as directed 4 times a day as needed., Disp: 1 Tube, Rfl: 11  •  Misc. Devices Misc, Bedside commode with padding (Patient not taking: Reported on 5/7/2020), Disp: 1 Each, Rfl: 11  •  Misc. Devices Misc, New latch for new dynavox  Wheel chair (Patient not taking: Reported on 5/7/2020), Disp: 1 Each, Rfl: 11  •  clindamycin-benzoyl peroxide (BENZACLIN) gel, APPLY TO THE FACE EVERY DAY (Patient not taking: Reported on 9/25/2020), Disp: 50 g, Rfl: 11  •  Misc. Devices Misc, Bedside commode (Patient not taking: Reported on 5/7/2020), Disp: 1 Each, Rfl: 11  •  simethicone (MYLICON) 125 MG chewable tablet, Take 125 mg by mouth 4 times a day as needed. Indications: Gas, Disp: , Rfl:   •  Misc. Devices Misc, W/C stabilizer needs eval and possible  replacement (Patient not taking: Reported on 2020), Disp: 1 Each, Rfl: 11  •  Misc. Devices Misc, Please eval and fix electric W/C. Please eval also for W/C needs. (Patient not taking: Reported on 2020), Disp: 1 Each, Rfl: 11  •  Misc. Devices Misc, Please allow patient to have support/therapy dog in appt. Regardless of weight due to multiple chronic illnesses and debility. (Patient not taking: Reported on 2020), Disp: 1 Each, Rfl: 11  Allergies   Allergen Reactions   • Bactrim      Reaction unknown   • Fentanyl      Makes her agressive        Past Social History:  Social History     Socioeconomic History   • Marital status: Single     Spouse name: Not on file   • Number of children: Not on file   • Years of education: Not on file   • Highest education level: Not on file   Occupational History   • Not on file   Social Needs   • Financial resource strain: Not on file   • Food insecurity     Worry: Not on file     Inability: Not on file   • Transportation needs     Medical: Not on file     Non-medical: Not on file   Tobacco Use   • Smoking status: Former Smoker     Packs/day: 1.00     Years: 12.00     Pack years: 12.00     Types: Cigarettes     Quit date: 2008     Years since quittin.7   • Smokeless tobacco: Never Used   • Tobacco comment: Started smoking at age 11   Substance and Sexual Activity   • Alcohol use: No   • Drug use: No   • Sexual activity: Never     Partners: Male   Lifestyle   • Physical activity     Days per week: Not on file     Minutes per session: Not on file   • Stress: Not on file   Relationships   • Social connections     Talks on phone: Not on file     Gets together: Not on file     Attends Shinto service: Not on file     Active member of club or organization: Not on file     Attends meetings of clubs or organizations: Not on file     Relationship status: Not on file   • Intimate partner violence     Fear of current or ex partner: Not on file     Emotionally abused: Not  on file     Physically abused: Not on file     Forced sexual activity: Not on file   Other Topics Concern   • Primary/coprimary nurse & associates Not Asked   • Family contact information Not Asked   • OK to release patient information to the following Not Asked   • Patient preferred routine/Privacy concerns Not Asked   • Patient likes and dislikes Not Asked   • Participating In Research Study Not Asked   • Miscellaneous Not Asked   •  Service No   • Blood Transfusions No   • Caffeine Concern No   • Occupational Exposure No   • Hobby Hazards No   • Sleep Concern Yes   • Stress Concern No   • Weight Concern No   • Special Diet Yes     Comment: Keto   • Back Care No   • Exercise No   • Bike Helmet No   • Seat Belt Yes   • Self-Exams No   Social History Narrative   • Not on file        Family History:  Family History   Problem Relation Age of Onset   • Cancer Mother         Sarcoma   • Bipolar disorder Brother    • Other Brother         spina bifida   • Diabetes Maternal Grandmother    • Other Daughter         Migrains       Depression and Opioid Screening  PHQ-9:  Depression Screen (PHQ-2/PHQ-9) 5/7/2020 6/19/2020 9/25/2020   PHQ-2 Total Score - - -   PHQ-2 Total Score - - -   PHQ-2 Total Score 0 0 0   PHQ-9 Total Score - - -     Interpretation of PHQ-9 Total Score   Score Severity   1-4 No Depression   5-9 Mild Depression   10-14 Moderate Depression   15-19 Moderately Severe Depression   20-27 Severe Depression     Opioid Risk Score: 2  Interpretation of Opioid Risk Score   Score 0-3 = Low risk of abuse. Do UDS at least once per year.  Score 4-7 = Moderate risk of abuse. Do UDS 1-4 times per year.  Score 8+ = High risk of abuse. Refer to specialist.      OBJECTIVE:   Vital Signs:  Vitals:    09/25/20 1101   BP: 102/68   Pulse: (!) 113   Temp: 37.3 °C (99.1 °F)   SpO2: 96%        Pertinent Labs:  Lab Results   Component Value Date/Time    SODIUM 145 10/11/2019 11:54 AM    POTASSIUM 4.2 10/11/2019 11:54 AM     CHLORIDE 109 10/11/2019 11:54 AM    CO2 27 10/11/2019 11:54 AM    GLUCOSE 83 10/11/2019 11:54 AM    BUN 14 10/11/2019 11:54 AM    CREATININE 0.73 10/11/2019 11:54 AM       No results found for: HBA1C    Lab Results   Component Value Date/Time    WBC 8.1 10/11/2019 11:54 AM    RBC 5.05 10/11/2019 11:54 AM    HEMOGLOBIN 15.5 10/11/2019 11:54 AM    HEMATOCRIT 48.9 (H) 10/11/2019 11:54 AM    MCV 96.8 10/11/2019 11:54 AM    MCH 30.7 10/11/2019 11:54 AM    MCHC 31.7 (L) 10/11/2019 11:54 AM    MPV 10.4 10/11/2019 11:54 AM    NEUTSPOLYS 61.10 10/11/2019 11:54 AM    LYMPHOCYTES 32.30 10/11/2019 11:54 AM    MONOCYTES 4.90 10/11/2019 11:54 AM    EOSINOPHILS 0.50 10/11/2019 11:54 AM    BASOPHILS 1.00 10/11/2019 11:54 AM    ANISOCYTOSIS 1+ 07/01/2016 04:41 AM       Lab Results   Component Value Date/Time    ASTSGOT 22 10/11/2019 11:54 AM    ALTSGPT 24 10/11/2019 11:54 AM        Physical Exam:   GEN: No apparent distress.   HEENT: Head normocephalic, atraumatic.  Sclera nonicteric bilaterally, no ocular discharge appreciated bilaterally.  CV: Extremities warm and well-perfused.  Mild, less than 1+/4 pitting edema at the right ankle.  No lower extremity swelling on the left.  PULMONARY: Breathing nonlabored on room air, no respiratory accessory muscle use.  Not requiring supplemental oxygen.  ABD: Soft, nontender.  SKIN: Open area of skin posterior right thigh approximately 0.24 cm.  Area of callus just laterally to that.  PSYCH: Mood and affect within normal limits.  NEURO: Awake alert.  Patient has logical thought content.  She answers questions appropriately.  Patient uses left hand predominantly to communicate via iPad.    Tone on Modified Karel Scale    R L   Hip extension (testing hip flexors) 3 3   Hip abduction (testing adductors) 3 3   Knee extension (testing knee flexors) LUKE*  LUKE*   Knee flexion (testing knee extensors) 3 3   Dorsiflexion (testing plantarflexors) LUKE** LUKE**   Plantarflexion (testing dorsiflexors)  "LUKE** LUKE**   LUKE*due to significant knee extensor spasticity  LUKE**due to underlying bilateral equina varus deformity    ASSESSMENT/PLAN: Griselda Swanson  is a 34 y.o. female with rehabilitation history significant for incomplete tetraplegia secondary to postinfectious ADEM 2009, here for spasticity follow-up. The following plan was discussed with the patient who is in agreement.     Visit Diagnoses     ICD-10-CM   1. Encephalopathy, unspecified  G93.40   2. Neurogenic bowel  K59.2   3. Other insomnia  G47.09   4. Neurogenic bladder  N31.9   5. Spasticity  R25.2   6. Contracture of muscle of multiple sites  M62.49      Rehab/Neuro:   1. Incomplete tetraplegia secondary to postinfectious ADEM in August 2009: Differential diagnosis initially included tumefactive MS versus ADEM, patient initially evaluated at [facility] and then went to [facility].  Neurology has been following and given that her MRIs have been completely stable this favors ADEM. ARU at [facility] in 2010.   -Equipment: Power wheelchair thru [employer] (7/2/2019)  -Information given regarding physical therapy scheduling to reevaluate wheelchair to see if there are any suggestions they might have to prevent posterior thigh redness.  -Equipment: 6/2020 I had personally called [employer] and they stated that the padded commode is not stocked and they would have to pay difference for $75, personally called [facility] and left a message accordingly. 9/2020 they have still not replaced bedside commode and now pain is getting posterior thigh skin breakdown from sitting in bedside commode too long.  -New prescription given for bedside commode     Spasticity:   1. Secondary to #1 in \"rehab/neuro\" section: Status post implantation of intrathecal baclofen pump 3/12/2013, revision 6/14/2016, and subsequent removal secondary to infection 7/1/2016 (Dr. Catrachito Vega).  Despite having had IBP placed, patient and sister state that they did not notice any additional efficacy.  Has had " "multiple rounds of Botox injections which have been unsuccessful.  On oral anti-spasticity medications (baclofen 4 times daily-30/30/30/20 milligrams).  LFTs within normal limits 10/11/2019 at AST/ALT 22/24.  Tizanidine increased from 2 mg 3 times daily to 4 mg 3 times daily 5/7/2020 -side effect of sleepiness, dose reduced to 3 mg 3 times daily 6/19/2020.  Patient has stopped altogether as of 9/25/2020.  -Continue Flexeril  -Continue baclofen 4 times daily 30/30/30/20 milligrams  -Referral to physical medicine and rehabilitation for Botox injections    Okay to continue anticoagulation with Eliquis and antiplatelet with aspirin through the procedure for botulinum toxin injection.  The risks of going off the medication outweigh the risks of doing the procedure on the medication.  I will plan to use 27-gauge needles and ultrasound guidance to avoid all blood vessels during the procedure.  We discussed that while on anticoagulation the patient does have an increased risk of bleeding and hematoma     Botox Plan: I plan to inject to the bilateral lower extremities  Location Botox Amount in Units   Right adductor longus  50 units   Right adductor brevis  50 units   Right adductor Angelito  50 units   Left Adductor longus  50 units   Left Adductor brevis  50 units   Left Adductor angelito  50 units   Total Units  300 units       The risks benefits and alternatives to this procedure were discussed and the patient wishes to proceed with the procedure. Risks include but are not limited to damage to surrounding structures, infection, bleeding, worsening of pain which can be permanent, weakness which can be permanent. Benefits include pain relief, improved function. Alternatives includes not doing the procedure.       Neurogenic Bladder:   1. Neurogenic Bladder secondary to #1 in \"rehab/neuro\" section: Most recent BUN/Cr from 10/11/2019 and was 14/0.73 which appears to be at patient's baseline, and is normal. Patient gets frequent " "UTIs, 1-2 per year. Voids volitionally without catheterizing, unclear if patient still retains after volitional voiding. Had to cath in distant past.  Last cystoscopy ~2015 without abnormalities with exception of Slight urethral stricture at the meatus. 2x UTI Jan-Feb 2020.   CT abdomen performed at [facility] 12/23/2019 when patient was having abdominal pain, there was no hydronephrosis bilaterally.  -On D Mannose per [facility] (last seen within the past year)  -Patient with continued sensation of urinary retention, has not seen urology.  Counseled on importance of following up with urology for PVRs and UDS.  -However, severe abductor spasticity will prevent any urologic procedures from being done.     Endo:   1. Vitamin D Deficiency secondary to #1 in \"rehab/neuro\" section: Vitamin D has not been drawn in several years.  On cholecalciferol 4000 units daily.  -Patient has not had labs yet, vitamin D was ordered 12/2019.     Skin:   1. Blanching Erythema: BLE posterior thigh after sitting.  Pressure mapping performed 6/19/2020.  Surprisingly, no areas of significantly high pressure initial tuberosities, lateral femoral head, femoral shafts.  However, throughout clinic visit map remained in place and pressure did slowly increase though not severely.  Despite these findings, I have no doubt that patient is sustaining significant pressure throughout the day.  She has significant hip flexor and knee extensor spasticity which keeps her feet relatively suspended in air.  2.  Skin breakdown from pressure from metal bar on bedside commode.  -Extensive counseling on weight shifting and prevention of skin breakdown  -Referral to physical therapy for wheelchair reevaluation  -Counseled on importance of getting new bedside commode prescription is already been sent.    Nociceptive Pain/Ortho:   1.  Valgus deformity of right knee: Chronic.  Managed at [place] [facility].  Per Luda, reportedly has fracture but is treated " "nonoperatively.  2.  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 Declined per patient and sisters, had been scheduled for 2/2020.   -Patient and sisters likely to move forward with surgical correction of equinovarus deformity once COVID subsides    Sleep:  1.  Poor sleep secondary to underlying neurologic condition: Failed alternative medications in the past, had been prescribed Ambien by her PCP which worked really well.  - Refilled Ambien 5 mg nightly as needed with 2 refills.  -NARxCheck did not reveal significant risk; counseled extensively on risk of taking baclofen and Ambien given both can cause sedation, patient has tolerated for many years in the past.     Neurogenic Bowel:   1. Neurogenic Bowel secondary to #1 in \"rehab/neuro\" section: Has had issues with constipation in the past. Taking Colace 100mg BID + Senna qAM (25mg) + Miralax BID + lactulose PRN. This only started in past month. Had been with BM q4-5 days and now has one q3-4 days with intermittent incontinence. More controlled lately on following regimen:  -Have counseled on maintaining bowel program today and in the past.  Difficulty with caregiver schedule.  Patient spending multiple hours on the commode.  -Increase Colace to 2 tabs twice daily  -continue senna 25 mg at night  -Avoid fiber as worsens constipation.  -Counseled on importance of doing one-time bowel cleanout now that she has not had bowel movement in 4 to 5 days.  Half a bottle mag citrate with suppository one feels need to go.  -Refilled MiraLAX 17 g 3 times daily.  -Prescribed suppository and counseled on bowel regimen of daily suppository at the same time to train bowel to have bowel movement daily.    Follow up: 1 month for Botox injections.    Total time spent face to face with patient was 41 minutes. Greater then 50% of my visit was spent on counseling " and coordination of care regarding the primary medical diagnosis, secondary medical complications, patient on their condition, best management practices, and risks and benefits of treatment as aforementioned in the assessment and plan. Extensive discussion involved the patient and caregiver and sister Luda.    Please note that this dictation was created using voice recognition software. I have made every reasonable attempt to correct obvious errors but there may be errors of grammar and content that I may have overlooked prior to finalization of this note.    Dr. Jessica Rouse DO, MS  Department of Physical Medicine & Rehabilitation  Neuro Rehabilitation Clinic  [facility]

## 2020-09-28 DIAGNOSIS — R25.2 SPASTICITY: ICD-10-CM

## 2020-09-28 RX ORDER — BACLOFEN 10 MG/1
30 TABLET ORAL 4 TIMES DAILY
Qty: 360 TAB | Refills: 3 | Status: SHIPPED | OUTPATIENT
Start: 2020-09-28 | End: 2020-09-29 | Stop reason: SDUPTHER

## 2020-09-29 DIAGNOSIS — R25.2 SPASTICITY: ICD-10-CM

## 2020-09-29 RX ORDER — BACLOFEN 10 MG/1
30 TABLET ORAL 4 TIMES DAILY
Qty: 360 TAB | Refills: 3 | Status: SHIPPED | OUTPATIENT
Start: 2020-09-29 | End: 2021-04-19 | Stop reason: SDUPTHER

## 2020-10-06 DIAGNOSIS — F33.42 RECURRENT MAJOR DEPRESSIVE DISORDER, IN FULL REMISSION (HCC): Chronic | ICD-10-CM

## 2020-10-06 RX ORDER — CITALOPRAM 20 MG/1
20 TABLET ORAL
Qty: 90 TAB | Refills: 3 | Status: SHIPPED | OUTPATIENT
Start: 2020-10-06 | End: 2021-01-18 | Stop reason: SDUPTHER

## 2020-10-21 ENCOUNTER — TELEPHONE (OUTPATIENT)
Dept: PHYSICAL MEDICINE AND REHAB | Facility: REHABILITATION | Age: 35
End: 2020-10-21

## 2020-10-21 NOTE — TELEPHONE ENCOUNTER
Maryellen, Griselda's sister called and said she is in [facility] for COVID and she is ok, but just does not get a clear test of no longer being positive for COVID.     I canceled her Botox appt and let Maryellen know that she will have to be at least two weeks out from negative test before we would reschedule. I wished Griselda the best.

## 2020-10-26 ENCOUNTER — APPOINTMENT (OUTPATIENT)
Dept: PHYSICAL THERAPY | Facility: REHABILITATION | Age: 35
End: 2020-10-26
Attending: PHYSICAL MEDICINE & REHABILITATION
Payer: MEDICARE

## 2020-10-26 ENCOUNTER — APPOINTMENT (OUTPATIENT)
Dept: PHYSICAL MEDICINE AND REHAB | Facility: REHABILITATION | Age: 35
End: 2020-10-26
Payer: MEDICARE

## 2020-11-07 ENCOUNTER — APPOINTMENT (OUTPATIENT)
Dept: URGENT CARE | Facility: CLINIC | Age: 35
End: 2020-11-07
Payer: MEDICARE

## 2020-11-08 ENCOUNTER — OFFICE VISIT (OUTPATIENT)
Dept: URGENT CARE | Facility: CLINIC | Age: 35
End: 2020-11-08
Payer: MEDICARE

## 2020-11-08 VITALS
HEART RATE: 101 BPM | SYSTOLIC BLOOD PRESSURE: 122 MMHG | WEIGHT: 137 LBS | TEMPERATURE: 96.8 F | OXYGEN SATURATION: 98 % | RESPIRATION RATE: 15 BRPM | HEIGHT: 67 IN | BODY MASS INDEX: 21.5 KG/M2 | DIASTOLIC BLOOD PRESSURE: 84 MMHG

## 2020-11-08 DIAGNOSIS — L02.415 ABSCESS OF RIGHT LEG: ICD-10-CM

## 2020-11-08 PROCEDURE — 99214 OFFICE O/P EST MOD 30 MIN: CPT | Performed by: PHYSICIAN ASSISTANT

## 2020-11-08 RX ORDER — DOXYCYCLINE HYCLATE 100 MG
100 TABLET ORAL 2 TIMES DAILY
Qty: 14 TAB | Refills: 0 | Status: SHIPPED | OUTPATIENT
Start: 2020-11-08 | End: 2020-11-15

## 2020-11-08 ASSESSMENT — ENCOUNTER SYMPTOMS
NAUSEA: 0
BLURRED VISION: 0
CHILLS: 0
TINGLING: 0
FEVER: 0
SENSORY CHANGE: 0
SHORTNESS OF BREATH: 0
SORE THROAT: 0
VOMITING: 0
PALPITATIONS: 0

## 2020-11-08 ASSESSMENT — FIBROSIS 4 INDEX: FIB4 SCORE: 0.63

## 2020-11-08 NOTE — PROGRESS NOTES
Subjective:      Griselda Swanson is a 35 y.o. female who presents with Sore (back of thighs and knees, its going into her skin, pus and blood  x this week )    HPI:  This is a new problem. Griselda Swanson is a 35 y.o. female today with her caregiver for evaluation of a possible infection on the back of her right thigh.  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  She has multiple caregivers.  They state that there has been an area on the back of her right thigh that tends to rub up against a metal bar that sits in front of the commode.  This area was looking very irritated earlier this week and a few days ago it drained quite a bit of purulent material and blood.  The caregiver that is present with her today states that there is still seems to be an open sore in that area with surrounding redness.  They have been keeping it clean and have been putting dressings on it.  They think it is been gradually improving but they still want to get it evaluated.  No fever/chills.      Review of Systems   Constitutional: Negative for chills and fever.   HENT: Negative for sore throat.    Eyes: Negative for blurred vision.   Respiratory: Negative for shortness of breath.    Cardiovascular: Negative for chest pain and palpitations.   Gastrointestinal: Negative for nausea and vomiting.   Skin:        Sore on back of right thigh   Neurological: Negative for tingling and sensory change.       PMH:  has a past medical history of ADEM (acute disseminated encephalomyelitis), Back pain, Breath shortness, C. difficile colitis (2015), Coma ([facility]) (August 2009), Coma ([facility]) (2008), Demyelinating disorder ([facility]), Encephalitis (2009), Heart burn, Indigestion, Lesion of brain, [facility], Migraines, MS (multiple sclerosis) ([facility]), Other specified disorder of intestines, Pain, Psychiatric problem, Renal disorder, and Urinary incontinence. She also has no past medical history of  Chronic airway obstruction, not elsewhere classified or Fall.  MEDS:   Current Outpatient Medications:   •  doxycycline (VIBRAMYCIN) 100 MG Tab, Take 1 Tab by mouth 2 times a day for 7 days., Disp: 14 Tab, Rfl: 0  •  citalopram (CELEXA) 20 MG Tab, Take 1 Tab by mouth every day., Disp: 90 Tab, Rfl: 3  •  baclofen (LIORESAL) 10 MG Tab, Take 3 Tabs by mouth 4 times a day., Disp: 360 Tab, Rfl: 3  •  HYDROcodone-acetaminophen (NORCO) 5-325 MG Tab per tablet, TK 1 T PO Q 6 H PRN P FOR 10 DAYS, Disp: , Rfl:   •  bisacodyl (DULCOLAX) 10 MG Suppos, Insert 1 Suppository in rectum every day., Disp: 50 Suppository, Rfl: 5  •  polyethylene glycol 3350 (MIRALAX) 17 GM/SCOOP Powder, MIX AND DRINK 1 CAPFUL WITH 8 OZ OF LIQUID QD, Disp: 578 g, Rfl: 5  •  zolpidem (AMBIEN) 5 MG Tab, Take 1 Tab by mouth at bedtime as needed for Sleep for up to 90 days., Disp: 30 Tab, Rfl: 2  •  cyclobenzaprine (FLEXERIL) 10 MG Tab, Take 1 Tab by mouth 3 times a day as needed., Disp: 270 Tab, Rfl: 1  •  nitrofurantoin (MACROBID) 100 MG Cap, TK 1 C PO Q 12 H, Disp: , Rfl:   •  tamsulosin (FLOMAX) 0.4 MG capsule, Take 1 Cap by mouth every day., Disp: 90 Cap, Rfl: 3  •  albuterol 108 (90 Base) MCG/ACT Aero Soln inhalation aerosol, Inhale 2 Puffs by mouth every 6 hours as needed. (Patient not taking: Reported on 9/25/2020), Disp: 8.5 g, Rfl: 11  •   MG Cap, TAKE 1 CAPSULE BY MOUTH TWICE DAILY AS NEEDED FOR CONSTIPATION, Disp: 60 Cap, Rfl: 10  •  Diclofenac Sodium 1 % Gel, Apply 2 g to skin as directed 4 times a day as needed., Disp: 1 Tube, Rfl: 11  •  Misc. Devices Misc, Bedside commode with padding (Patient not taking: Reported on 5/7/2020), Disp: 1 Each, Rfl: 11  •  Misc. Devices Misc, New latch for new dynavox  Wheel chair (Patient not taking: Reported on 5/7/2020), Disp: 1 Each, Rfl: 11  •  clindamycin-benzoyl peroxide (BENZACLIN) gel, APPLY TO THE FACE EVERY DAY (Patient not taking: Reported on 9/25/2020), Disp: 50 g, Rfl: 11  •  D-MANNOSE PO,  Take 2 Tabs by mouth every day. Per Urology, Disp: , Rfl:   •  Diclofenac Sodium 1 % Gel, APPLY 4 GRAMS TO KNEE AND 3 GRAMS TO ANKLES UP TO FOUR TIMES DAILY AS NEEDED, Disp: 100 g, Rfl: 10  •  Misc. Devices Misc, Bedside commode (Patient not taking: Reported on 5/7/2020), Disp: 1 Each, Rfl: 11  •  topiramate (TOPAMAX) 25 MG Tab, Take 1 Tab by mouth 2 times a day., Disp: 60 Tab, Rfl: 11  •  sumatriptan (IMITREX) 100 MG tablet, TAKE 1 TABLET BY MOUTH AT ONSET OF HEADACHE. MAY REPEAT IN 2 HOURS. MAX 2 TABLETS A DAY, Disp: 9 Tab, Rfl: 11  •  simethicone (MYLICON) 125 MG chewable tablet, Take 125 mg by mouth 4 times a day as needed. Indications: Gas, Disp: , Rfl:   •  Misc. Devices Misc, W/C stabilizer needs eval and possible replacement (Patient not taking: Reported on 5/7/2020), Disp: 1 Each, Rfl: 11  •  Misc. Devices Misc, Please eval and fix electric W/C. Please eval also for W/C needs. (Patient not taking: Reported on 5/7/2020), Disp: 1 Each, Rfl: 11  •  Misc. Devices Misc, Please allow patient to have support/therapy dog in appt. Regardless of weight due to multiple chronic illnesses and debility. (Patient not taking: Reported on 5/7/2020), Disp: 1 Each, Rfl: 11  •  Calcium Carbonate Antacid (TUMS PO), Take 1 tablet by mouth as needed. Indications: heartburn, Disp: , Rfl:   •  Multiple Vitamins-Minerals (MULTIVITAMIN GUMMIES ADULTS PO), Take 1 Tab by mouth every day. Indications: vitamin supplement., Disp: , Rfl:   •  vitamin D (CHOLECALCIFEROL) 1000 UNIT Tab, Take 1,000 Units by mouth 4 times a day. Indications: supplement, Disp: , Rfl:   ALLERGIES:   Allergies   Allergen Reactions   • Bactrim      Reaction unknown   • Fentanyl      Makes her agressive     SURGHX:   Past Surgical History:   Procedure Laterality Date   • PUMP INSERT/REMOVE Left 7/1/2016    Procedure: PUMP REMOVAL;  Surgeon: Catrachito Vega M.D.;  Location: [facility];  Service:    • CATH PLACE PERM EPIDURAL Left 6/14/2016     "Procedure: CATH PLACE PERM EPIDURAL - BACLOFEN PUMP AND CATHETER REPLACEMENT  - BACK AND ABDOMEN;  Surgeon: Pari Roberson M.D.;  Location: [facility];  Service:    • MI BACLOFEN INTRATHECAL TRIAL  3/8/2016    Dr. Roberson  [facility]   • CATH PLACE PERM EPIDURAL N/A 3/8/2016    Procedure: BACLOFEN PUMP TRIAL;  Surgeon: Pari Roberson M.D.;  Location: [facility];  Service:    • PUMP REVISION  10/8/2013    Performed by Pari Roberson M.D. at [facility]   • PUMP INSERT/REMOVE  3/12/2013    Performed by Pari Roberson M.D. at [facility]   • CATH PLACE PERM EPIDURAL  6/26/2012    Performed by PARI ROBERSON at [facility]   • CATH PLACE PERM EPIDURAL  3/6/2012    Performed by PARI ROBERSON at [facility]   • MAMMOPLASTY AUGMENTATION  2002   • TONSILLECTOMY       SOCHX:  reports that she quit smoking about 12 years ago. Her smoking use included cigarettes. She has a 12.00 pack-year smoking history. She has never used smokeless tobacco. She reports that she does not drink alcohol or use drugs.  FH: Family history was reviewed, no pertinent findings to report     Objective:     /84   Pulse (!) 101   Temp 36 °C (96.8 °F) (Temporal)   Resp 15   Ht 1.702 m (5' 7\")   Wt 62.1 kg (137 lb)   SpO2 98%   BMI 21.46 kg/m²      Physical Exam  Constitutional:       Appearance: She is well-developed.   HENT:      Head: Normocephalic and atraumatic.      Right Ear: External ear normal.      Left Ear: External ear normal.   Eyes:      Conjunctiva/sclera: Conjunctivae normal.      Pupils: Pupils are equal, round, and reactive to light.   Cardiovascular:      Rate and Rhythm: Normal rate and regular rhythm.      Heart sounds: Normal heart sounds. No murmur.   Pulmonary:      Effort: Pulmonary effort is normal.      Breath sounds: Normal breath sounds. No wheezing.   Skin:     General: Skin is warm and dry.      Capillary Refill: " Capillary refill takes less than 2 seconds.             Comments: Posterior aspect of right thigh exhibits an open wound approximately 0.5 cm in diameter with surrounding erythema.  There is also some mild surrounding induration.  No fluctuance.  No current drainage.  No streaking from the wound.  It is tender to palpation.   Neurological:      Mental Status: She is alert and oriented to person, place, and time.   Psychiatric:         Behavior: Behavior normal.         Judgment: Judgment normal.         Wound was cleaned with Hibiclens and sterile saline and a dry/sterile dressing was applied  Assessment/Plan:     1. Abscess of right leg  - doxycycline (VIBRAMYCIN) 100 MG Tab; Take 1 Tab by mouth 2 times a day for 7 days.  Dispense: 14 Tab; Refill: 0  Has already been draining so there is no indication for I&D at this time.  Recommend that they apply warm compresses multiple times per day.  They should also change the dressing at least once daily, more so the dressing becomes saturated.  Will initiate antibiotic treatment as well.  If symptoms worsen or do not significantly improve after 48 hours they can return to the urgent care for reevaluation.              Differential Diagnosis, natural history, and supportive care discussed. Return to the Urgent Care or follow up with your PCP if symptoms fail to resolve, or for any new or worsening symptoms. Emergency room precautions discussed. Patient and/or family appears understanding of information.

## 2020-12-07 ENCOUNTER — TELEPHONE (OUTPATIENT)
Dept: PHYSICAL MEDICINE AND REHAB | Facility: REHABILITATION | Age: 35
End: 2020-12-07

## 2020-12-07 DIAGNOSIS — K59.04 CHRONIC IDIOPATHIC CONSTIPATION: ICD-10-CM

## 2020-12-07 DIAGNOSIS — G47.09 OTHER INSOMNIA: ICD-10-CM

## 2020-12-07 DIAGNOSIS — N39.0 RECURRENT URINARY TRACT INFECTION: ICD-10-CM

## 2020-12-07 RX ORDER — ZOLPIDEM TARTRATE 5 MG/1
5 TABLET ORAL NIGHTLY PRN
Qty: 30 TAB | Refills: 2 | Status: SHIPPED | OUTPATIENT
Start: 2020-12-24 | End: 2021-03-24

## 2020-12-07 RX ORDER — TAMSULOSIN HYDROCHLORIDE 0.4 MG/1
0.4 CAPSULE ORAL
Qty: 90 CAP | Refills: 1 | Status: SHIPPED | OUTPATIENT
Start: 2020-12-07 | End: 2021-04-19 | Stop reason: SDUPTHER

## 2020-12-07 RX ORDER — DOCUSATE SODIUM 100 MG/1
100 CAPSULE, LIQUID FILLED ORAL 2 TIMES DAILY PRN
Qty: 180 CAP | Refills: 2 | Status: SHIPPED | OUTPATIENT
Start: 2020-12-07 | End: 2021-01-18 | Stop reason: SDUPTHER

## 2020-12-07 NOTE — TELEPHONE ENCOUNTER
I left msg medications have been filled and ambien has two refills and to call pharmacy before she picks up to make sure they are ready.

## 2020-12-07 NOTE — TELEPHONE ENCOUNTER
Emi called and scheduled Botox appt that was previously canceled for 1/4/2020.     She would like the following medications refilled for Griselda:  [id number] and tamsulosin .4mg    She said she will need Ambien but not until 12/24/2020.

## 2020-12-14 ENCOUNTER — TELEPHONE (OUTPATIENT)
Dept: PHYSICAL MEDICINE AND REHAB | Facility: REHABILITATION | Age: 35
End: 2020-12-14

## 2020-12-14 NOTE — TELEPHONE ENCOUNTER
I spoke with Krystina at [facility] pharmacy and let her know Dr. Rouse said Qty of 3 for 30 days, with two refills

## 2020-12-14 NOTE — TELEPHONE ENCOUNTER
"Geremias from [facility]'s pharmacy wanted to confirm Ambien Rx and wanted to know if the directions are for one tablet every bedtime for 30 days with two refills is why the directions say, \" Take 1 Tab by mouth at bedtime as needed for Sleep for up to 90 days.\" So to clarify 30 tabs are not supposed to last for 90 days.   "

## 2021-01-18 ENCOUNTER — OFFICE VISIT (OUTPATIENT)
Dept: PHYSICAL MEDICINE AND REHAB | Facility: REHABILITATION | Age: 36
End: 2021-01-18
Payer: MEDICARE

## 2021-01-18 VITALS
SYSTOLIC BLOOD PRESSURE: 128 MMHG | OXYGEN SATURATION: 90 % | BODY MASS INDEX: 21.5 KG/M2 | HEART RATE: 101 BPM | WEIGHT: 137 LBS | DIASTOLIC BLOOD PRESSURE: 81 MMHG | TEMPERATURE: 96.2 F | HEIGHT: 67 IN

## 2021-01-18 DIAGNOSIS — R25.2 SPASTICITY: ICD-10-CM

## 2021-01-18 DIAGNOSIS — G43.009 MIGRAINE WITHOUT AURA AND WITHOUT STATUS MIGRAINOSUS, NOT INTRACTABLE: ICD-10-CM

## 2021-01-18 DIAGNOSIS — R51.9 HEADACHE, UNSPECIFIED HEADACHE TYPE: ICD-10-CM

## 2021-01-18 DIAGNOSIS — G93.40 ENCEPHALOPATHY, UNSPECIFIED: Primary | ICD-10-CM

## 2021-01-18 DIAGNOSIS — K59.04 CHRONIC IDIOPATHIC CONSTIPATION: ICD-10-CM

## 2021-01-18 DIAGNOSIS — M62.49 CONTRACTURE OF MUSCLE OF MULTIPLE SITES: ICD-10-CM

## 2021-01-18 DIAGNOSIS — G82.50 TETRAPLEGIA (HCC): ICD-10-CM

## 2021-01-18 DIAGNOSIS — F33.42 RECURRENT MAJOR DEPRESSIVE DISORDER, IN FULL REMISSION (HCC): Chronic | ICD-10-CM

## 2021-01-18 PROCEDURE — 76942 ECHO GUIDE FOR BIOPSY: CPT | Performed by: PHYSICAL MEDICINE & REHABILITATION

## 2021-01-18 PROCEDURE — 64642 CHEMODENERV 1 EXTREMITY 1-4: CPT | Performed by: PHYSICAL MEDICINE & REHABILITATION

## 2021-01-18 PROCEDURE — 64643 CHEMODENERV 1 EXTREM 1-4 EA: CPT | Performed by: PHYSICAL MEDICINE & REHABILITATION

## 2021-01-18 PROCEDURE — 99214 OFFICE O/P EST MOD 30 MIN: CPT | Mod: 25 | Performed by: PHYSICAL MEDICINE & REHABILITATION

## 2021-01-18 RX ORDER — DOCUSATE SODIUM 100 MG/1
100 CAPSULE, LIQUID FILLED ORAL 2 TIMES DAILY PRN
Qty: 60 CAP | Refills: 2 | Status: SHIPPED | OUTPATIENT
Start: 2021-01-18 | End: 2021-04-19 | Stop reason: SDUPTHER

## 2021-01-18 RX ORDER — TOPIRAMATE 25 MG/1
25 TABLET ORAL 2 TIMES DAILY
Qty: 60 TAB | Refills: 2 | Status: SHIPPED | OUTPATIENT
Start: 2021-01-18 | End: 2021-04-13

## 2021-01-18 RX ORDER — CITALOPRAM 20 MG/1
20 TABLET ORAL
Qty: 90 TAB | Refills: 3 | Status: SHIPPED | OUTPATIENT
Start: 2021-01-18 | End: 2021-04-19 | Stop reason: SDUPTHER

## 2021-01-18 RX ORDER — SUMATRIPTAN 100 MG/1
TABLET, FILM COATED ORAL
Qty: 60 TAB | Refills: 2 | Status: SHIPPED | OUTPATIENT
Start: 2021-01-18 | End: 2021-04-19 | Stop reason: SDUPTHER

## 2021-01-18 ASSESSMENT — ENCOUNTER SYMPTOMS
MEMORY LOSS: 0
SORE THROAT: 0
FALLS: 0
CHILLS: 0
HEADACHES: 1
SHORTNESS OF BREATH: 0
COUGH: 0
CONSTIPATION: 1
BRUISES/BLEEDS EASILY: 0
PALPITATIONS: 0
FEVER: 0
WEAKNESS: 1
DIARRHEA: 0

## 2021-01-18 ASSESSMENT — FIBROSIS 4 INDEX: FIB4 SCORE: 0.63

## 2021-01-18 ASSESSMENT — PATIENT HEALTH QUESTIONNAIRE - PHQ9: CLINICAL INTERPRETATION OF PHQ2 SCORE: 0

## 2021-01-18 NOTE — PROCEDURES
[facility]  Physical Medicine & Rehabilitation Clinic  [facility] [place] [address]  Ph: [phone]    PROCEDURE NOTE    Procedure: Botulinum Injection  Primary Diagnosis & Secondary Diagnoses:   Visit Diagnoses     ICD-10-CM   1. Encephalopathy, unspecified  G93.40   2. Contracture of muscle of multiple sites  M62.49   3. Spasticity  R25.2   4. Tetraplegia (HCC)  G82.50         INFORMED CONSENT   The risks and benefits of the procedure were explained to the patient, and all questions were answered. Benefits of the injection include spasticity reduction by decrease in muscle activation following toxin injection. Adverse effects from toxin injection include but are not limited to: excessive weakness, infection, breathing and/or swallowing difficulty.  Common adverse effects from the injection itself are pain and bruising. The patient demonstrated good understanding of risks and benefits and consents to botulinum toxin injection.     Received botulinum toxin product in last 3 mos: No  Have recently received abx (aminoglycosides): No  Take anti-spasticity medications: Yes  Take allergy/cold medicine: No  Take a sleep medicine: Yes  Lactating/pregnant: No  Known NMJ disease: No  Human albumin allergy: No    Pre-procedure time out was performed.     PROCEDURE:  Usual sterile procedure was observed with sterile prep with chlorhexidine. Ultrasound was used for needle guidance for the injections in the following muscles. A negative aspiration of blood was obtained, and the following was injected into each muscle.    Location Botox Amount in Units   Right adductor longus  50 units   Right adductor brevis  50 units   Right adductor Angelito  50 units   Left Adductor longus  50 units   Left Adductor brevis  50 units   Left Adductor angelito  50 units   Total Units  300 units        Injection sites were cleaned with alcohol and band aid was applied afterwards.  The patient tolerated the procedure well.  There were no  complications.  The images were uploaded for permanent storage.    MEDICATION USED:  100 units of botulinum toxin type A, mixed with 2mL of sterile, preservative-free normal saline in two 1cc syringes, for a total of 50 units in 1 mL. Repeated for other vials.      Total units injected: 300 units  Total units discarded: 0 units    See MAR for Botox details.     Counseling: Pt was instructed to seek immediate medical attention if fever, difficulty breathing, difficulty swallowing, or other concerning sxs arise. RTC in 2-4 weeks to investigate for effect at peak.    Additional Plan: See progress note.    BOTOX  NDC 2996-3220-98  Lot  C3  Exp 02/2023    Dr. Jessica Rouse DO, MS  Department of Physical Medicine & Rehabilitation  [facility]  [facility]

## 2021-01-18 NOTE — PROGRESS NOTES
[facility]  [facility]  [address] [place] [address]  Ph: [phone]    FOLLOW UP PATIENT EVALUATION    Patient Name: Griselda Swanson   Patient : 1985  Patient Age: 34 y.o.   PCP: SELVIN Garcia    Examining Physician: Dr. Jessica Rouse, DO    SUBJECTIVE:   Patient Identification: Griselda Swanson is a 34 y.o. 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     Accompanied by Today: Caregiver, Becky  Interval History:   -Patient presents for Botox injections today but also has additional complaints.  -Patient is nonverbal and communicates with communicative assistive device therefore caregiver assists with history.  -States that her headaches have continued.  She ran out of the Topamax approximately 1 month ago and has used up her Imitrex which she uses as needed.  -Continues to have some constipation and uses Colace as needed.  She is out of this as well and her constipation is worsened and she needs a refill of Colace.  -Patient also has been out of her Celexa and needs a refill of her Celexa though her mood has been stable.    Review of Systems:  Review of Systems   Constitutional: Negative for chills and fever.   HENT: Negative for congestion and sore throat.    Respiratory: Negative for cough and shortness of breath.    Cardiovascular: Negative for palpitations and leg swelling.   Gastrointestinal: Positive for constipation. Negative for diarrhea.   Musculoskeletal: Negative for falls and joint pain.   Neurological: Positive for weakness and headaches.        + spasticity    Endo/Heme/Allergies: Does not bruise/bleed easily.   Psychiatric/Behavioral: Negative for memory loss.      All other pertinent positive review of systems are noted above in HPI.     Past Medical History:  Past Medical History:   Diagnosis Date   • C. difficile colitis 2015   • Coma ([facility]) August 2009    1 month, lesions on brain,  unknown etiology   • Encephalitis 2009   • Coma ([facility]) 2008    Spontaneous -    • ADEM (acute disseminated encephalomyelitis)    • Back pain    • Breath shortness     since 2014 not an issue at this time (4/2018)   • Demyelinating disorder ([facility])     demyelinating encephpomyeltis    • Heart burn    • Indigestion    • Lesion of brain     unknown cuase   • MEDICAL HOME    • Migraines    • MS (multiple sclerosis) ([facility])    • Other specified disorder of intestines     constipation   • Pain    • Psychiatric problem     depression and anxiety   • Renal disorder     kidney stones   • Urinary incontinence       Past Surgical History:   Procedure Laterality Date   • PUMP INSERT/REMOVE Left 7/1/2016    Procedure: PUMP REMOVAL;  Surgeon: Pari Roberson M.D.;  Location: [facility];  Service:    • CATH PLACE PERM EPIDURAL Left 6/14/2016    Procedure: CATH PLACE PERM EPIDURAL - BACLOFEN PUMP AND CATHETER REPLACEMENT  - BACK AND ABDOMEN;  Surgeon: Pari Roberson M.D.;  Location: [facility];  Service:    • AK BACLOFEN INTRATHECAL TRIAL  3/8/2016    Dr. Roberson  [facility]   • CATH PLACE PERM EPIDURAL N/A 3/8/2016    Procedure: BACLOFEN PUMP TRIAL;  Surgeon: Pari Roberson M.D.;  Location: [facility];  Service:    • PUMP REVISION  10/8/2013    Performed by Pari Roberson M.D. at [facility]   • PUMP INSERT/REMOVE  3/12/2013    Performed by Pari Roberson M.D. at [facility]   • CATH PLACE PERM EPIDURAL  6/26/2012    Performed by PARI ROBERSON at [facility]   • CATH PLACE PERM EPIDURAL  3/6/2012    Performed by  PARI ROBERSON at SURGERY [facility]   • MAMMOPLASTY AUGMENTATION  2002   • TONSILLECTOMY          Current Outpatient Medications:   •  topiramate (TOPAMAX) 25 MG Tab, Take 1 Tab by mouth 2 times a day., Disp: 60 Tab, Rfl: 2  •  docusate sodium (DOK) 100 MG Cap, Take 1 Cap by mouth 2 times a day as needed. FOR CONSTIPATION., Disp: 60 Cap, Rfl: 2  •  citalopram (CELEXA) 20 MG Tab, Take 1 Tab by mouth every day., Disp: 90 Tab, Rfl: 3  •  sumatriptan (IMITREX) 100 MG tablet, TAKE 1 TABLET BY MOUTH AT ONSET OF HEADACHE. MAY REPEAT IN 2 HOURS. MAX 2 TABLETS A DAY, Disp: 60 Tab, Rfl: 2  •  tamsulosin (FLOMAX) 0.4 MG capsule, Take 1 Cap by mouth every day., Disp: 90 Cap, Rfl: 1  •  zolpidem (AMBIEN) 5 MG Tab, Take 1 Tab by mouth at bedtime as needed for Sleep for up to 90 days., Disp: 30 Tab, Rfl: 2  •  baclofen (LIORESAL) 10 MG Tab, Take 3 Tabs by mouth 4 times a day., Disp: 360 Tab, Rfl: 3  •  bisacodyl (DULCOLAX) 10 MG Suppos, Insert 1 Suppository in rectum every day., Disp: 50 Suppository, Rfl: 5  •  polyethylene glycol 3350 (MIRALAX) 17 GM/SCOOP Powder, MIX AND DRINK 1 CAPFUL WITH 8 OZ OF LIQUID QD, Disp: 578 g, Rfl: 5  •  cyclobenzaprine (FLEXERIL) 10 MG Tab, Take 1 Tab by mouth 3 times a day as needed., Disp: 270 Tab, Rfl: 1  •  nitrofurantoin (MACROBID) 100 MG Cap, TK 1 C PO Q 12 H, Disp: , Rfl:   •  albuterol 108 (90 Base) MCG/ACT Aero Soln inhalation aerosol, Inhale 2 Puffs by mouth every 6 hours as needed., Disp: 8.5 g, Rfl: 11  •  Diclofenac Sodium 1 % Gel, APPLY 4 GRAMS TO KNEE AND 3 GRAMS TO ANKLES UP TO FOUR TIMES DAILY AS NEEDED, Disp: 100 g, Rfl: 10  •  simethicone (MYLICON) 125 MG chewable tablet, Take 125 mg by mouth 4 times a day as needed. Indications: Gas, Disp: , Rfl:   •  Calcium Carbonate Antacid (TUMS PO), Take 1 tablet by mouth as needed. Indications: heartburn, Disp: , Rfl:   •  Multiple Vitamins-Minerals (MULTIVITAMIN GUMMIES ADULTS PO), Take 1 Tab by mouth every day. Indications:  vitamin supplement., Disp: , Rfl:   •  vitamin D (CHOLECALCIFEROL) 1000 UNIT Tab, Take 1,000 Units by mouth 4 times a day. Indications: supplement, Disp: , Rfl:   •  HYDROcodone-acetaminophen (NORCO) 5-325 MG Tab per tablet, TK 1 T PO Q 6 H PRN P FOR 10 DAYS, Disp: , Rfl:   •  Diclofenac Sodium 1 % Gel, Apply 2 g to skin as directed 4 times a day as needed., Disp: 1 Tube, Rfl: 11  •  Misc. Devices Misc, Bedside commode with padding (Patient not taking: Reported on 5/7/2020), Disp: 1 Each, Rfl: 11  •  Misc. Devices Misc, New latch for new dynavox  Wheel chair (Patient not taking: Reported on 5/7/2020), Disp: 1 Each, Rfl: 11  •  clindamycin-benzoyl peroxide (BENZACLIN) gel, APPLY TO THE FACE EVERY DAY (Patient not taking: Reported on 9/25/2020), Disp: 50 g, Rfl: 11  •  D-MANNOSE PO, Take 2 Tabs by mouth every day. Per Urology, Disp: , Rfl:   •  Misc. Devices Misc, Bedside commode (Patient not taking: Reported on 5/7/2020), Disp: 1 Each, Rfl: 11  •  Misc. Devices Misc, W/C stabilizer needs eval and possible replacement (Patient not taking: Reported on 5/7/2020), Disp: 1 Each, Rfl: 11  •  Misc. Devices Misc, Please eval and fix electric W/C. Please eval also for W/C needs. (Patient not taking: Reported on 5/7/2020), Disp: 1 Each, Rfl: 11  •  Misc. Devices Misc, Please allow patient to have support/therapy dog in appt. Regardless of weight due to multiple chronic illnesses and debility. (Patient not taking: Reported on 5/7/2020), Disp: 1 Each, Rfl: 11    Current Facility-Administered Medications:   •  onabotulinum toxin type A (BOTOX) injection 300 Units, 300 Units, Intramuscular, Once, Jessica Rouse D.O.  Allergies   Allergen Reactions   • Bactrim      Reaction unknown   • Fentanyl      Makes her agressive        Past Social History:  Social History     Socioeconomic History   • Marital status: Single     Spouse name: Not on file   • Number of children: Not on file   • Years of education: Not on file   • Highest  education level: Not on file   Occupational History   • Not on file   Social Needs   • Financial resource strain: Not on file   • Food insecurity     Worry: Not on file     Inability: Not on file   • Transportation needs     Medical: Not on file     Non-medical: Not on file   Tobacco Use   • Smoking status: Former Smoker     Packs/day: 1.00     Years: 12.00     Pack years: 12.00     Types: Cigarettes     Quit date: 2008     Years since quittin.0   • Smokeless tobacco: Never Used   • Tobacco comment: Started smoking at age 11   Substance and Sexual Activity   • Alcohol use: No   • Drug use: No   • Sexual activity: Never     Partners: Male   Lifestyle   • Physical activity     Days per week: Not on file     Minutes per session: Not on file   • Stress: Not on file   Relationships   • Social connections     Talks on phone: Not on file     Gets together: Not on file     Attends Sabianist service: Not on file     Active member of club or organization: Not on file     Attends meetings of clubs or organizations: Not on file     Relationship status: Not on file   • Intimate partner violence     Fear of current or ex partner: Not on file     Emotionally abused: Not on file     Physically abused: Not on file     Forced sexual activity: Not on file   Other Topics Concern   • Primary/coprimary nurse & associates Not Asked   • Family contact information Not Asked   • OK to release patient information to the following Not Asked   • Patient preferred routine/Privacy concerns Not Asked   • Patient likes and dislikes Not Asked   • Participating In Research Study Not Asked   • Miscellaneous Not Asked   •  Service No   • Blood Transfusions No   • Caffeine Concern No   • Occupational Exposure No   • Hobby Hazards No   • Sleep Concern Yes   • Stress Concern No   • Weight Concern No   • Special Diet Yes     Comment: Keto   • Back Care No   • Exercise No   • Bike Helmet No   • Seat Belt Yes   • Self-Exams No   Social  History Narrative   • Not on file        Family History:  Family History   Problem Relation Age of Onset   • Cancer Mother         Sarcoma   • Bipolar disorder Brother    • Other Brother         spina bifida   • Diabetes Maternal Grandmother    • Other Daughter         Migrains       Depression and Opioid Screening  PHQ-9:  Depression Screen (PHQ-2/PHQ-9) 6/19/2020 9/25/2020 1/18/2021   PHQ-2 Total Score - - -   PHQ-2 Total Score - - -   PHQ-2 Total Score 0 0 0   PHQ-9 Total Score - - -     Interpretation of PHQ-9 Total Score   Score Severity   1-4 No Depression   5-9 Mild Depression   10-14 Moderate Depression   15-19 Moderately Severe Depression   20-27 Severe Depression     Opioid Risk Score: 2  Interpretation of Opioid Risk Score   Score 0-3 = Low risk of abuse. Do UDS at least once per year.  Score 4-7 = Moderate risk of abuse. Do UDS 1-4 times per year.  Score 8+ = High risk of abuse. Refer to specialist.      OBJECTIVE:   Vital Signs:  Vitals:    01/18/21 1315   BP: 128/81   Pulse: (!) 101   Temp: (!) 35.7 °C (96.2 °F)   SpO2: 90%        Pertinent Labs:  Lab Results   Component Value Date/Time    SODIUM 145 10/11/2019 11:54 AM    POTASSIUM 4.2 10/11/2019 11:54 AM    CHLORIDE 109 10/11/2019 11:54 AM    CO2 27 10/11/2019 11:54 AM    GLUCOSE 83 10/11/2019 11:54 AM    BUN 14 10/11/2019 11:54 AM    CREATININE 0.73 10/11/2019 11:54 AM       No results found for: HBA1C    Lab Results   Component Value Date/Time    WBC 8.1 10/11/2019 11:54 AM    RBC 5.05 10/11/2019 11:54 AM    HEMOGLOBIN 15.5 10/11/2019 11:54 AM    HEMATOCRIT 48.9 (H) 10/11/2019 11:54 AM    MCV 96.8 10/11/2019 11:54 AM    MCH 30.7 10/11/2019 11:54 AM    MCHC 31.7 (L) 10/11/2019 11:54 AM    MPV 10.4 10/11/2019 11:54 AM    NEUTSPOLYS 61.10 10/11/2019 11:54 AM    LYMPHOCYTES 32.30 10/11/2019 11:54 AM    MONOCYTES 4.90 10/11/2019 11:54 AM    EOSINOPHILS 0.50 10/11/2019 11:54 AM    BASOPHILS 1.00 10/11/2019 11:54 AM    ANISOCYTOSIS 1+ 07/01/2016 04:41 AM        Lab Results   Component Value Date/Time    ASTSGOT 22 10/11/2019 11:54 AM    ALTSGPT 24 10/11/2019 11:54 AM        Physical Exam:   GEN: No apparent distress.   HEENT: Head normocephalic, atraumatic.  Sclera nonicteric bilaterally, no ocular discharge appreciated bilaterally.  CV: Extremities warm and well-perfused.   PULMONARY: Breathing nonlabored on room air, no respiratory accessory muscle use.  Not requiring supplemental oxygen.  ABD: Soft, nontender.  SKIN: No skin breakdown appreciated on exposed areas of skin.  PSYCH: Mood and affect within normal limits.  NEURO: Awake alert. Normally has communicative assistive device, but not present today.  Does use some hand gesturing for communication.    ASSESSMENT/PLAN: Griselda Swanson  is a 34 y.o. female with rehabilitation history significant for incomplete tetraplegia secondary to postinfectious ADEM 2009, here for spasticity follow-up. The following plan was discussed with the patient who is in agreement.     Visit Diagnoses     ICD-10-CM   1. Encephalopathy, unspecified  G93.40   2. Contracture of muscle of multiple sites  M62.49   3. Spasticity  R25.2   4. Tetraplegia ([facility])  G82.50   5. Headache, unspecified headache type  R51.9   6. Chronic idiopathic constipation  K59.04   7. Recurrent major depressive disorder, in full remission ([facility])  F33.42   8. 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  Caregiver assist with history    Rehab/Neuro:   1. Incomplete tetraplegia secondary to postinfectious ADEM in August 2009: Differential diagnosis initially included tumefactive MS versus ADEM, patient initially evaluated at [facility] and then went to [facility].  Neurology has been following and given that her MRIs have been completely stable this favors ADEM. ARU at [place] in 2010.   -Status: Stable    2.  Headaches,  "worsened since out of prescriptions.  -Med management: Prescription for Topamax for 3-month supply  -Med management: Prescription for Imitrex for 3-month supply.     Spasticity:   1. Secondary to #1 in \"rehab/neuro\" section: Status post implantation of intrathecal baclofen pump 3/12/2013, revision 6/14/2016, and subsequent removal secondary to infection 7/1/2016 (Dr. Catrachito Vega).  Despite having had IBP placed, patient and sister state that they did not notice any additional efficacy.  Has had multiple rounds of Botox injections which have been unsuccessful.  On oral anti-spasticity medications (baclofen 4 times daily-30/30/30/20 milligrams).  LFTs within normal limits 10/11/2019 at AST/ALT 22/24.  Tizanidine increased from 2 mg 3 times daily to 4 mg 3 times daily 5/7/2020 -side effect of sleepiness, dose reduced to 3 mg 3 times daily 6/19/2020.  Patient has stopped altogether as of 9/25/2020.  -Med management: Botox to be injected today.     Mood: Stable.  -Med management: Prescription for Celexa 20 mg daily 3-month supply given.    Follow up: 1 month for virtual visit and evaluation of efficacy of Botox injections.      Please note that this dictation was created using voice recognition software. I have made every reasonable attempt to correct obvious errors but there may be errors of grammar and content that I may have overlooked prior to finalization of this note.    Dr. Jessica Rouse DO, MS  Department of Physical Medicine & Rehabilitation  Neuro Rehabilitation Clinic  [facility]  "

## 2021-01-25 ENCOUNTER — APPOINTMENT (OUTPATIENT)
Dept: PHYSICAL THERAPY | Facility: REHABILITATION | Age: 36
End: 2021-01-25
Attending: PHYSICAL MEDICINE & REHABILITATION
Payer: MEDICARE

## 2021-03-04 ENCOUNTER — TELEPHONE (OUTPATIENT)
Dept: MEDICAL GROUP | Facility: MEDICAL CENTER | Age: 36
End: 2021-03-04

## 2021-03-04 NOTE — TELEPHONE ENCOUNTER
I Called [facility] spoke with Keiry, I let her know the last time patient was seen in clinic and she said she will call the patient to schedule an appt and come in for incontinence supplies

## 2021-03-04 NOTE — TELEPHONE ENCOUNTER
1. Caller Name: Keiry from [facility] stating they received office visit  Notes and orders signed for incontinence supplies  but notes will need to be addend because it doesn't mention incontinence supplies        How would the patient prefer to be contacted with a response: Phone call OK to leave a detailed message

## 2021-03-05 ENCOUNTER — TELEMEDICINE (OUTPATIENT)
Dept: PHYSICAL MEDICINE AND REHAB | Facility: REHABILITATION | Age: 36
End: 2021-03-05
Payer: MEDICARE

## 2021-03-05 DIAGNOSIS — G04.00 ADEM (ACUTE DISSEMINATED ENCEPHALOMYELITIS): Primary | ICD-10-CM

## 2021-03-05 DIAGNOSIS — R47.01 APHASIA: ICD-10-CM

## 2021-03-05 DIAGNOSIS — R25.2 SPASTICITY: ICD-10-CM

## 2021-03-05 PROCEDURE — 99213 OFFICE O/P EST LOW 20 MIN: CPT | Mod: 95,CR | Performed by: PHYSICAL MEDICINE & REHABILITATION

## 2021-03-05 ASSESSMENT — ENCOUNTER SYMPTOMS
FALLS: 0
BRUISES/BLEEDS EASILY: 0
PALPITATIONS: 0
MEMORY LOSS: 0
SORE THROAT: 0
SHORTNESS OF BREATH: 0
CHILLS: 0
DIARRHEA: 0
CONSTIPATION: 0
COUGH: 0
FEVER: 0

## 2021-03-05 NOTE — PROGRESS NOTES
[facility] ([facility]  [address] [place] [id number]  Ph: [phone]    FOLLOW UP PATIENT EVALUATION    This evaluation was conducted via Zoom using secure and encrypted videoconferencing technology. The patient was in a private location in the [facility].  The patient's identity was confirmed and verbal consent was obtained for this virtual visit.    Patient Name: Griselda Swanson   Patient : 1985  Patient Age: 34 y.o.   PCP: SELVIN Garcia    Examining Physician: Dr. Jessica Rouse, DO    SUBJECTIVE:   Patient Identification: Griselda Swanson is a 34 y.o. 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   - Had to get another commode and that was finally done.   - Everything else is stable.   - Was going to get evaluated by [employer]. Unable to get w/c evaluated.     Review of Systems:  Review of Systems   Constitutional: Negative for chills and fever.   HENT: Negative for congestion and sore throat.    Respiratory: Negative for cough and shortness of breath.    Cardiovascular: Negative for palpitations and leg swelling.   Gastrointestinal: Negative for constipation and diarrhea.   Musculoskeletal: Negative for falls and joint pain.   Neurological:        Positive spasticity.  Aphasia.   Endo/Heme/Allergies: Does not bruise/bleed easily.   Psychiatric/Behavioral: Negative for memory loss.      All other pertinent positive review of systems are noted above in HPI.     Past Medical History:  Past Medical  History:   Diagnosis Date   • C. difficile colitis 2015   • Coma ([facility]) August 2009    1 month, lesions on brain,  unknown etiology   • Encephalitis 2009   • Coma ([facility]) 2008    Spontaneous -    • ADEM (acute disseminated encephalomyelitis)    • Back pain    • Breath shortness     since 2014 not an issue at this time (4/2018)   • Demyelinating disorder ([facility])     demyelinating encephpomyeltis    • Heart burn    • Indigestion    • Lesion of brain     unknown cuase   • MEDICAL HOME    • Migraines    • MS (multiple sclerosis) ([facility])    • Other specified disorder of intestines     constipation   • Pain    • Psychiatric problem     depression and anxiety   • Renal disorder     kidney stones   • Urinary incontinence       Past Surgical History:   Procedure Laterality Date   • PUMP INSERT/REMOVE Left 7/1/2016    Procedure: PUMP REMOVAL;  Surgeon: Pari Roberson M.D.;  Location: [facility];  Service:    • CATH PLACE PERM EPIDURAL Left 6/14/2016    Procedure: CATH PLACE PERM EPIDURAL - BACLOFEN PUMP AND CATHETER REPLACEMENT  - BACK AND ABDOMEN;  Surgeon: Pari Roberson M.D.;  Location: [facility];  Service:    • NM BACLOFEN INTRATHECAL TRIAL  3/8/2016    Dr. Roberson  [facility]   • CATH PLACE PERM EPIDURAL N/A 3/8/2016    Procedure: BACLOFEN PUMP TRIAL;  Surgeon: Pari Roberson M.D.;  Location: [facility];  Service:    • PUMP REVISION  10/8/2013    Performed by Pari Roberson M.D. at [facility]   • PUMP INSERT/REMOVE  3/12/2013    Performed by Pari Roberson M.D. at [facility]   • CATH PLACE PERM EPIDURAL  6/26/2012    Performed by PARI ROBERSON at [facility]   • CATH PLACE PERM EPIDURAL  3/6/2012    Performed by PARI ROBERSON at [facility]   • MAMMOPLASTY AUGMENTATION  2002   • TONSILLECTOMY          Current Outpatient Medications:   •  topiramate (TOPAMAX) 25 MG Tab, Take 1 Tab by mouth 2 times a day., Disp: 60 Tab,  Rfl: 2  •  docusate sodium (DOK) 100 MG Cap, Take 1 Cap by mouth 2 times a day as needed. FOR CONSTIPATION., Disp: 60 Cap, Rfl: 2  •  citalopram (CELEXA) 20 MG Tab, Take 1 Tab by mouth every day., Disp: 90 Tab, Rfl: 3  •  sumatriptan (IMITREX) 100 MG tablet, TAKE 1 TABLET BY MOUTH AT ONSET OF HEADACHE. MAY REPEAT IN 2 HOURS. MAX 2 TABLETS A DAY, Disp: 60 Tab, Rfl: 2  •  tamsulosin (FLOMAX) 0.4 MG capsule, Take 1 Cap by mouth every day., Disp: 90 Cap, Rfl: 1  •  zolpidem (AMBIEN) 5 MG Tab, Take 1 Tab by mouth at bedtime as needed for Sleep for up to 90 days., Disp: 30 Tab, Rfl: 2  •  baclofen (LIORESAL) 10 MG Tab, Take 3 Tabs by mouth 4 times a day., Disp: 360 Tab, Rfl: 3  •  HYDROcodone-acetaminophen (NORCO) 5-325 MG Tab per tablet, TK 1 T PO Q 6 H PRN P FOR 10 DAYS, Disp: , Rfl:   •  bisacodyl (DULCOLAX) 10 MG Suppos, Insert 1 Suppository in rectum every day., Disp: 50 Suppository, Rfl: 5  •  polyethylene glycol 3350 (MIRALAX) 17 GM/SCOOP Powder, MIX AND DRINK 1 CAPFUL WITH 8 OZ OF LIQUID QD, Disp: 578 g, Rfl: 5  •  cyclobenzaprine (FLEXERIL) 10 MG Tab, Take 1 Tab by mouth 3 times a day as needed., Disp: 270 Tab, Rfl: 1  •  nitrofurantoin (MACROBID) 100 MG Cap, TK 1 C PO Q 12 H, Disp: , Rfl:   •  albuterol 108 (90 Base) MCG/ACT Aero Soln inhalation aerosol, Inhale 2 Puffs by mouth every 6 hours as needed., Disp: 8.5 g, Rfl: 11  •  Diclofenac Sodium 1 % Gel, Apply 2 g to skin as directed 4 times a day as needed., Disp: 1 Tube, Rfl: 11  •  D-MANNOSE PO, Take 2 Tabs by mouth every day. Per Urology, Disp: , Rfl:   •  Diclofenac Sodium 1 % Gel, APPLY 4 GRAMS TO KNEE AND 3 GRAMS TO ANKLES UP TO FOUR TIMES DAILY AS NEEDED, Disp: 100 g, Rfl: 10  •  simethicone (MYLICON) 125 MG chewable tablet, Take 125 mg by mouth 4 times a day as needed. Indications: Gas, Disp: , Rfl:   •  Calcium Carbonate Antacid (TUMS PO), Take 1 tablet by mouth as needed. Indications: heartburn, Disp: , Rfl:   •  Multiple Vitamins-Minerals  (MULTIVITAMIN GUMMIES ADULTS PO), Take 1 Tab by mouth every day. Indications: vitamin supplement., Disp: , Rfl:   •  vitamin D (CHOLECALCIFEROL) 1000 UNIT Tab, Take 1,000 Units by mouth 4 times a day. Indications: supplement, Disp: , Rfl:   •  Misc. Devices Misc, Bedside commode with padding (Patient not taking: Reported on 2020), Disp: 1 Each, Rfl: 11  •  Misc. Devices Misc, New latch for new dynavox  Wheel chair (Patient not taking: Reported on 2020), Disp: 1 Each, Rfl: 11  •  clindamycin-benzoyl peroxide (BENZACLIN) gel, APPLY TO THE FACE EVERY DAY (Patient not taking: Reported on 2020), Disp: 50 g, Rfl: 11  •  Misc. Devices Misc, Bedside commode (Patient not taking: Reported on 2020), Disp: 1 Each, Rfl: 11  •  Misc. Devices Misc, W/C stabilizer needs eval and possible replacement (Patient not taking: Reported on 2020), Disp: 1 Each, Rfl: 11  •  Misc. Devices Misc, Please eval and fix electric W/C. Please eval also for W/C needs. (Patient not taking: Reported on 2020), Disp: 1 Each, Rfl: 11  •  Misc. Devices Misc, Please allow patient to have support/therapy dog in appt. Regardless of weight due to multiple chronic illnesses and debility. (Patient not taking: Reported on 2020), Disp: 1 Each, Rfl: 11  Allergies   Allergen Reactions   • Bactrim      Reaction unknown   • Fentanyl      Makes her agressive        Past Social History:  Social History     Socioeconomic History   • Marital status: Single     Spouse name: Not on file   • Number of children: Not on file   • Years of education: Not on file   • Highest education level: Not on file   Occupational History   • Not on file   Tobacco Use   • Smoking status: Former Smoker     Packs/day: 1.00     Years: 12.00     Pack years: 12.00     Types: Cigarettes     Quit date: 2008     Years since quittin.1   • Smokeless tobacco: Never Used   • Tobacco comment: Started smoking at age 11   Substance and Sexual Activity   • Alcohol use: No    • Drug use: No   • Sexual activity: Never     Partners: Male   Other Topics Concern   • Primary/coprimary nurse & associates Not Asked   • Family contact information Not Asked   • OK to release patient information to the following Not Asked   • Patient preferred routine/Privacy concerns Not Asked   • Patient likes and dislikes Not Asked   • Participating In Research Study Not Asked   • Miscellaneous Not Asked   •  Service No   • Blood Transfusions No   • Caffeine Concern No   • Occupational Exposure No   • Hobby Hazards No   • Sleep Concern Yes   • Stress Concern No   • Weight Concern No   • Special Diet Yes     Comment: Keto   • Back Care No   • Exercise No   • Bike Helmet No   • Seat Belt Yes   • Self-Exams No   Social History Narrative   • Not on file     Social Determinants of Health     Financial Resource Strain:    • Difficulty of Paying Living Expenses:    Food Insecurity:    • Worried About Running Out of Food in the Last Year:    • Ran Out of Food in the Last Year:    Transportation Needs:    • Lack of Transportation (Medical):    • Lack of Transportation (Non-Medical):    Physical Activity:    • Days of Exercise per Week:    • Minutes of Exercise per Session:    Stress:    • Feeling of Stress :    Social Connections:    • Frequency of Communication with Friends and Family:    • Frequency of Social Gatherings with Friends and Family:    • Attends [employer] Services:    • Active Member of Clubs or Organizations:    • Attends Club or Organization Meetings:    • Marital Status:    Intimate Partner Violence:    • Fear of Current or Ex-Partner:    • Emotionally Abused:    • Physically Abused:    • Sexually Abused:         Family History:  Family History   Problem Relation Age of Onset   • Cancer Mother         Sarcoma   • Bipolar disorder Brother    • Other Brother         spina bifida   • Diabetes Maternal Grandmother    • Other Daughter         Migrains       Depression and Opioid  Screening  PHQ-9:  Depression Screen (PHQ-2/PHQ-9) 6/19/2020 9/25/2020 1/18/2021   PHQ-2 Total Score - - -   PHQ-2 Total Score - - -   PHQ-2 Total Score 0 0 0   PHQ-9 Total Score - - -     Interpretation of PHQ-9 Total Score   Score Severity   1-4 No Depression   5-9 Mild Depression   10-14 Moderate Depression   15-19 Moderately Severe Depression   20-27 Severe Depression     Opioid Risk Score: 2  Interpretation of Opioid Risk Score   Score 0-3 = Low risk of abuse. Do UDS at least once per year.  Score 4-7 = Moderate risk of abuse. Do UDS 1-4 times per year.  Score 8+ = High risk of abuse. Refer to specialist.      OBJECTIVE:   Vital Signs:  There were no vitals filed for this visit.     Pertinent Labs:  Lab Results   Component Value Date/Time    SODIUM 145 10/11/2019 11:54 AM    POTASSIUM 4.2 10/11/2019 11:54 AM    CHLORIDE 109 10/11/2019 11:54 AM    CO2 27 10/11/2019 11:54 AM    GLUCOSE 83 10/11/2019 11:54 AM    BUN 14 10/11/2019 11:54 AM    CREATININE 0.73 10/11/2019 11:54 AM       No results found for: HBA1C    Lab Results   Component Value Date/Time    WBC 8.1 10/11/2019 11:54 AM    RBC 5.05 10/11/2019 11:54 AM    HEMOGLOBIN 15.5 10/11/2019 11:54 AM    HEMATOCRIT 48.9 (H) 10/11/2019 11:54 AM    MCV 96.8 10/11/2019 11:54 AM    MCH 30.7 10/11/2019 11:54 AM    MCHC 31.7 (L) 10/11/2019 11:54 AM    MPV 10.4 10/11/2019 11:54 AM    NEUTSPOLYS 61.10 10/11/2019 11:54 AM    LYMPHOCYTES 32.30 10/11/2019 11:54 AM    MONOCYTES 4.90 10/11/2019 11:54 AM    EOSINOPHILS 0.50 10/11/2019 11:54 AM    BASOPHILS 1.00 10/11/2019 11:54 AM    ANISOCYTOSIS 1+ 07/01/2016 04:41 AM       Lab Results   Component Value Date/Time    ASTSGOT 22 10/11/2019 11:54 AM    ALTSGPT 24 10/11/2019 11:54 AM        Physical Exam:   GEN: No apparent distress.   HEENT: Head normocephalic, atraumatic.  Sclera nonicteric bilaterally, no ocular discharge appreciated bilaterally.  PULMONARY: Breathing nonlabored on room air, no respiratory accessory muscle  "use.  Not requiring supplemental oxygen.  SKIN: No skin breakdown appreciated on exposed areas of skin.  PSYCH: Mood and affect within normal limits.  NEURO: Awake alert.  Aphasic.    ASSESSMENT/PLAN: Griselda Swanson  is a 34 y.o. female with rehabilitation history significant for incomplete tetraplegia secondary to postinfectious ADEM 2009, here for spasticity follow-up. The following plan was discussed with the patient who is in agreement.     Visit Diagnoses     ICD-10-CM   1. ADEM (acute disseminated encephalomyelitis)  G04.00   2. Aphasia  R47.01   3. Spasticity  R25.2        Sister assists with history given Griselda is aphasic.    Rehab/Neuro:   1. Incomplete tetraplegia secondary to postinfectious ADEM in August 2009: Differential diagnosis initially included tumefactive MS versus ADEM, patient initially evaluated at [facility] and then went to [facility].  Neurology has been following and given that her MRIs have been completely stable this favors ADEM. ARU at [facility] in 2010.   -Status: Stable    Spasticity: Secondary to #1 in \"rehab/neuro\" section: Status post implantation of intrathecal baclofen pump 3/12/2013, revision 6/14/2016, and subsequent removal secondary to infection 7/1/2016 (Dr. Catrachito Vega).  Despite having had IBP placed, patient and sister state that they did not notice any additional efficacy.  Has had multiple rounds of Botox injections which have been unsuccessful.  On oral anti-spasticity medications (baclofen 4 times daily-30/30/30/20 milligrams).  LFTs within normal limits 10/11/2019 at AST/ALT 22/24.  Tizanidine increased from 2 mg 3 times daily to 4 mg 3 times daily 5/7/2020 -side effect of sleepiness, dose reduced to 3 mg 3 times daily 6/19/2020.  Patient has stopped altogether as of 9/25/2020.  -Botox injections inefficacious as we discussed previously which may be the case.  Do not plan for Botox again.     Follow up: 6 months general follow-up      Please note that this dictation was " created using voice recognition software. I have made every reasonable attempt to correct obvious errors but there may be errors of grammar and content that I may have overlooked prior to finalization of this note.    Dr. Jessica Rouse DO, MS  Department of Physical Medicine & Rehabilitation  Neuro Rehabilitation Clinic  [facility]

## 2021-04-13 DIAGNOSIS — R51.9 HEADACHE, UNSPECIFIED HEADACHE TYPE: ICD-10-CM

## 2021-04-13 RX ORDER — TOPIRAMATE 25 MG/1
TABLET ORAL
Qty: 60 TABLET | Refills: 2 | Status: SHIPPED | OUTPATIENT
Start: 2021-04-13 | End: 2021-04-19 | Stop reason: SDUPTHER

## 2021-04-19 ENCOUNTER — OFFICE VISIT (OUTPATIENT)
Dept: MEDICAL GROUP | Facility: MEDICAL CENTER | Age: 36
End: 2021-04-19
Payer: MEDICARE

## 2021-04-19 VITALS
OXYGEN SATURATION: 98 % | TEMPERATURE: 97.3 F | DIASTOLIC BLOOD PRESSURE: 60 MMHG | SYSTOLIC BLOOD PRESSURE: 94 MMHG | HEART RATE: 97 BPM

## 2021-04-19 DIAGNOSIS — F33.42 RECURRENT MAJOR DEPRESSIVE DISORDER, IN FULL REMISSION (HCC): Chronic | ICD-10-CM

## 2021-04-19 DIAGNOSIS — K59.04 CHRONIC IDIOPATHIC CONSTIPATION: ICD-10-CM

## 2021-04-19 DIAGNOSIS — R32 URINARY INCONTINENCE, UNSPECIFIED TYPE: ICD-10-CM

## 2021-04-19 DIAGNOSIS — G43.009 MIGRAINE WITHOUT AURA AND WITHOUT STATUS MIGRAINOSUS, NOT INTRACTABLE: ICD-10-CM

## 2021-04-19 DIAGNOSIS — K59.2 NEUROGENIC BOWEL: ICD-10-CM

## 2021-04-19 DIAGNOSIS — R25.2 SPASTICITY: ICD-10-CM

## 2021-04-19 DIAGNOSIS — R51.9 HEADACHE, UNSPECIFIED HEADACHE TYPE: ICD-10-CM

## 2021-04-19 DIAGNOSIS — N39.0 RECURRENT URINARY TRACT INFECTION: ICD-10-CM

## 2021-04-19 PROCEDURE — 99214 OFFICE O/P EST MOD 30 MIN: CPT | Performed by: NURSE PRACTITIONER

## 2021-04-19 RX ORDER — TAMSULOSIN HYDROCHLORIDE 0.4 MG/1
0.4 CAPSULE ORAL
Qty: 90 CAPSULE | Refills: 3 | Status: SHIPPED | OUTPATIENT
Start: 2021-04-19 | End: 2021-07-26 | Stop reason: SDUPTHER

## 2021-04-19 RX ORDER — DOCUSATE SODIUM 100 MG/1
100 CAPSULE, LIQUID FILLED ORAL 2 TIMES DAILY PRN
Qty: 60 CAPSULE | Refills: 11 | Status: SHIPPED | OUTPATIENT
Start: 2021-04-19 | End: 2021-10-14 | Stop reason: SDUPTHER

## 2021-04-19 RX ORDER — CYCLOBENZAPRINE HCL 10 MG
10 TABLET ORAL 3 TIMES DAILY PRN
Qty: 270 TABLET | Refills: 3 | Status: SHIPPED | OUTPATIENT
Start: 2021-04-19 | End: 2022-02-07 | Stop reason: SDUPTHER

## 2021-04-19 RX ORDER — LACTULOSE 10 G/15ML
SOLUTION ORAL; RECTAL
Qty: 16200 ML | Refills: 11 | Status: SHIPPED | OUTPATIENT
Start: 2021-04-19 | End: 2021-04-19

## 2021-04-19 RX ORDER — BACLOFEN 10 MG/1
30 TABLET ORAL 4 TIMES DAILY
Qty: 360 TABLET | Refills: 11 | Status: SHIPPED | OUTPATIENT
Start: 2021-04-19 | End: 2022-01-11 | Stop reason: SDUPTHER

## 2021-04-19 RX ORDER — TOPIRAMATE 25 MG/1
TABLET ORAL
Qty: 60 TABLET | Refills: 11 | Status: SHIPPED | OUTPATIENT
Start: 2021-04-19 | End: 2021-07-26 | Stop reason: SDUPTHER

## 2021-04-19 RX ORDER — POLYETHYLENE GLYCOL 3350 17 G/17G
POWDER, FOR SOLUTION ORAL
Qty: 578 G | Refills: 11 | Status: SHIPPED | OUTPATIENT
Start: 2021-04-19 | End: 2021-08-16

## 2021-04-19 RX ORDER — LACTULOSE 10 G/15ML
SOLUTION ORAL; RECTAL
Qty: 16262 ML | Refills: 0 | Status: SHIPPED | OUTPATIENT
Start: 2021-04-19 | End: 2023-10-17

## 2021-04-19 RX ORDER — SUMATRIPTAN 100 MG/1
TABLET, FILM COATED ORAL
Qty: 60 TABLET | Refills: 11 | Status: SHIPPED | OUTPATIENT
Start: 2021-04-19 | End: 2021-08-16 | Stop reason: SDUPTHER

## 2021-04-19 RX ORDER — BISACODYL 10 MG
10 SUPPOSITORY, RECTAL RECTAL DAILY
Qty: 50 SUPPOSITORY | Refills: 11 | Status: SHIPPED | OUTPATIENT
Start: 2021-04-19 | End: 2022-05-12

## 2021-04-19 RX ORDER — CITALOPRAM 20 MG/1
20 TABLET ORAL
Qty: 90 TABLET | Refills: 3 | Status: SHIPPED | OUTPATIENT
Start: 2021-04-19 | End: 2022-02-14 | Stop reason: SDUPTHER

## 2021-04-19 NOTE — PROGRESS NOTES
Chief Complaint   Patient presents with   • Other     Incontinence supplies   • Medication Refill       Subjective:     HPI:     Griselda Swanson is a 35 y.o. female here to discuss the evaluation and management of:    Patient of Diallo ABRAMS. Last OV was 12/2019 with him.     Here with her caregiver.  Needing refills on her medications as well as order for incontinence supplies.    Urinary incontinence, unspecified type  Needs refills for her incontinence supplies with [facility] . Patient uses small pull on briefs, frequency 3-4 times per day and a large under pad for this.    2. Neurogenic bowel  Known problem to the patient.  Taking MiraLAX and  Generlac for this .    3. Headache, unspecified headache type  On topiramate for this.  Needs refills.    4. Chronic idiopathic constipation  Taking Colace for this.    5. Recurrent major depressive disorder, in full remission (HCC)  On citalopram for this.  Needs refills.    6. Migraine without aura and without status migrainosus, not intractable  Uses Imitrex as needed for this.  Has been effective. Needs refills    7. Recurrent urinary tract infection  Taking Flomax for this.  Needs refills.    8. Spasticity  Taking baclofen and Flexeril for this.  Needs refills at this time.  Continues follow-up with neurology.    ROS:  Denies any Headache, Blurred Vision, Confusion, Chest pain,  Shortness of breath,  Abdominal pain, Changes of bowel or bladder, Lower ext edema, Fevers, Nights sweats, Weight Changes, Focal weakness or numbness  And all other systems reviewed and are all negative. POSITIVE FOR see above.         Current Outpatient Medications:   •  polyethylene glycol 3350 (MIRALAX) 17 GM/SCOOP Powder, MIX AND DRINK 1 CAPFUL WITH 8 OZ OF LIQUID QD, Disp: 578 g, Rfl: 11  •  topiramate (TOPAMAX) 25 MG Tab, TAKE 1 TABLET BY MOUTH TWICE DAILY, Disp: 60 tablet, Rfl: 11  •  docusate sodium (DOK) 100 MG Cap, Take 1 capsule by mouth 2 times a day as needed. FOR  CONSTIPATION., Disp: 60 capsule, Rfl: 11  •  citalopram (CELEXA) 20 MG Tab, Take 1 tablet by mouth every day., Disp: 90 tablet, Rfl: 3  •  sumatriptan (IMITREX) 100 MG tablet, TAKE 1 TABLET BY MOUTH AT ONSET OF HEADACHE. MAY REPEAT IN 2 HOURS. MAX 2 TABLETS A DAY, Disp: 60 tablet, Rfl: 11  •  tamsulosin (FLOMAX) 0.4 MG capsule, Take 1 capsule by mouth every day., Disp: 90 capsule, Rfl: 3  •  baclofen (LIORESAL) 10 MG Tab, Take 3 Tablets by mouth 4 times a day., Disp: 360 tablet, Rfl: 11  •  bisacodyl (DULCOLAX) 10 MG Suppos, Insert 1 Suppository into the rectum every day., Disp: 50 Suppository, Rfl: 11  •  cyclobenzaprine (FLEXERIL) 10 mg Tab, Take 1 tablet by mouth 3 times a day as needed., Disp: 270 tablet, Rfl: 3  •  diclofenac sodium 1 % Gel, Apply 4 g topically 3 times a day as needed., Disp: 350 g, Rfl: 11  •  Diclofenac Sodium 1 % Gel, Apply 2 g to skin as directed 4 times a day as needed., Disp: 1 Tube, Rfl: 11  •  Misc. Devices Misc, Bedside commode with padding, Disp: 1 Each, Rfl: 11  •  Misc. Devices Misc, New latch for new dynavox  Wheel chair, Disp: 1 Each, Rfl: 11  •  D-MANNOSE PO, Take 2 Tabs by mouth every day. Per Urology, Disp: , Rfl:   •  Misc. Devices Misc, Bedside commode, Disp: 1 Each, Rfl: 11  •  simethicone (MYLICON) 125 MG chewable tablet, Take 125 mg by mouth 4 times a day as needed. Indications: Gas, Disp: , Rfl:   •  Misc. Devices Misc, W/C stabilizer needs eval and possible replacement, Disp: 1 Each, Rfl: 11  •  Misc. Devices Misc, Please eval and fix electric W/C. Please eval also for W/C needs., Disp: 1 Each, Rfl: 11  •  Misc. Devices Misc, Please allow patient to have support/therapy dog in appt. Regardless of weight due to multiple chronic illnesses and debility., Disp: 1 Each, Rfl: 11  •  Calcium Carbonate Antacid (TUMS PO), Take 1 tablet by mouth as needed. Indications: heartburn, Disp: , Rfl:   •  Multiple Vitamins-Minerals (MULTIVITAMIN GUMMIES ADULTS PO), Take 1 Tab by mouth  every day. Indications: vitamin supplement., Disp: , Rfl:   •  vitamin D (CHOLECALCIFEROL) 1000 UNIT Tab, Take 1,000 Units by mouth 4 times a day. Indications: supplement, Disp: , Rfl:   •  zolpidem (AMBIEN) 5 MG Tab, Take 5 mg by mouth. FOR SLEEP., Disp: , Rfl:   •  lactulose 10 GM/15ML Solution, TAKE 15 TO 30 MLS BY MOUTH EVERY 4 HOURS AS NEEDED, Disp: 16235 mL, Rfl: 0  •  HYDROcodone-acetaminophen (NORCO) 5-325 MG Tab per tablet, TK 1 T PO Q 6 H PRN P FOR 10 DAYS, Disp: , Rfl:   •  nitrofurantoin (MACROBID) 100 MG Cap, TK 1 C PO Q 12 H, Disp: , Rfl:   •  albuterol 108 (90 Base) MCG/ACT Aero Soln inhalation aerosol, Inhale 2 Puffs by mouth every 6 hours as needed., Disp: 8.5 g, Rfl: 11  •  clindamycin-benzoyl peroxide (BENZACLIN) gel, APPLY TO THE FACE EVERY DAY, Disp: 50 g, Rfl: 11  •  Diclofenac Sodium 1 % Gel, APPLY 4 GRAMS TO KNEE AND 3 GRAMS TO ANKLES UP TO FOUR TIMES DAILY AS NEEDED, Disp: 100 g, Rfl: 10    Allergies   Allergen Reactions   • Bactrim      Reaction unknown   • Fentanyl      Makes her agressive       Past Medical History:   Diagnosis Date   • ADEM (acute disseminated encephalomyelitis)    • Back pain    • Breath shortness     since 2014 not an issue at this time (4/2018)   • C. difficile colitis 2015   • Coma ([facility]) August 2009    1 month, lesions on brain,  unknown etiology   • Coma ([facility]) 2008    Spontaneous -    • Demyelinating disorder ([facility])     demyelinating encephpomyeltis    • Encephalitis 2009   • Heart burn    • Indigestion    • Lesion of brain     unknown cuase   • MEDICAL HOME    • Migraines    • MS (multiple sclerosis) ([facility])    • Other specified disorder of intestines     constipation   • Pain    • Psychiatric problem     depression and anxiety   • Renal disorder     kidney stones   • Urinary incontinence      Past Surgical History:   Procedure Laterality Date   • PUMP INSERT/REMOVE Left 7/1/2016    Procedure: PUMP REMOVAL;  Surgeon: Catrachito Vega M.D.;  Location: SURGERY [facility]  TOWER ORS;  Service:    • CATH PLACE PERM EPIDURAL Left 2016    Procedure: CATH PLACE PERM EPIDURAL - BACLOFEN PUMP AND CATHETER REPLACEMENT  - BACK AND ABDOMEN;  Surgeon: Pari Roberson M.D.;  Location: SURGERY [facility];  Service:    • MT BACLOFEN INTRATHECAL TRIAL  3/8/2016    Dr. Roberson  [facility]   • CATH PLACE PERM EPIDURAL N/A 3/8/2016    Procedure: BACLOFEN PUMP TRIAL;  Surgeon: Pari Roberson M.D.;  Location: SURGERY [facility];  Service:    • PUMP REVISION  10/8/2013    Performed by Pari Roberson M.D. at [facility]   • PUMP INSERT/REMOVE  3/12/2013    Performed by Pari Roberson M.D. at [facility]   • CATH PLACE PERM EPIDURAL  2012    Performed by PARI ROBERSON at [facility]   • CATH PLACE PERM EPIDURAL  3/6/2012    Performed by PARI ROBERSON at [facility]   • MAMMOPLASTY AUGMENTATION     • TONSILLECTOMY       Family History   Problem Relation Age of Onset   • Cancer Mother         Sarcoma   • Bipolar disorder Brother    • Other Brother         spina bifida   • Diabetes Maternal Grandmother    • Other Daughter         Migrains     Social History     Socioeconomic History   • Marital status: Single     Spouse name: Not on file   • Number of children: Not on file   • Years of education: Not on file   • Highest education level: Not on file   Occupational History   • Not on file   Tobacco Use   • Smoking status: Former Smoker     Packs/day: 1.00     Years: 12.00     Pack years: 12.00     Types: Cigarettes     Quit date: 2008     Years since quittin.3   • Smokeless tobacco: Never Used   • Tobacco comment: Started smoking at age 11   Substance and Sexual Activity   • Alcohol use: No   • Drug use: No   • Sexual activity: Never     Partners: Male   Other Topics Concern   • Primary/coprimary nurse & associates Not Asked   • Family contact information Not Asked   • OK to release patient information to the  following Not Asked   • Patient preferred routine/Privacy concerns Not Asked   • Patient likes and dislikes Not Asked   • Participating In Research Study Not Asked   • Miscellaneous Not Asked   •  Service No   • Blood Transfusions No   • Caffeine Concern No   • Occupational Exposure No   • Hobby Hazards No   • Sleep Concern Yes   • Stress Concern No   • Weight Concern No   • Special Diet Yes     Comment: Keto   • Back Care No   • Exercise No   • Bike Helmet No   • Seat Belt Yes   • Self-Exams No   Social History Narrative   • Not on file     Social Determinants of Health     Financial Resource Strain:    • Difficulty of Paying Living Expenses:    Food Insecurity:    • Worried About Running Out of Food in the Last Year:    • Ran Out of Food in the Last Year:    Transportation Needs:    • Lack of Transportation (Medical):    • Lack of Transportation (Non-Medical):    Physical Activity:    • Days of Exercise per Week:    • Minutes of Exercise per Session:    Stress:    • Feeling of Stress :    Social Connections:    • Frequency of Communication with Friends and Family:    • Frequency of Social Gatherings with Friends and Family:    • Attends Confucianist Services:    • Active Member of Clubs or Organizations:    • Attends Club or Organization Meetings:    • Marital Status:    Intimate Partner Violence:    • Fear of Current or Ex-Partner:    • Emotionally Abused:    • Physically Abused:    • Sexually Abused:        Objective:     Vitals: BP (!) 94/60 (BP Location: Left arm, Patient Position: Sitting, BP Cuff Size: Adult)   Pulse 97   Temp 36.3 °C (97.3 °F) (Temporal)   SpO2 98%    General: Alert, pleasant, NAD aphasia, in a motorized wheelchair.  HEENT: Normocephalic.    Skin: Warm, dry, no rashes.  Extremities: No leg edema. No discoloration  Neurological: No tremors  Psych:  Affect/mood is normal, judgement is good, memory is intact, grooming is appropriate.    Assessment/Plan:     Griselda was seen today for  other and medication refill.    Diagnoses and all orders for this visit:    Urinary incontinence, unspecified type  Patient is wheelchair bound and does use Pull on small briefs 3-4 times daily for this and a large underpad. Order sent to [facility].     Neurogenic bowel  Chronic problem.  Has bowel regimen with multiple agents.  Refills provided.  -     polyethylene glycol 3350 (MIRALAX) 17 GM/SCOOP Powder; MIX AND DRINK 1 CAPFUL WITH 8 OZ OF LIQUID QD  -     bisacodyl (DULCOLAX) 10 MG Suppos; Insert 1 Suppository into the rectum every day.    Headache, unspecified headache type  On topiramate for this.  Refills provided.  -     topiramate (TOPAMAX) 25 MG Tab; TAKE 1 TABLET BY MOUTH TWICE DAILY    Chronic idiopathic constipation  Chronic.  Continue with current regimen.  Refills provided.  -     docusate sodium (DOK) 100 MG Cap; Take 1 capsule by mouth 2 times a day as needed. FOR CONSTIPATION.    Recurrent major depressive disorder, in full remission (HCC)  Stable with citalopram.  Refills provided.  -     citalopram (CELEXA) 20 MG Tab; Take 1 tablet by mouth every day.    Migraine without aura and without status migrainosus, not intractable  Chronic.  Sumatriptan effective for this.  Refills provided.  -     sumatriptan (IMITREX) 100 MG tablet; TAKE 1 TABLET BY MOUTH AT ONSET OF HEADACHE. MAY REPEAT IN 2 HOURS. MAX 2 TABLETS A DAY    Recurrent urinary tract infection  Taking tamsulosin for this.  Refills provided.  -     tamsulosin (FLOMAX) 0.4 MG capsule; Take 1 capsule by mouth every day.    Spasticity  Chronic problem.  Followed by neurology.  Refill sent on baclofen and Flexeril.  -     baclofen (LIORESAL) 10 MG Tab; Take 3 Tablets by mouth 4 times a day.  -     cyclobenzaprine (FLEXERIL) 10 mg Tab; Take 1 tablet by mouth 3 times a day as needed.    Other orders  -     diclofenac sodium 1 % Gel; Apply 4 g topically 3 times a day as needed.  -     Discontinue: lactulose (GENERLAC) 10 GM/15ML Solution; TAKE 15 TO  30 ML BY MOUTH EVERY 4 HOURS AS NEEDED              Return in about 1 year (around 4/19/2022), or if symptoms worsen or fail to improve.          Yessica WILLIAMSON.

## 2021-04-19 NOTE — LETTER
April 19, 2021        Griselda Swanson  [address] [facility] [address]        To Whom it May Concern:        Ms. Griselda Swanson is a current patient at this clinic. It has come to my attention that she would greatly benefit from having an emotional support dog with her at all times. Ms. Swanson finds this very beneficial for her emotional well being. Please assist in this accomodation.            If you have any questions or concerns, please don't hesitate to call.        Sincerely,        CLAY Triana.    Electronically Signed

## 2021-05-03 ENCOUNTER — OFFICE VISIT (OUTPATIENT)
Dept: NEUROLOGY | Facility: MEDICAL CENTER | Age: 36
End: 2021-05-03
Attending: PSYCHIATRY & NEUROLOGY
Payer: MEDICARE

## 2021-05-03 VITALS
DIASTOLIC BLOOD PRESSURE: 70 MMHG | BODY MASS INDEX: 21.5 KG/M2 | TEMPERATURE: 99 F | OXYGEN SATURATION: 99 % | HEIGHT: 67 IN | SYSTOLIC BLOOD PRESSURE: 106 MMHG | WEIGHT: 137 LBS | HEART RATE: 79 BPM

## 2021-05-03 DIAGNOSIS — F51.01 PRIMARY INSOMNIA: ICD-10-CM

## 2021-05-03 DIAGNOSIS — R25.2 SPASTICITY: ICD-10-CM

## 2021-05-03 DIAGNOSIS — G04.00 ADEM (ACUTE DISSEMINATED ENCEPHALOMYELITIS): ICD-10-CM

## 2021-05-03 DIAGNOSIS — G43.719 INTRACTABLE CHRONIC MIGRAINE WITHOUT AURA AND WITHOUT STATUS MIGRAINOSUS: ICD-10-CM

## 2021-05-03 PROCEDURE — 99215 OFFICE O/P EST HI 40 MIN: CPT | Performed by: PSYCHIATRY & NEUROLOGY

## 2021-05-03 PROCEDURE — 99212 OFFICE O/P EST SF 10 MIN: CPT | Performed by: PSYCHIATRY & NEUROLOGY

## 2021-05-03 RX ORDER — ZOLPIDEM TARTRATE 5 MG/1
5 TABLET ORAL
COMMUNITY
Start: 2021-03-28 | End: 2021-08-16 | Stop reason: SDUPTHER

## 2021-05-03 ASSESSMENT — FIBROSIS 4 INDEX: FIB4 SCORE: 0.63

## 2021-05-03 NOTE — LETTER
May 3, 2021        Griseldamadhavi Swanson        This patient has a service dog for treatment and help with her neurologic disorder. Her service dog is medically necessary for security and therapy.        Sincerely,            Melissa P Bloch, M.D.    Clinical Professor of Neurology

## 2021-05-04 NOTE — ASSESSMENT & PLAN NOTE
She has 2 bad headaches a week at this point she is taking topiramate as a preventative and sumatriptan as an abortive.  In the past she thought perhaps Imitrex injections worked better so we discussed that.  We also discussed new medication Nurtec.

## 2021-05-04 NOTE — ASSESSMENT & PLAN NOTE
Patient was in [place] she felt that the zolpidem that she took there worked better than the zolpidem she has been prescribed as an outpatient.  The color of the pill was different.  She has been getting this from her primary care.

## 2021-05-04 NOTE — PROGRESS NOTES
Chief Complaint   Patient presents with   • Follow-Up     MS       Problem List Items Addressed This Visit     ADEM (acute disseminated encephalomyelitis)     Have not seen Griselda since 2018.  She has made some progress with movement since I last saw her but she did do the MRI scans Dr. Carver ordered.  Patient had Covid November 2020 and was hospitalized as she could not have caregivers during her infection.  She was sick briefly but has recovered and it did not seem to worsen her ADEM at this point.  Patient is getting therapy and needs a letter to have assistance with her service dog.         Relevant Orders    MR-BRAIN-WITH & W/O    REFERRAL TO PHYSICAL THERAPY    Spasticity    Relevant Orders    REFERRAL TO PHYSICAL THERAPY    Primary insomnia     Patient was in [place] she felt that the zolpidem that she took there worked better than the zolpidem she has been prescribed as an outpatient.  The color of the pill was different.  She has been getting this from her primary care.         Intractable chronic migraine without aura and without status migrainosus     She has 2 bad headaches a week at this point she is taking topiramate as a preventative and sumatriptan as an abortive.  In the past she thought perhaps Imitrex injections worked better so we discussed that.  We also discussed new medication Nurtec.               History of present illness:  Griselda Swanson 35 y.o. female presents today for logic follow-up though I have not seen since 2018.  Patient had a bout of Covid and has a variety of concerns to discuss today.  Patient is interested in a referral to physical therapy specifically aqua therapy to help her move more fluidly.    Past medical history:   Past Medical History:   Diagnosis Date   • ADEM (acute disseminated encephalomyelitis)    • Back pain    • Breath shortness     since 2014 not an issue at this time (4/2018)   • C. difficile colitis 2015   • Coma ([facility]) August 2009    1 month, lesions  on brain,  unknown etiology   • Coma (HCC) 2008    Spontaneous -    • Demyelinating disorder ([facility])     demyelinating encephpomyeltis    • Encephalitis 2009   • Heart burn    • Indigestion    • Lesion of brain     unknown cuase   • MEDICAL HOME    • Migraines    • MS (multiple sclerosis) ([facility])    • Other specified disorder of intestines     constipation   • Pain    • Psychiatric problem     depression and anxiety   • Renal disorder     kidney stones   • Urinary incontinence        Past surgical history:   Past Surgical History:   Procedure Laterality Date   • PUMP INSERT/REMOVE Left 7/1/2016    Procedure: PUMP REMOVAL;  Surgeon: Pari Roberson M.D.;  Location: [facility];  Service:    • CATH PLACE PERM EPIDURAL Left 6/14/2016    Procedure: CATH PLACE PERM EPIDURAL - BACLOFEN PUMP AND CATHETER REPLACEMENT  - BACK AND ABDOMEN;  Surgeon: Pari Roberson M.D.;  Location: [facility];  Service:    • [place] BACLOFEN INTRATHECAL TRIAL  3/8/2016    Dr. Roberson  [facility]   • CATH PLACE PERM EPIDURAL N/A 3/8/2016    Procedure: BACLOFEN PUMP TRIAL;  Surgeon: Pari Roberson M.D.;  Location: [facility];  Service:    • PUMP REVISION  10/8/2013    Performed by Pari Roberson M.D. at [facility]   • PUMP INSERT/REMOVE  3/12/2013    Performed by Pari Roberson M.D. at [facility]   • CATH PLACE PERM EPIDURAL  6/26/2012    Performed by PARI ROBERSON at [facility]   • CATH PLACE PERM EPIDURAL  3/6/2012    Performed by PARI ROBERSON at [facility]   • MAMMOPLASTY AUGMENTATION  2002   • TONSILLECTOMY         Family history:   Family History   Problem Relation Age of Onset   • Cancer Mother         Sarcoma   • Bipolar disorder Brother    • Other Brother         spina bifida   • Diabetes Maternal Grandmother    • Other Daughter         Migrains       Social history:   Social History     Socioeconomic History   • Marital status: Single      Spouse name: Not on file   • Number of children: Not on file   • Years of education: Not on file   • Highest education level: Not on file   Occupational History   • Not on file   Tobacco Use   • Smoking status: Former Smoker     Packs/day: 1.00     Years: 12.00     Pack years: 12.00     Types: Cigarettes     Quit date: 2008     Years since quittin.3   • Smokeless tobacco: Never Used   • Tobacco comment: Started smoking at age 11   Substance and Sexual Activity   • Alcohol use: No   • Drug use: No   • Sexual activity: Never     Partners: Male   Other Topics Concern   • Primary/coprimary nurse & associates Not Asked   • Family contact information Not Asked   • OK to release patient information to the following Not Asked   • Patient preferred routine/Privacy concerns Not Asked   • Patient likes and dislikes Not Asked   • Participating In Research Study Not Asked   • Miscellaneous Not Asked   •  Service No   • Blood Transfusions No   • Caffeine Concern No   • Occupational Exposure No   • Hobby Hazards No   • Sleep Concern Yes   • Stress Concern No   • Weight Concern No   • Special Diet Yes     Comment: Keto   • Back Care No   • Exercise No   • Bike Helmet No   • Seat Belt Yes   • Self-Exams No   Social History Narrative   • Not on file     Social Determinants of Health     Financial Resource Strain:    • Difficulty of Paying Living Expenses:    Food Insecurity:    • Worried About Running Out of Food in the Last Year:    • Ran Out of Food in the Last Year:    Transportation Needs:    • Lack of Transportation (Medical):    • Lack of Transportation (Non-Medical):    Physical Activity:    • Days of Exercise per Week:    • Minutes of Exercise per Session:    Stress:    • Feeling of Stress :    Social Connections:    • Frequency of Communication with Friends and Family:    • Frequency of Social Gatherings with Friends and Family:    • Attends Jainism Services:    • Active Member of Clubs or  Organizations:    • Attends Club or Organization Meetings:    • Marital Status:    Intimate Partner Violence:    • Fear of Current or Ex-Partner:    • Emotionally Abused:    • Physically Abused:    • Sexually Abused:        Current medications:   Current Outpatient Medications   Medication   • polyethylene glycol 3350 (MIRALAX) 17 GM/SCOOP Powder   • topiramate (TOPAMAX) 25 MG Tab   • citalopram (CELEXA) 20 MG Tab   • sumatriptan (IMITREX) 100 MG tablet   • tamsulosin (FLOMAX) 0.4 MG capsule   • baclofen (LIORESAL) 10 MG Tab   • bisacodyl (DULCOLAX) 10 MG Suppos   • cyclobenzaprine (FLEXERIL) 10 mg Tab   • lactulose 10 GM/15ML Solution   • nitrofurantoin (MACROBID) 100 MG Cap   • albuterol 108 (90 Base) MCG/ACT Aero Soln inhalation aerosol   • clindamycin-benzoyl peroxide (BENZACLIN) gel   • Diclofenac Sodium 1 % Gel   • simethicone (MYLICON) 125 MG chewable tablet   • Calcium Carbonate Antacid (TUMS PO)   • Multiple Vitamins-Minerals (MULTIVITAMIN GUMMIES ADULTS PO)   • vitamin D (CHOLECALCIFEROL) 1000 UNIT Tab   • zolpidem (AMBIEN) 5 MG Tab   • docusate sodium (DOK) 100 MG Cap   • diclofenac sodium 1 % Gel   • HYDROcodone-acetaminophen (NORCO) 5-325 MG Tab per tablet   • Diclofenac Sodium 1 % Gel   • Misc. Devices Misc   • Misc. Devices Misc   • D-MANNOSE PO   • Misc. Devices Misc   • Misc. Devices Misc   • Misc. Devices Misc   • Misc. Devices Misc     No current facility-administered medications for this visit.       Medication Allergy:  Allergies   Allergen Reactions   • Bactrim      Reaction unknown   • Fentanyl      Makes her agressive       Review of systems:   Constitutional: denies fever, night sweats, weight loss.   Eyes: denies acute vision change, eye pain or secretion.   Ears, Nose, Mouth, Throat: denies nasal secretion, nasal bleeding, difficulty swallowing, hearing loss, tinnitus, vertigo, ear pain, acute dental problems, oral ulcers or lesions.   Endocrine: denies recent weight changes, heat or cold  "intolerance, polyuria, polydypsia, polyphagia,abnormal hair growth.  Cardiovascular: denies new onset of chest pain, palpitations, syncope, or dyspnea of exertion.  Pulmonary: denies shortness of breath, new onset of cough, hemoptysis, wheezing, chest pain or flu-like symptoms.   GI: denies nausea, vomiting, diarrhea, GI bleeding, change in appetite, abdominal pain, and change in bowel habits.  : denies dysuria, urinary incontinence, hematuria.  Heme/oncology: denies history of easy bruising or bleeding. No history of cancer, DVTor PE.  Allergy/immunology: denies hives/urticaria, or itching.   Dermatologic: denies new rash, or new skin lesions.  Musculoskeletal:denies joint swelling or pain, muscle pain, neck and back pain. Neurologic: denies headaches, acute visual changes, facial droopiness, muscle weakness (focal or generalized), paresthesias, anesthesia, ataxia, change in speech or language, memory loss, abnormal movements, seizures, loss of consciousness, or episodes of confusion.   Psychiatric: denies symptoms of depression, anxiety, hallucinations, mood swings or changes, suicidal or homicidal thoughts.     Physical examination:   Vitals:    05/03/21 1040   BP: 106/70   BP Location: Left arm   Patient Position: Sitting   BP Cuff Size: Adult   Pulse: 79   Temp: 37.2 °C (99 °F)   TempSrc: Temporal   SpO2: 99%   Weight: 62.1 kg (137 lb)   Height: 1.702 m (5' 7\")     General: Patient in no acute distress, pleasant and cooperative.  HEENT: Normocephalic, no signs of acute trauma.   Neck: supple, no meningeal signs or carotid bruits. There is normal range of motion. No tenderness on exam.   Chest: clear to auscultation. No cough.   CV: RRR, no murmurs.   Skin: no signs of acute rashes or trauma.   Musculoskeletal: joints exhibit full range of motion, without any pain to palpation. There are no signs of joint or muscle swelling. There is no tenderness to deep palpation of muscles.   Psychiatric: No hallucinatory " behavior. Denies symptoms of depression or suicidal ideation. Mood and affect appear normal on exam.     NEUROLOGICAL EXAM:   Mental status, orientation: Awake, alert and fully oriented.   Speech and language: speech aphasic and she communicates with a type board. The patient is not able to name, repeat and comprehend.   Memory: There is intact recollection of recent and remote events.   Cranial nerve exam: Pupils are 3-4 mm bilaterally and equally reactive to light and accommodation. Visual fields are intact by confrontation. Fundoscopic exam was unremarkable. There is no nystagmus on primary or secondary gaze. Intact full EOM in all directions of gaze. Face appears symmetric. Sensation in the face is intact to light touch. Uvula is midline. Palate elevates symmetrically. Tongue is midline and without any signs of tongue biting or fasciculations. Sternocleidomastoid muscles exhibit is normal strength bilaterally. Shoulder shrug is intact bilaterally.   Motor exam: She has a spastic quadriparesis. Tone is mastic. No abnormal movements were seen on exam.   Sensory exam reveals abnormal sense of light touch, proprioception, vibration and pinprick in all extremities.   Deep tendon reflexes:  3+ throughout. Plantar responses are flexor. There  clonus.   Coordination: Not be performed  Gait: The patient is in a power wheelchair    ANCILLARY DATA REVIEWED:     Lab Data Review:  No results found for this or any previous visit (from the past 24 hour(s)).    Records reviewed: Previous records from the time I last saw her until the present.      Imaging: Reviewed imaging together of her last MRI scan of her brain from 2016.          ASSESSMENT AND PLAN:    1. ADEM (acute disseminated encephalomyelitis)  Plan to order at MRI scan to look for any signs of progressive demyelinating disease.  - MR-BRAIN-WITH & W/O; Future  - REFERRAL TO PHYSICAL THERAPY  Letter explaining need for her service dog into patient today.    2.  Spasticity  Patient needs help and prefers aqua therapy at this point  - REFERRAL TO PHYSICAL THERAPY    3. Primary insomnia  We discussed that there may be done different generic forms of Ambien and to speak to her pharmacist to see which brand best matches the description and manufacture of zolpidem that she prefers.    4. Intractable chronic migraine without aura and without status migrainosus  Plan to try sample Nurtec and will do a prescription if she finds it is more helpful than Imitrex.  Plan to continue topiramate.        FOLLOW-UP:   Return in about 3 months (around 8/3/2021).      My total time spent caring for the patient on the day of the encounter was 50 minutes.   This does not include time spent on separately billable procedures/tests.    EDUCATION AND COUNSELING:  -Discussed regular exercise program and prevention of cardiovascular disease, including stroke.   -Discussed healthy lifestyle, including: healthy diet (rich in fruits, vegetables, nuts and healthy oils); proper hydration, and adequate sleep hygiene (allowing 7-8 hrs of overnight sleep).        Melissa Bloch, MD  Clinical  of Neurology [facility].   Diplomate in Neurology.   Office: [phone]  Fax: [phone]

## 2021-05-04 NOTE — ASSESSMENT & PLAN NOTE
Have not seen Griselda since 2018.  She has made some progress with movement since I last saw her but she did do the MRI scans Dr. Carver ordered.  Patient had Covid November 2020 and was hospitalized as she could not have caregivers during her infection.  She was sick briefly but has recovered and it did not seem to worsen her ADEM at this point.  Patient is getting therapy and needs a letter to have assistance with her service dog.

## 2021-06-28 ENCOUNTER — HOSPITAL ENCOUNTER (OUTPATIENT)
Dept: RADIOLOGY | Facility: MEDICAL CENTER | Age: 36
End: 2021-06-28
Attending: PSYCHIATRY & NEUROLOGY
Payer: MEDICARE

## 2021-06-28 DIAGNOSIS — G04.00 ADEM (ACUTE DISSEMINATED ENCEPHALOMYELITIS): ICD-10-CM

## 2021-06-28 PROCEDURE — A9576 INJ PROHANCE MULTIPACK: HCPCS | Performed by: PSYCHIATRY & NEUROLOGY

## 2021-06-28 PROCEDURE — 700117 HCHG RX CONTRAST REV CODE 255: Performed by: PSYCHIATRY & NEUROLOGY

## 2021-06-28 PROCEDURE — 70553 MRI BRAIN STEM W/O & W/DYE: CPT | Mod: MG

## 2021-06-28 RX ADMIN — GADOTERIDOL 10 ML: 279.3 INJECTION, SOLUTION INTRAVENOUS at 10:55

## 2021-06-29 ENCOUNTER — TELEPHONE (OUTPATIENT)
Dept: MEDICAL GROUP | Facility: MEDICAL CENTER | Age: 36
End: 2021-06-29

## 2021-06-29 NOTE — TELEPHONE ENCOUNTER
----- Message from Liana Bermeo sent at 6/28/2021 10:37 AM PDT -----  Oswald,     This patient is out of her sleeping meds. She is a [facility] pt. But say Essie in April. Please let patient know when the order is in. The family will also be sending a message through [facility] also.    Thank you  liana

## 2021-06-30 NOTE — TELEPHONE ENCOUNTER
Her zolpidem is [facility].  That is Jessica Rouse.  I will forward this note to her if I can.

## 2021-07-19 ENCOUNTER — PATIENT MESSAGE (OUTPATIENT)
Dept: MEDICAL GROUP | Facility: MEDICAL CENTER | Age: 36
End: 2021-07-19

## 2021-07-22 RX ORDER — ZOLPIDEM TARTRATE 5 MG/1
5 TABLET ORAL NIGHTLY PRN
Qty: 30 TABLET | Refills: 0 | OUTPATIENT
Start: 2021-07-22 | End: 2021-08-21

## 2021-07-24 ENCOUNTER — OFFICE VISIT (OUTPATIENT)
Dept: URGENT CARE | Facility: CLINIC | Age: 36
End: 2021-07-24
Payer: MEDICARE

## 2021-07-24 VITALS
TEMPERATURE: 98.5 F | SYSTOLIC BLOOD PRESSURE: 100 MMHG | BODY MASS INDEX: 20.88 KG/M2 | RESPIRATION RATE: 16 BRPM | DIASTOLIC BLOOD PRESSURE: 60 MMHG | OXYGEN SATURATION: 100 % | HEIGHT: 67 IN | HEART RATE: 99 BPM | WEIGHT: 133 LBS
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
## 2021-07-24 DIAGNOSIS — S50.361A INSECT BITE OF RIGHT ELBOW, INITIAL ENCOUNTER: ICD-10-CM

## 2021-07-24 DIAGNOSIS — L03.113 CELLULITIS OF RIGHT UPPER EXTREMITY: ICD-10-CM

## 2021-07-24 PROCEDURE — 99214 OFFICE O/P EST MOD 30 MIN: CPT | Performed by: NURSE PRACTITIONER

## 2021-07-24 RX ORDER — DOXYCYCLINE HYCLATE 100 MG
100 TABLET ORAL 2 TIMES DAILY
Qty: 20 TABLET | Refills: 0 | Status: SHIPPED | OUTPATIENT
Start: 2021-07-24 | End: 2021-08-03

## 2021-07-24 ASSESSMENT — FIBROSIS 4 INDEX: FIB4 SCORE: 0.65

## 2021-07-24 ASSESSMENT — ENCOUNTER SYMPTOMS
CHILLS: 1
FEVER: 0
VOMITING: 0
NAUSEA: 0

## 2021-07-24 NOTE — PROGRESS NOTES
Griselda Swanson is a 36 y.o. female who presents for Bug Bite (7/20/21 pt got an insect bite that swelled and is painful. She feels chills and bite area is oozing.)      HPI is a new problem.  Griselda is a 36-year-old female who presents with a bug bite to her right elbow.  Her elbow is getting more red and swollen.  Is started to drain some yellow drainage.  Is very painful.  She is accompanied by her caregiver today who is the primary historian with the patient.  The redness is now extending up the back of her arm and down into her forearm.  She reports that she felt chilled last night but she did not have a fever.  Treatments tried washing it with soap and water.  No other aggravating or alleviating factors.    Review of Systems   Constitutional: Positive for chills. Negative for fever and malaise/fatigue.   Gastrointestinal: Negative for nausea and vomiting.   Skin: Negative for itching.       Allergies:       Allergies   Allergen Reactions   • Bactrim      Reaction unknown   • Fentanyl      Makes her agressive       PMSFS Hx:  Past Medical History:   Diagnosis Date   • ADEM (acute disseminated encephalomyelitis)    • Back pain    • Breath shortness     since 2014 not an issue at this time (4/2018)   • C. difficile colitis 2015   • Coma ([facility]) August 2009    1 month, lesions on brain,  unknown etiology   • Coma ([facility]) 2008    Spontaneous -    • Demyelinating disorder ([facility])     demyelinating encephpomyeltis    • Encephalitis 2009   • Heart burn    • Indigestion    • Lesion of brain     unknown cuase   • MEDICAL HOME    • Migraines    • MS (multiple sclerosis) ([facility])    • Other specified disorder of intestines     constipation   • Pain    • Psychiatric problem     depression and anxiety   • Renal disorder     kidney stones   • Urinary incontinence      Past Surgical History:   Procedure Laterality Date   • PUMP INSERT/REMOVE Left 7/1/2016    Procedure: PUMP REMOVAL;  Surgeon: Catrachito Vega M.D.;  Location:  SURGERY [facility];  Service:    • CATH PLACE PERM EPIDURAL Left 2016    Procedure: CATH PLACE PERM EPIDURAL - BACLOFEN PUMP AND CATHETER REPLACEMENT  - BACK AND ABDOMEN;  Surgeon: Pari Roberson M.D.;  Location: [facility];  Service:    • [place] BACLOFEN INTRATHECAL TRIAL  3/8/2016    Dr. Roberson  [facility]   • CATH PLACE PERM EPIDURAL N/A 3/8/2016    Procedure: BACLOFEN PUMP TRIAL;  Surgeon: Pari Roberson M.D.;  Location: SURGERY [facility];  Service:    • PUMP REVISION  10/8/2013    Performed by Pari Roberson M.D. at [facility]   • PUMP INSERT/REMOVE  3/12/2013    Performed by Pari Roberson M.D. at [facility]   • CATH PLACE PERM EPIDURAL  2012    Performed by PARI ROBERSON at [facility]   • CATH PLACE PERM EPIDURAL  3/6/2012    Performed by PARI ROBERSON at [facility]   • MAMMOPLASTY AUGMENTATION     • TONSILLECTOMY       Family History   Problem Relation Age of Onset   • Cancer Mother         Sarcoma   • Bipolar disorder Brother    • Other Brother         spina bifida   • Diabetes Maternal Grandmother    • Other Daughter         Migrains     Social History     Tobacco Use   • Smoking status: Former Smoker     Packs/day: 1.00     Years: 12.00     Pack years: 12.00     Types: Cigarettes     Quit date: 2008     Years since quittin.5   • Smokeless tobacco: Never Used   • Tobacco comment: Started smoking at age 11   Substance Use Topics   • Alcohol use: No       Problems:   Patient Active Problem List   Diagnosis   • Aphasia   • ADEM (acute disseminated encephalomyelitis)   • Spasticity   • Chronic low back pain   • At high risk for falls   • Dysphagia   • Debility   • Recurrent urinary tract infection   • Recurrent major depressive disorder, in full remission (HCC)   • Primary insomnia   • Intractable chronic migraine without aura and without status migrainosus   • Urinary incontinence   • Headache,  unspecified headache type   • Chronic constipation       Medications:   Current Outpatient Medications on File Prior to Visit   Medication Sig Dispense Refill   • zolpidem (AMBIEN) 5 MG Tab Take 5 mg by mouth. FOR SLEEP.     • polyethylene glycol 3350 (MIRALAX) 17 GM/SCOOP Powder MIX AND DRINK 1 CAPFUL WITH 8 OZ OF LIQUID  g 11   • topiramate (TOPAMAX) 25 MG Tab TAKE 1 TABLET BY MOUTH TWICE DAILY 60 tablet 11   • docusate sodium (DOK) 100 MG Cap Take 1 capsule by mouth 2 times a day as needed. FOR CONSTIPATION. 60 capsule 11   • citalopram (CELEXA) 20 MG Tab Take 1 tablet by mouth every day. 90 tablet 3   • sumatriptan (IMITREX) 100 MG tablet TAKE 1 TABLET BY MOUTH AT ONSET OF HEADACHE. MAY REPEAT IN 2 HOURS. MAX 2 TABLETS A DAY 60 tablet 11   • tamsulosin (FLOMAX) 0.4 MG capsule Take 1 capsule by mouth every day. 90 capsule 3   • baclofen (LIORESAL) 10 MG Tab Take 3 Tablets by mouth 4 times a day. 360 tablet 11   • bisacodyl (DULCOLAX) 10 MG Suppos Insert 1 Suppository into the rectum every day. 50 Suppository 11   • cyclobenzaprine (FLEXERIL) 10 mg Tab Take 1 tablet by mouth 3 times a day as needed. 270 tablet 3   • diclofenac sodium 1 % Gel Apply 4 g topically 3 times a day as needed. 350 g 11   • lactulose 10 GM/15ML Solution TAKE 15 TO 30 MLS BY MOUTH EVERY 4 HOURS AS NEEDED 67371 mL 0   • HYDROcodone-acetaminophen (NORCO) 5-325 MG Tab per tablet TK 1 T PO Q 6 H PRN P FOR 10 DAYS     • Diclofenac Sodium 1 % Gel Apply 2 g to skin as directed 4 times a day as needed. 1 Tube 11   • Misc. Devices Misc Bedside commode with padding 1 Each 11   • Misc. Devices Misc New latch for new dynavox  Wheel chair 1 Each 11   • Diclofenac Sodium 1 % Gel APPLY 4 GRAMS TO KNEE AND 3 GRAMS TO ANKLES UP TO FOUR TIMES DAILY AS NEEDED 100 g 10   • Misc. Devices Misc Bedside commode 1 Each 11   • simethicone (MYLICON) 125 MG chewable tablet Take 125 mg by mouth 4 times a day as needed. Indications: Gas     • Misc. Devices Misc  "W/C stabilizer needs eval and possible replacement 1 Each 11   • Misc. Devices Misc Please eval and fix electric W/C. Please eval also for W/C needs. 1 Each 11   • Misc. Devices Misc Please allow patient to have support/therapy dog in appt. Regardless of weight due to multiple chronic illnesses and debility. 1 Each 11   • Multiple Vitamins-Minerals (MULTIVITAMIN GUMMIES ADULTS PO) Take 1 Tab by mouth every day. Indications: vitamin supplement.     • vitamin D (CHOLECALCIFEROL) 1000 UNIT Tab Take 1,000 Units by mouth 4 times a day. Indications: supplement       No current facility-administered medications on file prior to visit.          Objective:     /60 (BP Location: Right arm, Patient Position: Sitting, BP Cuff Size: Adult long)   Pulse 99   Temp 36.9 °C (98.5 °F) (Temporal)   Resp 16   Ht 1.702 m (5' 7\")   Wt 60.3 kg (133 lb)   SpO2 100%   BMI 20.83 kg/m²     Physical Exam  Vitals reviewed.   Constitutional:       General: She is not in acute distress.     Appearance: Normal appearance. She is normal weight. She is not ill-appearing.   HENT:      Mouth/Throat:      Mouth: Mucous membranes are moist.   Cardiovascular:      Rate and Rhythm: Normal rate.      Pulses: Normal pulses.   Skin:     General: Skin is warm.      Capillary Refill: Capillary refill takes less than 2 seconds.          Neurological:      Mental Status: She is alert and oriented to person, place, and time.   Psychiatric:         Mood and Affect: Mood normal.         Behavior: Behavior normal.         Thought Content: Thought content normal.         Assessment /Associated Orders:      1. Cellulitis of right upper extremity  doxycycline (VIBRAMYCIN) 100 MG Tab   2. Insect bite of right elbow, initial encounter         Medical Decision Making:    Pt is clinically stable at today's acute urgent care visit.  No acute distress noted. Appropriate for outpatient management at this time.   Acute problem today with uncertain prognosis. "   Espom salt soaks BID for 2-3 days 10-15 min intervals.   Wash daily with mild soap and water.   Educated in proper administration of medication(s) ordered today including safety, possible SE, risks, benefits, rationale and alternatives to therapy. Take with food.   OTC  analgesic of choice (acetaminophen or NSAID). Follow manufactures dosing and safety precautions.   Resume all prior  RX medications. Take as prescribed.         Advised to follow-up with the primary care provider for recheck, reevaluation, and consideration of further management if necessary.   Discussed management options (risks,benefits, and alternatives to treatment). Expressed understanding and the treatment plan was agreed upon. Questions were encouraged and answered       Return to urgent care prn if new or worsening sx or if there is no improvement in condition prn.  Educated in Red flags and indications to immediately call 911 or present to the Emergency Department.     I personally reviewed prior external notes and test results pertinent to today's visit.  I have independently reviewed and interpreted all diagnostics ordered during this urgent care acute visit.   Time spent evaluating this patient was at least 30 minutes and includes preparing for visit, counseling/education, exam and evaluation, obtaining history, independent interpretation, ordering lab/test/procedures,medication management and documentation.Time does not include separately billable procedures noted .

## 2021-07-26 DIAGNOSIS — R51.9 HEADACHE, UNSPECIFIED HEADACHE TYPE: ICD-10-CM

## 2021-07-26 DIAGNOSIS — N39.0 RECURRENT URINARY TRACT INFECTION: ICD-10-CM

## 2021-07-26 RX ORDER — TOPIRAMATE 25 MG/1
TABLET ORAL
Qty: 60 TABLET | Refills: 11 | Status: SHIPPED | OUTPATIENT
Start: 2021-07-26 | End: 2022-07-06

## 2021-07-26 NOTE — TELEPHONE ENCOUNTER
----- Message from Puja Evans sent at 7/26/2021  2:01 PM PDT -----  Regarding: med refill  Pt. Sister Emi Villalobos needs refills for Ambien and Topamax.

## 2021-07-27 RX ORDER — TAMSULOSIN HYDROCHLORIDE 0.4 MG/1
0.4 CAPSULE ORAL
Qty: 90 CAPSULE | Refills: 3 | Status: SHIPPED | OUTPATIENT
Start: 2021-07-27 | End: 2022-10-04

## 2021-07-27 RX ORDER — ZOLPIDEM TARTRATE 5 MG/1
5 TABLET ORAL
Qty: 30 TABLET | OUTPATIENT
Start: 2021-07-27

## 2021-08-03 ENCOUNTER — PATIENT MESSAGE (OUTPATIENT)
Dept: HEALTH INFORMATION MANAGEMENT | Facility: OTHER | Age: 36
End: 2021-08-03

## 2021-08-16 ENCOUNTER — HOME HEALTH ADMISSION (OUTPATIENT)
Dept: HOME HEALTH SERVICES | Facility: HOME HEALTHCARE | Age: 36
End: 2021-08-16
Payer: MEDICARE

## 2021-08-16 ENCOUNTER — OFFICE VISIT (OUTPATIENT)
Dept: NEUROLOGY | Facility: MEDICAL CENTER | Age: 36
End: 2021-08-16
Attending: PSYCHIATRY & NEUROLOGY
Payer: MEDICARE

## 2021-08-16 VITALS
HEART RATE: 98 BPM | SYSTOLIC BLOOD PRESSURE: 106 MMHG | DIASTOLIC BLOOD PRESSURE: 58 MMHG | TEMPERATURE: 97 F | OXYGEN SATURATION: 98 %

## 2021-08-16 DIAGNOSIS — G04.00 ADEM (ACUTE DISSEMINATED ENCEPHALOMYELITIS): ICD-10-CM

## 2021-08-16 DIAGNOSIS — G43.009 MIGRAINE WITHOUT AURA AND WITHOUT STATUS MIGRAINOSUS, NOT INTRACTABLE: ICD-10-CM

## 2021-08-16 DIAGNOSIS — G43.719 INTRACTABLE CHRONIC MIGRAINE WITHOUT AURA AND WITHOUT STATUS MIGRAINOSUS: ICD-10-CM

## 2021-08-16 DIAGNOSIS — F51.01 PRIMARY INSOMNIA: ICD-10-CM

## 2021-08-16 DIAGNOSIS — R25.2 SPASTICITY: ICD-10-CM

## 2021-08-16 PROCEDURE — 99215 OFFICE O/P EST HI 40 MIN: CPT | Performed by: PSYCHIATRY & NEUROLOGY

## 2021-08-16 PROCEDURE — 99212 OFFICE O/P EST SF 10 MIN: CPT | Performed by: PSYCHIATRY & NEUROLOGY

## 2021-08-16 RX ORDER — SUMATRIPTAN 100 MG/1
TABLET, FILM COATED ORAL
Qty: 60 TABLET | Refills: 11 | Status: SHIPPED | OUTPATIENT
Start: 2021-08-16 | End: 2022-09-06

## 2021-08-16 RX ORDER — ZOLPIDEM TARTRATE 5 MG/1
5 TABLET ORAL NIGHTLY PRN
Qty: 30 TABLET | Refills: 5 | Status: SHIPPED | OUTPATIENT
Start: 2021-08-16 | End: 2021-09-15

## 2021-08-16 RX ORDER — SUMATRIPTAN SUCCINATE 6 MG/.5ML
6 INJECTION, SOLUTION SUBCUTANEOUS
Qty: 15 ML | Refills: 5 | Status: SHIPPED | OUTPATIENT
Start: 2021-08-16 | End: 2021-08-25

## 2021-08-16 NOTE — ASSESSMENT & PLAN NOTE
Patient needs refills on the Ambien as melatonin did not work.  Sleep is very important for keeping her most functional.

## 2021-08-16 NOTE — ASSESSMENT & PLAN NOTE
Nurtec did not work as well as the Imitrex and she needs refills on Imitrex.  The smoke in the heat are making her headaches worse.

## 2021-08-16 NOTE — ASSESSMENT & PLAN NOTE
Patient with recent MRI scan which reviewed today which is unremarkable.  With aquatic therapy patient is getting better.  Patient does have some weakness of her neck legs and is asking for increase physical therapy at home.  Patient lives by herself and has a caregiver.  Patient needs refills on her muscle relaxers.

## 2021-08-16 NOTE — PROGRESS NOTES
Chief Complaint   Patient presents with   • Follow-Up       Problem List Items Addressed This Visit     ADEM (acute disseminated encephalomyelitis)     Patient with recent MRI scan which reviewed today which is unremarkable.  With aquatic therapy patient is getting better.  Patient does have some weakness of her neck legs and is asking for increase physical therapy at home.  Patient lives by herself and has a caregiver.  Patient needs refills on her muscle relaxers.         Relevant Orders    REFERRAL TO HOME HEALTH    Spasticity    Relevant Orders    REFERRAL TO HOME HEALTH    Primary insomnia     Patient needs refills on the Ambien as melatonin did not work.  Sleep is very important for keeping her most functional.         Relevant Medications    zolpidem (AMBIEN) 5 MG Tab    Intractable chronic migraine without aura and without status migrainosus     Nurtec did not work as well as the Imitrex and she needs refills on Imitrex.  The smoke in the heat are making her headaches worse.         Relevant Medications    SUMAtriptan Succinate Refill 6 MG/0.5ML Solution Cartridge    sumatriptan (IMITREX) 100 MG tablet      Other Visit Diagnoses     Migraine without aura and without status migrainosus, not intractable        Relevant Medications    SUMAtriptan Succinate Refill 6 MG/0.5ML Solution Cartridge    sumatriptan (IMITREX) 100 MG tablet          History of present illness:  Griselda Swanson 35 y.o. female presents today for ADEM and MRI follow-up.  Patient had a bout of Covid and has a variety of concerns to discuss today.  Patient is interested in a referral to physical therapy with [facility] very helpful to her before.    Past medical history:   Past Medical History:   Diagnosis Date   • ADEM (acute disseminated encephalomyelitis)    • Back pain    • Breath shortness     since 2014 not an issue at this time (4/2018)   • C. difficile colitis 2015   • Coma ([facility]) August 2009    1 month, lesions on brain,  unknown  etiology   • Coma ([facility]) 2008    Spontaneous -    • Demyelinating disorder ([facility])     demyelinating encephpomyeltis    • Encephalitis 2009   • Heart burn    • Indigestion    • Lesion of brain     unknown cuase   • MEDICAL HOME    • Migraines    • MS (multiple sclerosis) ([facility])    • Other specified disorder of intestines     constipation   • Pain    • Psychiatric problem     depression and anxiety   • Renal disorder     kidney stones   • Urinary incontinence        Past surgical history:   Past Surgical History:   Procedure Laterality Date   • PUMP INSERT/REMOVE Left 7/1/2016    Procedure: PUMP REMOVAL;  Surgeon: Pari Roberson M.D.;  Location[facility];  Service:    • CATH PLACE PERM EPIDURAL Left 6/14/2016    Procedure: CATH PLACE PERM EPIDURAL - BACLOFEN PUMP AND CATHETER REPLACEMENT  - BACK AND ABDOMEN;  Surgeon: Pari Roberson M.D.;  Location: [facility];  Service:    • MD BACLOFEN INTRATHECAL TRIAL  3/8/2016    Dr. Roberson  [facility]   • CATH PLACE PERM EPIDURAL N/A 3/8/2016    Procedure: BACLOFEN PUMP TRIAL;  Surgeon: Pari Roberson M.D.;  Location: [facility];  Service:    • PUMP REVISION  10/8/2013    Performed by Pari Roberson M.D. at [facility]   • PUMP INSERT/REMOVE  3/12/2013    Performed by Pari Roberson M.D. at [facility]   • CATH PLACE PERM EPIDURAL  6/26/2012    Performed by PARI ROBERSON at [facility]   • CATH PLACE PERM EPIDURAL  3/6/2012    Performed by PARI ROBERSON at [facility]   • MAMMOPLASTY AUGMENTATION  2002   • TONSILLECTOMY         Family history:   Family History   Problem Relation Age of Onset   • Cancer Mother         Sarcoma   • Bipolar disorder Brother    • Other Brother         spina bifida   • Diabetes Maternal Grandmother    • Other Daughter         Migrains       Social history:   Social History     Socioeconomic History   • Marital status: Single     Spouse name: Not  on file   • Number of children: Not on file   • Years of education: Not on file   • Highest education level: Not on file   Occupational History   • Not on file   Tobacco Use   • Smoking status: Former Smoker     Packs/day: 1.00     Years: 12.00     Pack years: 12.00     Types: Cigarettes     Quit date: 2008     Years since quittin.6   • Smokeless tobacco: Never Used   • Tobacco comment: Started smoking at age 11   Substance and Sexual Activity   • Alcohol use: No   • Drug use: No   • Sexual activity: Never     Partners: Male   Other Topics Concern   • Primary/coprimary nurse & associates Not Asked   • Family contact information Not Asked   • OK to release patient information to the following Not Asked   • Patient preferred routine/Privacy concerns Not Asked   • Patient likes and dislikes Not Asked   • Participating In Research Study Not Asked   • Miscellaneous Not Asked   •  Service No   • Blood Transfusions No   • Caffeine Concern No   • Occupational Exposure No   • Hobby Hazards No   • Sleep Concern Yes   • Stress Concern No   • Weight Concern No   • Special Diet Yes     Comment: Keto   • Back Care No   • Exercise No   • Bike Helmet No   • Seat Belt Yes   • Self-Exams No   Social History Narrative   • Not on file     Social Determinants of Health     Financial Resource Strain:    • Difficulty of Paying Living Expenses:    Food Insecurity:    • Worried About Running Out of Food in the Last Year:    • Ran Out of Food in the Last Year:    Transportation Needs:    • Lack of Transportation (Medical):    • Lack of Transportation (Non-Medical):    Physical Activity:    • Days of Exercise per Week:    • Minutes of Exercise per Session:    Stress:    • Feeling of Stress :    Social Connections:    • Frequency of Communication with Friends and Family:    • Frequency of Social Gatherings with Friends and Family:    • Attends Gnosticist Services:    • Active Member of Clubs or Organizations:    • Attends Club  or Organization Meetings:    • Marital Status:    Intimate Partner Violence:    • Fear of Current or Ex-Partner:    • Emotionally Abused:    • Physically Abused:    • Sexually Abused:        Current medications:   Current Outpatient Medications   Medication   • zolpidem (AMBIEN) 5 MG Tab   • SUMAtriptan Succinate Refill 6 MG/0.5ML Solution Cartridge   • sumatriptan (IMITREX) 100 MG tablet   • tamsulosin (FLOMAX) 0.4 MG capsule   • topiramate (TOPAMAX) 25 MG Tab   • docusate sodium (DOK) 100 MG Cap   • citalopram (CELEXA) 20 MG Tab   • baclofen (LIORESAL) 10 MG Tab   • bisacodyl (DULCOLAX) 10 MG Suppos   • cyclobenzaprine (FLEXERIL) 10 mg Tab   • lactulose 10 GM/15ML Solution   • Misc. Devices Misc   • Misc. Devices Misc   • Misc. Devices Misc   • simethicone (MYLICON) 125 MG chewable tablet   • Misc. Devices Misc   • Misc. Devices Misc   • Misc. Devices Misc   • Multiple Vitamins-Minerals (MULTIVITAMIN GUMMIES ADULTS PO)   • vitamin D (CHOLECALCIFEROL) 1000 UNIT Tab     No current facility-administered medications for this visit.       Medication Allergy:  Allergies   Allergen Reactions   • Bactrim      Reaction unknown   • Fentanyl      Makes her agressive       Review of systems:   Constitutional: denies fever, night sweats, weight loss.   Eyes: denies acute vision change, eye pain or secretion.   Ears, Nose, Mouth, Throat: denies nasal secretion, nasal bleeding, difficulty swallowing, hearing loss, tinnitus, vertigo, ear pain, acute dental problems, oral ulcers or lesions.   Endocrine: denies recent weight changes, heat or cold intolerance, polyuria, polydypsia, polyphagia,abnormal hair growth.  Cardiovascular: denies new onset of chest pain, palpitations, syncope, or dyspnea of exertion.  Pulmonary: denies shortness of breath, new onset of cough, hemoptysis, wheezing, chest pain or flu-like symptoms.   GI: denies nausea, vomiting, diarrhea, GI bleeding, change in appetite, abdominal pain, and change in bowel  habits.  : denies dysuria, urinary incontinence, hematuria.  Heme/oncology: denies history of easy bruising or bleeding. No history of cancer, DVTor PE.  Allergy/immunology: denies hives/urticaria, or itching.   Dermatologic: denies new rash, or new skin lesions.  Musculoskeletal:denies joint swelling or pain, muscle pain, neck and back pain. Neurologic: denies headaches, acute visual changes, facial droopiness, muscle weakness (focal or generalized), paresthesias, anesthesia, ataxia, change in speech or language, memory loss, abnormal movements, seizures, loss of consciousness, or episodes of confusion.   Psychiatric: denies symptoms of depression, anxiety, hallucinations, mood swings or changes, suicidal or homicidal thoughts.     Physical examination:   Vitals:    08/16/21 1016   BP: 106/58   BP Location: Left arm   Pulse: 98   Temp: 36.1 °C (97 °F)   TempSrc: Temporal   SpO2: 98%     General: Patient in no acute distress, pleasant and cooperative.  HEENT: Normocephalic, no signs of acute trauma.   Neck: supple, no meningeal signs or carotid bruits. There is normal range of motion. No tenderness on exam.   Chest: clear to auscultation. No cough.   CV: RRR, no murmurs.   Skin: no signs of acute rashes or trauma.   Musculoskeletal: joints exhibit full range of motion, without any pain to palpation. There are no signs of joint or muscle swelling. There is no tenderness to deep palpation of muscles.   Psychiatric: No hallucinatory behavior. Denies symptoms of depression or suicidal ideation. Mood and affect appear normal on exam.     NEUROLOGICAL EXAM:   Mental status, orientation: Awake, alert and fully oriented.   Speech and language: speech aphasic and she communicates with a type board. The patient is not able to name, repeat and comprehend.   Memory: There is intact recollection of recent and remote events.   Cranial nerve exam: Pupils are 3-4 mm bilaterally and equally reactive to light and accommodation.  Visual fields are intact by confrontation. Fundoscopic exam was unremarkable. There is no nystagmus on primary or secondary gaze. Intact full EOM in all directions of gaze. Face appears symmetric. Sensation in the face is intact to light touch. Uvula is midline. Palate elevates symmetrically. Tongue is midline and without any signs of tongue biting or fasciculations. Sternocleidomastoid muscles exhibit is normal strength bilaterally. Shoulder shrug is intact bilaterally.   Motor exam: She has a spastic quadriparesis. Tone is mastic. No abnormal movements were seen on exam.   Sensory exam reveals abnormal sense of light touch, proprioception, vibration and pinprick in all extremities.   Deep tendon reflexes:  3+ throughout. Plantar responses are flexor. There  clonus.   Coordination: Not be performed  Gait: The patient is in a power wheelchair    ANCILLARY DATA REVIEWED:     Lab Data Review:  No results found for this or any previous visit (from the past 24 hour(s)).    Records reviewed: Previous records from the time I last saw her until the present.      Imaging: Reviewed imaging together of her last MRI scan of her brain from 2016.          ASSESSMENT AND PLAN:    1. ADEM (acute disseminated encephalomyelitis)  Plan to order at MRI scan to look for any signs of progressive demyelinating disease.  - MR-BRAIN-WITH & W/O; Future  - REFERRAL TO PHYSICAL THERAPY  Letter explaining need for her service dog into patient today.    2. Spasticity  Patient needs help and prefers aqua therapy at this point  - REFERRAL TO PHYSICAL THERAPY    3. Primary insomnia  We discussed that there may be done different generic forms of Ambien and to speak to her pharmacist to see which brand best matches the description and manufacture of zolpidem that she prefers.    4. Intractable chronic migraine without aura and without status migrainosus  Plan to try sample Nurtec and will do a prescription if she finds it is more helpful than  Imitrex.  Plan to continue topiramate.        FOLLOW-UP:   Return in about 3 months (around 11/16/2021).      My total time spent caring for the patient on the day of the encounter was 45 minutes.   This does not include time spent on separately billable procedures/tests.    EDUCATION AND COUNSELING:  -Discussed regular exercise program and prevention of cardiovascular disease, including stroke.   -Discussed healthy lifestyle, including: healthy diet (rich in fruits, vegetables, nuts and healthy oils); proper hydration, and adequate sleep hygiene (allowing 7-8 hrs of overnight sleep).        Melissa Bloch, MD  Clinical  of Neurology [facility].   Diplomate in Neurology.   Office: [phone]  Fax: [phone]

## 2021-10-14 DIAGNOSIS — K59.04 CHRONIC IDIOPATHIC CONSTIPATION: ICD-10-CM

## 2021-10-14 RX ORDER — DOCUSATE SODIUM 100 MG/1
100 CAPSULE, LIQUID FILLED ORAL 2 TIMES DAILY PRN
Qty: 60 CAPSULE | Refills: 11 | Status: SHIPPED | OUTPATIENT
Start: 2021-10-14 | End: 2022-02-14 | Stop reason: SDUPTHER

## 2021-10-15 ENCOUNTER — TELEPHONE (OUTPATIENT)
Dept: MEDICAL GROUP | Facility: MEDICAL CENTER | Age: 36
End: 2021-10-15

## 2022-01-11 DIAGNOSIS — R25.2 SPASTICITY: ICD-10-CM

## 2022-01-11 RX ORDER — BACLOFEN 10 MG/1
30 TABLET ORAL 4 TIMES DAILY
Qty: 360 TABLET | Refills: 11 | Status: SHIPPED | OUTPATIENT
Start: 2022-01-11 | End: 2022-01-24 | Stop reason: SDUPTHER

## 2022-01-24 ENCOUNTER — TELEPHONE (OUTPATIENT)
Dept: PHYSICAL MEDICINE AND REHAB | Facility: REHABILITATION | Age: 37
End: 2022-01-24

## 2022-01-24 DIAGNOSIS — R25.2 SPASTICITY: ICD-10-CM

## 2022-01-24 RX ORDER — BACLOFEN 10 MG/1
30 TABLET ORAL 4 TIMES DAILY
Qty: 360 TABLET | Refills: 11 | Status: SHIPPED | OUTPATIENT
Start: 2022-01-24 | End: 2023-01-19

## 2022-01-24 NOTE — TELEPHONE ENCOUNTER
Griselda Swanson Key: [id number] - PA Case ID: [id number] - Rx #: [id number] help? Call us at [phone]  Status  Sent to [facility]  Drug  Baclofen 10MG tablets  Form  treadalong Electronic PA Form  Original Claim Info  309,14

## 2022-02-07 ENCOUNTER — OFFICE VISIT (OUTPATIENT)
Dept: NEUROLOGY | Facility: MEDICAL CENTER | Age: 37
End: 2022-02-07
Attending: PSYCHIATRY & NEUROLOGY
Payer: MEDICARE

## 2022-02-07 VITALS
OXYGEN SATURATION: 97 % | SYSTOLIC BLOOD PRESSURE: 110 MMHG | TEMPERATURE: 98.3 F | WEIGHT: 146 LBS | HEIGHT: 67 IN | BODY MASS INDEX: 22.91 KG/M2 | HEART RATE: 74 BPM | DIASTOLIC BLOOD PRESSURE: 68 MMHG

## 2022-02-07 DIAGNOSIS — R25.2 SPASTICITY: ICD-10-CM

## 2022-02-07 DIAGNOSIS — G47.31 CENTRAL SLEEP APNEA: ICD-10-CM

## 2022-02-07 DIAGNOSIS — F51.01 PRIMARY INSOMNIA: ICD-10-CM

## 2022-02-07 DIAGNOSIS — G04.00 ADEM (ACUTE DISSEMINATED ENCEPHALOMYELITIS): ICD-10-CM

## 2022-02-07 PROCEDURE — 99212 OFFICE O/P EST SF 10 MIN: CPT | Performed by: PSYCHIATRY & NEUROLOGY

## 2022-02-07 PROCEDURE — 99215 OFFICE O/P EST HI 40 MIN: CPT | Performed by: PSYCHIATRY & NEUROLOGY

## 2022-02-07 RX ORDER — CEFUROXIME AXETIL 250 MG/1
TABLET ORAL
COMMUNITY
Start: 2022-01-07 | End: 2022-09-06

## 2022-02-07 RX ORDER — ZOLPIDEM TARTRATE 5 MG/1
5 TABLET ORAL
COMMUNITY
Start: 2022-01-02 | End: 2022-02-28

## 2022-02-07 RX ORDER — CYCLOBENZAPRINE HCL 10 MG
10 TABLET ORAL 3 TIMES DAILY PRN
Qty: 270 TABLET | Refills: 3 | Status: SHIPPED | OUTPATIENT
Start: 2022-02-07 | End: 2023-02-13

## 2022-02-08 NOTE — PROGRESS NOTES
Chief Complaint   Patient presents with   • Follow-Up     migraines       Problem List Items Addressed This Visit     ADEM (acute disseminated encephalomyelitis)     Patient lives on her own with caregivers who come in during the day which help her to toilet and eat her meals.  Patient has a dog for companionship and iPad that she communicates with others.  With the daughter she is vocalizing more according to caregiver and could use more assistance from home physical, occupational and speech therapy to see if she can better maximize her abilities which have come along slowly over time.  Patient is working on improving her neurologic status.         Relevant Medications    cyclobenzaprine (FLEXERIL) 10 mg Tab    Other Relevant Orders    Referral to Pulmonary and Sleep Medicine    Referral to Home Health    Spasticity    Relevant Medications    cyclobenzaprine (FLEXERIL) 10 mg Tab    Other Relevant Orders    Referral to Pulmonary and Sleep Medicine    Primary insomnia     Patient complaining of difficulty staying asleep at night and feeling very poorly rested during the day.  However patient does not take naps.           Other Visit Diagnoses     Central sleep apnea        Relevant Orders    Referral to Pulmonary and Sleep Medicine          History of present illness:  Griselda Swanson 36 y.o. female presents today for new complaints of difficulty with insomnia and slight improvement in abilities related to her neurologic condition related to previous ADEM.    Past medical history:   Past Medical History:   Diagnosis Date   • ADEM (acute disseminated encephalomyelitis)    • Back pain    • Breath shortness     since 2014 not an issue at this time (4/2018)   • C. difficile colitis 2015   • Coma ([facility]) August 2009    1 month, lesions on brain,  unknown etiology   • Coma ([facility]) 2008    Spontaneous -    • Demyelinating disorder ([facility])     demyelinating encephpomyeltis    • Encephalitis 2009   • Heart burn    • Indigestion     • Lesion of brain     unknown cuase   • MEDICAL HOME    • Migraines    • MS (multiple sclerosis) (HCC)    • Other specified disorder of intestines     constipation   • Pain    • Psychiatric problem     depression and anxiety   • Renal disorder     kidney stones   • Urinary incontinence        Past surgical history:   Past Surgical History:   Procedure Laterality Date   • PUMP INSERT/REMOVE Left 7/1/2016    Procedure: PUMP REMOVAL;  Surgeon: Pari Roberson M.D.;  Location: [facility];  Service:    • CATH PLACE PERM EPIDURAL Left 6/14/2016    Procedure: CATH PLACE PERM EPIDURAL - BACLOFEN PUMP AND CATHETER REPLACEMENT  - BACK AND ABDOMEN;  Surgeon: Pari Roberson M.D.;  Location: SURGERY [facility];  Service:    • AK BACLOFEN INTRATHECAL TRIAL  3/8/2016    Dr. Roberson  [facility]   • CATH PLACE PERM EPIDURAL N/A 3/8/2016    Procedure: BACLOFEN PUMP TRIAL;  Surgeon: Pari Roberson M.D.;  Location: [facility];  Service:    • PUMP REVISION  10/8/2013    Performed by Pari Roberson M.D. at [facility]   • PUMP INSERT/REMOVE  3/12/2013    Performed by Pari Roberson M.D. at [facility]   • CATH PLACE PERM EPIDURAL  6/26/2012    Performed by PARI ROBERSON at [facility]   • CATH PLACE PERM EPIDURAL  3/6/2012    Performed by PARI ROBERSON at [facility]   • MAMMOPLASTY AUGMENTATION  2002   • TONSILLECTOMY         Family history:   Family History   Problem Relation Age of Onset   • Cancer Mother         Sarcoma   • Bipolar disorder Brother    • Other Brother         spina bifida   • Diabetes Maternal Grandmother    • Other Daughter         Migrains       Social history:   Social History     Socioeconomic History   • Marital status: Single     Spouse name: Not on file   • Number of children: Not on file   • Years of education: Not on file   • Highest education level: Not on file   Occupational History   • Not on file   Tobacco  Use   • Smoking status: Former Smoker     Packs/day: 1.00     Years: 12.00     Pack years: 12.00     Types: Cigarettes     Quit date: 2008     Years since quittin.1   • Smokeless tobacco: Never Used   • Tobacco comment: Started smoking at age 11   Substance and Sexual Activity   • Alcohol use: No   • Drug use: No   • Sexual activity: Never     Partners: Male   Other Topics Concern   • Primary/coprimary nurse & associates Not Asked   • Family contact information Not Asked   • OK to release patient information to the following Not Asked   • Patient preferred routine/Privacy concerns Not Asked   • Patient likes and dislikes Not Asked   • Participating In Research Study Not Asked   • Miscellaneous Not Asked   •  Service No   • Blood Transfusions No   • Caffeine Concern No   • Occupational Exposure No   • Hobby Hazards No   • Sleep Concern Yes   • Stress Concern No   • Weight Concern No   • Special Diet Yes     Comment: Keto   • Back Care No   • Exercise No   • Bike Helmet No   • Seat Belt Yes   • Self-Exams No   Social History Narrative   • Not on file     Social Determinants of Health     Financial Resource Strain:    • Difficulty of Paying Living Expenses: Not on file   Food Insecurity:    • Worried About Running Out of Food in the Last Year: Not on file   • Ran Out of Food in the Last Year: Not on file   Transportation Needs:    • Lack of Transportation (Medical): Not on file   • Lack of Transportation (Non-Medical): Not on file   Physical Activity:    • Days of Exercise per Week: Not on file   • Minutes of Exercise per Session: Not on file   Stress:    • Feeling of Stress : Not on file   Social Connections:    • Frequency of Communication with Friends and Family: Not on file   • Frequency of Social Gatherings with Friends and Family: Not on file   • Attends Bahai Services: Not on file   • Active Member of Clubs or Organizations: Not on file   • Attends Club or Organization Meetings: Not on file    • Marital Status: Not on file   Intimate Partner Violence:    • Fear of Current or Ex-Partner: Not on file   • Emotionally Abused: Not on file   • Physically Abused: Not on file   • Sexually Abused: Not on file   Housing Stability:    • Unable to Pay for Housing in the Last Year: Not on file   • Number of Places Lived in the Last Year: Not on file   • Unstable Housing in the Last Year: Not on file       Current medications:   Current Outpatient Medications   Medication   • zolpidem (AMBIEN) 5 MG Tab   • SUMAtriptan Succinate 6 MG/0.5ML Solution Auto-injector   • cyclobenzaprine (FLEXERIL) 10 mg Tab   • baclofen (LIORESAL) 10 MG Tab   • docusate sodium (DOK) 100 MG Cap   • sumatriptan (IMITREX) 100 MG tablet   • tamsulosin (FLOMAX) 0.4 MG capsule   • topiramate (TOPAMAX) 25 MG Tab   • citalopram (CELEXA) 20 MG Tab   • bisacodyl (DULCOLAX) 10 MG Suppos   • lactulose 10 GM/15ML Solution   • Misc. Devices Misc   • Misc. Devices Misc   • Misc. Devices Misc   • simethicone (MYLICON) 125 MG chewable tablet   • Misc. Devices Misc   • Misc. Devices Misc   • Misc. Devices Misc   • Multiple Vitamins-Minerals (MULTIVITAMIN GUMMIES ADULTS PO)   • vitamin D (CHOLECALCIFEROL) 1000 UNIT Tab     No current facility-administered medications for this visit.       Medication Allergy:  Allergies   Allergen Reactions   • Bactrim      Reaction unknown   • Fentanyl      Makes her agressive       Review of systems:   Constitutional: denies fever, night sweats, weight loss.   Eyes: denies acute vision change, eye pain or secretion.   Ears, Nose, Mouth, Throat: denies nasal secretion, nasal bleeding, difficulty swallowing, hearing loss, tinnitus, vertigo, ear pain, acute dental problems, oral ulcers or lesions.   Endocrine: denies recent weight changes, heat or cold intolerance, polyuria, polydypsia, polyphagia,abnormal hair growth.  Cardiovascular: denies new onset of chest pain, palpitations, syncope, or dyspnea of exertion.  Pulmonary:  "denies shortness of breath, new onset of cough, hemoptysis, wheezing, chest pain or flu-like symptoms.   GI: denies nausea, vomiting, diarrhea, GI bleeding, change in appetite, abdominal pain, and change in bowel habits.  : denies dysuria, urinary incontinence, hematuria.  Heme/oncology: denies history of easy bruising or bleeding. No history of cancer, DVTor PE.  Allergy/immunology: denies hives/urticaria, or itching.   Dermatologic: denies new rash, or new skin lesions.  Musculoskeletal:denies joint swelling or pain, muscle pain, neck and back pain. Neurologic: denies headaches, acute visual changes, facial droopiness, muscle weakness (focal or generalized), paresthesias, anesthesia, ataxia, change in speech or language, memory loss, abnormal movements, seizures, loss of consciousness, or episodes of confusion.   Psychiatric: denies symptoms of depression, anxiety, hallucinations, mood swings or changes, suicidal or homicidal thoughts.     Physical examination:   Vitals:    02/07/22 1117   BP: 110/68   BP Location: Right arm   Patient Position: Sitting   BP Cuff Size: Adult   Pulse: 74   Temp: 36.8 °C (98.3 °F)   TempSrc: Temporal   SpO2: 97%   Weight: 66.2 kg (146 lb)   Height: 1.702 m (5' 7\")     General: Patient in no acute distress, pleasant and cooperative.  HEENT: Normocephalic, no signs of acute trauma.   Neck: supple, no meningeal signs or carotid bruits. There is normal range of motion. No tenderness on exam.   Chest: clear to auscultation. No cough.   CV: RRR, no murmurs.   Skin: no signs of acute rashes or trauma.   Musculoskeletal: joints exhibit full range of motion, without any pain to palpation. There are no signs of joint or muscle swelling. There is no tenderness to deep palpation of muscles.   Psychiatric: No hallucinatory behavior. Denies symptoms of depression or suicidal ideation. Mood and affect appear normal on exam.     NEUROLOGICAL EXAM:   Mental status, orientation: Awake, alert and " fully oriented.   Speech and language: speech is clear and fluent. The patient is able to name, repeat and comprehend.   Memory: There is intact recollection of recent and remote events.   Cranial nerve exam: Pupils are 3-4 mm bilaterally and equally reactive to light and accommodation. Visual fields are intact by confrontation. Fundoscopic exam was unremarkable. There is no nystagmus on primary or secondary gaze. Intact full EOM in all directions of gaze. Face appears symmetric. Sensation in the face is intact to light touch. Uvula is midline. Palate elevates symmetrically. Tongue is midline and without any signs of tongue biting or fasciculations. Sternocleidomastoid muscles exhibit is normal strength bilaterally. Shoulder shrug is intact bilaterally.   Motor exam: Strength is 4/5 in all extremities. Tone is spastic. No abnormal movements were seen on exam.   Sensory exam reveals normal sense of light touch, proprioception, vibration and pinprick in all extremities.   Deep tendon reflexes:  4+ throughout. . There is clonus.   Coordination: Patient is quadriparetic in a wheelchair      ANCILLARY DATA REVIEWED:     Lab Data Review:  No results found for this or any previous visit (from the past 24 hour(s)).    Records reviewed: Reviewed previous labs and home health records      Imaging: Reviewed recent imaging studies which did not show any new structural changes.          ASSESSMENT AND PLAN:    1. Spasticity  Patient will take cyclobenzaprine at bedtime.  I recommend patient have a monkey bar trapezius added to her home to help her stretch and relieve some of her muscle spasms.  Also help with strengthening.  - cyclobenzaprine (FLEXERIL) 10 mg Tab; Take 1 Tablet by mouth 3 times a day as needed.  Dispense: 270 Tablet; Refill: 3  - Referral to Pulmonary and Sleep Medicine    2. ADEM (acute disseminated encephalomyelitis)  Plan to refer to pulmonary and sleep medicine to determine if there are any underlying  conditions related to her sleep disturbance including neurologic related central sleep apnea.  For patient to home health and hope that she can get a monkey bar trapeze set up for home such that she can increase her mobility, lengthening and stretching.  - cyclobenzaprine (FLEXERIL) 10 mg Tab; Take 1 Tablet by mouth 3 times a day as needed.  Dispense: 270 Tablet; Refill: 3  - Referral to Pulmonary and Sleep Medicine  - Referral to Home Health    I referred to home health for physical-occupational and speech therapy to see if we can improve her functionality and getting patient to be more self-sufficient at home.    3. Central sleep apnea  Please evaluate  - Referral to Pulmonary and Sleep Medicine    4. Primary insomnia  I will await any new treatments for her condition pending sleep medicine's evaluation.        FOLLOW-UP:   Return in about 6 months (around 8/7/2022).    My total time spent caring for the patient on the day of the encounter was 40 minutes.   This does not include time spent on separately billable procedures/tests.      EDUCATION AND COUNSELING:  -Discussed regular exercise program and prevention of cardiovascular disease, including stroke.   -Discussed healthy lifestyle, including: healthy diet (rich in fruits, vegetables, nuts and healthy oils); proper hydration, and adequate sleep hygiene (allowing 7-8 hrs of overnight sleep).        Melissa Bloch, MD  Clinical  of Neurology [facility] [employer].   Diplomate in Neurology.   Office: [phone]  Fax: [phone]

## 2022-02-08 NOTE — ASSESSMENT & PLAN NOTE
Patient lives on her own with caregivers who come in during the day which help her to toilet and eat her meals.  Patient has a dog for companionship and iPad that she communicates with others.  With the daughter she is vocalizing more according to caregiver and could use more assistance from home physical, occupational and speech therapy to see if she can better maximize her abilities which have come along slowly over time.  Patient is working on improving her neurologic status.

## 2022-02-08 NOTE — ASSESSMENT & PLAN NOTE
Patient complaining of difficulty staying asleep at night and feeling very poorly rested during the day.  However patient does not take naps.

## 2022-02-14 ENCOUNTER — OFFICE VISIT (OUTPATIENT)
Dept: MEDICAL GROUP | Facility: MEDICAL CENTER | Age: 37
End: 2022-02-14
Payer: MEDICARE

## 2022-02-14 VITALS
HEART RATE: 89 BPM | SYSTOLIC BLOOD PRESSURE: 116 MMHG | DIASTOLIC BLOOD PRESSURE: 70 MMHG | OXYGEN SATURATION: 97 % | RESPIRATION RATE: 16 BRPM

## 2022-02-14 DIAGNOSIS — G04.00 ADEM (ACUTE DISSEMINATED ENCEPHALOMYELITIS): ICD-10-CM

## 2022-02-14 DIAGNOSIS — R25.2 SPASTICITY: ICD-10-CM

## 2022-02-14 DIAGNOSIS — G89.29 CHRONIC NONINTRACTABLE HEADACHE, UNSPECIFIED HEADACHE TYPE: ICD-10-CM

## 2022-02-14 DIAGNOSIS — R32 URINARY INCONTINENCE, UNSPECIFIED TYPE: ICD-10-CM

## 2022-02-14 DIAGNOSIS — R51.9 CHRONIC NONINTRACTABLE HEADACHE, UNSPECIFIED HEADACHE TYPE: ICD-10-CM

## 2022-02-14 DIAGNOSIS — F51.01 PRIMARY INSOMNIA: ICD-10-CM

## 2022-02-14 DIAGNOSIS — K59.04 CHRONIC IDIOPATHIC CONSTIPATION: ICD-10-CM

## 2022-02-14 DIAGNOSIS — Z23 NEED FOR VACCINATION: ICD-10-CM

## 2022-02-14 DIAGNOSIS — G82.50 TETRAPLEGIA (HCC): ICD-10-CM

## 2022-02-14 DIAGNOSIS — K59.09 CHRONIC CONSTIPATION: ICD-10-CM

## 2022-02-14 DIAGNOSIS — F33.42 RECURRENT MAJOR DEPRESSIVE DISORDER, IN FULL REMISSION (HCC): Chronic | ICD-10-CM

## 2022-02-14 PROCEDURE — 99213 OFFICE O/P EST LOW 20 MIN: CPT | Mod: 25 | Performed by: STUDENT IN AN ORGANIZED HEALTH CARE EDUCATION/TRAINING PROGRAM

## 2022-02-14 RX ORDER — CITALOPRAM 20 MG/1
20 TABLET ORAL
Qty: 90 TABLET | Refills: 3 | Status: SHIPPED | OUTPATIENT
Start: 2022-02-14 | End: 2023-03-13

## 2022-02-14 RX ORDER — DOCUSATE SODIUM 100 MG/1
100 CAPSULE, LIQUID FILLED ORAL 2 TIMES DAILY PRN
Qty: 60 CAPSULE | Refills: 11 | Status: SHIPPED | OUTPATIENT
Start: 2022-02-14 | End: 2023-03-07

## 2022-02-14 ASSESSMENT — PATIENT HEALTH QUESTIONNAIRE - PHQ9
3. TROUBLE FALLING OR STAYING ASLEEP OR SLEEPING TOO MUCH: MORE THAN HALF THE DAYS
SUM OF ALL RESPONSES TO PHQ QUESTIONS 1-9: 5
2. FEELING DOWN, DEPRESSED, IRRITABLE, OR HOPELESS: NOT AT ALL
7. TROUBLE CONCENTRATING ON THINGS, SUCH AS READING THE NEWSPAPER OR WATCHING TELEVISION: SEVERAL DAYS
6. FEELING BAD ABOUT YOURSELF - OR THAT YOU ARE A FAILURE OR HAVE LET YOURSELF OR YOUR FAMILY DOWN: NOT AL ALL
4. FEELING TIRED OR HAVING LITTLE ENERGY: MORE THAN HALF THE DAYS
SUM OF ALL RESPONSES TO PHQ9 QUESTIONS 1 AND 2: 0
8. MOVING OR SPEAKING SO SLOWLY THAT OTHER PEOPLE COULD HAVE NOTICED. OR THE OPPOSITE, BEING SO FIGETY OR RESTLESS THAT YOU HAVE BEEN MOVING AROUND A LOT MORE THAN USUAL: NOT AT ALL
1. LITTLE INTEREST OR PLEASURE IN DOING THINGS: NOT AT ALL
9. THOUGHTS THAT YOU WOULD BE BETTER OFF DEAD, OR OF HURTING YOURSELF: NOT AT ALL
5. POOR APPETITE OR OVEREATING: NOT AT ALL

## 2022-02-14 NOTE — PROGRESS NOTES
Subjective:     CC: Establish care, constipation, depression, migraines    HPI:   Griselda presents today to establish care.  Is a previous patient of Manuel ABRAMS.    Urinary incontinence, unspecified type  Needs refills for her incontinence supplies with [employer] . Patient uses small pull on briefs, frequency 3-4 times per day and a large under pad for this.  Also taking tamsulosin and topiramate.     Neurogenic bowel  Known problem to the patient.  Taking MiraLAX and Generlac for this .  Patient continues to have difficulty with constipation.  Patient notes that she does better with the water enema but only certain caregivers provide this.  Patient is interested in the medication Linzess to treat constipation.     Headache, unspecified headache type  On topiramate for this.     Chronic idiopathic constipation  Taking Colace for this.  Patient also takes MiraLAX as needed.  Patient interested in Linzess.     Recurrent major depressive disorder, in full remission (HCC)  On citalopram for this.  PHQ-9 score at 5 today.     Migraine without aura and without status migrainosus, not intractable  Uses Imitrex as needed for this.  Has been effective. Needs refills     Recurrent urinary tract infection  Taking Flomax for this.  Needs refills.     Spasticity  Taking baclofen and Flexeril for this.  Needs refills at this time.  Continues follow-up with neurology.    ADEM  Patient continues to live on her own with caregivers to come in during the day to help her with toileting and preparing meals. Patient does have a dog for companionship and iPad that she uses to communicate. Patient is starting physical therapy and becoming more active.    ROS:  Gen: no fevers/chills  Pulm: no sob, no cough  CV: no chest pain, no palpitations  GI: no nausea/vomiting, no diarrhea  Neuro: no headaches      Objective:     Exam:  /70 (BP Location: Right arm, Patient Position: Sitting, BP Cuff Size: Adult)   Pulse 89   Resp 16    SpO2 97%  There is no height or weight on file to calculate BMI.    Gen: Alert and oriented, No apparent distress.  Neck: Neck is supple without lymphadenopathy.  Lungs: Normal effort, CTA bilaterally, no wheezes, rhonchi, or rales  CV: Regular rate and rhythm. No murmurs, rubs, or gallops.  Ext: No clubbing, cyanosis, edema.      Assessment & Plan:     36 y.o. female with the following -     1. Recurrent major depressive disorder, in full remission (HCC)  Chronic, stable. Patient continues on citalopram. Patient notes no concern for depression today but does have decreased energy and poor sleep. Patient with referral to sleep medicine.  - citalopram (CELEXA) 20 MG Tab; Take 1 Tablet by mouth every day.  Dispense: 90 Tablet; Refill: 3    2. Chronic idiopathic constipation  Chronic, stable. Will trial Linzess for chronic idiopathic constipation. Patient will need prior Auth for this medication.  - docusate sodium (DOK) 100 MG Cap; Take 1 Capsule by mouth 2 times a day as needed. FOR CONSTIPATION.  Dispense: 60 Capsule; Refill: 11  - linaCLOtide 145 MCG Cap; Take 145 mcg by mouth every morning before breakfast.  Dispense: 30 Capsule; Refill: 11    3. Need for vaccination  - Influenza Vaccine Quad Injection (PF)    4. Tetraplegia (HCC)  Chronic, stable.    5. ADEM (acute disseminated encephalomyelitis)  Chronic, stable. Patient working with physical therapy for increased mobility.    6. Spasticity  Chronic, stable. Spasticity worse at night. Patient continues on baclofen 30 mg 4 times daily.    7. Urinary incontinence, unspecified type  Chronic, stable. Patient continues on Flomax and Topamax.    8. Chronic constipation  Chronic, stable.    9. Primary insomnia  Chronic, stable. Patient continues to alternate between Ambien and melatonin.    10. Chronic nonintractable headache, unspecified headache type  Chronic, stable. Patient continues to treat with sumatriptan injections and sumatriptan tablets.      Return in  about 6 months (around 8/14/2022).    Please note that this dictation was created using voice recognition software. I have made every reasonable attempt to correct obvious errors, but I expect that there are errors of grammar and possibly content that I did not discover before finalizing the note.

## 2022-02-16 PROCEDURE — G0008 ADMIN INFLUENZA VIRUS VAC: HCPCS | Performed by: STUDENT IN AN ORGANIZED HEALTH CARE EDUCATION/TRAINING PROGRAM

## 2022-02-16 PROCEDURE — 90686 IIV4 VACC NO PRSV 0.5 ML IM: CPT | Performed by: STUDENT IN AN ORGANIZED HEALTH CARE EDUCATION/TRAINING PROGRAM

## 2022-02-17 ENCOUNTER — TELEPHONE (OUTPATIENT)
Dept: NEUROLOGY | Facility: MEDICAL CENTER | Age: 37
End: 2022-02-17
Payer: MEDICARE

## 2022-02-17 DIAGNOSIS — G04.00 ADEM (ACUTE DISSEMINATED ENCEPHALOMYELITIS): ICD-10-CM

## 2022-02-17 NOTE — TELEPHONE ENCOUNTER
[phone]  [facility]  Called requesting a trapeze bar for bed. Stated it would be very helpful for pt to have

## 2022-02-27 DIAGNOSIS — F51.01 PRIMARY INSOMNIA: ICD-10-CM

## 2022-02-28 RX ORDER — ZOLPIDEM TARTRATE 5 MG/1
TABLET ORAL
Qty: 30 TABLET | Refills: 1 | Status: SHIPPED | OUTPATIENT
Start: 2022-02-28 | End: 2022-07-28

## 2022-04-04 ENCOUNTER — TELEPHONE (OUTPATIENT)
Dept: NEUROLOGY | Facility: MEDICAL CENTER | Age: 37
End: 2022-04-04
Payer: MEDICARE

## 2022-04-04 NOTE — TELEPHONE ENCOUNTER
lauryn baca   Key: [id number] - PA Case ID: [id number] help? Call us at [phone]  Status  Sent to Natalie  Cannot find matching patient  Drug  Cyclobenzaprine HCl 10MG tablets  Form  [employer] Electronic PA Form

## 2022-04-04 NOTE — TELEPHONE ENCOUNTER
NIA RIVAS APPROVED   PA Case: [id number], Status: Approved, Coverage Starts on: 1/1/2022 12:00:00 AM, Coverage Ends on: 12/31/2022 12:00:00 AM. Questions? Contact [phone].

## 2022-05-24 ENCOUNTER — APPOINTMENT (OUTPATIENT)
Dept: RADIOLOGY | Facility: MEDICAL CENTER | Age: 37
End: 2022-05-24
Attending: EMERGENCY MEDICINE
Payer: MEDICARE

## 2022-05-24 ENCOUNTER — HOSPITAL ENCOUNTER (EMERGENCY)
Facility: MEDICAL CENTER | Age: 37
End: 2022-05-24
Attending: EMERGENCY MEDICINE
Payer: MEDICARE

## 2022-05-24 VITALS
DIASTOLIC BLOOD PRESSURE: 78 MMHG | WEIGHT: 146 LBS | TEMPERATURE: 97.5 F | RESPIRATION RATE: 16 BRPM | BODY MASS INDEX: 22.91 KG/M2 | HEART RATE: 108 BPM | OXYGEN SATURATION: 97 % | SYSTOLIC BLOOD PRESSURE: 118 MMHG | HEIGHT: 67 IN

## 2022-05-24 DIAGNOSIS — S09.90XA CLOSED HEAD INJURY, INITIAL ENCOUNTER: ICD-10-CM

## 2022-05-24 DIAGNOSIS — S90.512A ABRASION OF LEFT ANKLE, INITIAL ENCOUNTER: ICD-10-CM

## 2022-05-24 DIAGNOSIS — S01.81XA FACIAL LACERATION, INITIAL ENCOUNTER: ICD-10-CM

## 2022-05-24 PROCEDURE — 99283 EMERGENCY DEPT VISIT LOW MDM: CPT

## 2022-05-24 PROCEDURE — 304217 HCHG IRRIGATION SYSTEM

## 2022-05-24 PROCEDURE — 304999 HCHG REPAIR-SIMPLE/INTERMED LEVEL 1

## 2022-05-24 PROCEDURE — 700101 HCHG RX REV CODE 250: Performed by: EMERGENCY MEDICINE

## 2022-05-24 PROCEDURE — 73610 X-RAY EXAM OF ANKLE: CPT | Mod: LT

## 2022-05-24 PROCEDURE — 303747 HCHG EXTRA SUTURE

## 2022-05-24 RX ORDER — LIDOCAINE HCL/EPINEPHRINE/PF 2%-1:200K
10 VIAL (ML) INJECTION ONCE
Status: COMPLETED | OUTPATIENT
Start: 2022-05-24 | End: 2022-05-24

## 2022-05-24 RX ORDER — LIDOCAINE HYDROCHLORIDE AND EPINEPHRINE BITARTRATE 20; .01 MG/ML; MG/ML
10 INJECTION, SOLUTION SUBCUTANEOUS ONCE
Status: DISCONTINUED | OUTPATIENT
Start: 2022-05-24 | End: 2022-05-24

## 2022-05-24 RX ADMIN — LIDOCAINE HYDROCHLORIDE AND EPINEPHRINE 10 ML: 20; 5 INJECTION, SOLUTION EPIDURAL; INFILTRATION; INTRACAUDAL; PERINEURAL at 14:45

## 2022-05-24 NOTE — DISCHARGE INSTRUCTIONS
The sutures are absorbable, therefore they do not need to be removed.  If you develop a fever, severe headache, multiple episodes of vomiting or have any other concerns return to the emergency department.
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
## 2022-07-25 DIAGNOSIS — F51.01 PRIMARY INSOMNIA: ICD-10-CM

## 2022-07-28 RX ORDER — ZOLPIDEM TARTRATE 5 MG/1
TABLET ORAL
Qty: 90 TABLET | Refills: 0 | Status: SHIPPED | OUTPATIENT
Start: 2022-07-28 | End: 2022-11-07

## 2022-09-04 DIAGNOSIS — G43.009 MIGRAINE WITHOUT AURA AND WITHOUT STATUS MIGRAINOSUS, NOT INTRACTABLE: ICD-10-CM

## 2022-09-06 RX ORDER — CEFUROXIME AXETIL 250 MG/1
TABLET ORAL
Qty: 15 ML | Refills: 11 | Status: SHIPPED | OUTPATIENT
Start: 2022-09-06 | End: 2023-10-17

## 2022-09-06 RX ORDER — SUMATRIPTAN 100 MG/1
TABLET, FILM COATED ORAL
Qty: 60 TABLET | Refills: 11 | Status: SHIPPED | OUTPATIENT
Start: 2022-09-06 | End: 2023-09-25

## 2022-10-04 DIAGNOSIS — N39.0 RECURRENT URINARY TRACT INFECTION: ICD-10-CM

## 2022-10-04 RX ORDER — TAMSULOSIN HYDROCHLORIDE 0.4 MG/1
0.4 CAPSULE ORAL
Qty: 90 CAPSULE | Refills: 3 | Status: SHIPPED | OUTPATIENT
Start: 2022-10-04 | End: 2023-01-17

## 2022-10-06 ENCOUNTER — OFFICE VISIT (OUTPATIENT)
Dept: MEDICAL GROUP | Facility: MEDICAL CENTER | Age: 37
End: 2022-10-06
Payer: MEDICARE

## 2022-10-06 VITALS
OXYGEN SATURATION: 97 % | RESPIRATION RATE: 16 BRPM | HEART RATE: 100 BPM | BODY MASS INDEX: 20.83 KG/M2 | DIASTOLIC BLOOD PRESSURE: 72 MMHG | HEIGHT: 67 IN | SYSTOLIC BLOOD PRESSURE: 118 MMHG

## 2022-10-06 DIAGNOSIS — R05.1 ACUTE COUGH: ICD-10-CM

## 2022-10-06 PROCEDURE — 99213 OFFICE O/P EST LOW 20 MIN: CPT | Performed by: STUDENT IN AN ORGANIZED HEALTH CARE EDUCATION/TRAINING PROGRAM

## 2022-10-06 RX ORDER — BENZONATATE 100 MG/1
100 CAPSULE ORAL 3 TIMES DAILY PRN
Qty: 60 CAPSULE | Refills: 0 | Status: SHIPPED | OUTPATIENT
Start: 2022-10-06 | End: 2023-01-12

## 2022-10-06 NOTE — PROGRESS NOTES
"Subjective:     Chief Complaint   Patient presents with    Cough     Since sept 21st         HPI:   Griselda presents today with cough.  Patient is nonverbal and uses her iPad to communicate.  Patient does not bring anyone with her today.  Patient's iPad states that she caught a cold approximately 2 weeks ago and cold-like illness has improved but continues to have a cough.  Patient notes that she has been taking Mucinex for cough and finished that last night.  Patient notes that cough feels improved today.  Patient notes that she is having pain in her right ribs and believes that secondary to her cough.  Patient notes no injury, trauma or falls.    Patient requesting flu and COVID booster, discussed with patient that we do not want to give it while she still feels ill, patient can follow-up at clinic or pharmacy to get these vaccines once cough has improved.    ROS:  Gen: no fevers/chills  Pulm: no sob, no cough  CV: no chest pain, no palpitations  GI: no nausea/vomiting, no diarrhea        Objective:     Exam:  /72 (BP Location: Left arm, Patient Position: Sitting, BP Cuff Size: Adult)   Pulse 100   Resp 16   Ht 1.702 m (5' 7\")   SpO2 97%   BMI 20.83 kg/m²  Body mass index is 20.83 kg/m².    Gen: Alert and oriented, No apparent distress.  Neck: Neck is supple without lymphadenopathy.  Lungs: Normal effort, CTA bilaterally, no wheezes, rhonchi, or rales  CV: Regular rate and rhythm. No murmurs, rubs, or gallops.  Ext: No clubbing, cyanosis, edema.    Assessment & Plan:     37 y.o. female with the following -     1. Acute cough  Acute, improved.  Difficult to communicate with patient.  Lungs clear to auscultation, patient does not appear ill or in any apparent distress.  Vital signs stable.  No visible bruising and able to recreate pain by pressing/deep breathing.  Discussed with patient possible costochondritis/pleuritis.  Patient encouraged to continue monitoring symptoms.  Discussed signs and symptoms " that would warrant emergent evaluation.  Will treat cough with Tessalon.      Return if symptoms worsen or fail to improve.    Please note that this dictation was created using voice recognition software. I have made every reasonable attempt to correct obvious errors, but I expect that there are errors of grammar and possibly content that I did not discover before finalizing the note.

## 2022-11-11 ENCOUNTER — PATIENT MESSAGE (OUTPATIENT)
Dept: HEALTH INFORMATION MANAGEMENT | Facility: OTHER | Age: 37
End: 2022-11-11

## 2023-01-12 ENCOUNTER — OFFICE VISIT (OUTPATIENT)
Dept: MEDICAL GROUP | Facility: MEDICAL CENTER | Age: 38
End: 2023-01-12
Payer: MEDICARE

## 2023-01-12 VITALS
RESPIRATION RATE: 16 BRPM | HEART RATE: 104 BPM | BODY MASS INDEX: 20.83 KG/M2 | HEIGHT: 67 IN | TEMPERATURE: 97.4 F | OXYGEN SATURATION: 100 % | DIASTOLIC BLOOD PRESSURE: 72 MMHG | SYSTOLIC BLOOD PRESSURE: 118 MMHG

## 2023-01-12 DIAGNOSIS — Z23 NEED FOR VACCINATION: ICD-10-CM

## 2023-01-12 DIAGNOSIS — K59.04 CHRONIC IDIOPATHIC CONSTIPATION: ICD-10-CM

## 2023-01-12 DIAGNOSIS — S90.415A ABRASION OF TOE OF LEFT FOOT, INITIAL ENCOUNTER: ICD-10-CM

## 2023-01-12 PROCEDURE — 99213 OFFICE O/P EST LOW 20 MIN: CPT | Mod: 25 | Performed by: STUDENT IN AN ORGANIZED HEALTH CARE EDUCATION/TRAINING PROGRAM

## 2023-01-12 PROCEDURE — 90686 IIV4 VACC NO PRSV 0.5 ML IM: CPT | Performed by: STUDENT IN AN ORGANIZED HEALTH CARE EDUCATION/TRAINING PROGRAM

## 2023-01-12 PROCEDURE — G0008 ADMIN INFLUENZA VIRUS VAC: HCPCS | Performed by: STUDENT IN AN ORGANIZED HEALTH CARE EDUCATION/TRAINING PROGRAM

## 2023-01-12 ASSESSMENT — PATIENT HEALTH QUESTIONNAIRE - PHQ9: CLINICAL INTERPRETATION OF PHQ2 SCORE: 0

## 2023-01-12 NOTE — PROGRESS NOTES
"Subjective:     Chief Complaint   Patient presents with    Other     Left leg sore         HPI:   Griselda presents today with sore on left leg. Patient is nonverbal and uses her iPad to communicate.  Does bring a caregiver with her today.    Wound on toe  Patient has a small wound on the bottom of her left big toe.  Unclear how patient got this other than standing to pivot.  Patient notes that it is sensitive when standing.  Patient has been keeping antibiotic ointment and Band-Aid over wound.  Wound is no longer open.  Does have significant contractures to her feet and toes, previously evaluated by orthopedic who recommended no further operation/treatment.  Patient uses cotton to separate toes to prevent irritation.  Patient does have poor circulation to lower extremity likely due to chronic positioning.  Discussed compression socks and leg elevation.      ROS:  Gen: no fevers/chills  Pulm: no sob, no cough  CV: no chest pain, no palpitations  GI: no nausea/vomiting, no diarrhea        Objective:     Exam:  /72 (BP Location: Right arm, Patient Position: Sitting, BP Cuff Size: Adult)   Pulse (!) 104   Temp 36.3 °C (97.4 °F) (Temporal)   Resp 16   Ht 1.702 m (5' 7\")   SpO2 100%   BMI 20.83 kg/m²  Body mass index is 20.83 kg/m².    Gen: Alert and oriented, No apparent distress.  Neck: Neck is supple without lymphadenopathy.  Lungs: Normal effort, CTA bilaterally, no wheezes, rhonchi, or rales  CV: Regular rate and rhythm. No murmurs, rubs, or gallops.  Ext: No clubbing, cyanosis, edema.  Feet: Wound on posterior first digit, no openings, small puncture like hematoma, already healed, no surrounding erythema.  Poor cap refill to bilateral feet.  No other open wounds.  No wounds between toes despite contractures.    Assessment & Plan:     37 y.o. female with the following -     1. Abrasion of toe of left foot, initial encounter  Abrasion is closed and does not appear infected at this time.  We will continue to " monitor.  Discussed antibiotic ointment and continuing to monitor wound.    2. Chronic idiopathic constipation  - linaCLOtide 145 MCG Cap; Take 145 mcg by mouth every morning before breakfast.  Dispense: 30 Capsule; Refill: 11    3. Need for vaccination  - Influenza Vaccine Quad Injection (PF)     No follow-ups on file.    Please note that this dictation was created using voice recognition software. I have made every reasonable attempt to correct obvious errors, but I expect that there are errors of grammar and possibly content that I did not discover before finalizing the note.

## 2023-01-17 ENCOUNTER — OFFICE VISIT (OUTPATIENT)
Dept: PHYSICAL MEDICINE AND REHAB | Facility: MEDICAL CENTER | Age: 38
End: 2023-01-17
Payer: MEDICARE

## 2023-01-17 VITALS
RESPIRATION RATE: 20 BRPM | HEART RATE: 90 BPM | OXYGEN SATURATION: 98 % | DIASTOLIC BLOOD PRESSURE: 72 MMHG | SYSTOLIC BLOOD PRESSURE: 100 MMHG | TEMPERATURE: 98 F

## 2023-01-17 DIAGNOSIS — G93.40 ENCEPHALOPATHY, UNSPECIFIED: Primary | ICD-10-CM

## 2023-01-17 DIAGNOSIS — G82.50 TETRAPLEGIA (HCC): ICD-10-CM

## 2023-01-17 DIAGNOSIS — M62.49 CONTRACTURE OF MUSCLE OF MULTIPLE SITES: ICD-10-CM

## 2023-01-17 DIAGNOSIS — M25.552 ACUTE HIP PAIN, BILATERAL: ICD-10-CM

## 2023-01-17 DIAGNOSIS — M25.551 ACUTE HIP PAIN, BILATERAL: ICD-10-CM

## 2023-01-17 DIAGNOSIS — R25.2 SPASTICITY: ICD-10-CM

## 2023-01-17 DIAGNOSIS — G04.00 ADEM (ACUTE DISSEMINATED ENCEPHALOMYELITIS): ICD-10-CM

## 2023-01-17 DIAGNOSIS — R10.30 INGUINAL PAIN, UNSPECIFIED LATERALITY: ICD-10-CM

## 2023-01-17 PROCEDURE — 99214 OFFICE O/P EST MOD 30 MIN: CPT | Performed by: PHYSICAL MEDICINE & REHABILITATION

## 2023-01-17 NOTE — PATIENT INSTRUCTIONS
Today Griselda and I discussed:    Home Health Referral: This has been ordered for PT, SLP, OT, and nursing for wound care (left great toe)  Order for xrays of hips and pelvis due to new pain in groin and hips  Dr Moura: Reschedule appointment to talk about surgical release [phone]   Will place referral for Botox with Dr. Rouse in feet to see if this will help with toe curling.

## 2023-01-17 NOTE — PROGRESS NOTES
[facility]  PM&R Neuro Rehabilitation Clinic  [facility] [place] [address]  Ph: [phone]    FOLLOW UP PATIENT EVALUATION    Patient Name: Griselda Swanson   Patient : 1985  Patient Age: 37 y.o.     Examining Physician: Dr. Jessica Rouse DO    SUBJECTIVE:   Patient Identification: Griselda Swanson is a 37 y.o. 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  - First would like to know how to get her legs less stiff.  - She is continuing to take baclofen 30 mg 4 times daily.  - Is still off of the tizanidine which she has been off for a while due to side effect of sedation.  - She is having new hip and groin pain which she thinks is due to being up in the chair or sitting in bed a lot.  - She also has a sore on the bottom of her great toe.  It is callused but partially open.  - She denies any new shoes or new areas of pressure that would have caused this.  - Is wondering if she may benefit from Achilles tendon lengthening or contracture release.  - Is having a lot of foot pain and toe curling which is painful.    Review of Systems:  All other pertinent positive review of systems are noted above in HPI.     Past Medical History:  Past Medical History:   Diagnosis Date    C. difficile colitis     Coma (Formerly [facility]) 2009    1 month, lesions on brain,  unknown etiology    Encephalitis 2009    Coma (Formerly [facility])      Spontaneous -     ADEM (acute disseminated encephalomyelitis)     Back pain     Breath shortness     since 2014 not an issue at this time (4/2018)    Demyelinating disorder (HCC)     demyelinating encephpomyeltis     Heart burn     Indigestion     Lesion of brain     unknown cuase    MEDICAL HOME     Migraines     MS (multiple sclerosis) (HCC)     Other specified disorder of intestines     constipation    Pain     Psychiatric problem     depression and anxiety    Renal disorder     kidney stones    Urinary incontinence       Past Surgical History:   Procedure Laterality Date    PUMP INSERT/REMOVE Left 7/1/2016    Procedure: PUMP REMOVAL;  Surgeon: Pari Roberson M.D.;  Location: [facility];  Service:     CATH PLACE PERM EPIDURAL Left 6/14/2016    Procedure: CATH PLACE PERM EPIDURAL - BACLOFEN PUMP AND CATHETER REPLACEMENT  - BACK AND ABDOMEN;  Surgeon: Pari Roberson M.D.;  Location: [facility];  Service:     [place] BACLOFEN INTRATHECAL TRIAL  3/8/2016    Dr. Roberson  [facility]    CATH PLACE PERM EPIDURAL N/A 3/8/2016    Procedure: BACLOFEN PUMP TRIAL;  Surgeon: Pari Roberson M.D.;  Location: [facility];  Service:     PUMP REVISION  10/8/2013    Performed by Pari Roberson M.D. at [facility]    PUMP INSERT/REMOVE  3/12/2013    Performed by Pari Roberson M.D. at [facility]    CATH PLACE PERM EPIDURAL  6/26/2012    Performed by PARI ROBERSON at [facility]    CATH PLACE PERM EPIDURAL  3/6/2012    Performed by PARI ROBERSON at [facility]    MAMMOPLASTY AUGMENTATION  2002    TONSILLECTOMY          Current Outpatient Medications:     zolpidem (AMBIEN) 5 MG Tab, TAKE 1 TABLET BY MOUTH AT BEDTIME AS NEEDED FOR SLEEP, Disp: 90 Tablet, Rfl: 1    tamsulosin (FLOMAX) 0.4 MG capsule, TAKE 1 CAPSULE BY MOUTH EVERY DAY, Disp: 90 Capsule, Rfl: 3    SUMAtriptan Succinate 6 MG/0.5ML Solution Auto-injector, INJECT 6MG  SUBCUTANEOUS 1 TIMES DAILY AS NEEDED FOR UP TO 9 DAYS, Disp: 15 mL, Rfl: 11    topiramate (TOPAMAX) 25 MG Tab, TAKE 1 TABLET BY MOUTH TWICE DAILY, Disp: 60 Tablet, Rfl: 11    citalopram (CELEXA) 20 MG Tab, Take 1 Tablet by mouth every day., Disp: 90 Tablet, Rfl: 3    docusate sodium (DOK) 100 MG Cap, Take 1 Capsule by mouth 2 times a day as needed. FOR CONSTIPATION., Disp: 60 Capsule, Rfl: 11    cyclobenzaprine (FLEXERIL) 10 mg Tab, Take 1 Tablet by mouth 3 times a day as needed., Disp: 270 Tablet, Rfl: 3    Multiple Vitamins-Minerals (MULTIVITAMIN GUMMIES ADULTS PO), Take 1 Tab by mouth every day. Indications: vitamin supplement., Disp: , Rfl:     vitamin D (CHOLECALCIFEROL) 1000 UNIT Tab, Take 1,000 Units by mouth 4 times a day. Indications: supplement, Disp: , Rfl:     linaCLOtide 145 MCG Cap, Take 145 mcg by mouth every morning before breakfast., Disp: 30 Capsule, Rfl: 11    sumatriptan (IMITREX) 100 MG tablet, TAKE 1 TABLET BY MOUTH AT ONSET OF HEADACHE. MAY REPEAT IN 2 HOUR. MAX 2 TABLETS PER DAY, Disp: 60 Tablet, Rfl: 11    polyethylene glycol 3350 (MIRALAX) 17 GM/SCOOP Powder, MIX 1 CAPFUL WITH 8OZ OF WATER AND DRINK DAILY FOR CONSTIPATION, Disp: 510 g, Rfl: 0    bisacodyl (DULCOLAX) 10 MG Suppos, INSERT 1 SUPPOSITORY RECTALLY EVERY DAY, Disp: 50 Suppository, Rfl: 11    baclofen (LIORESAL) 10 MG Tab, Take 3 Tablets by mouth 4 times a day., Disp: 360 Tablet, Rfl: 11    lactulose 10 GM/15ML Solution, TAKE 15 TO 30 MLS BY MOUTH EVERY 4 HOURS AS NEEDED, Disp: 67355 mL, Rfl: 0    Misc. Devices Misc, Bedside commode with padding, Disp: 1 Each, Rfl: 11    Misc. Devices Misc, New latch for new dynavox  Wheel chair, Disp: 1 Each, Rfl: 11    Misc. Devices Misc, Bedside commode, Disp: 1 Each, Rfl: 11    simethicone (MYLICON) 125 MG chewable tablet, Take 125 mg by mouth 4 times a day as needed. Indications: Gas, Disp: , Rfl:     Misc. Devices Misc, W/C stabilizer needs eval and possible replacement, Disp: 1 Each, Rfl:  11    Misc. Devices Misc, Please eval and fix electric W/C. Please eval also for W/C needs., Disp: 1 Each, Rfl: 11    Misc. Devices Misc, Please allow patient to have support/therapy dog in appt. Regardless of weight due to multiple chronic illnesses and debility., Disp: 1 Each, Rfl: 11  Allergies   Allergen Reactions    Bactrim      Reaction unknown    Fentanyl      Makes her agressive        Past Social History:  Social History     Socioeconomic History    Marital status: Single     Spouse name: Not on file    Number of children: Not on file    Years of education: Not on file    Highest education level: Not on file   Occupational History    Not on file   Tobacco Use    Smoking status: Former     Packs/day: 1.00     Years: 12.00     Pack years: 12.00     Types: Cigarettes     Quit date: 1/1/2008     Years since quitting: 15.0    Smokeless tobacco: Never    Tobacco comments:     Started smoking at age 11   Substance and Sexual Activity    Alcohol use: No    Drug use: No    Sexual activity: Never     Partners: Male   Other Topics Concern    Primary/coprimary nurse & associates Not Asked    Family contact information Not Asked    OK to release patient information to the following Not Asked    Patient preferred routine/Privacy concerns Not Asked    Patient likes and dislikes Not Asked    Participating In Research Study Not Asked    Miscellaneous Not Asked     Service No    Blood Transfusions No    Caffeine Concern No    Occupational Exposure No    Hobby Hazards No    Sleep Concern Yes    Stress Concern No    Weight Concern No    Special Diet Yes     Comment: Keto    Back Care No    Exercise No    Bike Helmet No    Seat Belt Yes    Self-Exams No   Social History Narrative    Not on file     Social Determinants of Health     Financial Resource Strain: Not on file   Food Insecurity: Not on file   Transportation Needs: Not on file   Physical Activity: Not on file   Stress: Not on file   Social Connections: Not on  file   Intimate Partner Violence: Not on file   Housing Stability: Not on file        Family History:  Family History   Problem Relation Age of Onset    Cancer Mother         Sarcoma    Bipolar disorder Brother     Other Brother         spina bifida    Diabetes Maternal Grandmother     Other Daughter         Migrains       Depression and Opioid Screening  PHQ-9:      1/18/2021 2/14/2022 1/12/2023   Depression Screen (PHQ-2/PHQ-9)   PHQ-2 Total Score  0    PHQ-2 Total Score 0  0   PHQ-9 Total Score  5        Multiple values from one day are sorted in reverse-chronological order     Interpretation of PHQ-9 Total Score   Score Severity   1-4 No Depression   5-9 Mild Depression   10-14 Moderate Depression   15-19 Moderately Severe Depression   20-27 Severe Depression     Opioid Risk Score: 2  Interpretation of Opioid Risk Score   Score 0-3 = Low risk of abuse. Do UDS at least once per year.  Score 4-7 = Moderate risk of abuse. Do UDS 1-4 times per year.  Score 8+ = High risk of abuse. Refer to specialist.      OBJECTIVE:   Vital Signs:  Vitals:    01/17/23 1014   BP: 100/72   Pulse: 90   Resp: 20   Temp: 36.7 °C (98 °F)   SpO2: 98%        Pertinent Labs:  Lab Results   Component Value Date/Time    SODIUM 141 12/23/2019 03:54 PM    SODIUM 145 10/11/2019 11:54 AM    POTASSIUM 3.7 12/23/2019 03:54 PM    POTASSIUM 4.2 10/11/2019 11:54 AM    CHLORIDE 110 (H) 12/23/2019 03:54 PM    CHLORIDE 109 10/11/2019 11:54 AM    CO2 26 12/23/2019 03:54 PM    CO2 27 10/11/2019 11:54 AM    GLUCOSE 98 12/23/2019 03:54 PM    GLUCOSE 83 10/11/2019 11:54 AM    BUN 11 12/23/2019 03:54 PM    BUN 14 10/11/2019 11:54 AM    CREATININE 0.85 12/23/2019 03:54 PM    CREATININE 0.73 10/11/2019 11:54 AM       No results found for: HBA1C    Lab Results   Component Value Date/Time    WBC 8.1 10/11/2019 11:54 AM    RBC 4.66 12/23/2019 03:54 PM    RBC 5.05 10/11/2019 11:54 AM    HEMOGLOBIN 14.7 12/23/2019 03:54 PM    HEMOGLOBIN 15.5 10/11/2019 11:54 AM     HEMATOCRIT 44.5 12/23/2019 03:54 PM    HEMATOCRIT 48.9 (H) 10/11/2019 11:54 AM    MCV 95.6 12/23/2019 03:54 PM    MCV 96.8 10/11/2019 11:54 AM    MCH 31.6 12/23/2019 03:54 PM    MCH 30.7 10/11/2019 11:54 AM    MCHC 33 12/23/2019 03:54 PM    MCHC 31.7 (L) 10/11/2019 11:54 AM    MPV 7.8 12/23/2019 03:54 PM    MPV 10.4 10/11/2019 11:54 AM    NEUTSPOLYS 79 (H) 12/23/2019 03:54 PM    NEUTSPOLYS 61.10 10/11/2019 11:54 AM    LYMPHOCYTES 16 (L) 12/23/2019 03:54 PM    LYMPHOCYTES 32.30 10/11/2019 11:54 AM    MONOCYTES 5 12/23/2019 03:54 PM    MONOCYTES 4.90 10/11/2019 11:54 AM    EOSINOPHILS 0 (L) 12/23/2019 03:54 PM    EOSINOPHILS 0.50 10/11/2019 11:54 AM    BASOPHILS 0 12/23/2019 03:54 PM    BASOPHILS 1.00 10/11/2019 11:54 AM    ANISOCYTOSIS 1+ 07/01/2016 04:41 AM       Lab Results   Component Value Date/Time    ASTSGOT 76 (H) 12/23/2019 03:54 PM    ASTSGOT 22 10/11/2019 11:54 AM    ALTSGPT 197 (H) 12/23/2019 03:54 PM    ALTSGPT 24 10/11/2019 11:54 AM        Physical Exam:   GEN: No apparent distress  HEENT: Head normocephalic, atraumatic.  Sclera nonicteric bilaterally, no ocular discharge appreciated bilaterally.  CV: Extremities warm and well-perfused, no peripheral edema appreciated bilaterally.  PULMONARY: Breathing nonlabored on room air, no respiratory accessory muscle use.  Not requiring supplemental oxygen.  ABD: Soft, nontender.  SKIN: No appreciable skin breakdown on exposed areas of skin.  PSYCH: Mood and affect within normal limits.  NEURO: Awake alert.  Communicative appropriately through iPad.  Multiple sites of severe contracture on multiple joints.        ASSESSMENT/PLAN: Griselda Swanson  is a 37 y.o. female with rehabilitation history significant for incomplete tetraplegia secondary to postinfectious acute disseminated encephalomyelitis 2009, here for follow up evaluation. The following plan was discussed with the patient who is in agreement.     Visit Diagnoses     ICD-10-CM   1. Encephalopathy,  "unspecified  G93.40   2. Acute hip pain, bilateral  M25.551    M25.552   3. Inguinal pain, unspecified laterality  R10.30   4. Tetraplegia (HCC)  G82.50   5. Contracture of muscle of multiple sites  M62.49   6. ADEM (acute disseminated encephalomyelitis)  G04.00        Rehab/Neuro:   Incomplete tetraplegia secondary to postinfectious acute disseminated encephalomyelitis 2009: Differential diagnosis initially included tumefactive MS versus ADEM, patient initially evaluated at [facility] and then went to [facility].  Neurology has been following and given that her MRIs have been completely stable this favors ADEM. ARU at [place] in 2010.   - Reviewed all pertinent previous medical records leading up to today's clinic visit.   -Referral: Home health for speech, OT, PT    Assessment of Current Functional Status:    Spasticity: Secondary to #1 in \"rehab/neuro\" section: Status post implantation of intrathecal baclofen pump 3/12/2013, revision 6/14/2016, and subsequent removal secondary to infection 7/1/2016 (Dr. Catrachito Vega).  Despite having had IBP placed, patient and sister state that they did not notice any additional efficacy.  Has had multiple rounds of Botox injections which have been unsuccessful.  On oral anti-spasticity medications (baclofen 4 times daily-30/30/30/20 milligrams). Tizanidine increased from 2 mg 3 times daily to 4 mg 3 times daily 5/7/2020 -side effect of sleepiness, dose reduced to 3 mg 3 times daily 6/19/2020. Patient has stopped altogether as of 9/25/2020.  -Counseled that we can try Botox in her feet in order to see if this alleviates any of the toe curling pain that she gets and that if it helps with position so that she does not get skin breakdown.  Her positioning is very likely also due to concurrent orthopedic contracture for which Botox will not help.  -Referral to physiatry for Botox     Botox Plan: I plan to inject to the bilateral feet  Location Botox Amount in Units   Flexor digitorum brevis, " right 100 units (multiple locations)   Flexor digitorum, left 100 units (multiple locations)   Total Units 200 units total       The risks benefits and alternatives to this procedure were discussed and the patient wishes to proceed with the procedure. Risks include but are not limited to damage to surrounding structures, infection, bleeding, worsening of pain which can be permanent, weakness which can be permanent. Benefits include pain relief, improved function. Alternatives includes not doing the procedure.       Neurogenic Bowel:   Chronic Idiopathic Constipation  - Linaclotide 145 mcg daily per PCP    Skin: Due to neurologic diagnosis of nontraumatic tetraplegia and subsequent sensorimotor impairments, patient is at great risk for skin breakdown.   ?  DTI plantar surface great toe on the left  - Counseled on continued frequency of weight shifting q2hrs and to avoid shearing with transfers or other positionally related movements to prevent development of skin breakdown.   -Referral: Home health for wound care, may need debridement.    Nociceptive Pain:   Acute hip and groin pain -patient thinks likely positional  - Imaging: X-ray to ensure no other pathology such as HO  -Referral to home health for range of motion and stretching to assist with pain    Ortho:   Multiple Joint Contractures:  - Ortho 9/27/22 - no acute management recommended. No show to appointment with Dr. Moura 10/25/2022  -Information given for orthopedic office for her to make an appointment with the physician in order to discuss contracture release.    Follow up: 3 weeks Botox injections to the feet    Total time spent was 27 minutes.  Included in this time is the time spent preparing for the visit including record review, my exam and evaluation, counseling and education regarding that which is aforementioned in the assessment and plan.  Time was spent documenting into patient's electronic health record.  Time was spent ordering the  appropriate labs, tests, procedures, referrals, medications.  Some of the time included occurred outside of charting time.  Discussion involved the patient.       Please note that this dictation was created using voice recognition software. I have made every reasonable attempt to correct obvious errors but there may be errors of grammar and content that I may have overlooked prior to finalization of this note.    Dr. Jessica Rouse DO, MS  Department of Physical Medicine & Rehabilitation  Neuro Rehabilitation Clinic  [facility]

## 2023-01-19 RX ORDER — BACLOFEN 10 MG/1
TABLET ORAL
Qty: 360 TABLET | Refills: 11 | Status: SHIPPED | OUTPATIENT
Start: 2023-01-19 | End: 2024-02-20

## 2023-01-26 ENCOUNTER — HOSPITAL ENCOUNTER (OUTPATIENT)
Dept: RADIOLOGY | Facility: MEDICAL CENTER | Age: 38
End: 2023-01-26
Attending: PHYSICAL MEDICINE & REHABILITATION
Payer: MEDICARE

## 2023-01-26 DIAGNOSIS — R10.30 INGUINAL PAIN, UNSPECIFIED LATERALITY: ICD-10-CM

## 2023-01-26 DIAGNOSIS — M25.551 ACUTE HIP PAIN, BILATERAL: ICD-10-CM

## 2023-01-26 DIAGNOSIS — M25.552 ACUTE HIP PAIN, BILATERAL: ICD-10-CM

## 2023-01-26 PROCEDURE — 73522 X-RAY EXAM HIPS BI 3-4 VIEWS: CPT

## 2023-02-09 ENCOUNTER — APPOINTMENT (OUTPATIENT)
Dept: PHYSICAL MEDICINE AND REHAB | Facility: MEDICAL CENTER | Age: 38
End: 2023-02-09
Payer: MEDICARE

## 2023-02-10 DIAGNOSIS — G04.00 ADEM (ACUTE DISSEMINATED ENCEPHALOMYELITIS): ICD-10-CM

## 2023-02-10 DIAGNOSIS — R25.2 SPASTICITY: ICD-10-CM

## 2023-02-11 NOTE — TELEPHONE ENCOUNTER
Received request via: Pharmacy    Was the patient seen in the last year in this department? No- LOV:2/7/22, follow up scheduled 5/16/23    Does the patient have an active prescription (recently filled or refills available) for medication(s) requested? No    Does the patient have skilled nursing Plus and need 100 day supply (blood pressure, diabetes and cholesterol meds only)? Medication is not for cholesterol, blood pressure or diabetes

## 2023-02-13 RX ORDER — CYCLOBENZAPRINE HCL 10 MG
TABLET ORAL
Qty: 270 TABLET | Refills: 3 | Status: SHIPPED | OUTPATIENT
Start: 2023-02-13 | End: 2023-03-13

## 2023-02-16 ENCOUNTER — OFFICE VISIT (OUTPATIENT)
Dept: PHYSICAL MEDICINE AND REHAB | Facility: MEDICAL CENTER | Age: 38
End: 2023-02-16
Payer: MEDICARE

## 2023-02-16 VITALS
OXYGEN SATURATION: 93 % | SYSTOLIC BLOOD PRESSURE: 128 MMHG | DIASTOLIC BLOOD PRESSURE: 64 MMHG | BODY MASS INDEX: 20.83 KG/M2 | HEIGHT: 67 IN | HEART RATE: 95 BPM | TEMPERATURE: 97.5 F

## 2023-02-16 DIAGNOSIS — M62.49 CONTRACTURE OF MUSCLE OF MULTIPLE SITES: ICD-10-CM

## 2023-02-16 DIAGNOSIS — G82.50 TETRAPLEGIA (HCC): ICD-10-CM

## 2023-02-16 DIAGNOSIS — R25.2 SPASTICITY: ICD-10-CM

## 2023-02-16 DIAGNOSIS — G93.40 ENCEPHALOPATHY, UNSPECIFIED: Primary | ICD-10-CM

## 2023-02-16 DIAGNOSIS — G04.00 ADEM (ACUTE DISSEMINATED ENCEPHALOMYELITIS): ICD-10-CM

## 2023-02-16 PROCEDURE — 76942 ECHO GUIDE FOR BIOPSY: CPT | Performed by: PHYSICAL MEDICINE & REHABILITATION

## 2023-02-16 PROCEDURE — 64643 CHEMODENERV 1 EXTREM 1-4 EA: CPT | Performed by: PHYSICAL MEDICINE & REHABILITATION

## 2023-02-16 PROCEDURE — 64642 CHEMODENERV 1 EXTREMITY 1-4: CPT | Performed by: PHYSICAL MEDICINE & REHABILITATION

## 2023-02-16 PROCEDURE — 99999 PR NO CHARGE: CPT | Performed by: PHYSICAL MEDICINE & REHABILITATION

## 2023-02-16 NOTE — PROGRESS NOTES
See procedure note.    Patient presents today for Botox procedure, however this is also in FACE to FACE visit for DynaVox.  Patient has severe expressive aphasia and dysarthria secondary to postinfectious acute disseminated encephalomyelitis.

## 2023-02-16 NOTE — PROCEDURES
[facility]  Physical Medicine & Rehabilitation Clinic  [facility] [place] [address]  Ph: [phone]    PROCEDURE NOTE    Procedure: Botulinum Injection  Primary Diagnosis & Secondary Diagnoses:   Visit Diagnoses     ICD-10-CM   1. Encephalopathy, unspecified  G93.40   2. Spasticity  R25.2   3. Contracture of muscle of multiple sites  M62.49   4. Tetraplegia (HCC)  G82.50   5. ADEM (acute disseminated encephalomyelitis)  G04.00       HPI:   Toxin injections have been efficacious for spasticity. Without injections spasticity is significantly worse. Patient wishes to continue with toxin injections.     Patient has tried and failed other interventions such as conservative measures (stretching, physical therapy, occupational therapy, TENS unit) and oral pharmacologic interventions. Oral pharmacologic agents are unable to be further titrated due to medication side effects which impair function.     INFORMED CONSENT   The risks and benefits of the procedure were explained to the patient, and all questions were answered. Benefits of the injection include spasticity reduction by decrease in muscle activation following toxin injection. Adverse effects from toxin injection include but are not limited to: excessive weakness, infection, breathing and/or swallowing difficulty.  Common adverse effects from the injection itself are pain and bruising. The patient demonstrated good understanding of risks and benefits and consents to botulinum toxin injection.     Received botulinum toxin product in last 3 mos: No  Have recently received abx (aminoglycosides): No  Take anti-spasticity medications: Yes  Take allergy/cold medicine:  No  Take a sleep medicine: No  Lactating/pregnant: No  Known NMJ disease: No  Human albumin allergy: No    Pre-procedure time out was performed.     PROCEDURE:  Usual sterile procedure was observed with sterile prep with chlorhexidine. Ultrasound was used for needle guidance for the injections in  the following muscles. Ultrasound indicated to allow greater accuracy and to minimize potential damage to nerves, arteries, veins, and injection of unintended muscles. A negative aspiration of blood was obtained, and the following was injected into each muscle.    Botox Plan: I plan to inject to the bilateral feet  Location Botox Amount in Units   Flexor digitorum brevis, right 100 units (multiple locations)   Flexor digitorum brevis, left 100 units (multiple locations)   Total Units 200 units total         Injection sites were cleaned with alcohol and band aid was applied afterwards.  The patient tolerated the procedure well.  There were no complications.  The images were uploaded for permanent storage    MEDICATION USED:  200 units of botulinum toxin type A, mixed with 4mL of sterile, preservative-free normal saline in 2 1cc syringes, for a total of 50 units in 1 mL. Repeated for other vials.      Total units injected: 200 units  Total units discarded: 0 units    See MAR for Botox details.     Counseling: Pt was instructed to seek immediate medical attention if fever, difficulty breathing, difficulty swallowing, or other concerning sxs arise. RTC in 2-4 weeks to investigate for effect at peak.    BUY & BILL    BOTOX  NDC 2608-0786-26  Lot  [id number]  Exp 04/2025    Additional Plan:  Patient presents today for Botox procedure, however this is also in FACE to FACE visit for DynaVox.  Patient has severe expressive aphasia and dysarthria secondary to postinfectious acute disseminated encephalomyelitis and requires this product for communication.    Dr. Jessica Rouse DO, MS  Department of Physical Medicine & Rehabilitation  Neuro Rehabilitation Clinic  [facility]

## 2023-02-22 ENCOUNTER — TELEPHONE (OUTPATIENT)
Dept: PHYSICAL MEDICINE AND REHAB | Facility: MEDICAL CENTER | Age: 38
End: 2023-02-22
Payer: MEDICARE

## 2023-03-03 DIAGNOSIS — K59.04 CHRONIC IDIOPATHIC CONSTIPATION: ICD-10-CM

## 2023-03-07 RX ORDER — DOCUSATE SODIUM 100 MG/1
CAPSULE, LIQUID FILLED ORAL
Qty: 60 CAPSULE | Refills: 0 | Status: SHIPPED | OUTPATIENT
Start: 2023-03-07 | End: 2023-04-18

## 2023-03-16 ENCOUNTER — APPOINTMENT (OUTPATIENT)
Dept: RADIOLOGY | Facility: MEDICAL CENTER | Age: 38
DRG: 377 | End: 2023-03-16
Attending: EMERGENCY MEDICINE
Payer: MEDICARE

## 2023-03-16 ENCOUNTER — HOSPITAL ENCOUNTER (INPATIENT)
Facility: MEDICAL CENTER | Age: 38
LOS: 1 days | DRG: 377 | End: 2023-03-18
Attending: EMERGENCY MEDICINE | Admitting: STUDENT IN AN ORGANIZED HEALTH CARE EDUCATION/TRAINING PROGRAM
Payer: MEDICARE

## 2023-03-16 ENCOUNTER — ANESTHESIA EVENT (OUTPATIENT)
Dept: SURGERY | Facility: MEDICAL CENTER | Age: 38
DRG: 377 | End: 2023-03-16
Payer: MEDICARE

## 2023-03-16 ENCOUNTER — ANESTHESIA (OUTPATIENT)
Dept: SURGERY | Facility: MEDICAL CENTER | Age: 38
DRG: 377 | End: 2023-03-16
Payer: MEDICARE

## 2023-03-16 DIAGNOSIS — K92.0 HEMATEMESIS, UNSPECIFIED WHETHER NAUSEA PRESENT: ICD-10-CM

## 2023-03-16 DIAGNOSIS — K29.80 DUODENITIS: ICD-10-CM

## 2023-03-16 DIAGNOSIS — G04.00 ACUTE DEMYELINATING ENCEPHALOMYELITIS: ICD-10-CM

## 2023-03-16 LAB
ALBUMIN SERPL BCP-MCNC: 4.5 G/DL (ref 3.2–4.9)
ALBUMIN/GLOB SERPL: 1.7 G/DL
ALP SERPL-CCNC: 72 U/L (ref 30–99)
ALT SERPL-CCNC: 16 U/L (ref 2–50)
AMORPH CRY #/AREA URNS HPF: PRESENT /HPF
ANION GAP SERPL CALC-SCNC: 11 MMOL/L (ref 7–16)
APPEARANCE UR: CLEAR
APTT PPP: 28 SEC (ref 24.7–36)
AST SERPL-CCNC: 16 U/L (ref 12–45)
BACTERIA #/AREA URNS HPF: ABNORMAL /HPF
BASOPHILS # BLD AUTO: 1.1 % (ref 0–1.8)
BASOPHILS # BLD: 0.1 K/UL (ref 0–0.12)
BILIRUB SERPL-MCNC: 0.7 MG/DL (ref 0.1–1.5)
BILIRUB UR QL STRIP.AUTO: NEGATIVE
BUN SERPL-MCNC: 11 MG/DL (ref 8–22)
CALCIUM ALBUM COR SERPL-MCNC: 8.9 MG/DL (ref 8.5–10.5)
CALCIUM SERPL-MCNC: 9.3 MG/DL (ref 8.5–10.5)
CHLORIDE SERPL-SCNC: 105 MMOL/L (ref 96–112)
CO2 SERPL-SCNC: 24 MMOL/L (ref 20–33)
COLOR UR: YELLOW
CREAT SERPL-MCNC: 0.55 MG/DL (ref 0.5–1.4)
D DIMER PPP IA.FEU-MCNC: 0.29 UG/ML (FEU) (ref 0–0.5)
EKG IMPRESSION: NORMAL
EOSINOPHIL # BLD AUTO: 0.02 K/UL (ref 0–0.51)
EOSINOPHIL NFR BLD: 0.2 % (ref 0–6.9)
EPI CELLS #/AREA URNS HPF: ABNORMAL /HPF
ERYTHROCYTE [DISTWIDTH] IN BLOOD BY AUTOMATED COUNT: 43.4 FL (ref 35.9–50)
GFR SERPLBLD CREATININE-BSD FMLA CKD-EPI: 119 ML/MIN/1.73 M 2
GLOBULIN SER CALC-MCNC: 2.7 G/DL (ref 1.9–3.5)
GLUCOSE SERPL-MCNC: 89 MG/DL (ref 65–99)
GLUCOSE UR STRIP.AUTO-MCNC: NEGATIVE MG/DL
HCG SERPL QL: NEGATIVE
HCT VFR BLD AUTO: 46.2 % (ref 37–47)
HGB BLD-MCNC: 15.3 G/DL (ref 12–16)
HYALINE CASTS #/AREA URNS LPF: ABNORMAL /LPF
IMM GRANULOCYTES # BLD AUTO: 0.03 K/UL (ref 0–0.11)
IMM GRANULOCYTES NFR BLD AUTO: 0.3 % (ref 0–0.9)
INR PPP: 1.09 (ref 0.87–1.13)
KETONES UR STRIP.AUTO-MCNC: NEGATIVE MG/DL
LEUKOCYTE ESTERASE UR QL STRIP.AUTO: ABNORMAL
LIPASE SERPL-CCNC: 28 U/L (ref 11–82)
LYMPHOCYTES # BLD AUTO: 1.64 K/UL (ref 1–4.8)
LYMPHOCYTES NFR BLD: 17.9 % (ref 22–41)
MCH RBC QN AUTO: 31.2 PG (ref 27–33)
MCHC RBC AUTO-ENTMCNC: 33.1 G/DL (ref 33.6–35)
MCV RBC AUTO: 94.3 FL (ref 81.4–97.8)
MICRO URNS: ABNORMAL
MONOCYTES # BLD AUTO: 0.37 K/UL (ref 0–0.85)
MONOCYTES NFR BLD AUTO: 4 % (ref 0–13.4)
NEUTROPHILS # BLD AUTO: 7.02 K/UL (ref 2–7.15)
NEUTROPHILS NFR BLD: 76.5 % (ref 44–72)
NITRITE UR QL STRIP.AUTO: NEGATIVE
NRBC # BLD AUTO: 0 K/UL
NRBC BLD-RTO: 0 /100 WBC
PATHOLOGY CONSULT NOTE: NORMAL
PH UR STRIP.AUTO: 7 [PH] (ref 5–8)
PLATELET # BLD AUTO: 250 K/UL (ref 164–446)
PMV BLD AUTO: 9.7 FL (ref 9–12.9)
POTASSIUM SERPL-SCNC: 4 MMOL/L (ref 3.6–5.5)
PROT SERPL-MCNC: 7.2 G/DL (ref 6–8.2)
PROT UR QL STRIP: NEGATIVE MG/DL
PROTHROMBIN TIME: 14 SEC (ref 12–14.6)
RBC # BLD AUTO: 4.9 M/UL (ref 4.2–5.4)
RBC # URNS HPF: ABNORMAL /HPF
RBC UR QL AUTO: ABNORMAL
SODIUM SERPL-SCNC: 140 MMOL/L (ref 135–145)
SP GR UR STRIP.AUTO: 1.03
TROPONIN T SERPL-MCNC: 7 NG/L (ref 6–19)
TROPONIN T SERPL-MCNC: 7 NG/L (ref 6–19)
TROPONIN T SERPL-MCNC: 8 NG/L (ref 6–19)
UROBILINOGEN UR STRIP.AUTO-MCNC: 0.2 MG/DL
WBC # BLD AUTO: 9.2 K/UL (ref 4.8–10.8)
WBC #/AREA URNS HPF: ABNORMAL /HPF

## 2023-03-16 PROCEDURE — 84703 CHORIONIC GONADOTROPIN ASSAY: CPT

## 2023-03-16 PROCEDURE — 160035 HCHG PACU - 1ST 60 MINS PHASE I: Performed by: INTERNAL MEDICINE

## 2023-03-16 PROCEDURE — 80053 COMPREHEN METABOLIC PANEL: CPT

## 2023-03-16 PROCEDURE — 160009 HCHG ANES TIME/MIN: Performed by: INTERNAL MEDICINE

## 2023-03-16 PROCEDURE — 0DB68ZX EXCISION OF STOMACH, VIA NATURAL OR ARTIFICIAL OPENING ENDOSCOPIC, DIAGNOSTIC: ICD-10-PCS | Performed by: INTERNAL MEDICINE

## 2023-03-16 PROCEDURE — 84484 ASSAY OF TROPONIN QUANT: CPT

## 2023-03-16 PROCEDURE — A9270 NON-COVERED ITEM OR SERVICE: HCPCS | Performed by: STUDENT IN AN ORGANIZED HEALTH CARE EDUCATION/TRAINING PROGRAM

## 2023-03-16 PROCEDURE — 81001 URINALYSIS AUTO W/SCOPE: CPT

## 2023-03-16 PROCEDURE — 700102 HCHG RX REV CODE 250 W/ 637 OVERRIDE(OP): Performed by: STUDENT IN AN ORGANIZED HEALTH CARE EDUCATION/TRAINING PROGRAM

## 2023-03-16 PROCEDURE — 00731 ANES UPR GI NDSC PX NOS: CPT | Performed by: ANESTHESIOLOGY

## 2023-03-16 PROCEDURE — 71045 X-RAY EXAM CHEST 1 VIEW: CPT

## 2023-03-16 PROCEDURE — 85610 PROTHROMBIN TIME: CPT

## 2023-03-16 PROCEDURE — 87086 URINE CULTURE/COLONY COUNT: CPT

## 2023-03-16 PROCEDURE — 700105 HCHG RX REV CODE 258: Performed by: STUDENT IN AN ORGANIZED HEALTH CARE EDUCATION/TRAINING PROGRAM

## 2023-03-16 PROCEDURE — 74177 CT ABD & PELVIS W/CONTRAST: CPT

## 2023-03-16 PROCEDURE — 160203 HCHG ENDO MINUTES - 1ST 30 MINS LEVEL 4: Performed by: INTERNAL MEDICINE

## 2023-03-16 PROCEDURE — A9270 NON-COVERED ITEM OR SERVICE: HCPCS | Performed by: EMERGENCY MEDICINE

## 2023-03-16 PROCEDURE — 83690 ASSAY OF LIPASE: CPT

## 2023-03-16 PROCEDURE — 88305 TISSUE EXAM BY PATHOLOGIST: CPT

## 2023-03-16 PROCEDURE — 99283 EMERGENCY DEPT VISIT LOW MDM: CPT | Mod: 25 | Performed by: INTERNAL MEDICINE

## 2023-03-16 PROCEDURE — 700111 HCHG RX REV CODE 636 W/ 250 OVERRIDE (IP): Performed by: ANESTHESIOLOGY

## 2023-03-16 PROCEDURE — 85379 FIBRIN DEGRADATION QUANT: CPT

## 2023-03-16 PROCEDURE — 700117 HCHG RX CONTRAST REV CODE 255: Performed by: EMERGENCY MEDICINE

## 2023-03-16 PROCEDURE — 43239 EGD BIOPSY SINGLE/MULTIPLE: CPT | Performed by: INTERNAL MEDICINE

## 2023-03-16 PROCEDURE — 99223 1ST HOSP IP/OBS HIGH 75: CPT | Performed by: STUDENT IN AN ORGANIZED HEALTH CARE EDUCATION/TRAINING PROGRAM

## 2023-03-16 PROCEDURE — 700101 HCHG RX REV CODE 250: Performed by: ANESTHESIOLOGY

## 2023-03-16 PROCEDURE — 96375 TX/PRO/DX INJ NEW DRUG ADDON: CPT

## 2023-03-16 PROCEDURE — 93005 ELECTROCARDIOGRAM TRACING: CPT | Performed by: EMERGENCY MEDICINE

## 2023-03-16 PROCEDURE — 700111 HCHG RX REV CODE 636 W/ 250 OVERRIDE (IP): Performed by: STUDENT IN AN ORGANIZED HEALTH CARE EDUCATION/TRAINING PROGRAM

## 2023-03-16 PROCEDURE — C9113 INJ PANTOPRAZOLE SODIUM, VIA: HCPCS | Performed by: STUDENT IN AN ORGANIZED HEALTH CARE EDUCATION/TRAINING PROGRAM

## 2023-03-16 PROCEDURE — 700105 HCHG RX REV CODE 258: Performed by: ANESTHESIOLOGY

## 2023-03-16 PROCEDURE — G0378 HOSPITAL OBSERVATION PER HR: HCPCS

## 2023-03-16 PROCEDURE — 88312 SPECIAL STAINS GROUP 1: CPT | Mod: 59

## 2023-03-16 PROCEDURE — 700111 HCHG RX REV CODE 636 W/ 250 OVERRIDE (IP): Performed by: EMERGENCY MEDICINE

## 2023-03-16 PROCEDURE — 85730 THROMBOPLASTIN TIME PARTIAL: CPT

## 2023-03-16 PROCEDURE — 700102 HCHG RX REV CODE 250 W/ 637 OVERRIDE(OP): Performed by: EMERGENCY MEDICINE

## 2023-03-16 PROCEDURE — 160002 HCHG RECOVERY MINUTES (STAT): Performed by: INTERNAL MEDICINE

## 2023-03-16 PROCEDURE — 160048 HCHG OR STATISTICAL LEVEL 1-5: Performed by: INTERNAL MEDICINE

## 2023-03-16 PROCEDURE — 36415 COLL VENOUS BLD VENIPUNCTURE: CPT

## 2023-03-16 PROCEDURE — 85025 COMPLETE CBC W/AUTO DIFF WBC: CPT

## 2023-03-16 PROCEDURE — 96374 THER/PROPH/DIAG INJ IV PUSH: CPT

## 2023-03-16 PROCEDURE — 99291 CRITICAL CARE FIRST HOUR: CPT

## 2023-03-16 RX ORDER — ACETAMINOPHEN 325 MG/1
650 TABLET ORAL EVERY 6 HOURS PRN
Status: DISCONTINUED | OUTPATIENT
Start: 2023-03-16 | End: 2023-03-18 | Stop reason: HOSPADM

## 2023-03-16 RX ORDER — HYDRALAZINE HYDROCHLORIDE 20 MG/ML
5 INJECTION INTRAMUSCULAR; INTRAVENOUS
Status: DISCONTINUED | OUTPATIENT
Start: 2023-03-16 | End: 2023-03-16 | Stop reason: HOSPADM

## 2023-03-16 RX ORDER — ONDANSETRON 2 MG/ML
4 INJECTION INTRAMUSCULAR; INTRAVENOUS ONCE
Status: COMPLETED | OUTPATIENT
Start: 2023-03-16 | End: 2023-03-16

## 2023-03-16 RX ORDER — HALOPERIDOL 5 MG/ML
1 INJECTION INTRAMUSCULAR
Status: DISCONTINUED | OUTPATIENT
Start: 2023-03-16 | End: 2023-03-16 | Stop reason: HOSPADM

## 2023-03-16 RX ORDER — SODIUM CHLORIDE 9 MG/ML
INJECTION, SOLUTION INTRAVENOUS CONTINUOUS
Status: DISCONTINUED | OUTPATIENT
Start: 2023-03-16 | End: 2023-03-17

## 2023-03-16 RX ORDER — DIPHENHYDRAMINE HYDROCHLORIDE 50 MG/ML
12.5 INJECTION INTRAMUSCULAR; INTRAVENOUS
Status: DISCONTINUED | OUTPATIENT
Start: 2023-03-16 | End: 2023-03-16 | Stop reason: HOSPADM

## 2023-03-16 RX ORDER — SUMATRIPTAN 50 MG/1
100 TABLET, FILM COATED ORAL
Status: COMPLETED | OUTPATIENT
Start: 2023-03-16 | End: 2023-03-17

## 2023-03-16 RX ORDER — POLYETHYLENE GLYCOL 3350 17 G/17G
1 POWDER, FOR SOLUTION ORAL
Status: DISCONTINUED | OUTPATIENT
Start: 2023-03-16 | End: 2023-03-18 | Stop reason: HOSPADM

## 2023-03-16 RX ORDER — PANTOPRAZOLE SODIUM 40 MG/10ML
40 INJECTION, POWDER, LYOPHILIZED, FOR SOLUTION INTRAVENOUS 2 TIMES DAILY
Status: DISCONTINUED | OUTPATIENT
Start: 2023-03-16 | End: 2023-03-18 | Stop reason: HOSPADM

## 2023-03-16 RX ORDER — SIMETHICONE 125 MG
125 TABLET,CHEWABLE ORAL 4 TIMES DAILY PRN
Status: DISCONTINUED | OUTPATIENT
Start: 2023-03-16 | End: 2023-03-18 | Stop reason: HOSPADM

## 2023-03-16 RX ORDER — SODIUM CHLORIDE, SODIUM LACTATE, POTASSIUM CHLORIDE, CALCIUM CHLORIDE 600; 310; 30; 20 MG/100ML; MG/100ML; MG/100ML; MG/100ML
INJECTION, SOLUTION INTRAVENOUS
Status: DISCONTINUED | OUTPATIENT
Start: 2023-03-16 | End: 2023-03-16 | Stop reason: SURG

## 2023-03-16 RX ORDER — BACLOFEN 10 MG/1
10 TABLET ORAL 4 TIMES DAILY
Status: DISCONTINUED | OUTPATIENT
Start: 2023-03-16 | End: 2023-03-18 | Stop reason: HOSPADM

## 2023-03-16 RX ORDER — LABETALOL HYDROCHLORIDE 5 MG/ML
5 INJECTION, SOLUTION INTRAVENOUS
Status: DISCONTINUED | OUTPATIENT
Start: 2023-03-16 | End: 2023-03-16 | Stop reason: HOSPADM

## 2023-03-16 RX ORDER — EPHEDRINE SULFATE 50 MG/ML
5 INJECTION, SOLUTION INTRAVENOUS
Status: DISCONTINUED | OUTPATIENT
Start: 2023-03-16 | End: 2023-03-16 | Stop reason: HOSPADM

## 2023-03-16 RX ORDER — AMOXICILLIN 250 MG
2 CAPSULE ORAL 2 TIMES DAILY
Status: DISCONTINUED | OUTPATIENT
Start: 2023-03-16 | End: 2023-03-18 | Stop reason: HOSPADM

## 2023-03-16 RX ORDER — MEPERIDINE HYDROCHLORIDE 25 MG/ML
6.25 INJECTION INTRAMUSCULAR; INTRAVENOUS; SUBCUTANEOUS
Status: DISCONTINUED | OUTPATIENT
Start: 2023-03-16 | End: 2023-03-16 | Stop reason: HOSPADM

## 2023-03-16 RX ORDER — ONDANSETRON 2 MG/ML
4 INJECTION INTRAMUSCULAR; INTRAVENOUS
Status: DISCONTINUED | OUTPATIENT
Start: 2023-03-16 | End: 2023-03-16 | Stop reason: HOSPADM

## 2023-03-16 RX ORDER — BISACODYL 10 MG
10 SUPPOSITORY, RECTAL RECTAL
Status: DISCONTINUED | OUTPATIENT
Start: 2023-03-16 | End: 2023-03-18 | Stop reason: HOSPADM

## 2023-03-16 RX ORDER — CYCLOBENZAPRINE HCL 10 MG
10 TABLET ORAL 3 TIMES DAILY PRN
Status: DISCONTINUED | OUTPATIENT
Start: 2023-03-16 | End: 2023-03-18 | Stop reason: HOSPADM

## 2023-03-16 RX ORDER — TOPIRAMATE 25 MG/1
25 TABLET ORAL 2 TIMES DAILY
Status: DISCONTINUED | OUTPATIENT
Start: 2023-03-16 | End: 2023-03-17

## 2023-03-16 RX ORDER — LIDOCAINE HYDROCHLORIDE 20 MG/ML
INJECTION, SOLUTION EPIDURAL; INFILTRATION; INTRACAUDAL; PERINEURAL PRN
Status: DISCONTINUED | OUTPATIENT
Start: 2023-03-16 | End: 2023-03-16 | Stop reason: SURG

## 2023-03-16 RX ORDER — CITALOPRAM 20 MG/1
20 TABLET ORAL DAILY
Status: DISCONTINUED | OUTPATIENT
Start: 2023-03-17 | End: 2023-03-18 | Stop reason: HOSPADM

## 2023-03-16 RX ORDER — MORPHINE SULFATE 4 MG/ML
4 INJECTION INTRAVENOUS ONCE
Status: COMPLETED | OUTPATIENT
Start: 2023-03-16 | End: 2023-03-16

## 2023-03-16 RX ORDER — MIDAZOLAM HYDROCHLORIDE 1 MG/ML
INJECTION INTRAMUSCULAR; INTRAVENOUS PRN
Status: DISCONTINUED | OUTPATIENT
Start: 2023-03-16 | End: 2023-03-16 | Stop reason: SURG

## 2023-03-16 RX ORDER — GLYCOPYRROLATE 0.2 MG/ML
INJECTION INTRAMUSCULAR; INTRAVENOUS PRN
Status: DISCONTINUED | OUTPATIENT
Start: 2023-03-16 | End: 2023-03-16 | Stop reason: SURG

## 2023-03-16 RX ADMIN — GLYCOPYRROLATE 0.2 MG: 0.2 INJECTION INTRAMUSCULAR; INTRAVENOUS at 14:43

## 2023-03-16 RX ADMIN — TOPIRAMATE 25 MG: 25 TABLET, FILM COATED ORAL at 18:44

## 2023-03-16 RX ADMIN — MORPHINE SULFATE 4 MG: 4 INJECTION, SOLUTION INTRAMUSCULAR; INTRAVENOUS at 12:14

## 2023-03-16 RX ADMIN — BACLOFEN 10 MG: 10 TABLET ORAL at 21:35

## 2023-03-16 RX ADMIN — SUMATRIPTAN SUCCINATE 100 MG: 50 TABLET ORAL at 18:44

## 2023-03-16 RX ADMIN — PROPOFOL 50 MG: 10 INJECTION, EMULSION INTRAVENOUS at 14:48

## 2023-03-16 RX ADMIN — LIDOCAINE HYDROCHLORIDE 100 MG: 20 INJECTION, SOLUTION EPIDURAL; INFILTRATION; INTRACAUDAL at 14:48

## 2023-03-16 RX ADMIN — ONDANSETRON 4 MG: 2 INJECTION INTRAMUSCULAR; INTRAVENOUS at 12:15

## 2023-03-16 RX ADMIN — MIDAZOLAM HYDROCHLORIDE 2 MG: 1 INJECTION, SOLUTION INTRAMUSCULAR; INTRAVENOUS at 14:43

## 2023-03-16 RX ADMIN — SODIUM CHLORIDE, POTASSIUM CHLORIDE, SODIUM LACTATE AND CALCIUM CHLORIDE: 600; 310; 30; 20 INJECTION, SOLUTION INTRAVENOUS at 14:43

## 2023-03-16 RX ADMIN — PANTOPRAZOLE SODIUM 40 MG: 40 INJECTION, POWDER, FOR SOLUTION INTRAVENOUS at 18:44

## 2023-03-16 RX ADMIN — SODIUM CHLORIDE: 9 INJECTION, SOLUTION INTRAVENOUS at 18:46

## 2023-03-16 RX ADMIN — PROPOFOL 50 MG: 10 INJECTION, EMULSION INTRAVENOUS at 14:54

## 2023-03-16 RX ADMIN — IOHEXOL 100 ML: 350 INJECTION, SOLUTION INTRAVENOUS at 11:48

## 2023-03-16 RX ADMIN — DOCUSATE SODIUM 50 MG AND SENNOSIDES 8.6 MG 2 TABLET: 8.6; 5 TABLET, FILM COATED ORAL at 18:44

## 2023-03-16 RX ADMIN — LIDOCAINE HYDROCHLORIDE 30 ML: 20 SOLUTION OROPHARYNGEAL at 12:14

## 2023-03-16 RX ADMIN — BACLOFEN 10 MG: 10 TABLET ORAL at 18:44

## 2023-03-16 ASSESSMENT — LIFESTYLE VARIABLES
EVER HAD A DRINK FIRST THING IN THE MORNING TO STEADY YOUR NERVES TO GET RID OF A HANGOVER: NO
AVERAGE NUMBER OF DAYS PER WEEK YOU HAVE A DRINK CONTAINING ALCOHOL: 1
TOTAL SCORE: 0
TOTAL SCORE: 0
HOW MANY TIMES IN THE PAST YEAR HAVE YOU HAD 5 OR MORE DRINKS IN A DAY: 0
CONSUMPTION TOTAL: NEGATIVE
ON A TYPICAL DAY WHEN YOU DRINK ALCOHOL HOW MANY DRINKS DO YOU HAVE: 1
TOTAL SCORE: 0
EVER FELT BAD OR GUILTY ABOUT YOUR DRINKING: NO
HAVE PEOPLE ANNOYED YOU BY CRITICIZING YOUR DRINKING: NO
DOES PATIENT WANT TO STOP DRINKING: NO
HAVE YOU EVER FELT YOU SHOULD CUT DOWN ON YOUR DRINKING: NO
ALCOHOL_USE: YES

## 2023-03-16 ASSESSMENT — PAIN DESCRIPTION - PAIN TYPE
TYPE: SURGICAL PAIN
TYPE: ACUTE PAIN
TYPE: SURGICAL PAIN
TYPE: SURGICAL PAIN

## 2023-03-16 ASSESSMENT — PATIENT HEALTH QUESTIONNAIRE - PHQ9
1. LITTLE INTEREST OR PLEASURE IN DOING THINGS: NOT AT ALL
SUM OF ALL RESPONSES TO PHQ9 QUESTIONS 1 AND 2: 0
2. FEELING DOWN, DEPRESSED, IRRITABLE, OR HOPELESS: NOT AT ALL

## 2023-03-16 ASSESSMENT — FIBROSIS 4 INDEX: FIB4 SCORE: .624

## 2023-03-16 ASSESSMENT — PAIN SCALES - GENERAL: PAIN_LEVEL: 0

## 2023-03-16 NOTE — PROCEDURES
OPERATIVE REPORT    PATIENT:   Griselda Swanson   1985       PREOPERATIVE DIAGNOSES/INDICATIONS: Hematemesis    POSTOPERATIVE DIAGNOSIS: Gastric ulcers, duodenitis, GERD, hiatal hernia.     PROCEDURE:  ESOPHAGOGASTRODUODENOSCOPY    PHYSICIAN:  Evelin Bautista MD. MPH.    ANESTHESIA:  Per anesthesiologist.    LOCATION: [facility]    CONSENT:  OBTAINED. The risks, benefits and alternatives of the procedure were discussed in details. The risks include and are not limited to bleeding, infection, perforation, missed lesions, and sedations risks (cardiopulmonary compromise and allergic reaction to medications).    DESCRIPTION: The patient presented to the procedure room.  After routine checkup was performed, patient was brought into the endoscopy suite.  Patient was placed on his left lateral decubitus position. A bite block was placed in patient's mouth. Patient was sedated by anesthesia.  Vital signs were monitored throughout the procedure.  Oxygenation support was provided throughout the procedure. Upper endoscope was inserted into patient's mouth and advanced to the second portion of the duodenum under direct visualization.      Once the site was reached and examined, the upper endoscope was withdrawn.  Retroflexion was made within the stomach.  The stomach was decompressed, scope was withdrawn and the procedure was terminated.    The patient tolerated the procedure well.  There were no immediate complications.    OPERATIVE FINDINGS:    1. Esophagus: Z line at 36cm. GERD grade B.   2. Stomach: Hiatal hernia. Multiple ulcers at the cardia, circled around the GE junction, likely Mandeep erosion/ulcers. Some blood clot on them, but no high risk stigmata. S/p biopsy. Likely the source of bleeding. Antrum was also biopsy for the duodenitis.   3. Duodenum: Mild duodenitis.     IMPRESSION/RECOMMENDATIONS:  Gastric ulcers, duodenitis, GERD, hiatal hernia.     Recs:  Keep PPI iv bid 40mg for today and tmr. Okay to have water  today. Clear liquid tmr morning then advance the diet per the primary team.     When stable to discharge, she will go home with oral PPI 40mg bid for 6-8 weeks.     We would suggest outpatient Gi referral from PCP.     GI signs off. GI will follow up on the pathology report.     This note has been transcribed with digital voice recognition software and although it has been reviewed may contain grammatical or word errors

## 2023-03-16 NOTE — ED PROVIDER NOTES
ED Provider Note    CHIEF COMPLAINT  Chief Complaint   Patient presents with    N/V     Episode x 2 this ap apx 0500    Blood in Vomit       EXTERNAL RECORDS REVIEWED  Baseline labs were reviewed for comparison.    HPI/ROS  LIMITATION TO HISTORY   Patient is nonverbal at baseline.  History is obtained by asking her yes or no questions and having her respond with thumbs up and thumbs down.  OUTSIDE HISTORIAN(S):  Family was at bedside provided additional history of regarding hematemesis this morning.    Griselda Swanson is a 37 y.o. female who presents to the emergency department for discomfort in her epigastrium and chest and hematemesis.  The patient is nonverbal at baseline.  She is able to communicate by give me thumbs up and thumbs down.  She has epigastric pain and pain in her chest today.  Is unable to really get a quality of the pain.  Assoc with 2 episodes of emesis both which are described as bloody.  No fevers or chills.  No shortness of breath, no cough.  Denies any black stools.  Denies any history of previous upper GI bleed.  Denies any history of endoscopy.    Medicines are in the chart.  She denies any change in her medical problems or history.    PAST MEDICAL HISTORY   has a past medical history of ADEM (acute disseminated encephalomyelitis), Back pain, Breath shortness, C. difficile colitis (2015), Coma ([facility]) (August 2009), Coma ([facility]) (2008), Demyelinating disorder ([facility]), Encephalitis (2009), Heart burn, Indigestion, Lesion of brain, MEDICAL HOME, Migraines, MS (multiple sclerosis) ([facility]), Other specified disorder of intestines, Pain, Psychiatric problem, Renal disorder, and Urinary incontinence.    SURGICAL HISTORY   has a past surgical history that includes tonsillectomy; cath place perm epidural (3/6/2012); cath place perm epidural (6/26/2012); pump insert/remove (3/12/2013); mammoplasty augmentation (2002); pump revision (10/8/2013); baclofen intrathecal trial (3/8/2016); cath place perm  epidural (N/A, 3/8/2016); cath place perm epidural (Left, 6/14/2016); and pump insert/remove (Left, 7/1/2016).    FAMILY HISTORY  Family History   Problem Relation Age of Onset    Cancer Mother         Sarcoma    Bipolar disorder Brother     Other Brother         spina bifida    Diabetes Maternal Grandmother     Other Daughter         Migrains       SOCIAL HISTORY  Social History     Tobacco Use    Smoking status: Former     Packs/day: 1.00     Years: 12.00     Pack years: 12.00     Types: Cigarettes     Quit date: 1/1/2008     Years since quitting: 15.2    Smokeless tobacco: Never    Tobacco comments:     Started smoking at age 11   Substance and Sexual Activity    Alcohol use: No    Drug use: No    Sexual activity: Never     Partners: Male       CURRENT MEDICATIONS  Home Medications    **Home medications have not yet been reviewed for this encounter**         ALLERGIES  Allergies   Allergen Reactions    Bactrim      Reaction unknown    Fentanyl      Makes her agressive       PHYSICAL EXAM  VITAL SIGNS: /76   Pulse (!) 102   Temp 36.7 °C (98 °F) (Temporal)   Resp 18   Wt 60.3 kg (133 lb)   SpO2 96%   BMI 20.83 kg/m²    Constitutional: Awake alert chronically ill-appearing no acute cardiopulmonary distress  HENT: Normocephalic, Atraumatic, Bilateral external ears normal,   Eyes: PERRL, EOMI, Conjunctiva normal, No discharge.   Neck: Normal range of motion, No tenderness  Cardiovascular: Normal heart rate, Normal rhythm, No murmurs, No rubs, No gallops.   Thorax & Lungs: Normal breath sounds, No respiratory distress, No wheezing,  Abdomen: Bowel sounds normal, Soft, tender in the epigastrium no peritonitis.  Skin: Warm, Dry, No erythema, No rash.   Back: No tenderness, No CVA tenderness.   Musculoskeletal: Good range of motion in all major joints.   Neurologic: Alert,No focal deficits noted.   Psychiatric: Affect normal      DIAGNOSTIC STUDIES / PROCEDURES    Results for orders placed or performed during  the hospital encounter of 03/16/23   CBC WITH DIFFERENTIAL   Result Value Ref Range    WBC 9.2 4.8 - 10.8 K/uL    RBC 4.90 4.20 - 5.40 M/uL    Hemoglobin 15.3 12.0 - 16.0 g/dL    Hematocrit 46.2 37.0 - 47.0 %    MCV 94.3 81.4 - 97.8 fL    MCH 31.2 27.0 - 33.0 pg    MCHC 33.1 (L) 33.6 - 35.0 g/dL    RDW 43.4 35.9 - 50.0 fL    Platelet Count 250 164 - 446 K/uL    MPV 9.7 9.0 - 12.9 fL    Neutrophils-Polys 76.50 (H) 44.00 - 72.00 %    Lymphocytes 17.90 (L) 22.00 - 41.00 %    Monocytes 4.00 0.00 - 13.40 %    Eosinophils 0.20 0.00 - 6.90 %    Basophils 1.10 0.00 - 1.80 %    Immature Granulocytes 0.30 0.00 - 0.90 %    Nucleated RBC 0.00 /100 WBC    Neutrophils (Absolute) 7.02 2.00 - 7.15 K/uL    Lymphs (Absolute) 1.64 1.00 - 4.80 K/uL    Monos (Absolute) 0.37 0.00 - 0.85 K/uL    Eos (Absolute) 0.02 0.00 - 0.51 K/uL    Baso (Absolute) 0.10 0.00 - 0.12 K/uL    Immature Granulocytes (abs) 0.03 0.00 - 0.11 K/uL    NRBC (Absolute) 0.00 K/uL   COMP METABOLIC PANEL   Result Value Ref Range    Sodium 140 135 - 145 mmol/L    Potassium 4.0 3.6 - 5.5 mmol/L    Chloride 105 96 - 112 mmol/L    Co2 24 20 - 33 mmol/L    Anion Gap 11.0 7.0 - 16.0    Glucose 89 65 - 99 mg/dL    Bun 11 8 - 22 mg/dL    Creatinine 0.55 0.50 - 1.40 mg/dL    Calcium 9.3 8.5 - 10.5 mg/dL    AST(SGOT) 16 12 - 45 U/L    ALT(SGPT) 16 2 - 50 U/L    Alkaline Phosphatase 72 30 - 99 U/L    Total Bilirubin 0.7 0.1 - 1.5 mg/dL    Albumin 4.5 3.2 - 4.9 g/dL    Total Protein 7.2 6.0 - 8.2 g/dL    Globulin 2.7 1.9 - 3.5 g/dL    A-G Ratio 1.7 g/dL   LIPASE   Result Value Ref Range    Lipase 28 11 - 82 U/L   URINALYSIS CULTURE, IF INDICATED    Specimen: Urine, Clean Catch   Result Value Ref Range    Color Yellow     Character Clear     Specific Gravity 1.029 <1.035    Ph 7.0 5.0 - 8.0    Glucose Negative Negative mg/dL    Ketones Negative Negative mg/dL    Protein Negative Negative mg/dL    Bilirubin Negative Negative    Urobilinogen, Urine 0.2 Negative    Nitrite Negative  Negative    Leukocyte Esterase Moderate (A) Negative    Occult Blood Small (A) Negative    Micro Urine Req Microscopic    HCG QUAL SERUM   Result Value Ref Range    Beta-Hcg Qualitative Serum Negative Negative   TROPONIN   Result Value Ref Range    Troponin T 8 6 - 19 ng/L   APTT   Result Value Ref Range    APTT 28.0 24.7 - 36.0 sec   PROTHROMBIN TIME (INR)   Result Value Ref Range    PT 14.0 12.0 - 14.6 sec    INR 1.09 0.87 - 1.13   CORRECTED CALCIUM   Result Value Ref Range    Correct Calcium 8.9 8.5 - 10.5 mg/dL   ESTIMATED GFR   Result Value Ref Range    GFR (CKD-EPI) 119 >60 mL/min/1.73 m 2   URINE MICROSCOPIC (W/UA)   Result Value Ref Range    WBC 20-50 (A) /hpf    RBC 0-2 /hpf    Bacteria Few (A) None /hpf    Epithelial Cells Few /hpf    Amorphous Crystal Present /hpf    Hyaline Cast 0-2 /lpf   URINE CULTURE(NEW)    Specimen: Urine   Result Value Ref Range    Significant Indicator NEG     Source UR     Site -     Culture Result -    EKG (NOW)   Result Value Ref Range    Report       [facility] Emergency Dept.    Test Date:  2023  Pt Name:    ALVIN GA                Department: ER  MRN:        [id number]                      Room:       [facility]  Gender:     Female                       Technician: [id number]  :        [id number]                   Requested By:JEFFERSON HERNANDEZ  Order #:    [id number]                    Reading MD: JEFFERSON HERNANDEZ. AMD    Measurements  Intervals                                Axis  Rate:       111                          P:          20  MA:         139                          QRS:        55  QRSD:       82                           T:          23  QT:         337  QTc:        458    Interpretive Statements  Sinus tachycardia  Paired ventricular premature complexes  Low voltage, precordial leads  Posterior infarct, old  Artifact in lead(s) I,aVR,aVL,aVF,V1,V2,V4,V5,V6  Compared to ECG 2016 15:13:38  Ventricular premature complex(es) now present  Low  QRS voltage now present  Myocardial infarct finding now  present  Sinus rhythm no longer present  T-wave abnormality no longer present  Accelerated junctional rhythm no longer present  Electronically Signed On 3- 13:18:59 PDT by JEFFERSON HERNANDEZ. AMD        RADIOLOGY  I have independently interpreted the diagnostic imaging associated with this visit and am waiting the final reading from the radiologist.   My preliminary interpretation is as follows:     No obvious pneumonia or pneumothorax.  No significant intra-abdominal pathology identified.        Radiologist interpretation:     CT-ABDOMEN-PELVIS WITH   Final Result      1.  No acute inflammation in the abdomen or pelvis.   2.  Small, nonobstructing bilateral renal stones.   3.  Severe levoconvex scoliosis of the thoracolumbar spine.      DX-CHEST-PORTABLE (1 VIEW)   Final Result      1.  Low lung volumes without definite acute cardiopulmonary abnormality.            COURSE & MEDICAL DECISION MAKING    ED Observation Status? Yes; I am placing the patient in to an observation status due to a diagnostic uncertainty as well as therapeutic intensity. Patient placed in observation status at 10:27 AM, 3/16/2023.     Observation plan is as follows: Patient admitted to etiolog to evaluate for hematemesis and her abdominal pain.  Disposition is unclear.    Upon Reevaluation, the patient's condition has: Improved; and will be discharged.    Patient discharged from ED Observation status at 1325 (Time) 3/16 (Date).     INITIAL ASSESSMENT, COURSE AND PLAN  Care Narrative:     Patient presents emergency department epigastric abdominal pain and hematemesis.  History is somewhat challenging.  A broad visual diagnosis was considered including abdominal into esophagitis, Homa-Wadsworth tear, intrathoracic pathology like hiatal hernia, or pneumonia.  Also considered peptic ulcer disease perforation, ulcer, pancreatitis and upper GI bleed.        The patient is evaluated for  the above differential diagnoses labs and imaging.  She has diffuse tenderness.  No history is somewhat challenging because of her ability to communicate.  Lower abdomen slightly tender she does not have peritonitis and she is not toxic appearing.  She does have tachycardia.    CBC CMP lipase are reassuring.  Urinalysis looks little suspicious for UTI.  Pregnancy test is negative.  Lipase and troponin are unremarkable.  EKG is unremarkable.  Abdominal CT does not show any acute.  There are some chronic findings.    Patient is having continued pain in her epigastrium and burning her esophagus to the GI cocktail as well as IV morphine and Zofran.        Additional history was obtained from her partner when she arrived.    At this point I have looked at the pictures.  She had dark emesis at least x2.  Is really unclear to me why this patient is having hematemesis and pain.  Work-up so far has been unrevealing.  Considering her chronic deconditioning overall medical condition and bedridden condition I do not think she is a good candidate for outpatient management.  She is tachycardic and says persistent pain.  We discussed the case with the GI doctor on-call he will see the patient.  He will recommends hospitalization for endoscopy and further work-up.    I think this is reasonable at this time.  I discussed the case with hospitalist Dr. Kirkland in case he is transferred at that time.            ADDITIONAL PROBLEM LIST  Chronic neurologic condition causing severe debility.    DISPOSITION AND DISCUSSIONS  I have discussed management of the patient with the following physicians and MAGY's: GI doctor, Dr. Bautista, hospitalist, Dr. Kirkland    Discussion of management with other QHP or appropriate source(s): None     Escalation of care considered, and ultimately not performed: I considered antibiotics, do not see a source of significant infection.      FINAL DIAGNOSIS  1. Hematemesis, unspecified whether nausea present    2. Acute  demyelinating encephalomyelitis           Electronically signed by: Dino Juarez M.D., 3/16/2023 10:02 AM

## 2023-03-16 NOTE — ED NOTES
Patient picked up by transport, belongings packed, patient to pre-op. Girlfriend at bedside. Messaged CDU RN to inform.

## 2023-03-16 NOTE — OR NURSING
1500 received patient from OR. Report from Anesthesiologist and OR RN. Patient on 6L at 100 % O2. VSS. Monitor connected. No airway in place. S/P EGD.    1510 Patient awake, nonverbal shakes head if asked if she is in pain or has nausea.     1515 Rima notified about patients status and updated on plan of care.     1520 MD Bauitsta at bedside talking with patient.     1525 Report given to floor NOHEMI Roca.     1532 Patient escorted to [facility] [place] by RN and tech. All belongings sent with.

## 2023-03-16 NOTE — CONSULTS
Date of Consultation:  3/16/2023    Patient: : Griselda Swanson  MRN: [id number]    Referring Physician:  Dr. Dino Juarez     GI:Evelin Bautista M.D.     Reason for Consultation: Hematemesis    History of Present Illness:     No family at bedside. The majority hx is from chart review.     Griselda Swanson is a 37 y.o. female who presents to the emergency department for discomfort in her epigastrium and chest and hematemesis.  Hx of acute disseminated encephalomyelitis. The patient is nonverbal at baseline.  She is able to communicate by give me thumbs up and thumbs down.  She has epigastric pain and pain in her chest today.  Two episodes of emesis both which are described as bloody.      GI is consulted for hematemesis and possible EGD.     Saw the patient at ED, no change in the last hour.     No active N/V or hematemesis. Could not confirm the last time BM.     Past Medical History:   Diagnosis Date    C. difficile colitis 2015    Coma ([facility]) August 2009    1 month, lesions on brain,  unknown etiology    Encephalitis 2009    Coma ([facility]) 2008    Spontaneous -     ADEM (acute disseminated encephalomyelitis)     Back pain     Breath shortness     since 2014 not an issue at this time (4/2018)    Demyelinating disorder ([facility])     demyelinating encephpomyeltis     Heart burn     Indigestion     Lesion of brain     unknown cuase    MEDICAL HOME     Migraines     MS (multiple sclerosis) ([facility])     Other specified disorder of intestines     constipation    Pain     Psychiatric problem     depression and anxiety    Renal disorder     kidney stones    Urinary incontinence          Past Surgical History:   Procedure Laterality Date    PUMP INSERT/REMOVE Left 7/1/2016    Procedure: PUMP REMOVAL;  Surgeon: Catrachito Vega M.D.;  Location: SURGERY [facility];  Service:     CATH PLACE PERM EPIDURAL Left 6/14/2016    Procedure: CATH PLACE PERM EPIDURAL - BACLOFEN PUMP AND CATHETER REPLACEMENT  - BACK AND ABDOMEN;  Surgeon:  Pari Roberson M.D.;  Location: SURGERY [facility];  Service:     PA BACLOFEN INTRATHECAL TRIAL  3/8/2016    Dr. Roberson  [facility]    CATH PLACE PERM EPIDURAL N/A 3/8/2016    Procedure: BACLOFEN PUMP TRIAL;  Surgeon: Pari Roberson M.D.;  Location: SURGERY [facility];  Service:     PUMP REVISION  10/8/2013    Performed by Pari Roberson M.D. at [facility]    PUMP INSERT/REMOVE  3/12/2013    Performed by Pari Roberson M.D. at [facility]    CATH PLACE PERM EPIDURAL  6/26/2012    Performed by PARI ROBERSON at [facility]    CATH PLACE PERM EPIDURAL  3/6/2012    Performed by PARI ROBERSON at [facility]    MAMMOPLASTY AUGMENTATION  2002    TONSILLECTOMY         Family History   Problem Relation Age of Onset    Cancer Mother         Sarcoma    Bipolar disorder Brother     Other Brother         spina bifida    Diabetes Maternal Grandmother     Other Daughter         Migrains       Social History     Socioeconomic History    Marital status: Single   Tobacco Use    Smoking status: Former     Packs/day: 1.00     Years: 12.00     Pack years: 12.00     Types: Cigarettes     Quit date: 1/1/2008     Years since quitting: 15.2    Smokeless tobacco: Never    Tobacco comments:     Started smoking at age 11   Substance and Sexual Activity    Alcohol use: No    Drug use: No    Sexual activity: Never     Partners: Male   Other Topics Concern     Service No    Blood Transfusions No    Caffeine Concern No    Occupational Exposure No    Hobby Hazards No    Sleep Concern Yes    Stress Concern No    Weight Concern No    Special Diet Yes     Comment: Keto    Back Care No    Exercise No    Bike Helmet No    Seat Belt Yes    Self-Exams No           Physical Exam:  Vitals:    03/16/23 0949 03/16/23 1000   BP: 121/76 125/67   Pulse: (!) 102 (!) 103   Resp: 18 (!) 10   Temp: 36.7 °C (98 °F)    TempSrc: Temporal    SpO2: 96% 97%   Weight: 60.3 kg (133 lb)               Labs:  Recent Labs     03/16/23  1007   WBC 9.2   RBC 4.90   HEMOGLOBIN 15.3   HEMATOCRIT 46.2   MCV 94.3   MCH 31.2   MCHC 33.1*   RDW 43.4   PLATELETCT 250   MPV 9.7     Recent Labs     03/16/23  1007   SODIUM 140   POTASSIUM 4.0   CHLORIDE 105   CO2 24   GLUCOSE 89   BUN 11     Recent Labs     03/16/23  1007   APTT 28.0   INR 1.09       Recent Labs     03/16/23  1007   ASTSGOT 16   ALTSGPT 16   TBILIRUBIN 0.7   ALKPHOSPHAT 72   GLOBULIN 2.7   INR 1.09         Imaging:  CT-ABDOMEN-PELVIS WITH  Narrative: 3/16/2023 11:10 AM    HISTORY/REASON FOR EXAM:  IV contrast only.  Blood in vomit    TECHNIQUE/EXAM DESCRIPTION:   CT scan of the abdomen and pelvis with contrast.    Contrast-enhanced helical scanning was obtained from the diaphragmatic domes through the pubic symphysis following the bolus administration of nonionic contrast without complication.    100 mL of Omnipaque 350 nonionic contrast was administered without complication.    Low dose optimization technique was utilized for this CT exam including automated exposure control and adjustment of the mA and/or kV according to patient size.    COMPARISON: Facility CT abdomen pelvis 12/23/2019    FINDINGS:  Lower Chest: Unremarkable. Bilateral breast implants.    Liver: Normal.    Spleen: Unremarkable.    Pancreas: Unremarkable.    Gallbladder: No calcified stones.    Biliary: Nondilated.    Adrenal glands: Normal.    Kidneys: No hydronephrosis. Too small to characterize right renal hypodensity. Small, nonobstructing bilateral renal stones.    Bowel: No obstruction or acute inflammation. Normal appendix.    Lymph nodes: No adenopathy.    Vasculature: Unremarkable.    Peritoneum: Unremarkable without ascites.    Musculoskeletal: Severe levoconvex scoliosis of the thoracolumbar spine provides suboptimal evaluation.    Pelvis: No adenopathy or free fluid. IUD noted in the endometrial canal.  Impression: 1.  No acute inflammation in the abdomen or  pelvis.  2.  Small, nonobstructing bilateral renal stones.  3.  Severe levoconvex scoliosis of the thoracolumbar spine.  DX-CHEST-PORTABLE (1 VIEW)  Narrative: 3/16/2023 10:17 AM    HISTORY/REASON FOR EXAM:  Chest Pain    TECHNIQUE/EXAM DESCRIPTION AND NUMBER OF VIEWS:  Single portable view of the chest.    COMPARISON: 11/25/2016    FINDINGS:    HEART: Not enlarged.  LUNGS: Low lung volumes. No areas of air space disease are demonstrated.  PLEURA: No effusion or pneumothorax.  Impression: 1.  Low lung volumes without definite acute cardiopulmonary abnormality.            Impressions:  37F with disseminated encephalomyelitis presented with epigastric discomfort and hematemesis.     GI saw the patient, agree EGD should be the next step of management. Possibly today or 3/17 based on when we can have consent from the patient and also the family.     Diagnosis: upper gastrointestinal bleeding.   Procedure: Esophagogastroduodenoscopy.     Talked the patient's sister ANNE MARIE, ANNE MARIE agrees to proceed to have EGD.     Keep PPI iv bid for now.   NPO for now.     This note was generated using voice recognition software which has a small chance of producing errors of grammar and possibly content. I have made every reasonable attempt to find and correct any obvious errors, but expect that some may not be found prior to finalization of this note.

## 2023-03-16 NOTE — OR NURSING
2 RN's spoke with patient's sister about procedure. Patient's sister agrees with procedure and states she has had all of her questions and concerns addressed at this time.

## 2023-03-16 NOTE — ANESTHESIA POSTPROCEDURE EVALUATION
Patient: Griselda Swanson    Procedure Summary     Date: 03/16/23 Room / Location: [facility] ROOM 26 / SURGERY SAME DAY [facility]    Anesthesia Start: 1443 Anesthesia Stop: 1501    Procedures:       GASTROSCOPY (Esophagus)      GASTROSCOPY, WITH BIOPSY (Esophagus) Diagnosis: (Gastric Ulcer, Duodenitis, Hiatal Hernia, GERD)    Surgeons: Evelin Bautista M.D. Responsible Provider: Marcia Gallegos M.D.    Anesthesia Type: MAC ASA Status: 3          Final Anesthesia Type: MAC  Last vitals  BP   Blood Pressure: 101/63    Temp   36.8 °C (98.2 °F)    Pulse   (!) 101   Resp   18    SpO2   98 %      Anesthesia Post Evaluation    Patient location during evaluation: PACU  Patient participation: complete - patient cannot participate  Level of consciousness: awake  Pain score: 0    Airway patency: patent  Anesthetic complications: no  Cardiovascular status: adequate  Respiratory status: acceptable  Hydration status: acceptable    PONV: none          No notable events documented.

## 2023-03-16 NOTE — ED NOTES
Urine collected and sent to lab. Patient complaining of epigastric pain, reported to MD. Will give GI cocktail and morphine

## 2023-03-16 NOTE — ED TRIAGE NOTES
.  Chief Complaint   Patient presents with    N/V     Episode x 2 this ap apx 0500    Blood in Vomit     Biba from home. Pt is nonverbal hx MS gives thumbs up or down to answer questions. Per report above cc/ denies diarrhea. Received Zofran 4 mg Fentanyl 50 mcg pta. Fsbs 134

## 2023-03-16 NOTE — ANESTHESIA TIME REPORT
Anesthesia Start and Stop Event Times     Date Time Event    3/16/2023 1437 Ready for Procedure     1443 Anesthesia Start     1501 Anesthesia Stop        Responsible Staff  03/16/23    Name Role Begin End    Marcia Gallegos M.D. Anesth 1443 1501        Overtime Reason:  no overtime (within assigned shift)    Comments:

## 2023-03-16 NOTE — ED NOTES
SHAILA GRANADOS given the number of patient's sister Luda. Patient reports that she would like her sister to consent for her.

## 2023-03-16 NOTE — ANESTHESIA PREPROCEDURE EVALUATION
Case: [id number] Date/Time: 03/16/23 1445    Procedure: GASTROSCOPY (Esophagus)    Anesthesia type: General    Pre-op diagnosis: Hematemesis    Location: [facility] ROOM 26 / SURGERY SAME DAY [facility]    Surgeons: Evelin Bautista M.D.          Relevant Problems   NEURO   (positive) Headache, unspecified headache type   (positive) Intractable chronic migraine without aura and without status migrainosus      CARDIAC   (positive) Intractable chronic migraine without aura and without status migrainosus       Physical Exam    Airway   Mallampati: II  TM distance: >3 FB  Neck ROM: full       Cardiovascular - normal exam     Dental - normal exam           Pulmonary   Breath sounds clear to auscultation     Abdominal    Neurological - abnormal exam    Comments: Non-verbal  Flexed at extremities              Anesthesia Plan    ASA 3   ASA physical status 3 criteria: CVA or TIA - history (> 3 months)    Plan - MAC               Induction: intravenous      Pertinent diagnostic labs and testing reviewed    Informed Consent:    Anesthetic plan and risks discussed with patient.

## 2023-03-17 LAB
ANION GAP SERPL CALC-SCNC: 11 MMOL/L (ref 7–16)
BASOPHILS # BLD AUTO: 1.1 % (ref 0–1.8)
BASOPHILS # BLD: 0.07 K/UL (ref 0–0.12)
BUN SERPL-MCNC: 10 MG/DL (ref 8–22)
CALCIUM SERPL-MCNC: 8.7 MG/DL (ref 8.5–10.5)
CHLORIDE SERPL-SCNC: 108 MMOL/L (ref 96–112)
CO2 SERPL-SCNC: 20 MMOL/L (ref 20–33)
CREAT SERPL-MCNC: 0.47 MG/DL (ref 0.5–1.4)
EOSINOPHIL # BLD AUTO: 0.06 K/UL (ref 0–0.51)
EOSINOPHIL NFR BLD: 0.9 % (ref 0–6.9)
ERYTHROCYTE [DISTWIDTH] IN BLOOD BY AUTOMATED COUNT: 43.5 FL (ref 35.9–50)
GFR SERPLBLD CREATININE-BSD FMLA CKD-EPI: 124 ML/MIN/1.73 M 2
GLUCOSE SERPL-MCNC: 84 MG/DL (ref 65–99)
HCT VFR BLD AUTO: 41 % (ref 37–47)
HGB BLD-MCNC: 13.5 G/DL (ref 12–16)
IMM GRANULOCYTES # BLD AUTO: 0.03 K/UL (ref 0–0.11)
IMM GRANULOCYTES NFR BLD AUTO: 0.5 % (ref 0–0.9)
LYMPHOCYTES # BLD AUTO: 2.3 K/UL (ref 1–4.8)
LYMPHOCYTES NFR BLD: 35.4 % (ref 22–41)
MCH RBC QN AUTO: 31.5 PG (ref 27–33)
MCHC RBC AUTO-ENTMCNC: 32.9 G/DL (ref 33.6–35)
MCV RBC AUTO: 95.8 FL (ref 81.4–97.8)
MONOCYTES # BLD AUTO: 0.39 K/UL (ref 0–0.85)
MONOCYTES NFR BLD AUTO: 6 % (ref 0–13.4)
NEUTROPHILS # BLD AUTO: 3.64 K/UL (ref 2–7.15)
NEUTROPHILS NFR BLD: 56.1 % (ref 44–72)
NRBC # BLD AUTO: 0 K/UL
NRBC BLD-RTO: 0 /100 WBC
PLATELET # BLD AUTO: 219 K/UL (ref 164–446)
PMV BLD AUTO: 9.6 FL (ref 9–12.9)
POTASSIUM SERPL-SCNC: 3.7 MMOL/L (ref 3.6–5.5)
RBC # BLD AUTO: 4.28 M/UL (ref 4.2–5.4)
SODIUM SERPL-SCNC: 139 MMOL/L (ref 135–145)
WBC # BLD AUTO: 6.5 K/UL (ref 4.8–10.8)

## 2023-03-17 PROCEDURE — 700102 HCHG RX REV CODE 250 W/ 637 OVERRIDE(OP): Performed by: NURSE PRACTITIONER

## 2023-03-17 PROCEDURE — 80048 BASIC METABOLIC PNL TOTAL CA: CPT

## 2023-03-17 PROCEDURE — 85025 COMPLETE CBC W/AUTO DIFF WBC: CPT

## 2023-03-17 PROCEDURE — C9113 INJ PANTOPRAZOLE SODIUM, VIA: HCPCS | Performed by: STUDENT IN AN ORGANIZED HEALTH CARE EDUCATION/TRAINING PROGRAM

## 2023-03-17 PROCEDURE — 770020 HCHG ROOM/CARE - TELE (206)

## 2023-03-17 PROCEDURE — A9270 NON-COVERED ITEM OR SERVICE: HCPCS | Performed by: NURSE PRACTITIONER

## 2023-03-17 PROCEDURE — 96376 TX/PRO/DX INJ SAME DRUG ADON: CPT

## 2023-03-17 PROCEDURE — 36415 COLL VENOUS BLD VENIPUNCTURE: CPT

## 2023-03-17 PROCEDURE — 700111 HCHG RX REV CODE 636 W/ 250 OVERRIDE (IP): Performed by: NURSE PRACTITIONER

## 2023-03-17 PROCEDURE — 99232 SBSQ HOSP IP/OBS MODERATE 35: CPT | Performed by: NURSE PRACTITIONER

## 2023-03-17 PROCEDURE — 700111 HCHG RX REV CODE 636 W/ 250 OVERRIDE (IP): Performed by: STUDENT IN AN ORGANIZED HEALTH CARE EDUCATION/TRAINING PROGRAM

## 2023-03-17 PROCEDURE — 700102 HCHG RX REV CODE 250 W/ 637 OVERRIDE(OP): Performed by: STUDENT IN AN ORGANIZED HEALTH CARE EDUCATION/TRAINING PROGRAM

## 2023-03-17 PROCEDURE — A9270 NON-COVERED ITEM OR SERVICE: HCPCS | Performed by: STUDENT IN AN ORGANIZED HEALTH CARE EDUCATION/TRAINING PROGRAM

## 2023-03-17 RX ORDER — ONDANSETRON 2 MG/ML
4 INJECTION INTRAMUSCULAR; INTRAVENOUS EVERY 6 HOURS PRN
Status: DISCONTINUED | OUTPATIENT
Start: 2023-03-17 | End: 2023-03-18 | Stop reason: HOSPADM

## 2023-03-17 RX ORDER — TOPIRAMATE 25 MG/1
25 TABLET ORAL 2 TIMES DAILY PRN
Status: DISCONTINUED | OUTPATIENT
Start: 2023-03-17 | End: 2023-03-18 | Stop reason: HOSPADM

## 2023-03-17 RX ORDER — SUMATRIPTAN 50 MG/1
25 TABLET, FILM COATED ORAL
Status: DISCONTINUED | OUTPATIENT
Start: 2023-03-17 | End: 2023-03-18 | Stop reason: HOSPADM

## 2023-03-17 RX ADMIN — TOPIRAMATE 25 MG: 25 TABLET, FILM COATED ORAL at 04:35

## 2023-03-17 RX ADMIN — CITALOPRAM HYDROBROMIDE 20 MG: 20 TABLET ORAL at 04:35

## 2023-03-17 RX ADMIN — SUMATRIPTAN SUCCINATE 25 MG: 50 TABLET ORAL at 16:37

## 2023-03-17 RX ADMIN — DOCUSATE SODIUM 50 MG AND SENNOSIDES 8.6 MG 2 TABLET: 8.6; 5 TABLET, FILM COATED ORAL at 17:07

## 2023-03-17 RX ADMIN — BACLOFEN 10 MG: 10 TABLET ORAL at 16:37

## 2023-03-17 RX ADMIN — PANTOPRAZOLE SODIUM 40 MG: 40 INJECTION, POWDER, FOR SOLUTION INTRAVENOUS at 17:07

## 2023-03-17 RX ADMIN — SUMATRIPTAN SUCCINATE 25 MG: 50 TABLET ORAL at 21:58

## 2023-03-17 RX ADMIN — BACLOFEN 10 MG: 10 TABLET ORAL at 21:56

## 2023-03-17 RX ADMIN — SUMATRIPTAN SUCCINATE 100 MG: 50 TABLET ORAL at 04:35

## 2023-03-17 RX ADMIN — ONDANSETRON 4 MG: 2 INJECTION INTRAMUSCULAR; INTRAVENOUS at 16:37

## 2023-03-17 RX ADMIN — PANTOPRAZOLE SODIUM 40 MG: 40 INJECTION, POWDER, FOR SOLUTION INTRAVENOUS at 04:34

## 2023-03-17 RX ADMIN — BACLOFEN 10 MG: 10 TABLET ORAL at 09:25

## 2023-03-17 RX ADMIN — BACLOFEN 10 MG: 10 TABLET ORAL at 13:30

## 2023-03-17 RX ADMIN — ACETAMINOPHEN 650 MG: 325 TABLET, FILM COATED ORAL at 20:34

## 2023-03-17 ASSESSMENT — ENCOUNTER SYMPTOMS
MYALGIAS: 1
VOMITING: 1
CARDIOVASCULAR NEGATIVE: 1
CHILLS: 0
WEAKNESS: 1
NAUSEA: 1
FEVER: 0
PSYCHIATRIC NEGATIVE: 1
EYES NEGATIVE: 1
RESPIRATORY NEGATIVE: 1

## 2023-03-17 ASSESSMENT — PAIN DESCRIPTION - PAIN TYPE
TYPE: CHRONIC PAIN
TYPE: ACUTE PAIN;CHRONIC PAIN
TYPE: CHRONIC PAIN

## 2023-03-17 ASSESSMENT — FIBROSIS 4 INDEX: FIB4 SCORE: 0.71

## 2023-03-17 NOTE — CARE PLAN
The patient is Stable - Low risk of patient condition declining or worsening    Shift Goals  Clinical Goals: safety, turns, lab results  Patient Goals: rest    Progress made toward(s) clinical / shift goals:        Problem: Knowledge Deficit - Standard  Goal: Patient and family/care givers will demonstrate understanding of plan of care, disease process/condition, diagnostic tests and medications  Outcome: Progressing     Problem: Skin Integrity  Goal: Skin integrity is maintained or improved  Outcome: Progressing     Problem: Fall Risk  Goal: Patient will remain free from falls  Outcome: Progressing

## 2023-03-17 NOTE — ASSESSMENT & PLAN NOTE
-Presents with hematemesis this morning, no hx of GI bleeding  -S/p EGD showing gastric ulcers, duodenitis  -Continue PPI IV BID today and tomorrow  -CLD in AM, and advanced diet as tolerated  -Will need to be on PPI BID for 6-8 weeks with outpatient GI follow up.

## 2023-03-17 NOTE — H&P
Hospital Medicine History & Physical Note    Date of Service  3/16/2023    Primary Care Physician  Margo Cook P.A.-C.    Consultants  GI      Code Status  Full Code    Chief Complaint  Chief Complaint   Patient presents with    N/V     Episode x 2 this ap apx 0500    Blood in Vomit       History of Presenting Illness  Griselda Swanson is a 37 y.o. female who presented 3/16/2023 with hematemesis. Patient with history of encephalomyelitis with secondary paralysis, nonverbal, wheelchair bound, chronic constipation, who presents with hematemesis. History obtained from chart review as patient in OR. Patient with reported chest/abdominal discomfort today with two episodes of hematemesis.  In the ED, . Hgb 15.3, CBC and CMP unremarkable. Troponin trend normal. INR and d dimer normal. CT abdomen pelvis normal with no acute findings. GI consulted, plan for EGD today.    I discussed the plan of care with ER physician.    Review of Systems  ROS    Past Medical History   has a past medical history of ADEM (acute disseminated encephalomyelitis), Back pain, Breath shortness, C. difficile colitis (2015), Coma ([facility]) (August 2009), Coma ([facility]) (2008), Demyelinating disorder ([facility]), Encephalitis (2009), Heart burn, Indigestion, Lesion of brain, MEDICAL HOME, Migraines, MS (multiple sclerosis) ([facility]), Other specified disorder of intestines, Pain, Psychiatric problem, Renal disorder, and Urinary incontinence.    Surgical History   has a past surgical history that includes tonsillectomy; cath place perm epidural (3/6/2012); cath place perm epidural (6/26/2012); pump insert/remove (3/12/2013); mammoplasty augmentation (2002); pump revision (10/8/2013); pr baclofen intrathecal trial (3/8/2016); cath place perm epidural (N/A, 3/8/2016); cath place perm epidural (Left, 6/14/2016); pump insert/remove (Left, 7/1/2016); pr upper gi endoscopy,diagnosis (N/A, 3/16/2023); and pr upper gi endoscopy,biopsy (N/A, 3/16/2023).     Family  History  family history includes Bipolar disorder in her brother; Cancer in her mother; Diabetes in her maternal grandmother; Other in her brother and daughter.     Social History   reports that she quit smoking about 15 years ago. Her smoking use included cigarettes. She has a 12.00 pack-year smoking history. She has never used smokeless tobacco. She reports that she does not drink alcohol and does not use drugs.    Allergies  Allergies   Allergen Reactions    Bactrim      Reaction unknown    Fentanyl      Makes her agressive       Medications  Prior to Admission Medications   Prescriptions Last Dose Informant Patient Reported? Taking?   Misc. Devices Misc  Caregiver No No   Sig: Please allow patient to have support/therapy dog in appt. Regardless of weight due to multiple chronic illnesses and debility.   Misc. Devices Misc  Caregiver No No   Sig: Please eval and fix electric W/C. Please eval also for W/C needs.   Misc. Devices Misc  Caregiver No No   Sig: W/C stabilizer needs eval and possible replacement   Misc. Devices Misc   No No   Sig: Bedside commode   Misc. Devices Misc   No No   Sig: New latch for new dynavox  Wheel chair   Misc. Devices Misc   No No   Sig: Bedside commode with padding   Multiple Vitamins-Minerals (MULTIVITAMIN GUMMIES ADULTS PO)  Caregiver Yes No   Sig: Take 1 Tab by mouth every day. Indications: vitamin supplement.   SUMAtriptan Succinate 6 MG/0.5ML Solution Auto-injector   No No   Sig: INJECT 6MG SUBCUTANEOUS 1 TIMES DAILY AS NEEDED FOR UP TO 9 DAYS   baclofen (LIORESAL) 10 MG Tab   No No   Sig: TAKE 3 TABLETS BY MOUTH FOUR TIMES DAILY   bisacodyl (DULCOLAX) 10 MG Suppos   No No   Sig: INSERT 1 SUPPOSITORY RECTALLY EVERY DAY   citalopram (CELEXA) 20 MG Tab   No No   Sig: TAKE 1 TABLET BY MOUTH EVERY DAY   cyclobenzaprine (FLEXERIL) 10 mg Tab   No No   Sig: TAKE 1 TABLET BY MOUTH THREE TIMES DAILY AS NEEDED   docusate sodium (COLACE) 100 MG Cap   No No   Sig: TAKE 1 CAPSULE BY MOUTH  TWICE DAILY AS NEEDED   lactulose 10 GM/15ML Solution   No No   Sig: TAKE 15 TO 30 MLS BY MOUTH EVERY 4 HOURS AS NEEDED   linaCLOtide 145 MCG Cap   No No   Sig: Take 145 mcg by mouth every morning before breakfast.   polyethylene glycol 3350 (MIRALAX) 17 GM/SCOOP Powder   No No   Sig: MIX 1 CAPFUL WITH 8OZ OF WATER AND DRINK DAILY FOR CONSTIPATION   simethicone (MYLICON) 125 MG chewable tablet  Caregiver Yes No   Sig: Take 125 mg by mouth 4 times a day as needed. Indications: Gas   sumatriptan (IMITREX) 100 MG tablet   No No   Sig: TAKE 1 TABLET BY MOUTH AT ONSET OF HEADACHE. MAY REPEAT IN 2 HOUR. MAX 2 TABLETS PER DAY   tamsulosin (FLOMAX) 0.4 MG capsule   No No   Sig: TAKE 1 CAPSULE BY MOUTH EVERY DAY   topiramate (TOPAMAX) 25 MG Tab   No No   Sig: TAKE 1 TABLET BY MOUTH TWICE DAILY   vitamin D (CHOLECALCIFEROL) 1000 UNIT Tab  Caregiver Yes No   Sig: Take 1,000 Units by mouth 4 times a day. Indications: supplement      Facility-Administered Medications: None       Physical Exam  Temp:  [36.7 °C (98 °F)-37.2 °C (98.9 °F)] 36.7 °C (98.1 °F)  Pulse:  [] 93  Resp:  [10-23] 18  BP: ()/(54-76) 87/54  SpO2:  [92 %-100 %] 95 %  Blood Pressure: 93/64   Temperature: 37.1 °C (98.7 °F)   Pulse: (!) 104   Respiration: 18   Pulse Oximetry: 94 %       Physical Exam    Laboratory:  Recent Labs     03/16/23  1007   WBC 9.2   RBC 4.90   HEMOGLOBIN 15.3   HEMATOCRIT 46.2   MCV 94.3   MCH 31.2   MCHC 33.1*   RDW 43.4   PLATELETCT 250   MPV 9.7     Recent Labs     03/16/23  1007   SODIUM 140   POTASSIUM 4.0   CHLORIDE 105   CO2 24   GLUCOSE 89   BUN 11   CREATININE 0.55   CALCIUM 9.3     Recent Labs     03/16/23  1007   ALTSGPT 16   ASTSGOT 16   ALKPHOSPHAT 72   TBILIRUBIN 0.7   LIPASE 28   GLUCOSE 89     Recent Labs     03/16/23  1007   APTT 28.0   INR 1.09     No results for input(s): NTPROBNP in the last 72 hours.      Recent Labs     03/16/23  1007 03/16/23  1344 03/16/23  1835   TROPONINT 8 7 7        Imaging:  CT-ABDOMEN-PELVIS WITH   Final Result      1.  No acute inflammation in the abdomen or pelvis.   2.  Small, nonobstructing bilateral renal stones.   3.  Severe levoconvex scoliosis of the thoracolumbar spine.      DX-CHEST-PORTABLE (1 VIEW)   Final Result      1.  Low lung volumes without definite acute cardiopulmonary abnormality.            Assessment/Plan:  Justification for Admission Status  I anticipate this patient is appropriate for observation status at this time because treatment of upper GI bleed is expected to take <2 midnights.    Patient will need a Med/Surg bed on MEDICAL service .  The need is secondary to continued treatment of GI bleeding.    * Hematemesis- (present on admission)  Assessment & Plan  Presents with hematemesis this morning, no hx of GI bleeding  S/p EGD showing gastric ulcers, duodenitis  Continue PPI IV BID today and tomorrow  CLD in AM, and advanced diet as tolerated  Will need to be on PPI BID for 6-8 weeks with outpatient GI follow up.    Chronic constipation- (present on admission)  Assessment & Plan  Continue bowel protocol    Headache, unspecified headache type- (present on admission)  Assessment & Plan  Continue home topiramate and sumitriptan prn    ADEM (acute disseminated encephalomyelitis)- (present on admission)  Assessment & Plan  History of,  With resulting paralysis  Wheelchair bound at baseline, non verbal        VTE prophylaxis: SCDs/TEDs

## 2023-03-17 NOTE — PROGRESS NOTES
Pt arrived to T8. Nonverbal at baseline but able to answer questions with thumbs up or thumbs down. Pt A&Ox4. On RA. Pt is nauseous and has a mild headache. Will give PRNs. Bed in low and locked position. Call light within reach.

## 2023-03-17 NOTE — HOSPITAL COURSE
Ms. Griselda Swanson is a 37 y.o. female with history of encephalomyelitis with secondary paralysis, nonverbal, wheelchair bound, chronic constipation, who presents on 3/16 with hematemesis.     History was mostly obtained by the admitting physician from chart review as patient was in OR. Patient with reported chest/abdominal discomfort today with two episodes of hematemesis.    In the ED, . Hgb 15.3, CBC and CMP unremarkable. Troponin trend normal. INR and d dimer normal. CT abdomen pelvis normal with no acute findings. GI consulted, plan for EGD.     Patient underwent an esophagogastroduodenoscopy on 3/16 with Dr. Bautista.  Postoperative diagnosis noted gastric ulcers, duodenitis, GERD, and hiatal hernia.  Biopsies were also obtained due to multiple ulcers at the cardia circled around the GE junction likely Mandeep erosions/ulcers with a blood clot on the, but no high risk stigmata likely the source of her bleeding.  Postprocedure recommendations include keeping patient on PPI IV 40 mg twice daily, restart clear liquid diet and advance as tolerated.  Once stable for discharge, patient will be placed on oral PPI 40 mg twice daily for 6-8 weeks.    Patient switched to inpatient status as she is anticipated to stay 2-3 midnights for IV PPI, restart of diet and advancement in diet tolerance.

## 2023-03-17 NOTE — PROGRESS NOTES
4 Eyes Skin Assessment Completed by Chanelle RN and , RN.    Head WDL  Ears WDL  Nose WDL  Mouth WDL  Neck WDL  Breast/Chest WDL  Shoulder Blades WDL  Spine WDL  (R) Arm/Elbow/Hand WDL  (L) Arm/Elbow/Hand WDL  Abdomen WDL  Groin WDL  Scrotum/Coccyx/Buttocks Redness and Blanching  (R) Leg WDL  (L) Leg WDL  (R) Heel/Foot/Toe WDL  (L) Heel/Foot/Toe Redness and Scab - redness on 2nd toe - gauze applied          Devices In Places Pulse Ox      Interventions In Place Q2 Turns, Heels Loaded W/Pillows, and Pressure Redistribution Mattress    Possible Skin Injury No    Pictures Uploaded Into Epic N/A  Wound Consult Placed N/A  RN Wound Prevention Protocol Ordered No

## 2023-03-17 NOTE — PROGRESS NOTES
[facility] Medicine Daily Progress Note    Date of Service  3/17/2023    Chief Complaint  Griselda Swanson is a 37 y.o. female admitted 3/16/2023 with N/V and hematemesis    Hospital Course  Ms. Griselda Swanson is a 37 y.o. female with history of encephalomyelitis with secondary paralysis, nonverbal, wheelchair bound, chronic constipation, who presents on 3/16 with hematemesis.     History was mostly obtained by the admitting physician from chart review as patient was in OR. Patient with reported chest/abdominal discomfort today with two episodes of hematemesis.    In the ED, . Hgb 15.3, CBC and CMP unremarkable. Troponin trend normal. INR and d dimer normal. CT abdomen pelvis normal with no acute findings. GI consulted, plan for EGD.     Patient underwent an esophagogastroduodenoscopy on 3/16 with Dr. Bautista.  Postoperative diagnosis noted gastric ulcers, duodenitis, GERD, and hiatal hernia.  Biopsies were also obtained due to multiple ulcers at the cardia circled around the GE junction likely Mandeep erosions/ulcers with a blood clot on the, but no high risk stigmata likely the source of her bleeding.  Postprocedure recommendations include keeping patient on PPI IV 40 mg twice daily, restart clear liquid diet and advance as tolerated.  Once stable for discharge, patient will be placed on oral PPI 40 mg twice daily for 6-8 weeks.    Patient switched to inpatient status as she is anticipated to stay 2-3 midnights for IV PPI, restart of diet and advancement in diet tolerance.            Interval Problem Update  -Patient seen and examined.  Patient is nonverbal, but able to nod or shake her head for yes and no questions.  Discussed with patient and family who was at bedside plan of care.  We will await biopsy results with the GI team from recent EGD.  We will start patient on clear liquid diet and advance as tolerated.  We will continue IV PPI at this time and will transition her to oral once patient is  discharged.  -Plan of care: Continue to monitor for any signs of bleeding; continue PPI IV 40 mg twice daily and transition to oral 40 mg twice daily for 6-8 weeks; start CLD and advance as tolerated  -Disposition: Patient switched to inpatient status as she is anticipated to stay 2-3 midnights for management of hematemesis, active use of IV PPI twice daily, and monitoring in tolerance for advancement in diet  -Lab work: Reviewed; expected  -VSS at this time    I have discussed this patient's plan of care and discharge plan at IDT rounds today with Case Management, Nursing, Nursing leadership, and other members of the IDT team.    Consultants/Specialty  GI -- Bautista ---- SIGNED OFF    Code Status  Full Code    Disposition  Patient is not medically cleared for discharge.   Anticipate discharge to to home with close outpatient follow-up.  I have placed the appropriate orders for post-discharge needs.    Review of Systems  Review of Systems   Constitutional:  Positive for malaise/fatigue. Negative for chills and fever.   HENT: Negative.     Eyes: Negative.    Respiratory: Negative.     Cardiovascular: Negative.    Gastrointestinal:  Positive for nausea and vomiting.   Genitourinary: Negative.    Musculoskeletal:  Positive for myalgias.   Skin: Negative.    Neurological:  Positive for weakness.   Endo/Heme/Allergies: Negative.    Psychiatric/Behavioral: Negative.        Physical Exam  Temp:  [36.4 °C (97.5 °F)-37.2 °C (98.9 °F)] 36.4 °C (97.5 °F)  Pulse:  [] 86  Resp:  [10-23] 16  BP: ()/(54-76) 92/55  SpO2:  [92 %-100 %] 96 %    Physical Exam  Vitals and nursing note reviewed.   HENT:      Head: Normocephalic.      Nose: Nose normal.      Mouth/Throat:      Mouth: Mucous membranes are moist.      Pharynx: Oropharynx is clear.   Eyes:      Pupils: Pupils are equal, round, and reactive to light.   Cardiovascular:      Rate and Rhythm: Normal rate and regular rhythm.      Pulses: Normal pulses.      Heart sounds:  Normal heart sounds.   Pulmonary:      Effort: Pulmonary effort is normal.      Breath sounds: Normal breath sounds.   Abdominal:      General: Bowel sounds are normal.      Palpations: Abdomen is soft.   Musculoskeletal:         General: Tenderness present.      Cervical back: Normal range of motion and neck supple.   Skin:     General: Skin is dry.      Capillary Refill: Capillary refill takes 2 to 3 seconds.   Neurological:      Mental Status: She is alert. Mental status is at baseline.       Fluids    Intake/Output Summary (Last 24 hours) at 3/17/2023 0911  Last data filed at 3/17/2023 0800  Gross per 24 hour   Intake 1292.5 ml   Output 550 ml   Net 742.5 ml       Laboratory  Recent Labs     03/16/23  1007 03/17/23  0122   WBC 9.2 6.5   RBC 4.90 4.28   HEMOGLOBIN 15.3 13.5   HEMATOCRIT 46.2 41.0   MCV 94.3 95.8   MCH 31.2 31.5   MCHC 33.1* 32.9*   RDW 43.4 43.5   PLATELETCT 250 219   MPV 9.7 9.6     Recent Labs     03/16/23  1007 03/17/23  0122   SODIUM 140 139   POTASSIUM 4.0 3.7   CHLORIDE 105 108   CO2 24 20   GLUCOSE 89 84   BUN 11 10   CREATININE 0.55 0.47*   CALCIUM 9.3 8.7     Recent Labs     03/16/23  1007   APTT 28.0   INR 1.09               Imaging  CT-ABDOMEN-PELVIS WITH   Final Result      1.  No acute inflammation in the abdomen or pelvis.   2.  Small, nonobstructing bilateral renal stones.   3.  Severe levoconvex scoliosis of the thoracolumbar spine.      DX-CHEST-PORTABLE (1 VIEW)   Final Result      1.  Low lung volumes without definite acute cardiopulmonary abnormality.           Assessment/Plan  * Hematemesis- (present on admission)  Assessment & Plan  -Presents with hematemesis this morning, no hx of GI bleeding  -S/p EGD showing gastric ulcers, duodenitis  -Continue PPI IV BID today and tomorrow  -CLD in AM, and advanced diet as tolerated  -Will need to be on PPI BID for 6-8 weeks with outpatient GI follow up.    Chronic constipation- (present on admission)  Assessment & Plan  -Continue bowel  protocol    Headache, unspecified headache type- (present on admission)  Assessment & Plan  Continue home topiramate and sumitriptan prn    ADEM (acute disseminated encephalomyelitis)- (present on admission)  Assessment & Plan  -History of,  -With resulting paralysis  -Wheelchair bound at baseline, non verbal         VTE prophylaxis: pharmacologic prophylaxis contraindicated due to hematemesis    I have performed a physical exam and reviewed and updated ROS and Plan today (3/17/2023). In review of yesterday's note (3/16/2023), there are no changes except as documented above.    ====================================================================================================================================================================================================================================================================  Please note that this dictation was created using voice recognition software. I have made every reasonable attempt to correct obvious errors, but there may be errors of grammar and possibly content that I did not discover before finalizing the note.    Electronically signed by:  Dr. HITESH Melissa, DNP, APRN, FNP-C  Hospitalist Services  [facility]  [phone]  [email or web address]  03/17/23                  0956

## 2023-03-18 ENCOUNTER — PHARMACY VISIT (OUTPATIENT)
Dept: PHARMACY | Facility: MEDICAL CENTER | Age: 38
End: 2023-03-18
Payer: COMMERCIAL

## 2023-03-18 VITALS
SYSTOLIC BLOOD PRESSURE: 98 MMHG | OXYGEN SATURATION: 95 % | DIASTOLIC BLOOD PRESSURE: 65 MMHG | TEMPERATURE: 98.4 F | BODY MASS INDEX: 23.36 KG/M2 | HEART RATE: 63 BPM | RESPIRATION RATE: 16 BRPM | HEIGHT: 67 IN | WEIGHT: 148.81 LBS

## 2023-03-18 PROBLEM — K29.80 DUODENITIS: Status: ACTIVE | Noted: 2023-03-18

## 2023-03-18 LAB
ANION GAP SERPL CALC-SCNC: 11 MMOL/L (ref 7–16)
BACTERIA UR CULT: NORMAL
BUN SERPL-MCNC: 8 MG/DL (ref 8–22)
CALCIUM SERPL-MCNC: 8.9 MG/DL (ref 8.5–10.5)
CHLORIDE SERPL-SCNC: 110 MMOL/L (ref 96–112)
CO2 SERPL-SCNC: 18 MMOL/L (ref 20–33)
CREAT SERPL-MCNC: 0.53 MG/DL (ref 0.5–1.4)
ERYTHROCYTE [DISTWIDTH] IN BLOOD BY AUTOMATED COUNT: 44.1 FL (ref 35.9–50)
GFR SERPLBLD CREATININE-BSD FMLA CKD-EPI: 120 ML/MIN/1.73 M 2
GLUCOSE SERPL-MCNC: 78 MG/DL (ref 65–99)
HCT VFR BLD AUTO: 42.9 % (ref 37–47)
HGB BLD-MCNC: 13.8 G/DL (ref 12–16)
MCH RBC QN AUTO: 31.2 PG (ref 27–33)
MCHC RBC AUTO-ENTMCNC: 32.2 G/DL (ref 33.6–35)
MCV RBC AUTO: 97.1 FL (ref 81.4–97.8)
PLATELET # BLD AUTO: 227 K/UL (ref 164–446)
PMV BLD AUTO: 9.8 FL (ref 9–12.9)
POTASSIUM SERPL-SCNC: 4 MMOL/L (ref 3.6–5.5)
RBC # BLD AUTO: 4.42 M/UL (ref 4.2–5.4)
SIGNIFICANT IND 70042: NORMAL
SITE SITE: NORMAL
SODIUM SERPL-SCNC: 139 MMOL/L (ref 135–145)
SOURCE SOURCE: NORMAL
WBC # BLD AUTO: 4.9 K/UL (ref 4.8–10.8)

## 2023-03-18 PROCEDURE — 36415 COLL VENOUS BLD VENIPUNCTURE: CPT

## 2023-03-18 PROCEDURE — A9270 NON-COVERED ITEM OR SERVICE: HCPCS | Performed by: STUDENT IN AN ORGANIZED HEALTH CARE EDUCATION/TRAINING PROGRAM

## 2023-03-18 PROCEDURE — 99239 HOSP IP/OBS DSCHRG MGMT >30: CPT | Performed by: HOSPITALIST

## 2023-03-18 PROCEDURE — 85027 COMPLETE CBC AUTOMATED: CPT

## 2023-03-18 PROCEDURE — 700111 HCHG RX REV CODE 636 W/ 250 OVERRIDE (IP): Performed by: STUDENT IN AN ORGANIZED HEALTH CARE EDUCATION/TRAINING PROGRAM

## 2023-03-18 PROCEDURE — 80048 BASIC METABOLIC PNL TOTAL CA: CPT

## 2023-03-18 PROCEDURE — RXMED WILLOW AMBULATORY MEDICATION CHARGE: Performed by: HOSPITALIST

## 2023-03-18 PROCEDURE — C9113 INJ PANTOPRAZOLE SODIUM, VIA: HCPCS | Performed by: STUDENT IN AN ORGANIZED HEALTH CARE EDUCATION/TRAINING PROGRAM

## 2023-03-18 PROCEDURE — 700102 HCHG RX REV CODE 250 W/ 637 OVERRIDE(OP): Performed by: NURSE PRACTITIONER

## 2023-03-18 PROCEDURE — 700102 HCHG RX REV CODE 250 W/ 637 OVERRIDE(OP): Performed by: STUDENT IN AN ORGANIZED HEALTH CARE EDUCATION/TRAINING PROGRAM

## 2023-03-18 PROCEDURE — A9270 NON-COVERED ITEM OR SERVICE: HCPCS | Performed by: NURSE PRACTITIONER

## 2023-03-18 RX ORDER — OMEPRAZOLE 40 MG/1
40 CAPSULE, DELAYED RELEASE ORAL 2 TIMES DAILY
Qty: 60 CAPSULE | Refills: 2 | Status: SHIPPED | OUTPATIENT
Start: 2023-03-18

## 2023-03-18 RX ADMIN — POLYETHYLENE GLYCOL 3350 1 PACKET: 17 POWDER, FOR SOLUTION ORAL at 09:46

## 2023-03-18 RX ADMIN — DOCUSATE SODIUM 50 MG AND SENNOSIDES 8.6 MG 2 TABLET: 8.6; 5 TABLET, FILM COATED ORAL at 05:44

## 2023-03-18 RX ADMIN — SUMATRIPTAN SUCCINATE 25 MG: 50 TABLET ORAL at 03:48

## 2023-03-18 RX ADMIN — ACETAMINOPHEN 650 MG: 325 TABLET, FILM COATED ORAL at 05:46

## 2023-03-18 RX ADMIN — PANTOPRAZOLE SODIUM 40 MG: 40 INJECTION, POWDER, FOR SOLUTION INTRAVENOUS at 05:44

## 2023-03-18 RX ADMIN — BACLOFEN 10 MG: 10 TABLET ORAL at 09:46

## 2023-03-18 RX ADMIN — CITALOPRAM HYDROBROMIDE 20 MG: 20 TABLET ORAL at 05:44

## 2023-03-18 RX ADMIN — TOPIRAMATE 25 MG: 25 TABLET, FILM COATED ORAL at 05:46

## 2023-03-18 ASSESSMENT — PAIN DESCRIPTION - PAIN TYPE: TYPE: ACUTE PAIN;CHRONIC PAIN

## 2023-03-18 NOTE — DISCHARGE SUMMARY
Hospital Medicine Discharge Note     Admit Date:  3/16/2023       Discharge Date:   3/18/2023    Attending Physician:  William Crenshaw M.D.     Diagnoses (includes active and resolved):   Principal Problem:    Hematemesis POA: Yes  Active Problems:    ADEM (acute disseminated encephalomyelitis) POA: Yes    Headache, unspecified headache type POA: Yes    Chronic constipation POA: Yes    Duodenitis POA: Unknown  Resolved Problems:    * No resolved hospital problems. *      Hospital Summary (Brief Narrative):       Ms. Griselda Swanson is a 37 y.o. female with history of encephalomyelitis with secondary paralysis, nonverbal, wheelchair bound, chronic constipation, who presents on 3/16 with hematemesis.   In the ED, . Hgb 15.3, CBC and CMP unremarkable. Troponin trend normal. INR and d dimer normal. CT abdomen pelvis normal with no acute findings. GI consulted, plan for EGD.     Patient underwent an esophagogastroduodenoscopy on 3/16 with Dr. Bautista.  Postoperative diagnosis noted gastric ulcers, duodenitis, GERD, and hiatal hernia.  Biopsies were also obtained due to multiple ulcers at the cardia circled around the GE junction likely Mandeep erosions/ulcers with a blood clot on the, but no high risk stigmata likely the source of her bleeding.  Postprocedure recommendations include keeping patient on PPI IV 40 mg twice daily, restart clear liquid diet and advance as tolerated.  Patient was discharged with oral PPI 40 mg twice daily for 6-8 weeks.    The patient met 2-midnight criteria for an inpatient stay at the time of discharge.     Consultants:      [facility] Dr. Bautista    Procedures:        EGD:  1. Esophagus: Z line at 36cm. GERD grade B.   2. Stomach: Hiatal hernia. Multiple ulcers at the cardia, circled around the GE junction, likely Mandeep erosion/ulcers. Some blood clot on them, but no high risk stigmata. S/p biopsy. Likely the source of bleeding. Antrum was also biopsy for the duodenitis.   3. Duodenum: Mild  duodenitis.      IMPRESSION/RECOMMENDATIONS:  Gastric ulcers, duodenitis, GERD, hiatal hernia.      Recs:  Keep PPI iv bid 40mg for today and tmr. Okay to have water today. Clear liquid tmr morning then advance the diet per the primary team.      When stable to discharge, she will go home with oral PPI 40mg bid for 6-8 weeks.     Discharge Medications:           Medication List        START taking these medications        Instructions   omeprazole 40 MG delayed-release capsule  Commonly known as: PRILOSEC   Take 1 Capsule by mouth 2 times a day.  Dose: 40 mg            CONTINUE taking these medications        Instructions   baclofen 10 MG Tabs  Commonly known as: LIORESAL   TAKE 3 TABLETS BY MOUTH FOUR TIMES DAILY     bisacodyl 10 MG Supp  Commonly known as: DULCOLAX   INSERT 1 SUPPOSITORY RECTALLY EVERY DAY     citalopram 20 MG Tabs  Commonly known as: CeleXA   TAKE 1 TABLET BY MOUTH EVERY DAY  Dose: 20 mg     cyclobenzaprine 10 mg Tabs  Commonly known as: Flexeril   TAKE 1 TABLET BY MOUTH THREE TIMES DAILY AS NEEDED     docusate sodium 100 MG Caps  Commonly known as: COLACE   TAKE 1 CAPSULE BY MOUTH TWICE DAILY AS NEEDED     lactulose 10 GM/15ML Soln   Doctor's comments: **Patient requests 90 days supply**  TAKE 15 TO 30 MLS BY MOUTH EVERY 4 HOURS AS NEEDED     linaCLOtide 145 MCG Caps   Take 145 mcg by mouth every morning before breakfast.  Dose: 145 mcg     * Misc. Devices Misc   Please allow patient to have support/therapy dog in appt. Regardless of weight due to multiple chronic illnesses and debility.     * Misc. Devices Misc   Please eval and fix electric W/C. Please eval also for W/C needs.     * Misc. Devices Misc   W/C stabilizer needs eval and possible replacement     * Misc. Devices Misc   Bedside commode     * Misc. Devices Misc   New latch for new dynavox  Wheel chair     * Misc. Devices Misc   Bedside commode with padding     MULTIVITAMIN GUMMIES ADULTS PO   Take 1 Tab by mouth every day.  Indications: vitamin supplement.  Dose: 1 Tablet     polyethylene glycol 3350 17 GM/SCOOP Powd  Commonly known as: MIRALAX   MIX 1 CAPFUL WITH 8OZ OF WATER AND DRINK DAILY FOR CONSTIPATION     simethicone 125 MG chewable tablet  Commonly known as: Mylicon   Take 125 mg by mouth 4 times a day as needed. Indications: Gas  Dose: 125 mg     * SUMAtriptan Succinate 6 MG/0.5ML Soaj   INJECT 6MG SUBCUTANEOUS 1 TIMES DAILY AS NEEDED FOR UP TO 9 DAYS     * sumatriptan 100 MG tablet  Commonly known as: IMITREX   TAKE 1 TABLET BY MOUTH AT ONSET OF HEADACHE. MAY REPEAT IN 2 HOUR. MAX 2 TABLETS PER DAY     tamsulosin 0.4 MG capsule  Commonly known as: FLOMAX   TAKE 1 CAPSULE BY MOUTH EVERY DAY  Dose: 0.4 mg     topiramate 25 MG Tabs  Commonly known as: TOPAMAX   TAKE 1 TABLET BY MOUTH TWICE DAILY     vitamin D 1000 Unit (25 mcg) Tabs  Commonly known as: cholecalciferol   Take 1,000 Units by mouth 4 times a day. Indications: supplement  Dose: 1,000 Units           * This list has 8 medication(s) that are the same as other medications prescribed for you. Read the directions carefully, and ask your doctor or other care provider to review them with you.                Disposition:   Discharge home    Activity:   As tolerated    Code status:   Full code    Primary Care Provider:    Margo Cook P.A.-C.    Follow up appointment details :      No follow-up provider specified.  Future Appointments   Date Time Provider Department Center   4/6/2023  9:30 AM Jessica Rouse D.O. [facility] None   5/16/2023 10:40 AM Melissa P Bloch, M.D. [facility] None       Pending Studies:        Pathology from EGD    Time spent on discharge day patient visit: 43 minutes    #################################################    Interval history/exam for day of discharge:    Vitals:    03/17/23 2339 03/18/23 0333 03/18/23 0820 03/18/23 0920   BP: 96/61 110/66  98/65   Pulse: 83 78 63    Resp: 17 18 16    Temp: 36.1 °C (97 °F) 36.6 °C (97.9 °F) 36.9 °C (98.4  °F)    TempSrc: Temporal Temporal Temporal    SpO2: 95% 97% 95%    Weight:       Height:         Weight/BMI: Body mass index is 23.31 kg/m².  Pulse Oximetry: 95 %, O2 (LPM): 0, O2 Delivery Device: None - Room Air    General:  NAD  CVS:  RRR  PULM:  CTAB, no respiratory distress    Most Recent Labs:    Lab Results   Component Value Date/Time    WBC 4.9 03/18/2023 03:58 AM    RBC 4.42 03/18/2023 03:58 AM    HEMOGLOBIN 13.8 03/18/2023 03:58 AM    HEMATOCRIT 42.9 03/18/2023 03:58 AM    MCV 97.1 03/18/2023 03:58 AM    MCH 31.2 03/18/2023 03:58 AM    MCHC 32.2 (L) 03/18/2023 03:58 AM    MPV 9.8 03/18/2023 03:58 AM    NEUTSPOLYS 56.10 03/17/2023 01:22 AM    LYMPHOCYTES 35.40 03/17/2023 01:22 AM    MONOCYTES 6.00 03/17/2023 01:22 AM    EOSINOPHILS 0.90 03/17/2023 01:22 AM    BASOPHILS 1.10 03/17/2023 01:22 AM    ANISOCYTOSIS 1+ 07/01/2016 04:41 AM      Lab Results   Component Value Date/Time    SODIUM 139 03/18/2023 03:58 AM    POTASSIUM 4.0 03/18/2023 03:58 AM    CHLORIDE 110 03/18/2023 03:58 AM    CO2 18 (L) 03/18/2023 03:58 AM    GLUCOSE 78 03/18/2023 03:58 AM    BUN 8 03/18/2023 03:58 AM    CREATININE 0.53 03/18/2023 03:58 AM      Lab Results   Component Value Date/Time    ALTSGPT 16 03/16/2023 10:07 AM    ASTSGOT 16 03/16/2023 10:07 AM    ALKPHOSPHAT 72 03/16/2023 10:07 AM    TBILIRUBIN 0.7 03/16/2023 10:07 AM    DBILIRUBIN <0.1 02/28/2013 02:55 AM    LIPASE 28 03/16/2023 10:07 AM    ALBUMIN 4.5 03/16/2023 10:07 AM    GLOBULIN 2.7 03/16/2023 10:07 AM    PREALBUMIN 8.0 (L) 07/04/2016 05:34 AM    INR 1.09 03/16/2023 10:07 AM    MACROCYTOSIS 1+ 07/01/2016 04:41 AM     Lab Results   Component Value Date/Time    PROTHROMBTM 14.0 03/16/2023 10:07 AM    INR 1.09 03/16/2023 10:07 AM        Imaging/ Testing:      CT-ABDOMEN-PELVIS WITH   Final Result      1.  No acute inflammation in the abdomen or pelvis.   2.  Small, nonobstructing bilateral renal stones.   3.  Severe levoconvex scoliosis of the thoracolumbar spine.       DX-CHEST-PORTABLE (1 VIEW)   Final Result      1.  Low lung volumes without definite acute cardiopulmonary abnormality.          Instructions:      The patient was instructed to return to the ER in the event of worsening symptoms. I have counseled the patient on the importance of compliance and the patient has agreed to proceed with all medical recommendations and follow up plan indicated above.   The patient understands that all medications come with benefits and risks. Risks may include permanent injury or death and these risks can be minimized with close reassessment and monitoring.

## 2023-03-18 NOTE — CARE PLAN
The patient is Stable - Low risk of patient condition declining or worsening    Shift Goals  Clinical Goals: advance diet; safety; ppi;q2 turns  Patient Goals: advance diet  Family Goals: advance diet    Progress made toward(s) clinical / shift goals:  progressing     Problem: Knowledge Deficit - Standard  Goal: Patient and family/care givers will demonstrate understanding of plan of care, disease process/condition, diagnostic tests and medications  Outcome: Progressing     Problem: Skin Integrity  Goal: Skin integrity is maintained or improved  Outcome: Progressing     Problem: Fall Risk  Goal: Patient will remain free from falls  Outcome: Progressing     Problem: Hemodynamics  Goal: Patient's hemodynamics, fluid balance and neurologic status will be stable or improve  Outcome: Progressing     Problem: Nutrition  Goal: Patient's nutritional and fluid intake will be adequate or improve  Outcome: Progressing       Patient is not progressing towards the following goals:

## 2023-03-18 NOTE — CARE PLAN
The patient is Stable - Low risk of patient condition declining or worsening    Shift Goals  Clinical Goals: safety, PPI, advanced diet  Patient Goals: rest, advance diet    Progress made toward(s) clinical / shift goals:      Problem: Knowledge Deficit - Standard  Goal: Patient and family/care givers will demonstrate understanding of plan of care, disease process/condition, diagnostic tests and medications  Description: Target End Date:  1-3 days or as soon as patient condition allows    Document in Patient Education    1.  Patient and family/caregiver oriented to unit, equipment, visitation policy and means for communicating concern  2.  Complete/review Learning Assessment  3.  Assess knowledge level of disease process/condition, treatment plan, diagnostic tests and medications  4.  Explain disease process/condition, treatment plan, diagnostic tests and medications  Outcome: Progressing     Problem: Skin Integrity  Goal: Skin integrity is maintained or improved  Description: Target End Date:  Prior to discharge or change in level of care    Document interventions on Skin Risk/Greg flowsheet groups and corresponding LDA    1.  Assess and monitor skin integrity, appearance and/or temperature  2.  Assess risk factors for impaired skin integrity and/or pressures ulcers  3.  Implement precautions to protect skin integrity in collaboration with interdisciplinary team  4.  Implement pressure ulcer prevention protocol if at risk for skin breakdown  5.  Confirm wound care consult if at risk for skin breakdown  6.  Ensure patient use of pressure relieving devices  (Low air loss bed, waffle overlay, heel protectors, ROHO cushion, etc)  Outcome: Progressing     Problem: Fall Risk  Goal: Patient will remain free from falls  Description: Target End Date:  Prior to discharge or change in level of care    Document interventions on the [facility] Fall Risk Assessment    1.  Assess for fall risk factors  2.  Implement fall  precautions  Outcome: Progressing

## 2023-03-18 NOTE — CARE PLAN
The patient is Stable - Low risk of patient condition declining or worsening    Shift Goals  Clinical Goals: Discharge  Patient Goals: Discharge  Family Goals: Discharge    Progress made toward(s) clinical / shift goals:      Problem: Knowledge Deficit - Standard  Goal: Patient and family/care givers will demonstrate understanding of plan of care, disease process/condition, diagnostic tests and medications  Description: Target End Date:  1-3 days or as soon as patient condition allows    Document in Patient Education    1.  Patient and family/caregiver oriented to unit, equipment, visitation policy and means for communicating concern  2.  Complete/review Learning Assessment  3.  Assess knowledge level of disease process/condition, treatment plan, diagnostic tests and medications  4.  Explain disease process/condition, treatment plan, diagnostic tests and medications  Outcome: Met     Problem: Skin Integrity  Goal: Skin integrity is maintained or improved  Description: Target End Date:  Prior to discharge or change in level of care    Document interventions on Skin Risk/Greg flowsheet groups and corresponding LDA    1.  Assess and monitor skin integrity, appearance and/or temperature  2.  Assess risk factors for impaired skin integrity and/or pressures ulcers  3.  Implement precautions to protect skin integrity in collaboration with interdisciplinary team  4.  Implement pressure ulcer prevention protocol if at risk for skin breakdown  5.  Confirm wound care consult if at risk for skin breakdown  6.  Ensure patient use of pressure relieving devices  (Low air loss bed, waffle overlay, heel protectors, ROHO cushion, etc)  Outcome: Met     Problem: Fall Risk  Goal: Patient will remain free from falls  Description: Target End Date:  Prior to discharge or change in level of care    Document interventions on the [facility] Fall Risk Assessment    1.  Assess for fall risk factors  2.  Implement fall precautions  Outcome:  Met     Problem: Hemodynamics  Goal: Patient's hemodynamics, fluid balance and neurologic status will be stable or improve  Description: Target End Date:  Prior to discharge or change in level of care    Document on Assessment and I/O flowsheet templates    1.  Monitor vital signs, pulse oximetry and cardiac monitor per provider order and/or policy  2.  Maintain blood pressure per provider order  3.  Hemodynamic monitoring per provider order  4.  Manage IV fluids and IV infusions  5.  Monitor intake and output  6.  Daily weights per unit policy or provider order  7.  Assess peripheral pulses and capillary refill  8.  Assess color and body temperature  9.  Position patient for maximum circulation/cardiac output  10. Monitor for signs/symptoms of excessive bleeding  11. Assess mental status, restlessness and changes in level of consciousness  12. Monitor temperature and report fever or hypothermia to provider immediately. Consideration of targeted temperature management.  Outcome: Met     Problem: Nutrition  Goal: Patient's nutritional and fluid intake will be adequate or improve  Description: Target End Date:  Prior to discharge or change in level of care    Document on I/O flowsheet    1.  Monitor nutritional intake  2.  Monitor weight per provider order  3.  Assess patient's ability to take oral nutrition  4.  Collaborate with Speech Therapy, Dietitian and interdisciplinary team for appropriate feeding and fluid intake  5.  Assist with feeding  Outcome: Met

## 2023-03-18 NOTE — PROGRESS NOTES
4 Eyes Skin Assessment Completed by Fatimah EWING RN and Dominga TORRES    Head WDL  Ears Redness and Blanching  Nose WDL  Mouth WDL  Neck WDL  Breast/Chest WDL  Shoulder Blades WDL  Spine WDL  (R) Arm/Elbow/Hand Redness and Blanching  (L) Arm/Elbow/Hand WDL  Abdomen WDL  Groin WDL  Scrotum/Coccyx/Buttocks Redness and Blanching  (R) Leg Redness and Blanching  (L) Leg WDL  (R) Heel/Foot/Toe Redness and Blanching  (L) Heel/Foot/Toe Redness and Blanching          Devices In Places Tele Box, Pulse Ox, and SCD's      Interventions In Place Heel Mepilex, Pillows, Q2 Turns, and Barrier Cream    Possible Skin Injury No    Pictures Uploaded Into Epic N/A  Wound Consult Placed N/A  RN Wound Prevention Protocol Ordered No

## 2023-03-18 NOTE — PROGRESS NOTES
MONITOR SUMMARY:    RHYTHM:SR, ST  HEART RATE:   ECTOPY: n/a  MEASUREMENT: .19/.08/.36

## 2023-03-20 ENCOUNTER — PATIENT OUTREACH (OUTPATIENT)
Dept: HEALTH INFORMATION MANAGEMENT | Facility: OTHER | Age: 38
End: 2023-03-20
Payer: MEDICARE

## 2023-03-20 NOTE — PROGRESS NOTES
"Patient outreach for TCM follow up completed with garrett Middleton sister. The patient is non-verbal. Reviewed discharge instructions and new and current medications--no changes made to current medications. Sister verbalized understanding but will need to compare medications at home with list, she was not able to do this at time of call. Instructed to reach out via [employer] with any discrepancies/questions.   Family will make the [facility] appointment for the patient--given scheduling # and told to specify \"Hospital follow up visit\" for a 40 min appt. Denied any further needs at this time. The patient is not having any n/v.     "

## 2023-04-06 ENCOUNTER — APPOINTMENT (OUTPATIENT)
Dept: PHYSICAL MEDICINE AND REHAB | Facility: MEDICAL CENTER | Age: 38
End: 2023-04-06
Payer: MEDICARE

## 2023-04-20 ENCOUNTER — OFFICE VISIT (OUTPATIENT)
Dept: MEDICAL GROUP | Facility: MEDICAL CENTER | Age: 38
End: 2023-04-20
Payer: MEDICARE

## 2023-04-20 VITALS
RESPIRATION RATE: 16 BRPM | HEIGHT: 67 IN | OXYGEN SATURATION: 98 % | HEART RATE: 101 BPM | DIASTOLIC BLOOD PRESSURE: 72 MMHG | SYSTOLIC BLOOD PRESSURE: 118 MMHG | TEMPERATURE: 97.2 F | BODY MASS INDEX: 23.31 KG/M2

## 2023-04-20 DIAGNOSIS — R25.2 SPASTICITY: ICD-10-CM

## 2023-04-20 DIAGNOSIS — K92.0 HEMATEMESIS, UNSPECIFIED WHETHER NAUSEA PRESENT: ICD-10-CM

## 2023-04-20 DIAGNOSIS — F33.42 RECURRENT MAJOR DEPRESSIVE DISORDER, IN FULL REMISSION (HCC): Chronic | ICD-10-CM

## 2023-04-20 DIAGNOSIS — G04.00 ADEM (ACUTE DISSEMINATED ENCEPHALOMYELITIS): ICD-10-CM

## 2023-04-20 PROCEDURE — 99213 OFFICE O/P EST LOW 20 MIN: CPT | Performed by: STUDENT IN AN ORGANIZED HEALTH CARE EDUCATION/TRAINING PROGRAM

## 2023-04-20 NOTE — PROGRESS NOTES
Subjective:     Chief Complaint   Patient presents with    Follow-Up         HPI:   Griselda presents today with     Hospital follow-up  Recently hospitalized 3/16 through 3/18 secondary to hematemesis.  BC and CMP were unremarkable, troponin negative.  INR and D-dimer normal.  CT abdomen pelvis with no acute findings.  GI consulted and EGD 3/16.  Postoperative diagnosis noted gastric ulcers, duodenitis, GERD, hiatal hernia.  Patient was started on PPI 40 mg twice daily for 6 to 8 weeks and recommend follow-up with GI.    We will refer to GI.  Patient notes no abdominal pain.  Taking omeprazole as prescribed.    Spasticity  Contracture of muscles  Continues to follow with physiatry for spasticity and contractures.  Patient receiving Botox.  Patient does continue on baclofen 30 mg 4 times daily.  Stop tizanidine due to sedation.    Patient has been referred to orthopedics for further evaluation of contractures.  Patient had x-rays of bilateral hips to further evaluate pain.  Patient has not followed up with orthopedics.      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  Patient does continue to follow with neurology.  Patient has been referred to home health, speech, occupational therapy and PT. patient would benefit from therapy for stretching/movement.  Unclear what happened with home health referral.    Urinary incontinence, unspecified type  Needs refills for her incontinence supplies with [employer] . Patient uses small pull on briefs, frequency 3-4 times per day and a large under pad for this.  Also taking tamsulosin and topiramate.    Depression  Patient continues on citalopram denies depression today.          ROS:  Gen: no fevers/chills  Pulm: no sob, no cough  CV: no chest pain, no palpitations  GI: no nausea/vomiting, no diarrhea        Objective:     Exam:  /72 (BP Location: Right arm, Patient Position: Sitting, BP Cuff Size: Adult)    "Pulse (!) 101   Temp 36.2 °C (97.2 °F) (Temporal)   Resp 16   Ht 1.702 m (5' 7\")   SpO2 98%   BMI 23.31 kg/m²  Body mass index is 23.31 kg/m².    Gen: Alert and oriented, No apparent distress.  Neck: Neck is supple without lymphadenopathy.  Lungs: Normal effort, CTA bilaterally, no wheezes, rhonchi, or rales  CV: Regular rate and rhythm. No murmurs, rubs, or gallops.      Assessment & Plan:     37 y.o. female with the following -     Patient presents today for 6-month follow-up.  Patient notes no new concerns.  Patient using thumbs up or down to respond to questions, is not vocal or using iPad at today's visit.  Patient presents to visit alone.  Patient states that she is doing well overall.  No new concerns.    1. Hematemesis, unspecified whether nausea present  Chronic, stable.  Patient continues on omeprazole.  Patient notes no further pain or hematemesis.  - Referral to Gastroenterology    2. Spasticity  Chronic, stable.  Patient continues to follow with physiatry.      3. ADEM (acute disseminated encephalomyelitis)  Chronic, stable.  Patient continues to follow with neurology.    4. Recurrent major depressive disorder, in full remission (HCC)  Chronic, stable.  Patient continues on Celexa.      No follow-ups on file.    Please note that this dictation was created using voice recognition software. I have made every reasonable attempt to correct obvious errors, but I expect that there are errors of grammar and possibly content that I did not discover before finalizing the note.        "

## 2023-04-24 ENCOUNTER — APPOINTMENT (OUTPATIENT)
Dept: PHYSICAL MEDICINE AND REHAB | Facility: MEDICAL CENTER | Age: 38
End: 2023-04-24
Payer: MEDICARE

## 2023-05-02 ENCOUNTER — TELEPHONE (OUTPATIENT)
Dept: MEDICAL GROUP | Facility: MEDICAL CENTER | Age: 38
End: 2023-05-02
Payer: MEDICARE

## 2023-05-02 NOTE — TELEPHONE ENCOUNTER
Gunner [employer] (incontinence supply company) called stating they recived the recent prescription for patient for incontinence supplies but in the last office visit notes is not mentioned the need for pt to have incontinence supplies. They were wondering if you can please addend the note and fax it over to them to [phone]

## 2023-05-04 ENCOUNTER — TELEPHONE (OUTPATIENT)
Dept: NEUROLOGY | Facility: MEDICAL CENTER | Age: 38
End: 2023-05-04
Payer: MEDICARE

## 2023-06-07 ENCOUNTER — TELEPHONE (OUTPATIENT)
Dept: NEUROLOGY | Facility: MEDICAL CENTER | Age: 38
End: 2023-06-07
Payer: MEDICARE

## 2023-06-07 NOTE — TELEPHONE ENCOUNTER
[phone] (sister Luda)    Called sister as requested LV we scheduled pt on 8/1/23 to call back to let us know if this works for them.

## 2023-07-25 ENCOUNTER — OFFICE VISIT (OUTPATIENT)
Dept: PHYSICAL MEDICINE AND REHAB | Facility: MEDICAL CENTER | Age: 38
End: 2023-07-25
Payer: MEDICARE

## 2023-07-25 VITALS
OXYGEN SATURATION: 87 % | TEMPERATURE: 98 F | HEART RATE: 98 BPM | DIASTOLIC BLOOD PRESSURE: 74 MMHG | SYSTOLIC BLOOD PRESSURE: 110 MMHG

## 2023-07-25 DIAGNOSIS — N39.0 URINARY TRACT INFECTION WITHOUT HEMATURIA, SITE UNSPECIFIED: ICD-10-CM

## 2023-07-25 DIAGNOSIS — R47.01 APHASIA: ICD-10-CM

## 2023-07-25 DIAGNOSIS — Z97.5 IUD (INTRAUTERINE DEVICE) IN PLACE: ICD-10-CM

## 2023-07-25 DIAGNOSIS — G04.00 ADEM (ACUTE DISSEMINATED ENCEPHALOMYELITIS): Primary | ICD-10-CM

## 2023-07-25 DIAGNOSIS — L89.90 PRESSURE ULCERS OF SKIN OF MULTIPLE TOPOGRAPHIC SITES: ICD-10-CM

## 2023-07-25 PROCEDURE — 99214 OFFICE O/P EST MOD 30 MIN: CPT | Performed by: PHYSICAL MEDICINE & REHABILITATION

## 2023-07-25 PROCEDURE — 3074F SYST BP LT 130 MM HG: CPT | Performed by: PHYSICAL MEDICINE & REHABILITATION

## 2023-07-25 PROCEDURE — 3078F DIAST BP <80 MM HG: CPT | Performed by: PHYSICAL MEDICINE & REHABILITATION

## 2023-07-25 RX ORDER — CIPROFLOXACIN 500 MG/1
500 TABLET, FILM COATED ORAL 2 TIMES DAILY
Qty: 14 TABLET | Refills: 0 | Status: SHIPPED | OUTPATIENT
Start: 2023-07-25 | End: 2023-08-01

## 2023-07-25 NOTE — PROGRESS NOTES
[facility]  [facility]  [address] [place] [address]  Ph: [phone]    FOLLOW UP PATIENT EVALUATION    Patient Name: Griselda Swanson   Patient : 1985  Patient Age: 38 y.o.     Examining Physician: Dr. Jessica Rouse DO    SUBJECTIVE:   Patient Identification: Griselda Swanson is a 38 y.o. 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   Interval History:      CV 23  - Griselda presents alone today, she has several concerns.  - First would like to know how to get her legs less stiff.  - She is continuing to take baclofen 30 mg 4 times daily.  - Is still off of the tizanidine which she has been off for a while due to side effect of sedation.  - She is having new hip and groin pain which she thinks is due to being up in the chair or sitting in bed a lot.  - She also has a sore on the bottom of her great toe.  It is callused but partially open.  - She denies any new shoes or new areas of pressure that would have caused this.  - Is wondering if she may benefit from Achilles tendon lengthening or contracture release.  - Is having a lot of foot pain and toe curling which is painful.    CV 2023   And has been doing well.  Her sister is on iPad via [employer] to assist with history.  Griselda has made notes from previous to discuss with me.  She thinks she has a UTI.  She has had burning while peeing for a couple of weeks now.  She also states that she  needs her IUD replaced and does not have a gynecologist.  Additionally she has skin ulcers on multiple sites due to her contractures.  She needs home health nursing to look at her wounds.  They are wondering what else can be done about her tone, she feels like it is significantly worse.    Review of Systems:  All other pertinent positive review of systems are noted above in HPI.     Past Medical History:  Past Medical History:   Diagnosis Date    C. difficile colitis 2015    Coma ([facility]) August 2009    1 month, lesions on brain,  unknown etiology    Encephalitis 2009    Coma ([facility]) 2008    Spontaneous -     ADEM (acute disseminated encephalomyelitis)     Back pain     Breath shortness     since 2014 not an issue at this time (4/2018)    Demyelinating disorder ([facility])     demyelinating encephpomyeltis     Heart burn     Indigestion     Lesion of brain     unknown cuase    MEDICAL HOME     Migraines     MS (multiple sclerosis) ([facility])     Other specified disorder of intestines     constipation    Pain     Psychiatric problem     depression and anxiety    Renal disorder     kidney stones    Urinary incontinence       Past Surgical History:   Procedure Laterality Date    KS UPPER GI ENDOSCOPY,DIAGNOSIS N/A 3/16/2023    Procedure: GASTROSCOPY;  Surgeon: Evelin Bautista M.D.;  Location: SURGERY SAME DAY [facility];  Service: Gastroenterology    KS UPPER GI ENDOSCOPY,BIOPSY N/A 3/16/2023    Procedure: GASTROSCOPY, WITH BIOPSY;  Surgeon: Evelin Bautista M.D.;  Location: SURGERY SAME DAY [facility];  Service: Gastroenterology    PUMP INSERT/REMOVE Left 7/1/2016    Procedure: PUMP REMOVAL;  Surgeon: Catrachito Vega M.D.;  Location: SURGERY [facility];  Service:     CATH PLACE PERM EPIDURAL Left 6/14/2016    Procedure: CATH PLACE PERM EPIDURAL - BACLOFEN PUMP AND CATHETER REPLACEMENT  - BACK AND ABDOMEN;  Surgeon: Catrachito Vega M.D.;  Location: SURGERY [facility];  Service:     KS BACLOFEN INTRATHECAL TRIAL  3/8/2016      [place]  [facility]    CATH PLACE PERM EPIDURAL N/A 3/8/2016    Procedure: BACLOFEN PUMP TRIAL;  Surgeon: Pari Roberson M.D.;  Location: SURGERY [facility];  Service:     PUMP REVISION  10/8/2013    Performed by Pair Roberson M.D. at [facility]    PUMP INSERT/REMOVE  3/12/2013    Performed by Pari Roberson M.D. at [facility]    CATH PLACE PERM EPIDURAL  6/26/2012    Performed by PARI ROBERSON at [facility]    CATH PLACE PERM EPIDURAL  3/6/2012    Performed by PARI ROBERSON at [facility]    MAMMOPLASTY AUGMENTATION  2002    TONSILLECTOMY          Current Outpatient Medications:     ciprofloxacin (CIPRO) 500 MG Tab, Take 1 Tablet by mouth 2 times a day for 7 days., Disp: 14 Tablet, Rfl: 0    docusate sodium (COLACE) 100 MG Cap, TAKE 1 CAPSULE BY MOUTH TWICE DAILY AS NEEDED, Disp: 60 Capsule, Rfl: 3    omeprazole (PRILOSEC) 40 MG delayed-release capsule, Take 1 Capsule by mouth 2 times a day., Disp: 60 Capsule, Rfl: 2    cyclobenzaprine (FLEXERIL) 10 mg Tab, TAKE 1 TABLET BY MOUTH THREE TIMES DAILY AS NEEDED, Disp: 270 Tablet, Rfl: 3    citalopram (CELEXA) 20 MG Tab, TAKE 1 TABLET BY MOUTH EVERY DAY, Disp: 90 Tablet, Rfl: 3    baclofen (LIORESAL) 10 MG Tab, TAKE 3 TABLETS BY MOUTH FOUR TIMES DAILY, Disp: 360 Tablet, Rfl: 11    tamsulosin (FLOMAX) 0.4 MG capsule, TAKE 1 CAPSULE BY MOUTH EVERY DAY, Disp: 90 Capsule, Rfl: 3    linaCLOtide 145 MCG Cap, Take 145 mcg by mouth every morning before breakfast., Disp: 30 Capsule, Rfl: 11    SUMAtriptan Succinate 6 MG/0.5ML Solution Auto-injector, INJECT 6MG SUBCUTANEOUS 1 TIMES DAILY AS NEEDED FOR UP TO 9 DAYS, Disp: 15 mL, Rfl: 11    sumatriptan (IMITREX) 100 MG tablet, TAKE 1 TABLET BY MOUTH AT ONSET OF HEADACHE. MAY REPEAT IN 2 HOUR. MAX 2 TABLETS PER DAY, Disp: 60 Tablet, Rfl: 11    topiramate (TOPAMAX) 25 MG Tab, TAKE 1 TABLET BY MOUTH TWICE DAILY, Disp: 60 Tablet, Rfl: 11    polyethylene glycol 3350  (MIRALAX) 17 GM/SCOOP Powder, MIX 1 CAPFUL WITH 8OZ OF WATER AND DRINK DAILY FOR CONSTIPATION, Disp: 510 g, Rfl: 0    bisacodyl (DULCOLAX) 10 MG Suppos, INSERT 1 SUPPOSITORY RECTALLY EVERY DAY, Disp: 50 Suppository, Rfl: 11    lactulose 10 GM/15ML Solution, TAKE 15 TO 30 MLS BY MOUTH EVERY 4 HOURS AS NEEDED, Disp: 25229 mL, Rfl: 0    Misc. Devices Misc, Bedside commode with padding, Disp: 1 Each, Rfl: 11    Misc. Devices Misc, New latch for new dynavox  Wheel chair, Disp: 1 Each, Rfl: 11    Misc. Devices Misc, Bedside commode, Disp: 1 Each, Rfl: 11    simethicone (MYLICON) 125 MG chewable tablet, Take 125 mg by mouth 4 times a day as needed. Indications: Gas, Disp: , Rfl:     Misc. Devices Misc, W/C stabilizer needs eval and possible replacement, Disp: 1 Each, Rfl: 11    Misc. Devices Misc, Please eval and fix electric W/C. Please eval also for W/C needs., Disp: 1 Each, Rfl: 11    Misc. Devices Misc, Please allow patient to have support/therapy dog in appt. Regardless of weight due to multiple chronic illnesses and debility., Disp: 1 Each, Rfl: 11    Multiple Vitamins-Minerals (MULTIVITAMIN GUMMIES ADULTS PO), Take 1 Tab by mouth every day. Indications: vitamin supplement., Disp: , Rfl:     vitamin D (CHOLECALCIFEROL) 1000 UNIT Tab, Take 1,000 Units by mouth 4 times a day. Indications: supplement, Disp: , Rfl:   Allergies   Allergen Reactions    Bactrim      Reaction unknown    Fentanyl      Makes her agressive        Past Social History:  Social History     Socioeconomic History    Marital status: Single     Spouse name: Not on file    Number of children: Not on file    Years of education: Not on file    Highest education level: Not on file   Occupational History    Not on file   Tobacco Use    Smoking status: Former     Packs/day: 1.00     Years: 12.00     Pack years: 12.00     Types: Cigarettes     Quit date: 1/1/2008     Years since quitting: 15.5    Smokeless tobacco: Never    Tobacco comments:     Started  smoking at age 11   Substance and Sexual Activity    Alcohol use: No    Drug use: No    Sexual activity: Never     Partners: Male   Other Topics Concern    Primary/coprimary nurse & associates Not Asked    Family contact information Not Asked    OK to release patient information to the following Not Asked    Patient preferred routine/Privacy concerns Not Asked    Patient likes and dislikes Not Asked    Participating In Research Study Not Asked    Miscellaneous Not Asked     Service No    Blood Transfusions No    Caffeine Concern No    Occupational Exposure No    Hobby Hazards No    Sleep Concern Yes    Stress Concern No    Weight Concern No    Special Diet Yes     Comment: Keto    Back Care No    Exercise No    Bike Helmet No    Seat Belt Yes    Self-Exams No   Social History Narrative    Not on file     Social Determinants of Health     Financial Resource Strain: Not on file   Food Insecurity: Not on file   Transportation Needs: Not on file   Physical Activity: Not on file   Stress: Not on file   Social Connections: Not on file   Intimate Partner Violence: Not on file   Housing Stability: Not on file        Family History:  Family History   Problem Relation Age of Onset    Cancer Mother         Sarcoma    Bipolar disorder Brother     Other Brother         spina bifida    Diabetes Maternal Grandmother     Other Daughter         Migrains       Depression and Opioid Screening  PHQ-9:      2/14/2022    11:38 AM 1/12/2023     1:20 PM 3/16/2023     6:29 PM   Depression Screen (PHQ-2/PHQ-9)   PHQ-2 Total Score 0  0   PHQ-2 Total Score  0    PHQ-9 Total Score 5       Interpretation of PHQ-9 Total Score   Score Severity   1-4 No Depression   5-9 Mild Depression   10-14 Moderate Depression   15-19 Moderately Severe Depression   20-27 Severe Depression     Opioid Risk Score: 2  Interpretation of Opioid Risk Score   Score 0-3 = Low risk of abuse. Do UDS at least once per year.  Score 4-7 = Moderate risk of abuse. Do  UDS 1-4 times per year.  Score 8+ = High risk of abuse. Refer to specialist.      OBJECTIVE:   Vital Signs:  Vitals:    07/25/23 0952   BP: 110/74   Pulse: 98   Temp: 36.7 °C (98 °F)   SpO2: (!) 87%        Pertinent Labs:  Lab Results   Component Value Date/Time    SODIUM 139 03/18/2023 03:58 AM    POTASSIUM 4.0 03/18/2023 03:58 AM    CHLORIDE 110 03/18/2023 03:58 AM    CO2 18 (L) 03/18/2023 03:58 AM    GLUCOSE 78 03/18/2023 03:58 AM    BUN 8 03/18/2023 03:58 AM    CREATININE 0.53 03/18/2023 03:58 AM       No results found for: HBA1C    Lab Results   Component Value Date/Time    WBC 4.9 03/18/2023 03:58 AM    RBC 4.42 03/18/2023 03:58 AM    HEMOGLOBIN 13.8 03/18/2023 03:58 AM    HEMATOCRIT 42.9 03/18/2023 03:58 AM    MCV 97.1 03/18/2023 03:58 AM    MCH 31.2 03/18/2023 03:58 AM    MCHC 32.2 (L) 03/18/2023 03:58 AM    MPV 9.8 03/18/2023 03:58 AM    NEUTSPOLYS 56.10 03/17/2023 01:22 AM    LYMPHOCYTES 35.40 03/17/2023 01:22 AM    MONOCYTES 6.00 03/17/2023 01:22 AM    EOSINOPHILS 0.90 03/17/2023 01:22 AM    BASOPHILS 1.10 03/17/2023 01:22 AM    ANISOCYTOSIS 1+ 07/01/2016 04:41 AM       Lab Results   Component Value Date/Time    ASTSGOT 16 03/16/2023 10:07 AM    ALTSGPT 16 03/16/2023 10:07 AM        Physical Exam:   GEN: No apparent distress  HEENT: Head normocephalic, atraumatic.  Sclera nonicteric bilaterally, no ocular discharge appreciated bilaterally.  PULMONARY: Breathing nonlabored on room air, no respiratory accessory muscle use.  Not requiring supplemental oxygen.  SKIN: No appreciable skin breakdown on exposed areas of skin.  PSYCH: Mood and affect within normal limits.  NEURO: Awake alert.  Communicative appropriately through iPad.  Multiple sites of severe contracture on multiple joints.    ASSESSMENT/PLAN: Griselda Swanson  is a 38 y.o. 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 The following plan was discussed with the patient who is in agreement.     Visit Diagnoses     ICD-10-CM   1. ADEM (acute disseminated encephalomyelitis)  G04.00   2. Urinary tract infection without hematuria, site unspecified  N39.0   3. IUD (intrauterine device) in place  Z97.5   4. Aphasia  R47.01   5. Pressure ulcers of skin of multiple topographic sites  L89.90        Rehab/Neuro:   Incomplete tetraplegia secondary to postinfectious acute disseminated encephalomyelitis 2009: Differential diagnosis initially included tumefactive MS versus ADEM, patient initially evaluated at [facility] and then went to [facility].  Neurology has been following and given that her MRIs have been completely stable this favors ADEM. ARU at [place] in 2010.   -Referral: Home health for speech therapy using DynaVox and nursing for skin ulcers    Skin: Due to neurologic diagnosis of nontraumatic tetraplegia and subsequent sensorimotor impairments, patient is at great risk for skin breakdown.   Pressure also is due to contractures  -Counseled on utility of Botox which I do not think would be efficacious at all since we have tried it in the past already.  At this point contractures are a surgical issue which would need release.  This would be at the discretion of the surgeons.  She could consider another baclofen pump though she did have significant issues in the past including infection leading to removal.    Ortho:   Multiple Joint Contractures:  - Ortho 9/27/22 - no acute management recommended. No show to appointment with Dr. Moura 10/25/2022  -Information given for orthopedic office for her to make an appointment with the physician in order to discuss contracture release.    :  Subjective UTI  Presence of IUD  -Very difficult to get urine sample  - Med management: Ciprofloxacin 500 mg twice daily for 7 days  -Referral to gynecology for IUD replacement    Follow up: As needed      Please note that this dictation was created  using voice recognition software. I have made every reasonable attempt to correct obvious errors but there may be errors of grammar and content that I may have overlooked prior to finalization of this note.    Dr. Jessica Rouse DO, MS  Department of Physical Medicine & Rehabilitation  Neuro Rehabilitation Clinic  [facility]

## 2023-08-08 ENCOUNTER — OFFICE VISIT (OUTPATIENT)
Dept: MEDICAL GROUP | Facility: MEDICAL CENTER | Age: 38
End: 2023-08-08
Payer: MEDICARE

## 2023-08-08 VITALS
HEART RATE: 89 BPM | RESPIRATION RATE: 16 BRPM | DIASTOLIC BLOOD PRESSURE: 72 MMHG | OXYGEN SATURATION: 100 % | SYSTOLIC BLOOD PRESSURE: 110 MMHG

## 2023-08-08 DIAGNOSIS — L89.90 PRESSURE INJURY OF SKIN, UNSPECIFIED INJURY STAGE, UNSPECIFIED LOCATION: ICD-10-CM

## 2023-08-08 DIAGNOSIS — G04.00 ADEM (ACUTE DISSEMINATED ENCEPHALOMYELITIS): ICD-10-CM

## 2023-08-08 PROCEDURE — 99213 OFFICE O/P EST LOW 20 MIN: CPT | Performed by: STUDENT IN AN ORGANIZED HEALTH CARE EDUCATION/TRAINING PROGRAM

## 2023-08-08 PROCEDURE — 3078F DIAST BP <80 MM HG: CPT | Performed by: STUDENT IN AN ORGANIZED HEALTH CARE EDUCATION/TRAINING PROGRAM

## 2023-08-08 PROCEDURE — 3074F SYST BP LT 130 MM HG: CPT | Performed by: STUDENT IN AN ORGANIZED HEALTH CARE EDUCATION/TRAINING PROGRAM

## 2023-08-08 NOTE — PROGRESS NOTES
Subjective:     Chief Complaint   Patient presents with    Other     Sores on feet and elbow         HPI:   Griselda presents today with     Skin ulcers  Patient presents today for skin ulcers on multiple sites due to contractures.  Patient was seen 1 week ago by physiatry who recommended home health nursing to look at wounds and clean wounds.  Physiatry also recommended that patient follow-up with orthopedic for contracture release surgery.  Physiatry stated that at this time contractures are a surgical issue and need to release.  Referral has already gone through, patient referred to [facility].  Patient does not have open wounds on elbows, just chronic skin breakdown.    Unfortunately, tried calling patient's Sister Luda several times throughout the appointment but was unable to discuss concerns with her.  Patient advised of treatment plan and referral to home health.  Advised patient to have sister call and we will further discuss if needed.      ROS:  Gen: no fevers/chills  Pulm: no sob, no cough  CV: no chest pain, no palpitations  GI: no nausea/vomiting, no diarrhea        Objective:     Exam:  /72   Pulse 89   Resp 16   SpO2 100%  There is no height or weight on file to calculate BMI.    Gen: Alert and oriented, No apparent distress.  Neck: Neck is supple without lymphadenopathy.  Lungs: Normal effort, CTA bilaterally, no wheezes, rhonchi, or rales  CV: Regular rate and rhythm. No murmurs, rubs, or gallops.  Ext: No clubbing, cyanosis, edema.    Assessment & Plan:     38 y.o. female with the following -     1. ADEM (acute disseminated encephalomyelitis)  2. Pressure injury of skin, unspecified injury stage, unspecified location  Chronic, stable.  Patient presents today by herself, unable to get sister on phone.  Patient having difficulty using iPad today for communication.  Patient continues on baclofen 30 mg 4 times daily.  Patient having new hip and groin pain due to being in the chair or  sitting in bed a lot.  Patient is developing sore on bottom of great toe, concern for contracture.  Patient having breakdown to bilateral elbows.  Patient will benefit from home health nurse.  Referral already placed by physiatry.      No follow-ups on file.    Please note that this dictation was created using voice recognition software. I have made every reasonable attempt to correct obvious errors, but I expect that there are errors of grammar and possibly content that I did not discover before finalizing the note.

## 2023-08-10 ENCOUNTER — TELEPHONE (OUTPATIENT)
Dept: NEUROLOGY | Facility: MEDICAL CENTER | Age: 38
End: 2023-08-10
Payer: MEDICARE

## 2023-08-21 ENCOUNTER — TELEPHONE (OUTPATIENT)
Dept: SCHEDULING | Facility: IMAGING CENTER | Age: 38
End: 2023-08-21
Payer: MEDICARE

## 2023-08-21 NOTE — TELEPHONE ENCOUNTER
Good Afternoon,    Patient's sister Emi called to cancel the appt w/ Bloch, but hung up before verifying all demographics.       Thank you,    MARIELLA

## 2023-08-22 ENCOUNTER — APPOINTMENT (OUTPATIENT)
Dept: NEUROLOGY | Facility: MEDICAL CENTER | Age: 38
End: 2023-08-22
Attending: PSYCHIATRY & NEUROLOGY
Payer: MEDICARE

## 2023-09-21 ENCOUNTER — OFFICE VISIT (OUTPATIENT)
Dept: MEDICAL GROUP | Facility: MEDICAL CENTER | Age: 38
End: 2023-09-21
Payer: MEDICARE

## 2023-09-21 VITALS
SYSTOLIC BLOOD PRESSURE: 110 MMHG | DIASTOLIC BLOOD PRESSURE: 70 MMHG | HEIGHT: 67 IN | TEMPERATURE: 98.4 F | BODY MASS INDEX: 23.31 KG/M2 | OXYGEN SATURATION: 100 % | HEART RATE: 100 BPM | RESPIRATION RATE: 16 BRPM

## 2023-09-21 DIAGNOSIS — M24.50 CONTRACTED, JOINT: ICD-10-CM

## 2023-09-21 DIAGNOSIS — Z23 NEED FOR VACCINATION: ICD-10-CM

## 2023-09-21 DIAGNOSIS — R25.2 SPASTICITY: ICD-10-CM

## 2023-09-21 DIAGNOSIS — M70.31 BURSITIS OF RIGHT ELBOW, UNSPECIFIED BURSA: ICD-10-CM

## 2023-09-21 DIAGNOSIS — G04.00 ADEM (ACUTE DISSEMINATED ENCEPHALOMYELITIS): ICD-10-CM

## 2023-09-21 PROCEDURE — 90686 IIV4 VACC NO PRSV 0.5 ML IM: CPT | Performed by: STUDENT IN AN ORGANIZED HEALTH CARE EDUCATION/TRAINING PROGRAM

## 2023-09-21 PROCEDURE — 3074F SYST BP LT 130 MM HG: CPT | Performed by: STUDENT IN AN ORGANIZED HEALTH CARE EDUCATION/TRAINING PROGRAM

## 2023-09-21 PROCEDURE — 99214 OFFICE O/P EST MOD 30 MIN: CPT | Mod: 25 | Performed by: STUDENT IN AN ORGANIZED HEALTH CARE EDUCATION/TRAINING PROGRAM

## 2023-09-21 PROCEDURE — 3078F DIAST BP <80 MM HG: CPT | Performed by: STUDENT IN AN ORGANIZED HEALTH CARE EDUCATION/TRAINING PROGRAM

## 2023-09-21 PROCEDURE — G0008 ADMIN INFLUENZA VIRUS VAC: HCPCS | Performed by: STUDENT IN AN ORGANIZED HEALTH CARE EDUCATION/TRAINING PROGRAM

## 2023-09-21 RX ORDER — MELOXICAM 15 MG/1
15 TABLET ORAL DAILY
Qty: 90 TABLET | Refills: 3 | Status: SHIPPED | OUTPATIENT
Start: 2023-09-21

## 2023-09-21 RX ORDER — AMOXICILLIN 500 MG/1
500 CAPSULE ORAL 3 TIMES DAILY
Qty: 15 CAPSULE | Refills: 0 | Status: SHIPPED | OUTPATIENT
Start: 2023-09-21 | End: 2023-09-26

## 2023-09-21 NOTE — PROGRESS NOTES
"Subjective:     Chief Complaint   Patient presents with    Bump     R Elbow and L foot x2 weeks, swollen, hot to the touch     Knee Pain     Both knee pain x chronic          HPI:   Griselda presents today with     Bursitis of elbow  Patient presents today with a bursitis of her right elbow.  Patient has had some inflammation to right elbow previously due to pressure on the joint from sitting in her chair.  Elbow today appears fluid-filled with redness and streak running down the arm towards wrist.  Patient continues to rest her elbow on a chair due to contractures and positioning.    Outline drawn around cellulitis, will start patient on antibiotics and continue to monitor for increased redness.  Refer to orthopedic for further evaluation and drainage of joint if needed.  Patient advised to follow-up in 1 week for recheck.  Patient provided supplies to wrap and pad joint to reduce pressure.    Bilateral knee pain  Physiatry stated at this time contractures are a surgical issue and need to be released.  Patient provided referral to orthopedic.  We will trial patient on meloxicam, normal kidney function.  Patient noting significant pain.  Continues on baclofen 30 mg 4 times daily.  Sore on great toe has healed, no longer has sore in this area at this time.  Ortho 9/27/22 - no acute management recommended. No show to appointment with Dr. Moura 10/25/2022      Sore on great toe has healed.  No longer has a sore at this time.        ROS:  Gen: no fevers/chill  Pulm: no sob, no cough  CV: no chest pain, no palpitations  GI: no nausea/vomiting, no diarrhea  : no dysuria        Objective:     Exam:  /70   Pulse 100   Temp 36.9 °C (98.4 °F) (Temporal)   Resp 16   Ht 1.702 m (5' 7\")   SpO2 100%   BMI 23.31 kg/m²  Body mass index is 23.31 kg/m².    Gen: Alert and oriented, No apparent distress.  Neck: Neck is supple without lymphadenopathy.  Lungs: Normal effort, CTA bilaterally, no wheezes, rhonchi, or " rales  CV: Regular rate and rhythm. No murmurs, rubs, or gallops.      Assessment & Plan:     38 y.o. female with the following -     1. Bursitis of right elbow, unspecified bursa  - amoxicillin (AMOXIL) 500 MG Cap; Take 1 Capsule by mouth 3 times a day for 5 days.  Dispense: 15 Capsule; Refill: 0    2. Need for vaccination  - INFLUENZA VACCINE QUAD INJ (PF)    3. Spasticity  - meloxicam (MOBIC) 15 MG tablet; Take 1 Tablet by mouth every day.  Dispense: 90 Tablet; Refill: 3    4. ADEM (acute disseminated encephalomyelitis)  - Referral to Orthopedics    5. Contracted, joint  - Referral to Orthopedics     No follow-ups on file.    Please note that this dictation was created using voice recognition software. I have made every reasonable attempt to correct obvious errors, but I expect that there are errors of grammar and possibly content that I did not discover before finalizing the note.

## 2023-09-22 DIAGNOSIS — G43.009 MIGRAINE WITHOUT AURA AND WITHOUT STATUS MIGRAINOSUS, NOT INTRACTABLE: ICD-10-CM

## 2023-09-22 NOTE — TELEPHONE ENCOUNTER
Received request via: Pharmacy    Was the patient seen in the last year in this department? No- LOV:2/7/22, FV scheduled 11/14/22.    Does the patient have an active prescription (recently filled or refills available) for medication(s) requested? No    Does the patient have USP Plus and need 100 day supply (blood pressure, diabetes and cholesterol meds only)? Medication is not for cholesterol, blood pressure or diabetes

## 2023-09-25 ENCOUNTER — TELEPHONE (OUTPATIENT)
Dept: NEUROLOGY | Facility: MEDICAL CENTER | Age: 38
End: 2023-09-25
Payer: MEDICARE

## 2023-09-25 RX ORDER — SUMATRIPTAN 100 MG/1
TABLET, FILM COATED ORAL
Qty: 60 TABLET | Refills: 11 | Status: ON HOLD | OUTPATIENT
Start: 2023-09-25 | End: 2023-10-18

## 2023-09-25 NOTE — TELEPHONE ENCOUNTER
Received Refill PA request via [employer]  for sumatriptan (IMITREX) 100 MG tablet . (Quantity:60 tab, Day Supply:30)     Insurance: [employer]  Member ID:  [id number]  BIN: [id number]  PCN: [id number]  [id number]     Ran Test claim via [place] & medication  rejection states to submit bill to other processor or primary pay. Reaching out to liaison to have member contacted to see if member has primary ins or to have member call ins to let them know they are know the primary.

## 2023-09-30 ENCOUNTER — HOSPITAL ENCOUNTER (INPATIENT)
Facility: MEDICAL CENTER | Age: 38
LOS: 8 days | DRG: 853 | End: 2023-10-09
Attending: EMERGENCY MEDICINE | Admitting: STUDENT IN AN ORGANIZED HEALTH CARE EDUCATION/TRAINING PROGRAM
Payer: MEDICARE

## 2023-09-30 DIAGNOSIS — L03.113 CELLULITIS OF RIGHT UPPER EXTREMITY: ICD-10-CM

## 2023-09-30 DIAGNOSIS — M71.121 SEPTIC BURSITIS OF ELBOW, RIGHT: ICD-10-CM

## 2023-09-30 DIAGNOSIS — A41.9 SEPSIS WITHOUT ACUTE ORGAN DYSFUNCTION, DUE TO UNSPECIFIED ORGANISM (HCC): ICD-10-CM

## 2023-09-30 LAB
ALBUMIN SERPL BCP-MCNC: 4 G/DL (ref 3.2–4.9)
ALBUMIN/GLOB SERPL: 1.4 G/DL
ALP SERPL-CCNC: 64 U/L (ref 30–99)
ALT SERPL-CCNC: 16 U/L (ref 2–50)
ANION GAP SERPL CALC-SCNC: 11 MMOL/L (ref 7–16)
AST SERPL-CCNC: 15 U/L (ref 12–45)
BASOPHILS # BLD AUTO: 0.4 % (ref 0–1.8)
BASOPHILS # BLD: 0.05 K/UL (ref 0–0.12)
BILIRUB SERPL-MCNC: 0.4 MG/DL (ref 0.1–1.5)
BUN SERPL-MCNC: 17 MG/DL (ref 8–22)
CALCIUM ALBUM COR SERPL-MCNC: 9.1 MG/DL (ref 8.5–10.5)
CALCIUM SERPL-MCNC: 9.1 MG/DL (ref 8.5–10.5)
CHLORIDE SERPL-SCNC: 106 MMOL/L (ref 96–112)
CO2 SERPL-SCNC: 22 MMOL/L (ref 20–33)
CREAT SERPL-MCNC: 0.65 MG/DL (ref 0.5–1.4)
EOSINOPHIL # BLD AUTO: 0.01 K/UL (ref 0–0.51)
EOSINOPHIL NFR BLD: 0.1 % (ref 0–6.9)
ERYTHROCYTE [DISTWIDTH] IN BLOOD BY AUTOMATED COUNT: 43.6 FL (ref 35.9–50)
GFR SERPLBLD CREATININE-BSD FMLA CKD-EPI: 114 ML/MIN/1.73 M 2
GLOBULIN SER CALC-MCNC: 2.9 G/DL (ref 1.9–3.5)
GLUCOSE SERPL-MCNC: 108 MG/DL (ref 65–99)
HCT VFR BLD AUTO: 41.9 % (ref 37–47)
HGB BLD-MCNC: 14 G/DL (ref 12–16)
IMM GRANULOCYTES # BLD AUTO: 0.05 K/UL (ref 0–0.11)
IMM GRANULOCYTES NFR BLD AUTO: 0.4 % (ref 0–0.9)
LACTATE SERPL-SCNC: 1.3 MMOL/L (ref 0.5–2)
LYMPHOCYTES # BLD AUTO: 1.58 K/UL (ref 1–4.8)
LYMPHOCYTES NFR BLD: 12.8 % (ref 22–41)
MCH RBC QN AUTO: 31.6 PG (ref 27–33)
MCHC RBC AUTO-ENTMCNC: 33.4 G/DL (ref 32.2–35.5)
MCV RBC AUTO: 94.6 FL (ref 81.4–97.8)
MONOCYTES # BLD AUTO: 0.54 K/UL (ref 0–0.85)
MONOCYTES NFR BLD AUTO: 4.4 % (ref 0–13.4)
NEUTROPHILS # BLD AUTO: 10.07 K/UL (ref 1.82–7.42)
NEUTROPHILS NFR BLD: 81.9 % (ref 44–72)
NRBC # BLD AUTO: 0 K/UL
NRBC BLD-RTO: 0 /100 WBC (ref 0–0.2)
PLATELET # BLD AUTO: 237 K/UL (ref 164–446)
PMV BLD AUTO: 9.5 FL (ref 9–12.9)
POTASSIUM SERPL-SCNC: 3.6 MMOL/L (ref 3.6–5.5)
PROT SERPL-MCNC: 6.9 G/DL (ref 6–8.2)
RBC # BLD AUTO: 4.43 M/UL (ref 4.2–5.4)
SODIUM SERPL-SCNC: 139 MMOL/L (ref 135–145)
WBC # BLD AUTO: 12.3 K/UL (ref 4.8–10.8)

## 2023-09-30 PROCEDURE — 80053 COMPREHEN METABOLIC PANEL: CPT

## 2023-09-30 PROCEDURE — 96366 THER/PROPH/DIAG IV INF ADDON: CPT

## 2023-09-30 PROCEDURE — 87040 BLOOD CULTURE FOR BACTERIA: CPT | Mod: 91

## 2023-09-30 PROCEDURE — 303977 HCHG I & D

## 2023-09-30 PROCEDURE — 96365 THER/PROPH/DIAG IV INF INIT: CPT

## 2023-09-30 PROCEDURE — 99285 EMERGENCY DEPT VISIT HI MDM: CPT

## 2023-09-30 PROCEDURE — 96367 TX/PROPH/DG ADDL SEQ IV INF: CPT

## 2023-09-30 PROCEDURE — 83605 ASSAY OF LACTIC ACID: CPT

## 2023-09-30 PROCEDURE — 700105 HCHG RX REV CODE 258: Mod: UD | Performed by: EMERGENCY MEDICINE

## 2023-09-30 PROCEDURE — 85025 COMPLETE CBC W/AUTO DIFF WBC: CPT

## 2023-09-30 PROCEDURE — 0R910ZZ DRAINAGE OF CERVICAL VERTEBRAL JOINT, OPEN APPROACH: ICD-10-PCS | Performed by: EMERGENCY MEDICINE

## 2023-09-30 PROCEDURE — 36415 COLL VENOUS BLD VENIPUNCTURE: CPT

## 2023-09-30 PROCEDURE — 700111 HCHG RX REV CODE 636 W/ 250 OVERRIDE (IP): Mod: JZ,UD | Performed by: EMERGENCY MEDICINE

## 2023-09-30 RX ORDER — MORPHINE SULFATE 4 MG/ML
4 INJECTION INTRAVENOUS ONCE
Status: COMPLETED | OUTPATIENT
Start: 2023-09-30 | End: 2023-09-30

## 2023-09-30 RX ADMIN — VANCOMYCIN HYDROCHLORIDE 1750 MG: 5 INJECTION, POWDER, LYOPHILIZED, FOR SOLUTION INTRAVENOUS at 23:00

## 2023-09-30 RX ADMIN — AMPICILLIN AND SULBACTAM 3 G: 1; 2 INJECTION, POWDER, FOR SOLUTION INTRAMUSCULAR; INTRAVENOUS at 22:26

## 2023-09-30 RX ADMIN — MORPHINE SULFATE 4 MG: 4 INJECTION, SOLUTION INTRAMUSCULAR; INTRAVENOUS at 22:15

## 2023-09-30 ASSESSMENT — PAIN DESCRIPTION - PAIN TYPE
TYPE: ACUTE PAIN
TYPE: ACUTE PAIN

## 2023-09-30 ASSESSMENT — FIBROSIS 4 INDEX: FIB4 SCORE: 0.7

## 2023-10-01 ENCOUNTER — APPOINTMENT (OUTPATIENT)
Dept: RADIOLOGY | Facility: MEDICAL CENTER | Age: 38
DRG: 853 | End: 2023-10-01
Attending: HOSPITALIST
Payer: MEDICARE

## 2023-10-01 PROBLEM — M25.529 ELBOW PAIN: Status: ACTIVE | Noted: 2023-10-01

## 2023-10-01 PROBLEM — M70.31 BURSITIS OF RIGHT ELBOW: Status: ACTIVE | Noted: 2023-10-01

## 2023-10-01 PROBLEM — L03.113 CELLULITIS OF RIGHT ARM: Status: ACTIVE | Noted: 2023-10-01

## 2023-10-01 PROBLEM — R65.10 SIRS (SYSTEMIC INFLAMMATORY RESPONSE SYNDROME) (HCC): Status: ACTIVE | Noted: 2023-10-01

## 2023-10-01 PROBLEM — R00.0 TACHYCARDIA: Status: ACTIVE | Noted: 2023-10-01

## 2023-10-01 LAB
EKG IMPRESSION: NORMAL
GRAM STN SPEC: NORMAL
SCCMEC + MECA PNL NOSE NAA+PROBE: POSITIVE
SIGNIFICANT IND 70042: NORMAL
SITE SITE: NORMAL
SOURCE SOURCE: NORMAL

## 2023-10-01 PROCEDURE — 99223 1ST HOSP IP/OBS HIGH 75: CPT | Mod: AI | Performed by: STUDENT IN AN ORGANIZED HEALTH CARE EDUCATION/TRAINING PROGRAM

## 2023-10-01 PROCEDURE — 700102 HCHG RX REV CODE 250 W/ 637 OVERRIDE(OP): Performed by: STUDENT IN AN ORGANIZED HEALTH CARE EDUCATION/TRAINING PROGRAM

## 2023-10-01 PROCEDURE — 87641 MR-STAPH DNA AMP PROBE: CPT

## 2023-10-01 PROCEDURE — 700105 HCHG RX REV CODE 258: Performed by: STUDENT IN AN ORGANIZED HEALTH CARE EDUCATION/TRAINING PROGRAM

## 2023-10-01 PROCEDURE — 770001 HCHG ROOM/CARE - MED/SURG/GYN PRIV*

## 2023-10-01 PROCEDURE — 93005 ELECTROCARDIOGRAM TRACING: CPT | Performed by: HOSPITALIST

## 2023-10-01 PROCEDURE — 93010 ELECTROCARDIOGRAM REPORT: CPT | Performed by: INTERNAL MEDICINE

## 2023-10-01 PROCEDURE — 700111 HCHG RX REV CODE 636 W/ 250 OVERRIDE (IP)

## 2023-10-01 PROCEDURE — 700105 HCHG RX REV CODE 258: Performed by: HOSPITALIST

## 2023-10-01 PROCEDURE — 700111 HCHG RX REV CODE 636 W/ 250 OVERRIDE (IP): Mod: JZ | Performed by: STUDENT IN AN ORGANIZED HEALTH CARE EDUCATION/TRAINING PROGRAM

## 2023-10-01 PROCEDURE — 87186 SC STD MICRODIL/AGAR DIL: CPT

## 2023-10-01 PROCEDURE — 87205 SMEAR GRAM STAIN: CPT

## 2023-10-01 PROCEDURE — 99233 SBSQ HOSP IP/OBS HIGH 50: CPT | Performed by: HOSPITALIST

## 2023-10-01 PROCEDURE — 87070 CULTURE OTHR SPECIMN AEROBIC: CPT

## 2023-10-01 PROCEDURE — A9270 NON-COVERED ITEM OR SERVICE: HCPCS | Performed by: STUDENT IN AN ORGANIZED HEALTH CARE EDUCATION/TRAINING PROGRAM

## 2023-10-01 PROCEDURE — 87147 CULTURE TYPE IMMUNOLOGIC: CPT

## 2023-10-01 PROCEDURE — 71045 X-RAY EXAM CHEST 1 VIEW: CPT

## 2023-10-01 PROCEDURE — 87077 CULTURE AEROBIC IDENTIFY: CPT

## 2023-10-01 RX ORDER — MELOXICAM 7.5 MG/1
15 TABLET ORAL DAILY
Status: DISCONTINUED | OUTPATIENT
Start: 2023-10-01 | End: 2023-10-09 | Stop reason: HOSPADM

## 2023-10-01 RX ORDER — CITALOPRAM HYDROBROMIDE 20 MG/1
20 TABLET ORAL
Status: DISCONTINUED | OUTPATIENT
Start: 2023-10-01 | End: 2023-10-09 | Stop reason: HOSPADM

## 2023-10-01 RX ORDER — POLYETHYLENE GLYCOL 3350 17 G/17G
1 POWDER, FOR SOLUTION ORAL
Status: DISCONTINUED | OUTPATIENT
Start: 2023-10-01 | End: 2023-10-09 | Stop reason: HOSPADM

## 2023-10-01 RX ORDER — TOPIRAMATE 25 MG/1
25 TABLET, FILM COATED ORAL 2 TIMES DAILY
Status: DISCONTINUED | OUTPATIENT
Start: 2023-10-01 | End: 2023-10-09 | Stop reason: HOSPADM

## 2023-10-01 RX ORDER — MORPHINE SULFATE 4 MG/ML
1 INJECTION INTRAVENOUS EVERY 4 HOURS PRN
Status: COMPLETED | OUTPATIENT
Start: 2023-10-01 | End: 2023-10-02

## 2023-10-01 RX ORDER — ACETAMINOPHEN 325 MG/1
650 TABLET ORAL EVERY 4 HOURS PRN
Status: DISCONTINUED | OUTPATIENT
Start: 2023-10-01 | End: 2023-10-09 | Stop reason: HOSPADM

## 2023-10-01 RX ORDER — OXYCODONE HYDROCHLORIDE 5 MG/1
5 TABLET ORAL EVERY 4 HOURS PRN
Status: DISCONTINUED | OUTPATIENT
Start: 2023-10-01 | End: 2023-10-09 | Stop reason: HOSPADM

## 2023-10-01 RX ORDER — HYDROMORPHONE HYDROCHLORIDE 1 MG/ML
0.5 INJECTION, SOLUTION INTRAMUSCULAR; INTRAVENOUS; SUBCUTANEOUS ONCE
Status: COMPLETED | OUTPATIENT
Start: 2023-10-01 | End: 2023-10-01

## 2023-10-01 RX ORDER — BISACODYL 10 MG
10 SUPPOSITORY, RECTAL RECTAL DAILY
Status: DISCONTINUED | OUTPATIENT
Start: 2023-10-01 | End: 2023-10-09 | Stop reason: HOSPADM

## 2023-10-01 RX ORDER — SODIUM CHLORIDE, SODIUM LACTATE, POTASSIUM CHLORIDE, AND CALCIUM CHLORIDE .6; .31; .03; .02 G/100ML; G/100ML; G/100ML; G/100ML
500 INJECTION, SOLUTION INTRAVENOUS ONCE
Status: COMPLETED | OUTPATIENT
Start: 2023-10-01 | End: 2023-10-01

## 2023-10-01 RX ORDER — BISACODYL 10 MG
10 SUPPOSITORY, RECTAL RECTAL
Status: DISCONTINUED | OUTPATIENT
Start: 2023-10-01 | End: 2023-10-09 | Stop reason: HOSPADM

## 2023-10-01 RX ORDER — SODIUM CHLORIDE, SODIUM LACTATE, POTASSIUM CHLORIDE, CALCIUM CHLORIDE 600; 310; 30; 20 MG/100ML; MG/100ML; MG/100ML; MG/100ML
INJECTION, SOLUTION INTRAVENOUS CONTINUOUS
Status: DISCONTINUED | OUTPATIENT
Start: 2023-10-01 | End: 2023-10-01

## 2023-10-01 RX ORDER — BACLOFEN 10 MG/1
30 TABLET ORAL 4 TIMES DAILY
Status: DISCONTINUED | OUTPATIENT
Start: 2023-10-01 | End: 2023-10-09 | Stop reason: HOSPADM

## 2023-10-01 RX ORDER — CYCLOBENZAPRINE HCL 10 MG
10 TABLET ORAL 3 TIMES DAILY PRN
Status: DISCONTINUED | OUTPATIENT
Start: 2023-10-01 | End: 2023-10-09 | Stop reason: HOSPADM

## 2023-10-01 RX ORDER — OMEPRAZOLE 20 MG/1
40 CAPSULE, DELAYED RELEASE ORAL 2 TIMES DAILY
Status: DISCONTINUED | OUTPATIENT
Start: 2023-10-01 | End: 2023-10-09 | Stop reason: HOSPADM

## 2023-10-01 RX ORDER — AMOXICILLIN 250 MG
2 CAPSULE ORAL 2 TIMES DAILY
Status: DISCONTINUED | OUTPATIENT
Start: 2023-10-01 | End: 2023-10-09 | Stop reason: HOSPADM

## 2023-10-01 RX ORDER — DOCUSATE SODIUM 100 MG/1
100 CAPSULE, LIQUID FILLED ORAL 2 TIMES DAILY PRN
Status: DISCONTINUED | OUTPATIENT
Start: 2023-10-01 | End: 2023-10-01

## 2023-10-01 RX ADMIN — TOPIRAMATE 25 MG: 25 TABLET, FILM COATED ORAL at 06:22

## 2023-10-01 RX ADMIN — BACLOFEN 30 MG: 10 TABLET ORAL at 21:55

## 2023-10-01 RX ADMIN — SODIUM CHLORIDE, POTASSIUM CHLORIDE, SODIUM LACTATE AND CALCIUM CHLORIDE 500 ML: 600; 310; 30; 20 INJECTION, SOLUTION INTRAVENOUS at 13:16

## 2023-10-01 RX ADMIN — AMPICILLIN AND SULBACTAM 3 G: 1; 2 INJECTION, POWDER, FOR SOLUTION INTRAMUSCULAR; INTRAVENOUS at 17:51

## 2023-10-01 RX ADMIN — OMEPRAZOLE 40 MG: 20 CAPSULE, DELAYED RELEASE ORAL at 17:48

## 2023-10-01 RX ADMIN — AMPICILLIN AND SULBACTAM 3 G: 1; 2 INJECTION, POWDER, FOR SOLUTION INTRAMUSCULAR; INTRAVENOUS at 12:31

## 2023-10-01 RX ADMIN — OXYCODONE 5 MG: 5 TABLET ORAL at 13:07

## 2023-10-01 RX ADMIN — OXYCODONE 5 MG: 5 TABLET ORAL at 21:56

## 2023-10-01 RX ADMIN — SODIUM CHLORIDE, POTASSIUM CHLORIDE, SODIUM LACTATE AND CALCIUM CHLORIDE: 600; 310; 30; 20 INJECTION, SOLUTION INTRAVENOUS at 02:17

## 2023-10-01 RX ADMIN — MORPHINE SULFATE 1 MG: 4 INJECTION INTRAVENOUS at 16:20

## 2023-10-01 RX ADMIN — CITALOPRAM HYDROBROMIDE 20 MG: 20 TABLET ORAL at 06:22

## 2023-10-01 RX ADMIN — BACLOFEN 30 MG: 10 TABLET ORAL at 09:36

## 2023-10-01 RX ADMIN — ACETAMINOPHEN 650 MG: 325 TABLET, FILM COATED ORAL at 20:30

## 2023-10-01 RX ADMIN — TOPIRAMATE 25 MG: 25 TABLET, FILM COATED ORAL at 17:48

## 2023-10-01 RX ADMIN — BACLOFEN 30 MG: 10 TABLET ORAL at 16:20

## 2023-10-01 RX ADMIN — OXYCODONE 5 MG: 5 TABLET ORAL at 17:48

## 2023-10-01 RX ADMIN — VANCOMYCIN HYDROCHLORIDE 1000 MG: 5 INJECTION, POWDER, LYOPHILIZED, FOR SOLUTION INTRAVENOUS at 09:36

## 2023-10-01 RX ADMIN — DOCUSATE SODIUM 50 MG AND SENNOSIDES 8.6 MG 2 TABLET: 8.6; 5 TABLET, FILM COATED ORAL at 06:23

## 2023-10-01 RX ADMIN — OMEPRAZOLE 40 MG: 20 CAPSULE, DELAYED RELEASE ORAL at 06:21

## 2023-10-01 RX ADMIN — BISACODYL 10 MG: 10 SUPPOSITORY RECTAL at 09:37

## 2023-10-01 RX ADMIN — CYCLOBENZAPRINE 10 MG: 10 TABLET, FILM COATED ORAL at 13:06

## 2023-10-01 RX ADMIN — BACLOFEN 30 MG: 10 TABLET ORAL at 12:24

## 2023-10-01 RX ADMIN — MELOXICAM 15 MG: 7.5 TABLET ORAL at 06:22

## 2023-10-01 RX ADMIN — HYDROMORPHONE HYDROCHLORIDE 0.5 MG: 1 INJECTION, SOLUTION INTRAMUSCULAR; INTRAVENOUS; SUBCUTANEOUS at 02:43

## 2023-10-01 RX ADMIN — VANCOMYCIN HYDROCHLORIDE 1000 MG: 5 INJECTION, POWDER, LYOPHILIZED, FOR SOLUTION INTRAVENOUS at 22:02

## 2023-10-01 RX ADMIN — DOCUSATE SODIUM 50 MG AND SENNOSIDES 8.6 MG 2 TABLET: 8.6; 5 TABLET, FILM COATED ORAL at 17:48

## 2023-10-01 RX ADMIN — OXYCODONE 5 MG: 5 TABLET ORAL at 06:22

## 2023-10-01 RX ADMIN — AMPICILLIN AND SULBACTAM 3 G: 1; 2 INJECTION, POWDER, FOR SOLUTION INTRAMUSCULAR; INTRAVENOUS at 04:57

## 2023-10-01 RX ADMIN — MORPHINE SULFATE 1 MG: 4 INJECTION INTRAVENOUS at 09:42

## 2023-10-01 ASSESSMENT — PAIN SCALES - PAIN ASSESSMENT IN ADVANCED DEMENTIA (PAINAD)
FACIALEXPRESSION: SMILING OR INEXPRESSIVE
BODYLANGUAGE: RELAXED
TOTALSCORE: 0
BREATHING: NORMAL
CONSOLABILITY: NO NEED TO CONSOLE

## 2023-10-01 ASSESSMENT — PAIN SCALES - WONG BAKER: WONGBAKER_NUMERICALRESPONSE: HURTS A LITTLE MORE

## 2023-10-01 ASSESSMENT — PAIN DESCRIPTION - PAIN TYPE
TYPE: ACUTE PAIN
TYPE: ACUTE PAIN
TYPE: OTHER (COMMENT)
TYPE: OTHER (COMMENT)
TYPE: ACUTE PAIN
TYPE: ACUTE PAIN
TYPE: OTHER (COMMENT)
TYPE: ACUTE PAIN

## 2023-10-01 NOTE — ED NOTES
Pt being taken upstairs at this time by RN via transport. Pt is awake and alert, talking to staff, in no apparent distress at time of transfer. Pt's paperwork and belongings sent upstairs with pt and transport.

## 2023-10-01 NOTE — H&P
Hospital Medicine History & Physical Note    Date of Service  10/1/2023    Primary Care Physician  Margo Cook P.A.-C.    Consultants  Orthopedics-Dr. Schmitt    Code Status  Full Code    Chief Complaint  Chief Complaint   Patient presents with    Elbow Pain      Swelling and redness to right elbow       History of Presenting Illness  Griselda Swanson is a 38 y.o. female who presented 9/30/2023 with right elbow pain, swelling and redness that increased since 9/21/2023.  Patient was seen at that time given antibiotics however caretaker reports that the pain is worsened and redness has increased.  Patient is nonverbal so history is obtained entirely from chart review.    In the ER, orthopedics consulted who recommended ER physician due to incision and drainage and sent cultures off.  Orthopedic will formally evaluate patient in a.m.  She will be continued on broad-spectrum antibiotics and pain control.    I discussed the plan of care with patient, bedside RN, and ER physician .    Review of Systems  Review of Systems   Unable to perform ROS: Patient nonverbal       Past Medical History   has a past medical history of ADEM (acute disseminated encephalomyelitis), Back pain, Breath shortness, C. difficile colitis (2015), Coma ([facility]) (August 2009), Coma ([facility]) (2008), Demyelinating disorder ([facility]), Encephalitis (2009), Heart burn, Indigestion, Lesion of brain, [facility]ICAL HOME, Migraines, MS (multiple sclerosis) ([facility]), Other specified disorder of intestines, Pain, Psychiatric problem, Renal disorder, and Urinary incontinence.    Surgical History   has a past surgical history that includes tonsillectomy; cath place perm epidural (3/6/2012); cath place perm epidural (6/26/2012); pump insert/remove (3/12/2013); mammoplasty augmentation (2002); pump revision (10/8/2013); pr baclofen intrathecal trial (3/8/2016); cath place perm epidural (N/A, 3/8/2016); cath place perm epidural (Left, 6/14/2016); pump insert/remove (Left,  7/1/2016); pr upper gi endoscopy,diagnosis (N/A, 3/16/2023); and pr upper gi endoscopy,biopsy (N/A, 3/16/2023).     Family History  family history includes Bipolar disorder in her brother; Cancer in her mother; Diabetes in her maternal grandmother; Other in her brother and daughter.   Family history reviewed with patient. There is no family history that is pertinent to the chief complaint.     Social History   reports that she quit smoking about 15 years ago. Her smoking use included cigarettes. She started smoking about 27 years ago. She has a 12.0 pack-year smoking history. She has never used smokeless tobacco. She reports that she does not drink alcohol and does not use drugs.    Allergies  Allergies   Allergen Reactions    Bactrim      Reaction unknown    Fentanyl      Makes her agressive       Medications  Prior to Admission Medications   Prescriptions Last Dose Informant Patient Reported? Taking?   Misc. Devices Misc  Caregiver No No   Sig: Please allow patient to have support/therapy dog in appt. Regardless of weight due to multiple chronic illnesses and debility.   Misc. Devices Misc  Caregiver No No   Sig: Please eval and fix electric W/C. Please eval also for W/C needs.   Misc. Devices Misc  Caregiver No No   Sig: W/C stabilizer needs eval and possible replacement   Misc. Devices Misc   No No   Sig: Bedside commode   Misc. Devices Misc   No No   Sig: New latch for new dynavox  Wheel chair   Misc. Devices Misc   No No   Sig: Bedside commode with padding   Multiple Vitamins-Minerals (MULTIVITAMIN GUMMIES ADULTS PO) 9/30/2023 at 0800 Caregiver Yes No   Sig: Take 1 Tab by mouth every day. Indications: vitamin supplement.   SUMAtriptan Succinate 6 MG/0.5ML Solution Auto-injector 9/30/2023 at 1900  No No   Sig: INJECT 6MG SUBCUTANEOUS 1 TIMES DAILY AS NEEDED FOR UP TO 9 DAYS   baclofen (LIORESAL) 10 MG Tab 9/30/2023 at 1800  No No   Sig: TAKE 3 TABLETS BY MOUTH FOUR TIMES DAILY   bisacodyl (DULCOLAX) 10 MG  Suppos 9/30/2023 at 0800  No No   Sig: INSERT 1 SUPPOSITORY RECTALLY EVERY DAY   citalopram (CELEXA) 20 MG Tab 9/30/2023 at 0800  No No   Sig: TAKE 1 TABLET BY MOUTH EVERY DAY   cyclobenzaprine (FLEXERIL) 10 mg Tab 9/30/2023 at 0800  No No   Sig: TAKE 1 TABLET BY MOUTH THREE TIMES DAILY AS NEEDED   docusate sodium (COLACE) 100 MG Cap 9/30/2023 at 0800  No No   Sig: TAKE 1 CAPSULE BY MOUTH TWICE DAILY AS NEEDED   lactulose 10 GM/15ML Solution 9/30/2023  No No   Sig: TAKE 15 TO 30 MLS BY MOUTH EVERY 4 HOURS AS NEEDED   linaCLOtide 145 MCG Cap 9/30/2023 at 0700  No No   Sig: Take 145 mcg by mouth every morning before breakfast.   meloxicam (MOBIC) 15 MG tablet 9/30/2023 at 0800  No No   Sig: Take 1 Tablet by mouth every day.   omeprazole (PRILOSEC) 40 MG delayed-release capsule 9/30/2023 at 0800  No No   Sig: Take 1 Capsule by mouth 2 times a day.   polyethylene glycol 3350 (MIRALAX) 17 GM/SCOOP Powder   No No   Sig: MIX 1 CAPFUL WITH 8OZ OF WATER AND DRINK DAILY FOR CONSTIPATION   simethicone (MYLICON) 125 MG chewable tablet 9/30/2023 at 0800 Caregiver Yes No   Sig: Take 125 mg by mouth 4 times a day as needed. Indications: Gas   Patient not taking: Reported on 9/21/2023   sumatriptan (IMITREX) 100 MG tablet 9/30/2023 at 1900  No No   Sig: TAKE 1 TABLET BY MOUTH AT ONSET OF HEADACHE, MAY REPEAT IN 2 HOURS.(MAX 2 TABLETS PER DAY)   topiramate (TOPAMAX) 25 MG Tab 9/30/2023 at 0800  No No   Sig: TAKE 1 TABLET BY MOUTH TWICE DAILY   vitamin D (CHOLECALCIFEROL) 1000 UNIT Tab 9/30/2023 at 0800 Caregiver Yes No   Sig: Take 1,000 Units by mouth 4 times a day. Indications: supplement      Facility-Administered Medications: None       Physical Exam  Temp:  [37.6 °C (99.6 °F)] 37.6 °C (99.6 °F)  Pulse:  [] 102  Resp:  [16-38] 17  BP: ()/(58-80) 97/58  SpO2:  [74 %-100 %] 96 %  Blood Pressure: 97/58   Temperature: 37.6 °C (99.6 °F)   Pulse: (!) 102   Respiration: 17   Pulse Oximetry: 96 %       Physical Exam  Vitals  "and nursing note reviewed.   HENT:      Head: Normocephalic and atraumatic.      Right Ear: Tympanic membrane normal.      Left Ear: Tympanic membrane normal.      Nose: Nose normal.      Mouth/Throat:      Mouth: Mucous membranes are moist.      Pharynx: Oropharynx is clear.   Eyes:      Extraocular Movements: Extraocular movements intact.      Pupils: Pupils are equal, round, and reactive to light.   Cardiovascular:      Rate and Rhythm: Normal rate and regular rhythm.      Pulses: Normal pulses.      Heart sounds: Normal heart sounds.   Pulmonary:      Effort: Pulmonary effort is normal.      Breath sounds: Normal breath sounds.   Abdominal:      General: Bowel sounds are normal. There is no distension.      Palpations: Abdomen is soft. There is no mass.   Musculoskeletal:         General: Tenderness present.      Cervical back: Neck supple.      Comments: Right elbow erythematous, swollen and tender status post I&D by ER physician   Skin:     General: Skin is warm.      Capillary Refill: Capillary refill takes less than 2 seconds.   Neurological:      Mental Status: She is alert.      Comments: Nonverbal at baseline   Psychiatric:         Mood and Affect: Mood normal.         Behavior: Behavior normal.         Laboratory:  Recent Labs     09/30/23  2149   WBC 12.3*   RBC 4.43   HEMOGLOBIN 14.0   HEMATOCRIT 41.9   MCV 94.6   MCH 31.6   MCHC 33.4   RDW 43.6   PLATELETCT 237   MPV 9.5     Recent Labs     09/30/23  2149   SODIUM 139   POTASSIUM 3.6   CHLORIDE 106   CO2 22   GLUCOSE 108*   BUN 17   CREATININE 0.65   CALCIUM 9.1     Recent Labs     09/30/23  2149   ALTSGPT 16   ASTSGOT 15   ALKPHOSPHAT 64   TBILIRUBIN 0.4   GLUCOSE 108*         No results for input(s): \"NTPROBNP\" in the last 72 hours.      No results for input(s): \"TROPONINT\" in the last 72 hours.    Imaging:  No orders to display       no X-Ray or EKG requiring interpretation    Assessment/Plan:  Justification for Admission Status  I anticipate this " patient will require at least two midnights for appropriate medical management, necessitating inpatient admission because cellulitis, bursitis of elbow, cellulitis of right upper extremity.    Patient will need a Med/Surg bed on ORTHOPEDICS service .  The need is secondary to see above.    * SIRS (systemic inflammatory response syndrome) (HCC)  Assessment & Plan  Acute     SIRS criteria identified on my evaluation include:  Tachycardia, with heart rate greater than 90 BPM and Leukocytosis, with WBC greater than 12,000      Bursitis of right elbow  Assessment & Plan  Acute     Status post I&D by ER physician  Follow-up wound culture sent  Continue with vancomycin/Unasyn  Orthopedic consulted-follow official recommendations  We will keep n.p.o. in case patient needs procedure in a.m.    Cellulitis of right arm  Assessment & Plan  Acute     Vancomycin and Unasyn  Monitor BMP to assess for renal toxicity from vancomycin administration  Monitor for rash and Unasyn administration  Follow-up cultures    Elbow pain- (present on admission)  Assessment & Plan  Pain control with IV narcotics, monitor respiratory status closely        VTE prophylaxis: SCDs/TEDs

## 2023-10-01 NOTE — PROGRESS NOTES
"Pharmacy Vancomycin Kinetics Note for 10/1/2023     38 y.o. female on Vancomycin day # 1     Vancomycin Indication (AUC Dosing): Skin/skin structure infection      Provider specified end date: 10/05/23    Active Antibiotics (From admission, onward)      Ordered     Ordering Provider       Sun Oct 1, 2023 12:47 AM    10/01/23 0047  ampicillin/sulbactam (Unasyn) 3 g in  mL IVPB  EVERY 6 HOURS         Jozef Hahn M.D.    10/01/23 0047  MD Alert...Vancomycin per Pharmacy  PHARMACY TO DOSE        Question:  Indication(s) for vancomycin?  Answer:  Skin and soft tissue infection    Jozef Hahn M.D.       Sat Sep 30, 2023 10:02 PM    09/30/23 2202  vancomycin (Vancocin) 1,750 mg in  mL IVPB  (vancomycin (VANCOCIN) IV (LD + Maintenance))  ONCE         Ruiz Levin M.D.            Dosing Weight: 67.1 kg (147 lb 14.9 oz)      Admission History: Admitted on 9/30/2023 for Elbow pain [M25.529]  Pertinent history: Patient with bursitis of right elbow and prescribed Amoxicillin. Patient presented to ED for evaluation because redness is now exceeding the outline originally marked from visit on 9/21. Patient started on empiric Unasyn and Vancomycin    Allergies:     Bactrim and Fentanyl     Pertinent cultures to date:     Results       Procedure Component Value Units Date/Time    CULTURE WOUND W/ GRAM STAIN [[id number]] Collected: 10/01/23 0045    Order Status: Sent Specimen: Wound from Abscess Updated: 10/01/23 0105    Narrative:      Release to patient->Immediate    Blood Culture [[id number]] Collected: 09/30/23 2149    Order Status: Sent Specimen: Blood from Peripheral Updated: 09/30/23 2321    Narrative:      2 of 2 blood culture x2  Sites order. Per Hospital Policy:  Only change Specimen Src: to \"Line\" if specified by physician  order.  Release to patient->Immediate    Blood Culture [[id number]] Collected: 09/30/23 2224    Order Status: Sent Specimen: Blood from Peripheral Updated: 09/30/23 2233    Narrative:  " "    1 of 2 for Blood Culture x 2 sites order. Per Hospital  Policy: Only change Specimen Src: to \"Line\" if specified by  physician order.  Release to patient->Immediate            Labs:     Estimated Creatinine Clearance: 111.9 mL/min (by C-G formula based on SCr of 0.65 mg/dL).  Recent Labs     23   WBC 12.3*   NEUTSPOLYS 81.90*     Recent Labs     23   BUN 17   CREATININE 0.65   ALBUMIN 4.0       Intake/Output Summary (Last 24 hours) at 10/1/2023 0114  Last data filed at 2023 2257  Gross per 24 hour   Intake 100.14 ml   Output --   Net 100.14 ml      /63   Pulse 99   Temp 37.6 °C (99.6 °F) (Oral)   Resp 20   Ht 1.702 m (5' 7\")   Wt 67.1 kg (148 lb)   SpO2 94%  Temp (24hrs), Av.6 °C (99.6 °F), Min:37.6 °C (99.6 °F), Max:37.6 °C (99.6 °F)      List concerns for Vancomycin clearance:     None    Pharmacokinetics:     AUC kinetics:   Ke (hr ^-1): 0.0965 hr^-1  Half life: 7.18 hr  Clearance: 4.209  Estimated TDD: 2104.5  Estimated Dose: 807  Estimated interval: 9.2      A/P:     -  Vancomycin dose: Loading dose: 1750 mg once      Maintenance dose: 1000 mg q 12 hours    -  Next vancomycin level(s): To be determined by floor pharmacist pending clinical course       -  Predicted vancomycin AUC from initial AUC test calculator: 475 mg·hr/L      -  Comments: No major concerns for accumulation at this time. Patient empirically being treated with Unasyn and Vancomycin for suspected septic bursitis. MRSA nasal swab ordered. Wound culture and blood cultures in process; de-escalate antibiotics as clinically appropriate. Pharmacy will continue to follow.       Maria Elena Canada, PharmD    "

## 2023-10-01 NOTE — ED NOTES
Pt provided help with drinking water. Pt resting with even chest rise and fall, reports no needs at this time, call light available and in reach.

## 2023-10-01 NOTE — ED NOTES
Reported attempted to NOHEMI Daniel. RN reports she is in pt room and to call back in 5 minutes.This RN to call back in 5 minutes.

## 2023-10-01 NOTE — ASSESSMENT & PLAN NOTE
Failed outpt Amox  Status post I&D by ER physician  Continue with vancomycin/Unasyn  Wound cultures have come back MRSA: DC amp/sulbactam  Blood cultures remain neg  Orthopedic consulted-follow official recommendations  Plan to monitor on Abx's and see if we can avoid the OR  Clinically is a little improved however was also febrile yesterday evening    10/4/2023  S/p  Right elbow olecranon bursectomy

## 2023-10-01 NOTE — ED NOTES
PT medicated per MAR, tolerating well. Pt resting with even chest rise and fall, reports no needs at this time, call light available and in reach.

## 2023-10-01 NOTE — ASSESSMENT & PLAN NOTE
Acute     SIRS criteria identified on my evaluation include:  Tachycardia, with heart rate greater than 90 BPM and Leukocytosis, with WBC greater than 12,000

## 2023-10-01 NOTE — CARE PLAN
Problem: Pain - Standard  Goal: Alleviation of pain or a reduction in pain to the patient’s comfort goal  Description: Target End Date:  Prior to discharge or change in level of care    Document on Vitals flowsheet    1.  Document pain using the appropriate pain scale per order or unit policy  2.  Educate and implement non-pharmacologic comfort measures (i.e. relaxation, distraction, massage, cold/heat therapy, etc.)  3.  Pain management medications as ordered  4.  Reassess pain after pain med administration per policy  5.  If opiods administered assess patient's response to pain medication is appropriate per POSS sedation scale  6.  Follow pain management plan developed in collaboration with patient and interdisciplinary team (including palliative care or pain specialists if applicable)  Outcome: Progressing   The patient is Stable - Low risk of patient condition declining or worsening    Shift Goals  Clinical Goals: safety, Aspiration precaution, Pain control  Patient Goals: Comfort, rest, pain control  Family Goals: Not present    Progress made toward(s) clinical / shift goals:        Pt. Vital signs were in normal range.  Pt. Verbalized understanding on the care given.   Pt. Has no skin integrity concern/issues.  Pressure injury protocol was in placed.   Pt. Is currently NPO status with LR 1L at 100mL/Hr.

## 2023-10-01 NOTE — PROGRESS NOTES
Hospital Medicine Daily Progress Note    Date of Service  10/1/2023    Chief Complaint  Griselda Swanson is a 38 y.o. female admitted 9/30/2023 with red elbow    Hospital Course  39yo PMHx acute disseminated encephalomyelitis with resultant paralysis and non verbal.  Seen 9/21 by PCP for R elbow swelling and treated for soft tissue infection with amox.  Presented to the ED on 9/30 with worsening    Interval Problem Update  ROS limited by pt's ability to communicate.  Non verbal, uses hand signals and types on her IPad  No belly or CP  Pain in elbow unchanged  Eating well, no N or V  Las BM Friday    DW Ortho  DW pt's sister Luda by phone    I have discussed this patient's plan of care and discharge plan at IDT rounds today with Case Management, Nursing, Nursing leadership, and other members of the IDT team.    Consultants/Specialty  orthopedics    Code Status  Full Code    Disposition  The patient is not medically cleared for discharge to home or a post-acute facility.      I have placed the appropriate orders for post-discharge needs.    Review of Systems  Review of Systems   Unable to perform ROS: Other        Physical Exam  Temp:  [37.6 °C (99.6 °F)] 37.6 °C (99.6 °F)  Pulse:  [] 102  Resp:  [16-38] 17  BP: ()/(58-80) 97/58  SpO2:  [74 %-100 %] 96 %    Physical Exam  Constitutional:       General: She is not in acute distress.     Appearance: Normal appearance. She is well-developed. She is not diaphoretic.   HENT:      Head: Normocephalic and atraumatic.   Neck:      Vascular: No JVD.   Cardiovascular:      Rate and Rhythm: Regular rhythm. Tachycardia present.      Heart sounds: No murmur heard.  Pulmonary:      Effort: Pulmonary effort is normal. No respiratory distress.      Breath sounds: No stridor. No wheezing or rales.   Abdominal:      General: There is no distension.      Palpations: Abdomen is soft.      Tenderness: There is no abdominal tenderness. There is no guarding or rebound.    Musculoskeletal:      Right lower leg: No edema.      Left lower leg: No edema.      Comments: Fluctuance over R elbow, redness from mid humerus to mid lower arm  Hand warm and pink, good pulses and cap refill   Skin:     General: Skin is warm and dry.      Findings: No rash.   Neurological:      Mental Status: She is oriented to person, place, and time.      Comments: Multiple contractures  A and O  Non verbal but follows and understands  Communicates by hand signs and typing on IPad   Psychiatric:         Mood and Affect: Mood normal.         Behavior: Behavior normal.         Thought Content: Thought content normal.         Fluids    Intake/Output Summary (Last 24 hours) at 10/1/2023 0713  Last data filed at 9/30/2023 2257  Gross per 24 hour   Intake 100.14 ml   Output --   Net 100.14 ml       Laboratory  Recent Labs     09/30/23  2149   WBC 12.3*   RBC 4.43   HEMOGLOBIN 14.0   HEMATOCRIT 41.9   MCV 94.6   MCH 31.6   MCHC 33.4   RDW 43.6   PLATELETCT 237   MPV 9.5     Recent Labs     09/30/23  2149   SODIUM 139   POTASSIUM 3.6   CHLORIDE 106   CO2 22   GLUCOSE 108*   BUN 17   CREATININE 0.65   CALCIUM 9.1                   Imaging  DX-CHEST-PORTABLE (1 VIEW)   Final Result      1.  Elevation of the right hemidiaphragm with hypoinflation and bibasilar atelectasis.   2.  Air-filled loop of hepatic flexure noted beneath the right hemidiaphragm.           Assessment/Plan  * SIRS (systemic inflammatory response syndrome) (HCC)  Assessment & Plan  Acute     SIRS criteria identified on my evaluation include:  Tachycardia, with heart rate greater than 90 BPM and Leukocytosis, with WBC greater than 12,000      Tachycardia  Assessment & Plan  Pt consistently tachy around 100  Given 500ml crystaloid bolus; HR down to mid 90s  I ordered and have reviewed EKG: sinus tach no signs of ishcemia  CXR ordered and reviewed: lung fields clear but dialated bowel loops noted  abd exam benign, pt with good appetite and eating well,  no N or V.  Last BM 36hrs ago  Cont to monitor closely.  Given no abd complaints will hold off for now on CT or NPO    Bursitis of right elbow  Assessment & Plan    Failed outpt Amox  Status post I&D by ER physician  Follow-up wound culture sent  Continue with vancomycin/Unasyn  Orthopedic consulted-follow official recommendations  Plan to monitor on Abx's and see if we can avoid the OR    Cellulitis of right arm  Assessment & Plan  Acute     Vancomycin and Unasyn  Monitor BMP to assess for renal toxicity from vancomycin administration  Monitor for rash and Unasyn administration  Follow-up cultures    Elbow pain- (present on admission)  Assessment & Plan  Pain control with IV narcotics, monitor respiratory status closely         VTE prophylaxis:   SCDs/TEDs      I have performed a physical exam and reviewed and updated ROS and Plan today (10/1/2023). In review of yesterday's note (9/30/2023), there are no changes except as documented above.

## 2023-10-01 NOTE — PROGRESS NOTES
Patient is A&O x 4 upon entering room, HOB in semi-Barreto's position. Performed assessment- see Assessment for details. Patient states pain is 9/10- gave meds per MAR. Performed dressing change on right elbow- serosanguineous drainage noted. Repositioned patient onto L side- patient tolerated well. SCDs and float boots in place. All needs were addressed at this time. Call light within reach.

## 2023-10-01 NOTE — ED PROVIDER NOTES
ED Provider Note        CHIEF COMPLAINT  Chief Complaint   Patient presents with    Elbow Pain      Swelling and redness to right elbow         HPI  LIMITATION TO HISTORY   Select: Baseline non-verbal  OUTSIDE HISTORIAN(S):  Significant other caregiver    Griselda Swanson is a 38 y.o. female who presents to the Emergency Department with increasing swelling and pain and redness to the right elbow.  The patient was seen on 9/21/2023 and started on antibiotics, which somewhat improved her septic bursitis of the right elbow, however it has been markedly worsening over the past day and a carlie was made at 7 PM this evening with her caregiver noting that the redness has been rapidly spreading.  There was a subjective fever at home.    REVIEW OF SYSTEMS  See HPI for further details. All other systems are negative.     PAST MEDICAL HISTORY     Past Medical History:   Diagnosis Date    ADEM (acute disseminated encephalomyelitis)     Back pain     Breath shortness     since 2014 not an issue at this time (4/2018)    C. difficile colitis 2015    Coma (Former[facility]) August 2009    1 month, lesions on brain,  unknown etiology    Coma ([facility]ly [facility]) 2008    Spontaneous -     Demyelinating disorder ([facility]ly [facility])     demyelinating encephpomyeltis     Encephalitis 2009    Heart burn     Indigestion     Lesion of brain     unknown cuase    MEDICAL HOME     Migraines     MS (multiple sclerosis) (Formerly [facility])     Other specified disorder of intestines     constipation    Pain     Psychiatric problem     depression and anxiety    Renal disorder     kidney stones    Urinary incontinence        SURGICAL HISTORY  Past Surgical History:   Procedure Laterality Date    WI UPPER GI ENDOSCOPY,DIAGNOSIS N/A 3/16/2023    Procedure: GASTROSCOPY;  Surgeon: Evelin Bautista M.D.;  Location: SURGERY SAME DAY [facility];  Service: Gastroenterology    WI UPPER GI ENDOSCOPY,BIOPSY N/A 3/16/2023    Procedure: GASTROSCOPY, WITH BIOPSY;  Surgeon: Evelin Bautista M.D.;  [facility];  Service: Gastroenterology    PUMP INSERT/REMOVE Left 7/1/2016    Procedure: PUMP REMOVAL;  Surgeon: Pari Roberson M.D.;  Location: SURGERY [facility];  Service:     CATH PLACE PERM EPIDURAL Left 6/14/2016    Procedure: CATH PLACE PERM EPIDURAL - BACLOFEN PUMP AND CATHETER REPLACEMENT  - BACK AND ABDOMEN;  Surgeon: Pari Roberson M.D.;  Location: SURGERY [facility];  Service:     [place] BACLOFEN INTRATHECAL TRIAL  3/8/2016    Dr. Roberson  [facility]    CATH PLACE PERM EPIDURAL N/A 3/8/2016    Procedure: BACLOFEN PUMP TRIAL;  Surgeon: Pari Roberson M.D.;  Location: SURGERY [facility];  Service:     PUMP REVISION  10/8/2013    Performed by Pari Roberson M.D. at [facility]    PUMP INSERT/REMOVE  3/12/2013    Performed by Pari Roberson M.D. at [facility]    CATH PLACE PERM EPIDURAL  6/26/2012    Performed by PARI ROBERSON at [facility]    CATH PLACE PERM EPIDURAL  3/6/2012    Performed by PARI ROBERSON at [facility]    MAMMOPLASTY AUGMENTATION  2002    TONSILLECTOMY         FAMILY HISTORY  Family History   Problem Relation Age of Onset    Cancer Mother         Sarcoma    Bipolar disorder Brother     Other Brother         spina bifida    Diabetes Maternal Grandmother     Other Daughter         Migrains       SOCIAL HISTORY    reports that she quit smoking about 15 years ago. Her smoking use included cigarettes. She started smoking about 27 years ago. She has a 12.0 pack-year smoking history. She has never used smokeless tobacco. She reports that she does not drink alcohol and does not use drugs.    CURRENT MEDICATIONS  Home Medications       Reviewed by Katherine Sorto R.N. (Registered Nurse) on 09/30/23 at 2136  Med List Status: Partial     Medication Last Dose Status   baclofen (LIORESAL) 10 MG Tab  Active   bisacodyl (DULCOLAX) 10 MG Suppos  Active   citalopram (CELEXA) 20 MG Tab  Active   cyclobenzaprine  "(FLEXERIL) 10 mg Tab  Active   docusate sodium (COLACE) 100 MG Cap  Active   lactulose 10 GM/15ML Solution  Active   linaCLOtide 145 MCG Cap  Active   meloxicam (MOBIC) 15 MG tablet  Active   Misc. Devices Misc  Active   Misc. Devices Misc  Active   Misc. Devices Misc  Active   Misc. Devices Misc  Active   Misc. Devices Misc  Active   Misc. Devices Misc  Active   Multiple Vitamins-Minerals (MULTIVITAMIN GUMMIES ADULTS PO)  Active   omeprazole (PRILOSEC) 40 MG delayed-release capsule  Active   polyethylene glycol 3350 (MIRALAX) 17 GM/SCOOP Powder  Active   simethicone (MYLICON) 125 MG chewable tablet  Active   sumatriptan (IMITREX) 100 MG tablet  Active   SUMAtriptan Succinate 6 MG/0.5ML Solution Auto-injector  Active   topiramate (TOPAMAX) 25 MG Tab  Active   vitamin D (CHOLECALCIFEROL) 1000 UNIT Tab  Active                    ALLERGIES  Allergies   Allergen Reactions    Bactrim      Reaction unknown    Fentanyl      Makes her agressive       PHYSICAL EXAM  VITAL SIGNS: /63   Pulse 99   Temp 37.6 °C (99.6 °F) (Oral)   Resp 20   Ht 1.702 m (5' 7\")   Wt 67.1 kg (148 lb)   SpO2 94%   BMI 23.18 kg/m²   Gen: Alert, no acute distress  HEENT: ATNC  Eyes: PERRL, EOMI, normal conjunctiva  Neck: trachea midline  Resp: no respiratory distress  CV: No JVD, tachycardic, regular rate and rhythm  Abd: non-distended  Ext: No deformities.  Erythema and tenderness with induration and warmth to right elbow.  Fluctuant swollen bursal sac.  Patient able to range elbow.  Distal CSM intact.  Psych: normal mood  Neuro: Moves all extremities, interacts nonverbally          DIAGNOSTIC STUDIES / PROCEDURES  Point of Care Ultrasound    ED ULTRASOUND GUIDED ABSCESS INCISION AND DRAINAGE    Indication: Abscess    Procedure: Bedside ultrasound was utilized to identify and localize fluid collection and image retained as below.  The patient was positioned appropriately and the skin over the incision site was prepped with chlorhexidine. " "Local anesthesia was obtained using commendation of 0.5% bupivacaine with epinephrine and 1% lidocaine without epinephrine.  An incision was then made over the greatest area of fluctuance and a large amount of seropurulent material was expressed. Loculations were not present. The drainage cavity was then dressed with a sterile dressing.     The patient tolerated the procedure well.    Complications: None     Image retained through [facility] as seen below:         Additional interpretation: Images demonstrating forceps within the cavity    This study is a limited ultrasound examination performed and interpreted to evaluate for limited conditions as outlined above. There may be other clinically important information contained in the images that is outside this scope. When clinically warranted, a comprehensive ultrasound through the appropriate department is considered.        LABS  Labs Reviewed   CBC WITH DIFFERENTIAL - Abnormal; Notable for the following components:       Result Value    WBC 12.3 (*)     Neutrophils-Polys 81.90 (*)     Lymphocytes 12.80 (*)     Neutrophils (Absolute) 10.07 (*)     All other components within normal limits   COMP METABOLIC PANEL - Abnormal; Notable for the following components:    Glucose 108 (*)     All other components within normal limits   LACTIC ACID   ESTIMATED GFR   BLOOD CULTURE    Narrative:     1 of 2 for Blood Culture x 2 sites order. Per Hospital  Policy: Only change Specimen Src: to \"Line\" if specified by  physician order.  Release to patient->Immediate   BLOOD CULTURE    Narrative:     2 of 2 blood culture x2  Sites order. Per Hospital Policy:  Only change Specimen Src: to \"Line\" if specified by physician  order.  Release to patient->Immediate   CULTURE WOUND W/ GRAM STAIN             COURSE & MEDICAL DECISION MAKING  Pertinent Labs & Imaging studies were reviewed. (See chart for details)      EXTERNAL RECORDS REVIEWED  Outpatient Notes seen outpatient 9/21/2023 for " bursitis, started on amoxicillin, 5-day course      INITIAL ASSESSMENT AND PLAN  Care Narrative: Patient presents with progressive worsening infection of the right elbow.  To initiate from septic bursitis but appears to be rapidly spreading as a cellulitis.  She meets sepsis criteria.  Will provide broad-spectrum antibiotics.  The patient's case was discussed with Dr. Schmitt, orthopedics, who agrees with plan for I&D of the bursa for source control.  This was performed by myself.  The patient tolerated this well.  We will plan for hospitalization and IV antibiotics    ADDITIONAL PROBLEM LIST AND DISPOSITION    I have discussed management of the patient with the following physicians and MAGY's: See above and below      Escalation of care considered, and ultimately not performed: diagnostic imaging.       Decision tools and prescription drugs considered including, but not limited to: Antibiotics   .          FINAL IMPRESSION  1. Sepsis without acute organ dysfunction, due to unspecified organism (HCC)    2. Septic bursitis of elbow, right    3. Cellulitis of right upper extremity             Case discussed with Dr. Hahn , who will evaluate the patient for hospitalization. Patient will be hospitalized in guarded condition.    This dictation was created using voice recognition software. The accuracy of the dictation is limited to the abilities of the software. I expect there may be some errors of grammar and possibly content. The nursing notes were reviewed and certain aspects of this information were incorporated into this note.

## 2023-10-01 NOTE — ED TRIAGE NOTES
PT BIB EMS for   Chief Complaint   Patient presents with    Elbow Pain      Swelling and redness to right elbow   PT seen for bursitis of right elbow on 9/21 and prescribed Amoxicillin. Pt care giver called EMS due to redness exceeding outline. Pt denies N/V. PT is non-verbal at baseline and uses ipad to communicate.

## 2023-10-01 NOTE — ED NOTES
PT medicated per MAR, tolerating well. Phlebotomy at bedside for second set of cultures. Pt resting with even chest rise and fall, reports no needs at this time, call light available and in reach.

## 2023-10-01 NOTE — ED NOTES
Pt wound dressed with 4x4 guaze per ERP in order to control drainage. Pt resting with even chest rise and fall, reports no needs at this time, call light available and in reach.

## 2023-10-01 NOTE — ED NOTES
Pt requesting water, ERP okay with pt having water. Pt provided water. Pt resting with even chest rise and fall, reports no needs at this time, call light available and in reach.

## 2023-10-01 NOTE — PROGRESS NOTES
4 Eyes Skin Assessment Completed by NOHEMI Daniel and NOHEMI Jerez.    Head WDL  Ears WDL  Nose WDL  Mouth WDL  Neck WDL  Breast/Chest WDL  Shoulder Blades WDL  Spine WDL  (R) Arm/Elbow/Hand Swelling.  Incision done due to bursitis and cellulitis  (L) Arm/Elbow/Hand WDL  Abdomen WDL  Groin WDL  Scrotum/Coccyx/Buttocks WDL  (R) Leg Bruising and Abrasion  (L) Leg WDL  (R) Heel/Foot/Toe WDL. RT foot decorticated   (L) Heel/Foot/Toe WDL. Lt foot decorticated           Devices In Places Pulse Ox, SCD's, and Nasal Cannula      Interventions In Place Gray Ear Foams, Waffle Overlay, and Pillows    Possible Skin Injury No    Pictures Uploaded Into Epic N/A  Wound Consult Placed N/A  RN Wound Prevention Protocol Ordered Yes

## 2023-10-02 LAB
ALBUMIN SERPL BCP-MCNC: 3 G/DL (ref 3.2–4.9)
ALBUMIN/GLOB SERPL: 1.1 G/DL
ALP SERPL-CCNC: 56 U/L (ref 30–99)
ALT SERPL-CCNC: 12 U/L (ref 2–50)
ANION GAP SERPL CALC-SCNC: 11 MMOL/L (ref 7–16)
AST SERPL-CCNC: 13 U/L (ref 12–45)
BASOPHILS # BLD AUTO: 0.5 % (ref 0–1.8)
BASOPHILS # BLD: 0.04 K/UL (ref 0–0.12)
BILIRUB SERPL-MCNC: 0.3 MG/DL (ref 0.1–1.5)
BUN SERPL-MCNC: 12 MG/DL (ref 8–22)
CALCIUM ALBUM COR SERPL-MCNC: 8.8 MG/DL (ref 8.5–10.5)
CALCIUM SERPL-MCNC: 8 MG/DL (ref 8.5–10.5)
CHLORIDE SERPL-SCNC: 109 MMOL/L (ref 96–112)
CO2 SERPL-SCNC: 19 MMOL/L (ref 20–33)
CREAT SERPL-MCNC: 0.54 MG/DL (ref 0.5–1.4)
EOSINOPHIL # BLD AUTO: 0.09 K/UL (ref 0–0.51)
EOSINOPHIL NFR BLD: 1.1 % (ref 0–6.9)
ERYTHROCYTE [DISTWIDTH] IN BLOOD BY AUTOMATED COUNT: 44.1 FL (ref 35.9–50)
GFR SERPLBLD CREATININE-BSD FMLA CKD-EPI: 119 ML/MIN/1.73 M 2
GLOBULIN SER CALC-MCNC: 2.8 G/DL (ref 1.9–3.5)
GLUCOSE SERPL-MCNC: 109 MG/DL (ref 65–99)
HCT VFR BLD AUTO: 37.4 % (ref 37–47)
HGB BLD-MCNC: 12.6 G/DL (ref 12–16)
IMM GRANULOCYTES # BLD AUTO: 0.02 K/UL (ref 0–0.11)
IMM GRANULOCYTES NFR BLD AUTO: 0.2 % (ref 0–0.9)
LYMPHOCYTES # BLD AUTO: 1.28 K/UL (ref 1–4.8)
LYMPHOCYTES NFR BLD: 15 % (ref 22–41)
MCH RBC QN AUTO: 32.1 PG (ref 27–33)
MCHC RBC AUTO-ENTMCNC: 33.7 G/DL (ref 32.2–35.5)
MCV RBC AUTO: 95.2 FL (ref 81.4–97.8)
MONOCYTES # BLD AUTO: 0.56 K/UL (ref 0–0.85)
MONOCYTES NFR BLD AUTO: 6.6 % (ref 0–13.4)
NEUTROPHILS # BLD AUTO: 6.52 K/UL (ref 1.82–7.42)
NEUTROPHILS NFR BLD: 76.6 % (ref 44–72)
NRBC # BLD AUTO: 0 K/UL
NRBC BLD-RTO: 0 /100 WBC (ref 0–0.2)
PLATELET # BLD AUTO: 192 K/UL (ref 164–446)
PMV BLD AUTO: 9.5 FL (ref 9–12.9)
POTASSIUM SERPL-SCNC: 3.7 MMOL/L (ref 3.6–5.5)
PROT SERPL-MCNC: 5.8 G/DL (ref 6–8.2)
RBC # BLD AUTO: 3.93 M/UL (ref 4.2–5.4)
SODIUM SERPL-SCNC: 139 MMOL/L (ref 135–145)
WBC # BLD AUTO: 8.5 K/UL (ref 4.8–10.8)

## 2023-10-02 PROCEDURE — 770001 HCHG ROOM/CARE - MED/SURG/GYN PRIV*

## 2023-10-02 PROCEDURE — 700111 HCHG RX REV CODE 636 W/ 250 OVERRIDE (IP): Mod: JZ | Performed by: STUDENT IN AN ORGANIZED HEALTH CARE EDUCATION/TRAINING PROGRAM

## 2023-10-02 PROCEDURE — 700102 HCHG RX REV CODE 250 W/ 637 OVERRIDE(OP): Performed by: STUDENT IN AN ORGANIZED HEALTH CARE EDUCATION/TRAINING PROGRAM

## 2023-10-02 PROCEDURE — 99232 SBSQ HOSP IP/OBS MODERATE 35: CPT | Performed by: HOSPITALIST

## 2023-10-02 PROCEDURE — 36415 COLL VENOUS BLD VENIPUNCTURE: CPT

## 2023-10-02 PROCEDURE — 80053 COMPREHEN METABOLIC PANEL: CPT

## 2023-10-02 PROCEDURE — A9270 NON-COVERED ITEM OR SERVICE: HCPCS | Performed by: STUDENT IN AN ORGANIZED HEALTH CARE EDUCATION/TRAINING PROGRAM

## 2023-10-02 PROCEDURE — 700105 HCHG RX REV CODE 258: Performed by: STUDENT IN AN ORGANIZED HEALTH CARE EDUCATION/TRAINING PROGRAM

## 2023-10-02 PROCEDURE — 85025 COMPLETE CBC W/AUTO DIFF WBC: CPT

## 2023-10-02 RX ADMIN — MELOXICAM 15 MG: 7.5 TABLET ORAL at 04:38

## 2023-10-02 RX ADMIN — BACLOFEN 30 MG: 10 TABLET ORAL at 17:10

## 2023-10-02 RX ADMIN — BACLOFEN 30 MG: 10 TABLET ORAL at 09:29

## 2023-10-02 RX ADMIN — VANCOMYCIN HYDROCHLORIDE 1000 MG: 5 INJECTION, POWDER, LYOPHILIZED, FOR SOLUTION INTRAVENOUS at 22:18

## 2023-10-02 RX ADMIN — OXYCODONE 5 MG: 5 TABLET ORAL at 04:36

## 2023-10-02 RX ADMIN — OMEPRAZOLE 40 MG: 20 CAPSULE, DELAYED RELEASE ORAL at 17:11

## 2023-10-02 RX ADMIN — OXYCODONE 5 MG: 5 TABLET ORAL at 09:29

## 2023-10-02 RX ADMIN — MORPHINE SULFATE 1 MG: 4 INJECTION INTRAVENOUS at 13:31

## 2023-10-02 RX ADMIN — DOCUSATE SODIUM 50 MG AND SENNOSIDES 8.6 MG 2 TABLET: 8.6; 5 TABLET, FILM COATED ORAL at 04:37

## 2023-10-02 RX ADMIN — AMPICILLIN AND SULBACTAM 3 G: 1; 2 INJECTION, POWDER, FOR SOLUTION INTRAMUSCULAR; INTRAVENOUS at 05:51

## 2023-10-02 RX ADMIN — OXYCODONE 5 MG: 5 TABLET ORAL at 17:12

## 2023-10-02 RX ADMIN — TOPIRAMATE 25 MG: 25 TABLET, FILM COATED ORAL at 04:42

## 2023-10-02 RX ADMIN — DOCUSATE SODIUM 50 MG AND SENNOSIDES 8.6 MG 2 TABLET: 8.6; 5 TABLET, FILM COATED ORAL at 17:10

## 2023-10-02 RX ADMIN — CITALOPRAM HYDROBROMIDE 20 MG: 20 TABLET ORAL at 04:37

## 2023-10-02 RX ADMIN — BACLOFEN 30 MG: 10 TABLET ORAL at 22:15

## 2023-10-02 RX ADMIN — AMPICILLIN AND SULBACTAM 3 G: 1; 2 INJECTION, POWDER, FOR SOLUTION INTRAMUSCULAR; INTRAVENOUS at 00:18

## 2023-10-02 RX ADMIN — BISACODYL 10 MG: 10 SUPPOSITORY RECTAL at 09:30

## 2023-10-02 RX ADMIN — TOPIRAMATE 25 MG: 25 TABLET, FILM COATED ORAL at 17:11

## 2023-10-02 RX ADMIN — OMEPRAZOLE 40 MG: 20 CAPSULE, DELAYED RELEASE ORAL at 04:38

## 2023-10-02 RX ADMIN — BACLOFEN 30 MG: 10 TABLET ORAL at 13:31

## 2023-10-02 RX ADMIN — VANCOMYCIN HYDROCHLORIDE 1000 MG: 5 INJECTION, POWDER, LYOPHILIZED, FOR SOLUTION INTRAVENOUS at 09:33

## 2023-10-02 ASSESSMENT — PAIN DESCRIPTION - PAIN TYPE
TYPE: OTHER (COMMENT)

## 2023-10-02 NOTE — PROGRESS NOTES
Pt have a high temperature 100.6F. Administered PRN for fever, will continue to re-assessed pt temperature.

## 2023-10-02 NOTE — PROGRESS NOTES
Bedside report received from night RN, pt care assumed, assessment completed. Pt is A&O 4 using ipad and hand/face signals to communicate, pain management on board. Updated on POC, questions answered. Bed in lowest, locked position, treaded socks on, call light and belongings within reach.

## 2023-10-02 NOTE — CARE PLAN
The patient is Watcher - Medium risk of patient condition declining or worsening    Shift Goals  Clinical Goals: Q2 turns, IV abx  Patient Goals: Comfort, communicate needs  Family Goals: No family at bedside    Progress made toward(s) clinical / shift goals:    Problem: Hemodynamics  Goal: Patient's hemodynamics, fluid balance and neurologic status will be stable or improve  Outcome: Progressing     Problem: Pain - Standard  Goal: Alleviation of pain or a reduction in pain to the patient’s comfort goal  Outcome: Progressing     Problem: Respiratory  Goal: Patient will achieve/maintain optimum respiratory ventilation and gas exchange  Outcome: Progressing          Home

## 2023-10-02 NOTE — CARE PLAN
The patient is Stable - Low risk of patient condition declining or worsening    Shift Goals  Clinical Goals: Pain control, order diet  Patient Goals: comfort  Family Goals: LUKE    Progress made toward(s) clinical / shift goals:  Educated on pain medications and non-pharmacologic interventions. Medications provided per MAR and repositioning patient for comfort and to prevent skin breakdown. Dressing to elbow changed and assessing pain per protocol. Diet ordered and assisting with tray set up.    Patient is not progressing towards the following goals:  N/A

## 2023-10-02 NOTE — DISCHARGE PLANNING
Case Management Discharge Planning    Admission Date: 9/30/2023  GMLOS: 5.1  ALOS: 1    6-Clicks ADL Score:    6-Clicks Mobility Score:        Anticipated Discharge Dispo:  Pending medical team collaboration    DME Needed: No    Action(s) Taken: OTHER    Pt discussed in IDT rounds. Patient is not medically cleared. Patient pending surgery. Patient is on IV abx.     Escalations Completed: None    Medically Clear: No    Next Steps: Pending medical clearance, surgery.. ID recs    Barriers to Discharge: Medical clearance    Is the patient up for discharge tomorrow: No

## 2023-10-02 NOTE — DOCUMENTATION QUERY
"                                                                         [facility]                                                                       Query Response Note      PATIENT:               ALVIN GA  ACCT #:                  [id number]  MRN:                     [id number]  :                      1985  ADMIT DATE:       2023 9:28 PM  DISCH DATE:          RESPONDING  PROVIDER #:        [id number]           QUERY TEXT:    The diagnosis of sepsis and cellulitis has been documented in the ED Provider Notes.     Please clarify the status of sepsis after study:    The patient's Clinical Indicators include:  ED Provider Notes: \"progressive worsening infection of the right elbow. To initiate from septic bursitis but appears to be rapidly spreading as a cellulitis. She meets sepsis criteria ... Sepsis without acute organ dysfunction\"  H&P: \"SIRS criteria identified on my evaluation include:  Tachycardia, with heart rate greater than 90 BPM and Leukocytosis, with WBC greater than 12,000\"     Labs: WBC 12.3, , CR 0.65, Total Bilirubin 0.4, Lactic Acid 1.3   Vitals: HR , RR 16-33, MAP 78-99, 83% on room air > 94% 1L NC    Risk Factors: RUE cellulitis, R elbow bursitis  Treatment: IV fluids/bolus, IV Unasyn/Vancomycin, I&D    Thank You,  Gema Thao RN  Clinical    Connect via [employer] or [email or web address]  Options provided:   -- Sepsis ruled in, (please specify sepsis-related organ dysfunction)   -- Sepsis ruled out, SIRS criteria only   -- Other explanation, (please specify other explanation)   -- Unable to determine      Query created by: Gema Thao on 10/2/2023 6:58 AM    RESPONSE TEXT:    Sepsis ruled in -          Electronically signed by:  SPENCER SOMMER DO 10/2/2023 7:26 AM              "

## 2023-10-02 NOTE — PROGRESS NOTES
[facility] Medicine Daily Progress Note    Date of Service  10/2/2023    Chief Complaint  Griselda Swanson is a 38 y.o. female admitted 9/30/2023 with red elbow    Hospital Course  37yo PMHx acute disseminated encephalomyelitis with resultant paralysis and non verbal.  Seen 9/21 by PCP for R elbow swelling and treated for soft tissue infection with amox.  Presented to the ED on 9/30 with worsening    Interval Problem Update  ROS limited by pt's ability to communicate.  Non verbal, uses hand signals and types on her IPad  No belly or CP  Pain in elbow a little less today  Eating well, no N or V  Las BM Friday    Tmax 100.6  Sinus 100-110  SBP 90s    I have discussed this patient's plan of care and discharge plan at IDT rounds today with Case Management, Nursing, Nursing leadership, and other members of the IDT team.    Consultants/Specialty  orthopedics    Code Status  Full Code    Disposition  The patient is not medically cleared for discharge to home or a post-acute facility.      I have placed the appropriate orders for post-discharge needs.    Review of Systems  Review of Systems   Unable to perform ROS: Other        Physical Exam  Temp:  [36.7 °C (98.1 °F)-38.1 °C (100.6 °F)] 37.2 °C (99 °F)  Pulse:  [] 100  Resp:  [16-18] 16  BP: ()/(47-64) 103/56  SpO2:  [93 %-94 %] 94 %    Physical Exam  Constitutional:       General: She is not in acute distress.     Appearance: Normal appearance. She is well-developed. She is not diaphoretic.   HENT:      Head: Normocephalic and atraumatic.   Neck:      Vascular: No JVD.   Cardiovascular:      Rate and Rhythm: Regular rhythm. Tachycardia present.      Heart sounds: No murmur heard.  Pulmonary:      Effort: Pulmonary effort is normal. No respiratory distress.      Breath sounds: No stridor. No wheezing or rales.   Abdominal:      General: There is no distension.      Palpations: Abdomen is soft.      Tenderness: There is no abdominal tenderness. There is no  guarding or rebound.      Comments: tympanic   Musculoskeletal:      Right lower leg: No edema.      Left lower leg: No edema.      Comments: Fluctuance over R elbow, redness from mid humerus to mid lower arm; slightly less intense on exam today  Hand warm and pink, good pulses and cap refill  Clear serous drainage from olecranon incisioin   Skin:     General: Skin is warm and dry.      Findings: No rash.   Neurological:      Mental Status: She is oriented to person, place, and time.      Comments: Multiple contractures  A and O  Non verbal but follows and understands  Communicates by hand signs and typing on IPad   Psychiatric:         Mood and Affect: Mood normal.         Behavior: Behavior normal.         Thought Content: Thought content normal.         Fluids    Intake/Output Summary (Last 24 hours) at 10/2/2023 0712  Last data filed at 10/1/2023 2000  Gross per 24 hour   Intake 1821.08 ml   Output 300 ml   Net 1521.08 ml         Laboratory  Recent Labs     09/30/23  2149   WBC 12.3*   RBC 4.43   HEMOGLOBIN 14.0   HEMATOCRIT 41.9   MCV 94.6   MCH 31.6   MCHC 33.4   RDW 43.6   PLATELETCT 237   MPV 9.5       Recent Labs     09/30/23  2149   SODIUM 139   POTASSIUM 3.6   CHLORIDE 106   CO2 22   GLUCOSE 108*   BUN 17   CREATININE 0.65   CALCIUM 9.1                     Imaging  DX-CHEST-PORTABLE (1 VIEW)   Final Result      1.  Elevation of the right hemidiaphragm with hypoinflation and bibasilar atelectasis.   2.  Air-filled loop of hepatic flexure noted beneath the right hemidiaphragm.             Assessment/Plan  * SIRS (systemic inflammatory response syndrome) (HCC)  Assessment & Plan  Acute     SIRS criteria identified on my evaluation include:  Tachycardia, with heart rate greater than 90 BPM and Leukocytosis, with WBC greater than 12,000      Tachycardia  Assessment & Plan  Pt consistently tachy around 100  Given 500ml crystaloid bolus; HR down to mid 90s  I ordered and have reviewed EKG: sinus tach no signs of  ishcemia  CXR ordered and reviewed 10/1: lung fields clear but dialated bowel loops noted  abd exam benign, pt with good appetite and eating well, no N or V.  Last BM 36hrs ago  Cont to monitor closely.  Given no abd complaints will hold off for now on CT or NPO    Source of tachy is likely pro inflamatory state from sepsis  Doubt PE; pt is at her baseline as she is bed bound.  Peripheral exam benign    Bursitis of right elbow  Assessment & Plan  Failed outpt Amox  Status post I&D by ER physician  Continue with vancomycin/Unasyn  Wound cultures have come back MRSA: DC amp/sulbactam  Blood cultures remain neg  Orthopedic consulted-follow official recommendations  Plan to monitor on Abx's and see if we can avoid the OR  Clinically is a little improved however was also febrile yesterday evening    Cellulitis of right arm  Assessment & Plan  Acute   Failed outpt amox  Vancomycin and Unasyn initially  Wound cultures MRSA: DC amp/sulbactam  Monitor BMP to assess for renal toxicity from vancomycin administration  Monitor for rash and Unasyn administration  Follow-up cultures    Elbow pain- (present on admission)  Assessment & Plan  Pain control with IV narcotics, monitor respiratory status closely         VTE prophylaxis:   SCDs/TEDs      I have performed a physical exam and reviewed and updated ROS and Plan today (10/2/2023). In review of yesterday's note (10/1/2023), there are no changes except as documented above.

## 2023-10-02 NOTE — PROGRESS NOTES
Assumed pt care at shift change, report received from day RN. Pt alert, non-verbal. Pt sitting in bed,eating dinner. Pt in 1L NC.Reinforce-dressing in place on the right elbow. Noted serosanguineous drainage. Repositioned pt. Completed assessment. Updated on POC,communication board updated. Bed locked and in lowest position. Call light and belongings within reach. Non-skid socks in place. Needs met, and hourly rounding in place.

## 2023-10-02 NOTE — CARE PLAN
The patient is Stable - Low risk of patient condition declining or worsening    Shift Goals  Clinical Goals: Q 2 turns, continue IV abx  Patient Goals: Comfort  Family Goals: No family at bedside    Progress made toward(s) clinical / shift goals:    Problem: Pain - Standard  Goal: Alleviation of pain or a reduction in pain to the patient’s comfort goal  10/2/2023 0113 by Carmen Mcknight, R.N.  Outcome: Progressing  Note: Utilized non-verbal descriptors to evaluate pain. Medicated pt per MAR for pain, repositioned pt to provide comfort.      Problem: Skin Integrity  Goal: Skin integrity is maintained or improved  10/2/2023 0113 by Carmen Mcknight, R.N.  Outcome: Progressing  Note: Skin ulcer preventative in place: Waffle overlay mattress and heel mepilex in place,  offload pt using pillows and implemented Q 2 turns.      Patient is not progressing towards the following goals:

## 2023-10-03 ENCOUNTER — ANESTHESIA (OUTPATIENT)
Dept: SURGERY | Facility: MEDICAL CENTER | Age: 38
DRG: 853 | End: 2023-10-03
Payer: MEDICARE

## 2023-10-03 ENCOUNTER — ANESTHESIA EVENT (OUTPATIENT)
Dept: SURGERY | Facility: MEDICAL CENTER | Age: 38
DRG: 853 | End: 2023-10-03
Payer: MEDICARE

## 2023-10-03 LAB
ALBUMIN SERPL BCP-MCNC: 3.1 G/DL (ref 3.2–4.9)
ALBUMIN/GLOB SERPL: 1.2 G/DL
ALP SERPL-CCNC: 57 U/L (ref 30–99)
ALT SERPL-CCNC: 12 U/L (ref 2–50)
ANION GAP SERPL CALC-SCNC: 10 MMOL/L (ref 7–16)
AST SERPL-CCNC: 11 U/L (ref 12–45)
BACTERIA WND AEROBE CULT: ABNORMAL
BACTERIA WND AEROBE CULT: ABNORMAL
BASOPHILS # BLD AUTO: 0.7 % (ref 0–1.8)
BASOPHILS # BLD: 0.04 K/UL (ref 0–0.12)
BILIRUB SERPL-MCNC: 0.2 MG/DL (ref 0.1–1.5)
BUN SERPL-MCNC: 16 MG/DL (ref 8–22)
CALCIUM ALBUM COR SERPL-MCNC: 8.8 MG/DL (ref 8.5–10.5)
CALCIUM SERPL-MCNC: 8.1 MG/DL (ref 8.5–10.5)
CHLORIDE SERPL-SCNC: 111 MMOL/L (ref 96–112)
CO2 SERPL-SCNC: 20 MMOL/L (ref 20–33)
CREAT SERPL-MCNC: 0.61 MG/DL (ref 0.5–1.4)
EOSINOPHIL # BLD AUTO: 0.14 K/UL (ref 0–0.51)
EOSINOPHIL NFR BLD: 2.3 % (ref 0–6.9)
ERYTHROCYTE [DISTWIDTH] IN BLOOD BY AUTOMATED COUNT: 44.9 FL (ref 35.9–50)
GFR SERPLBLD CREATININE-BSD FMLA CKD-EPI: 116 ML/MIN/1.73 M 2
GLOBULIN SER CALC-MCNC: 2.5 G/DL (ref 1.9–3.5)
GLUCOSE SERPL-MCNC: 111 MG/DL (ref 65–99)
GRAM STN SPEC: ABNORMAL
HCT VFR BLD AUTO: 36.5 % (ref 37–47)
HGB BLD-MCNC: 11.9 G/DL (ref 12–16)
IMM GRANULOCYTES # BLD AUTO: 0.02 K/UL (ref 0–0.11)
IMM GRANULOCYTES NFR BLD AUTO: 0.3 % (ref 0–0.9)
LYMPHOCYTES # BLD AUTO: 1.11 K/UL (ref 1–4.8)
LYMPHOCYTES NFR BLD: 18 % (ref 22–41)
MCH RBC QN AUTO: 31.2 PG (ref 27–33)
MCHC RBC AUTO-ENTMCNC: 32.6 G/DL (ref 32.2–35.5)
MCV RBC AUTO: 95.8 FL (ref 81.4–97.8)
MONOCYTES # BLD AUTO: 0.48 K/UL (ref 0–0.85)
MONOCYTES NFR BLD AUTO: 7.8 % (ref 0–13.4)
NEUTROPHILS # BLD AUTO: 4.36 K/UL (ref 1.82–7.42)
NEUTROPHILS NFR BLD: 70.9 % (ref 44–72)
NRBC # BLD AUTO: 0 K/UL
NRBC BLD-RTO: 0 /100 WBC (ref 0–0.2)
PLATELET # BLD AUTO: 196 K/UL (ref 164–446)
PMV BLD AUTO: 9.6 FL (ref 9–12.9)
POTASSIUM SERPL-SCNC: 3.9 MMOL/L (ref 3.6–5.5)
PROT SERPL-MCNC: 5.6 G/DL (ref 6–8.2)
RBC # BLD AUTO: 3.81 M/UL (ref 4.2–5.4)
SIGNIFICANT IND 70042: ABNORMAL
SITE SITE: ABNORMAL
SODIUM SERPL-SCNC: 141 MMOL/L (ref 135–145)
SOURCE SOURCE: ABNORMAL
VANCOMYCIN PEAK SERPL-MCNC: 17.7 UG/ML (ref 20–40)
VANCOMYCIN TROUGH SERPL-MCNC: 8.6 UG/ML (ref 10–20)
WBC # BLD AUTO: 6.2 K/UL (ref 4.8–10.8)

## 2023-10-03 PROCEDURE — 0MB30ZZ EXCISION OF RIGHT ELBOW BURSA AND LIGAMENT, OPEN APPROACH: ICD-10-PCS | Performed by: STUDENT IN AN ORGANIZED HEALTH CARE EDUCATION/TRAINING PROGRAM

## 2023-10-03 PROCEDURE — 160002 HCHG RECOVERY MINUTES (STAT): Performed by: STUDENT IN AN ORGANIZED HEALTH CARE EDUCATION/TRAINING PROGRAM

## 2023-10-03 PROCEDURE — 700111 HCHG RX REV CODE 636 W/ 250 OVERRIDE (IP): Performed by: STUDENT IN AN ORGANIZED HEALTH CARE EDUCATION/TRAINING PROGRAM

## 2023-10-03 PROCEDURE — 87015 SPECIMEN INFECT AGNT CONCNTJ: CPT

## 2023-10-03 PROCEDURE — 36415 COLL VENOUS BLD VENIPUNCTURE: CPT

## 2023-10-03 PROCEDURE — 770001 HCHG ROOM/CARE - MED/SURG/GYN PRIV*

## 2023-10-03 PROCEDURE — 700105 HCHG RX REV CODE 258: Performed by: STUDENT IN AN ORGANIZED HEALTH CARE EDUCATION/TRAINING PROGRAM

## 2023-10-03 PROCEDURE — 99233 SBSQ HOSP IP/OBS HIGH 50: CPT | Performed by: STUDENT IN AN ORGANIZED HEALTH CARE EDUCATION/TRAINING PROGRAM

## 2023-10-03 PROCEDURE — 87070 CULTURE OTHR SPECIMN AEROBIC: CPT

## 2023-10-03 PROCEDURE — 700102 HCHG RX REV CODE 250 W/ 637 OVERRIDE(OP): Performed by: STUDENT IN AN ORGANIZED HEALTH CARE EDUCATION/TRAINING PROGRAM

## 2023-10-03 PROCEDURE — 85025 COMPLETE CBC W/AUTO DIFF WBC: CPT

## 2023-10-03 PROCEDURE — 160038 HCHG SURGERY MINUTES - EA ADDL 1 MIN LEVEL 2: Performed by: STUDENT IN AN ORGANIZED HEALTH CARE EDUCATION/TRAINING PROGRAM

## 2023-10-03 PROCEDURE — A9270 NON-COVERED ITEM OR SERVICE: HCPCS | Performed by: STUDENT IN AN ORGANIZED HEALTH CARE EDUCATION/TRAINING PROGRAM

## 2023-10-03 PROCEDURE — 99222 1ST HOSP IP/OBS MODERATE 55: CPT | Mod: 57 | Performed by: STUDENT IN AN ORGANIZED HEALTH CARE EDUCATION/TRAINING PROGRAM

## 2023-10-03 PROCEDURE — 87077 CULTURE AEROBIC IDENTIFY: CPT

## 2023-10-03 PROCEDURE — 80053 COMPREHEN METABOLIC PANEL: CPT

## 2023-10-03 PROCEDURE — 700111 HCHG RX REV CODE 636 W/ 250 OVERRIDE (IP)

## 2023-10-03 PROCEDURE — 700101 HCHG RX REV CODE 250: Performed by: STUDENT IN AN ORGANIZED HEALTH CARE EDUCATION/TRAINING PROGRAM

## 2023-10-03 PROCEDURE — 160048 HCHG OR STATISTICAL LEVEL 1-5: Performed by: STUDENT IN AN ORGANIZED HEALTH CARE EDUCATION/TRAINING PROGRAM

## 2023-10-03 PROCEDURE — 8968 PR NO CHARGE - PROCEDURE: Mod: 80ROC,RT | Performed by: STUDENT IN AN ORGANIZED HEALTH CARE EDUCATION/TRAINING PROGRAM

## 2023-10-03 PROCEDURE — 160027 HCHG SURGERY MINUTES - 1ST 30 MINS LEVEL 2: Performed by: STUDENT IN AN ORGANIZED HEALTH CARE EDUCATION/TRAINING PROGRAM

## 2023-10-03 PROCEDURE — 87205 SMEAR GRAM STAIN: CPT

## 2023-10-03 PROCEDURE — 160035 HCHG PACU - 1ST 60 MINS PHASE I: Performed by: STUDENT IN AN ORGANIZED HEALTH CARE EDUCATION/TRAINING PROGRAM

## 2023-10-03 PROCEDURE — 80202 ASSAY OF VANCOMYCIN: CPT

## 2023-10-03 PROCEDURE — 160009 HCHG ANES TIME/MIN: Performed by: STUDENT IN AN ORGANIZED HEALTH CARE EDUCATION/TRAINING PROGRAM

## 2023-10-03 PROCEDURE — 24105 EXCISION OLECRANON BURSA: CPT | Mod: RT | Performed by: STUDENT IN AN ORGANIZED HEALTH CARE EDUCATION/TRAINING PROGRAM

## 2023-10-03 PROCEDURE — 87075 CULTR BACTERIA EXCEPT BLOOD: CPT

## 2023-10-03 PROCEDURE — 87147 CULTURE TYPE IMMUNOLOGIC: CPT

## 2023-10-03 RX ORDER — MEPERIDINE HYDROCHLORIDE 25 MG/ML
12.5 INJECTION INTRAMUSCULAR; INTRAVENOUS; SUBCUTANEOUS
Status: DISCONTINUED | OUTPATIENT
Start: 2023-10-03 | End: 2023-10-03 | Stop reason: HOSPADM

## 2023-10-03 RX ORDER — ONDANSETRON 2 MG/ML
INJECTION INTRAMUSCULAR; INTRAVENOUS PRN
Status: DISCONTINUED | OUTPATIENT
Start: 2023-10-03 | End: 2023-10-03 | Stop reason: SURG

## 2023-10-03 RX ORDER — CEFAZOLIN SODIUM 1 G/3ML
INJECTION, POWDER, FOR SOLUTION INTRAMUSCULAR; INTRAVENOUS PRN
Status: DISCONTINUED | OUTPATIENT
Start: 2023-10-03 | End: 2023-10-03 | Stop reason: SURG

## 2023-10-03 RX ORDER — HYDROMORPHONE HYDROCHLORIDE 1 MG/ML
0.1 INJECTION, SOLUTION INTRAMUSCULAR; INTRAVENOUS; SUBCUTANEOUS
Status: DISCONTINUED | OUTPATIENT
Start: 2023-10-03 | End: 2023-10-03 | Stop reason: HOSPADM

## 2023-10-03 RX ORDER — SUMATRIPTAN 50 MG/1
25 TABLET, FILM COATED ORAL
Status: DISCONTINUED | OUTPATIENT
Start: 2023-10-03 | End: 2023-10-09 | Stop reason: HOSPADM

## 2023-10-03 RX ORDER — DIPHENHYDRAMINE HYDROCHLORIDE 50 MG/ML
12.5 INJECTION INTRAMUSCULAR; INTRAVENOUS
Status: DISCONTINUED | OUTPATIENT
Start: 2023-10-03 | End: 2023-10-03 | Stop reason: HOSPADM

## 2023-10-03 RX ORDER — SODIUM CHLORIDE, SODIUM LACTATE, POTASSIUM CHLORIDE, CALCIUM CHLORIDE 600; 310; 30; 20 MG/100ML; MG/100ML; MG/100ML; MG/100ML
INJECTION, SOLUTION INTRAVENOUS CONTINUOUS
Status: DISCONTINUED | OUTPATIENT
Start: 2023-10-03 | End: 2023-10-03 | Stop reason: HOSPADM

## 2023-10-03 RX ORDER — OXYCODONE HCL 5 MG/5 ML
5 SOLUTION, ORAL ORAL
Status: COMPLETED | OUTPATIENT
Start: 2023-10-03 | End: 2023-10-03

## 2023-10-03 RX ORDER — HYDROMORPHONE HYDROCHLORIDE 1 MG/ML
INJECTION, SOLUTION INTRAMUSCULAR; INTRAVENOUS; SUBCUTANEOUS
Status: COMPLETED
Start: 2023-10-03 | End: 2023-10-03

## 2023-10-03 RX ORDER — IPRATROPIUM BROMIDE AND ALBUTEROL SULFATE 2.5; .5 MG/3ML; MG/3ML
3 SOLUTION RESPIRATORY (INHALATION)
Status: DISCONTINUED | OUTPATIENT
Start: 2023-10-03 | End: 2023-10-03 | Stop reason: HOSPADM

## 2023-10-03 RX ORDER — ONDANSETRON 2 MG/ML
4 INJECTION INTRAMUSCULAR; INTRAVENOUS
Status: DISCONTINUED | OUTPATIENT
Start: 2023-10-03 | End: 2023-10-03 | Stop reason: HOSPADM

## 2023-10-03 RX ORDER — LABETALOL HYDROCHLORIDE 5 MG/ML
5 INJECTION, SOLUTION INTRAVENOUS
Status: DISCONTINUED | OUTPATIENT
Start: 2023-10-03 | End: 2023-10-03 | Stop reason: HOSPADM

## 2023-10-03 RX ORDER — ROCURONIUM BROMIDE 10 MG/ML
INJECTION, SOLUTION INTRAVENOUS PRN
Status: DISCONTINUED | OUTPATIENT
Start: 2023-10-03 | End: 2023-10-03 | Stop reason: SURG

## 2023-10-03 RX ORDER — HALOPERIDOL 5 MG/ML
1 INJECTION INTRAMUSCULAR
Status: DISCONTINUED | OUTPATIENT
Start: 2023-10-03 | End: 2023-10-03 | Stop reason: HOSPADM

## 2023-10-03 RX ORDER — DEXAMETHASONE SODIUM PHOSPHATE 4 MG/ML
INJECTION, SOLUTION INTRA-ARTICULAR; INTRALESIONAL; INTRAMUSCULAR; INTRAVENOUS; SOFT TISSUE PRN
Status: DISCONTINUED | OUTPATIENT
Start: 2023-10-03 | End: 2023-10-03 | Stop reason: SURG

## 2023-10-03 RX ORDER — OXYCODONE HCL 5 MG/5 ML
10 SOLUTION, ORAL ORAL
Status: COMPLETED | OUTPATIENT
Start: 2023-10-03 | End: 2023-10-03

## 2023-10-03 RX ORDER — ENOXAPARIN SODIUM 100 MG/ML
40 INJECTION SUBCUTANEOUS DAILY
Status: DISCONTINUED | OUTPATIENT
Start: 2023-10-03 | End: 2023-10-09 | Stop reason: HOSPADM

## 2023-10-03 RX ORDER — EPHEDRINE SULFATE 50 MG/ML
5 INJECTION, SOLUTION INTRAVENOUS
Status: DISCONTINUED | OUTPATIENT
Start: 2023-10-03 | End: 2023-10-03 | Stop reason: HOSPADM

## 2023-10-03 RX ORDER — HYDROMORPHONE HYDROCHLORIDE 1 MG/ML
0.2 INJECTION, SOLUTION INTRAMUSCULAR; INTRAVENOUS; SUBCUTANEOUS
Status: DISCONTINUED | OUTPATIENT
Start: 2023-10-03 | End: 2023-10-03 | Stop reason: HOSPADM

## 2023-10-03 RX ORDER — HYDROMORPHONE HYDROCHLORIDE 1 MG/ML
0.4 INJECTION, SOLUTION INTRAMUSCULAR; INTRAVENOUS; SUBCUTANEOUS
Status: DISCONTINUED | OUTPATIENT
Start: 2023-10-03 | End: 2023-10-03 | Stop reason: HOSPADM

## 2023-10-03 RX ORDER — LIDOCAINE HYDROCHLORIDE 20 MG/ML
INJECTION, SOLUTION EPIDURAL; INFILTRATION; INTRACAUDAL; PERINEURAL PRN
Status: DISCONTINUED | OUTPATIENT
Start: 2023-10-03 | End: 2023-10-03 | Stop reason: SURG

## 2023-10-03 RX ORDER — HYDRALAZINE HYDROCHLORIDE 20 MG/ML
5 INJECTION INTRAMUSCULAR; INTRAVENOUS
Status: DISCONTINUED | OUTPATIENT
Start: 2023-10-03 | End: 2023-10-03 | Stop reason: HOSPADM

## 2023-10-03 RX ORDER — SODIUM CHLORIDE, SODIUM LACTATE, POTASSIUM CHLORIDE, CALCIUM CHLORIDE 600; 310; 30; 20 MG/100ML; MG/100ML; MG/100ML; MG/100ML
INJECTION, SOLUTION INTRAVENOUS
Status: DISCONTINUED | OUTPATIENT
Start: 2023-10-03 | End: 2023-10-03 | Stop reason: SURG

## 2023-10-03 RX ADMIN — FENTANYL CITRATE 50 MCG: 50 INJECTION, SOLUTION INTRAMUSCULAR; INTRAVENOUS at 16:14

## 2023-10-03 RX ADMIN — SUGAMMADEX 200 MG: 100 INJECTION, SOLUTION INTRAVENOUS at 16:17

## 2023-10-03 RX ADMIN — LIDOCAINE HYDROCHLORIDE 60 MG: 20 INJECTION, SOLUTION EPIDURAL; INFILTRATION; INTRACAUDAL at 15:45

## 2023-10-03 RX ADMIN — BISACODYL 10 MG: 10 SUPPOSITORY RECTAL at 09:27

## 2023-10-03 RX ADMIN — OXYCODONE 5 MG: 5 TABLET ORAL at 04:53

## 2023-10-03 RX ADMIN — FENTANYL CITRATE 25 MCG: 50 INJECTION, SOLUTION INTRAMUSCULAR; INTRAVENOUS at 16:01

## 2023-10-03 RX ADMIN — MELOXICAM 15 MG: 7.5 TABLET ORAL at 04:51

## 2023-10-03 RX ADMIN — PROPOFOL 100 MG: 10 INJECTION, EMULSION INTRAVENOUS at 15:45

## 2023-10-03 RX ADMIN — CEFAZOLIN 2 G: 1 INJECTION, POWDER, FOR SOLUTION INTRAMUSCULAR; INTRAVENOUS at 15:48

## 2023-10-03 RX ADMIN — FENTANYL CITRATE 75 MCG: 50 INJECTION, SOLUTION INTRAMUSCULAR; INTRAVENOUS at 15:45

## 2023-10-03 RX ADMIN — OXYCODONE 5 MG: 5 TABLET ORAL at 09:26

## 2023-10-03 RX ADMIN — CITALOPRAM HYDROBROMIDE 20 MG: 20 TABLET ORAL at 04:51

## 2023-10-03 RX ADMIN — OMEPRAZOLE 40 MG: 20 CAPSULE, DELAYED RELEASE ORAL at 04:52

## 2023-10-03 RX ADMIN — BACLOFEN 30 MG: 10 TABLET ORAL at 13:00

## 2023-10-03 RX ADMIN — VANCOMYCIN HYDROCHLORIDE 1500 MG: 5 INJECTION, POWDER, LYOPHILIZED, FOR SOLUTION INTRAVENOUS at 18:00

## 2023-10-03 RX ADMIN — OXYCODONE HYDROCHLORIDE 10 MG: 5 SOLUTION ORAL at 17:03

## 2023-10-03 RX ADMIN — VANCOMYCIN HYDROCHLORIDE 1000 MG: 5 INJECTION, POWDER, LYOPHILIZED, FOR SOLUTION INTRAVENOUS at 10:33

## 2023-10-03 RX ADMIN — ROCURONIUM BROMIDE 30 MG: 50 INJECTION, SOLUTION INTRAVENOUS at 15:45

## 2023-10-03 RX ADMIN — DEXAMETHASONE SODIUM PHOSPHATE 4 MG: 4 INJECTION INTRA-ARTICULAR; INTRALESIONAL; INTRAMUSCULAR; INTRAVENOUS; SOFT TISSUE at 15:48

## 2023-10-03 RX ADMIN — DOCUSATE SODIUM 50 MG AND SENNOSIDES 8.6 MG 2 TABLET: 8.6; 5 TABLET, FILM COATED ORAL at 04:52

## 2023-10-03 RX ADMIN — TOPIRAMATE 25 MG: 25 TABLET, FILM COATED ORAL at 17:48

## 2023-10-03 RX ADMIN — HYDROMORPHONE HYDROCHLORIDE 0.4 MG: 1 INJECTION, SOLUTION INTRAMUSCULAR; INTRAVENOUS; SUBCUTANEOUS at 16:40

## 2023-10-03 RX ADMIN — BACLOFEN 30 MG: 10 TABLET ORAL at 09:26

## 2023-10-03 RX ADMIN — SODIUM CHLORIDE, POTASSIUM CHLORIDE, SODIUM LACTATE AND CALCIUM CHLORIDE: 600; 310; 30; 20 INJECTION, SOLUTION INTRAVENOUS at 15:41

## 2023-10-03 RX ADMIN — TOPIRAMATE 25 MG: 25 TABLET, FILM COATED ORAL at 04:51

## 2023-10-03 RX ADMIN — ENOXAPARIN SODIUM 40 MG: 100 INJECTION SUBCUTANEOUS at 17:48

## 2023-10-03 RX ADMIN — ONDANSETRON 4 MG: 2 INJECTION INTRAMUSCULAR; INTRAVENOUS at 16:17

## 2023-10-03 RX ADMIN — BACLOFEN 30 MG: 10 TABLET ORAL at 20:23

## 2023-10-03 RX ADMIN — OXYCODONE 5 MG: 5 TABLET ORAL at 17:48

## 2023-10-03 RX ADMIN — DOCUSATE SODIUM 50 MG AND SENNOSIDES 8.6 MG 2 TABLET: 8.6; 5 TABLET, FILM COATED ORAL at 17:48

## 2023-10-03 RX ADMIN — HYDROMORPHONE HYDROCHLORIDE 0.2 MG: 1 INJECTION, SOLUTION INTRAMUSCULAR; INTRAVENOUS; SUBCUTANEOUS at 17:00

## 2023-10-03 RX ADMIN — HYDROMORPHONE HYDROCHLORIDE 0.4 MG: 1 INJECTION, SOLUTION INTRAMUSCULAR; INTRAVENOUS; SUBCUTANEOUS at 16:45

## 2023-10-03 RX ADMIN — OMEPRAZOLE 40 MG: 20 CAPSULE, DELAYED RELEASE ORAL at 17:47

## 2023-10-03 ASSESSMENT — PAIN DESCRIPTION - PAIN TYPE
TYPE: ACUTE PAIN
TYPE: OTHER (COMMENT)
TYPE: ACUTE PAIN
TYPE: OTHER (COMMENT)

## 2023-10-03 ASSESSMENT — PAIN SCALES - GENERAL: PAIN_LEVEL: 2

## 2023-10-03 NOTE — ANESTHESIA POSTPROCEDURE EVALUATION
Patient: Griselda Swanson    Procedure Summary     Date: 10/03/23 Room / Location: [facility] 16 / SURGERY [facility]    Anesthesia Start: 1541 Anesthesia Stop: 1636    Procedure: IRRIGATION AND DEBRIDEMENT, WOUND (Right: Elbow) Diagnosis: (RIGHT ELBOW WOUND)    Surgeons: Omar Simpson M.D. Responsible Provider: Manuel Trevizo M.D.    Anesthesia Type: general ASA Status: 3          Final Anesthesia Type: general  Last vitals  BP   Blood Pressure: 96/56    Temp   37.4 °C (99.3 °F)    Pulse   84   Resp   16    SpO2   94 %      Anesthesia Post Evaluation    Patient location during evaluation: PACU  Patient participation: complete - patient participated  Level of consciousness: awake  Pain score: 2    Airway patency: patent  Anesthetic complications: no  Cardiovascular status: hemodynamically stable  Respiratory status: acceptable  Hydration status: acceptable    PONV: none          No notable events documented.     Nurse Pain Score: 5 (NPRS)

## 2023-10-03 NOTE — PROGRESS NOTES
"Pharmacy Vancomycin Kinetics Note for 10/3/2023     38 y.o. female on Vancomycin day # 3     Vancomycin Indication (Two level): Skin/skin structure infection    Provider specified end date: 10/05/23 (however TBD)    Active Antibiotics (From admission, onward)      Ordered     Ordering Provider       lora Oct 3, 2023 12:05 PM    10/03/23 1205  vancomycin (Vancocin) 1,500 mg in  mL IVPB  (vancomycin (VANCOCIN) IV (LD + Maintenance))  EVERY 12 HOURS         Adarsh Odell M.D.       Sun Oct 1, 2023 12:47 AM    10/01/23 0047  MD Alert...Vancomycin per Pharmacy  PHARMACY TO DOSE        Question:  Indication(s) for vancomycin?  Answer:  Skin and soft tissue infection    Jozef Hahn M.D.          Dosing Weight: 67.1 kg (147 lb 14.9 oz)    Admission History: Admitted on 9/30/2023 for Elbow pain [M25.529]    Allergies:     Bactrim and Fentanyl     Pertinent cultures to date:     Results       Procedure Component Value Units Date/Time    CULTURE WOUND W/ GRAM STAIN [[id number]]  (Abnormal) Collected: 10/01/23 0045    Order Status: Completed Specimen: Wound from Abscess Updated: 10/02/23 0845     Significant Indicator POS     Source WND     Site Right Elbow Abscess     Culture Result -     Gram Stain Result Few WBCs.  No organisms seen.       Culture Result Staphylococcus aureus  Light growth  Methicillin resistant by screening method      Narrative:      Release to patient->Immediate  Swab received    Blood Culture [[id number]] Collected: 09/30/23 2149    Order Status: Completed Specimen: Blood from Peripheral Updated: 10/02/23 0646     Significant Indicator NEG     Source BLD     Site PERIPHERAL     Culture Result No Growth  Note: Blood cultures are incubated for 5 days and  are monitored continuously.Positive blood cultures  are called to the RN and reported as soon as  they are identified.      Narrative:      2 of 2 blood culture x2  Sites order. Per Hospital Policy:  Only change Specimen Src: to \"Line\" if specified " "by physician  order.  Release to patient->Immediate  No site indicated    GRAM STAIN [[id number]] Collected: 10/01/23 0045    Order Status: Completed Specimen: Wound Updated: 10/01/23 1525     Significant Indicator .     Source WND     Site Right Elbow Abscess     Gram Stain Result Few WBCs.  No organisms seen.      Narrative:      Release to patient->Immediate  Swab received    MRSA By PCR (Amp) [[id number]] Collected: 10/01/23 0930    Order Status: Completed Specimen: Respirate from Nares Updated: 10/01/23 1303     MRSA by PCR POSITIVE    Narrative:      Release to patient->Immediate    MRSA By PCR (Amp) [[id number]]     Order Status: Canceled Specimen: Respirate from Nares     Blood Culture [[id number]] Collected: 09/30/23 2224    Order Status: Completed Specimen: Blood from Peripheral Updated: 10/01/23 0737     Significant Indicator NEG     Source BLD     Site PERIPHERAL     Culture Result No Growth  Note: Blood cultures are incubated for 5 days and  are monitored continuously.Positive blood cultures  are called to the RN and reported as soon as  they are identified.      Narrative:      1 of 2 for Blood Culture x 2 sites order. Per Hospital  Policy: Only change Specimen Src: to \"Line\" if specified by  physician order.  Release to patient->Immediate  Left Hand            Labs:     Estimated Creatinine Clearance: 119.2 mL/min (by C-G formula based on SCr of 0.61 mg/dL).  Recent Labs     09/30/23  2149 10/02/23  0849 10/03/23  0205   WBC 12.3* 8.5 6.2   NEUTSPOLYS 81.90* 76.60* 70.90     Recent Labs     09/30/23  2149 10/02/23  0849 10/03/23  0205   BUN 17 12 16   CREATININE 0.65 0.54 0.61   ALBUMIN 4.0 3.0* 3.1*       Intake/Output Summary (Last 24 hours) at 10/3/2023 1206  Last data filed at 10/2/2023 1935  Gross per 24 hour   Intake 240 ml   Output 150 ml   Net 90 ml      /66   Pulse 95   Temp 36.3 °C (97.3 °F) (Temporal)   Resp 16   Ht 1.702 m (5' 7\")   Wt 67.1 kg (148 lb)   SpO2 96%  Temp (24hrs), " Av.9 °C (98.4 °F), Min:36.3 °C (97.3 °F), Max:37.7 °C (99.9 °F)    List concerns for Vancomycin clearance:     None    Pharmacokinetics:     Two level kinetics:   Ke (hr ^-1): 0.0984  Half life: 7  Vd: Steady state Vd : 62.083  Calculated AUC: 327 mg·hr/L    Trough kinetics:   Recent Labs     10/03/23  0205 10/03/23  0925   VANCOTROUGH  --  8.6*   VANCOPEAK 17.7*  --      A/P:     -  Vancomycin dose: 1500 mg Q12H    -  Next vancomycin level(s): steady state, ~10/5    -  Predicted vancomycin AUC from two level test calculator: 490 mg·hr/L    -  Comments: R elbow bursitis s/p I&D in ED, wcx growing MRSA. Empiric vanco/unasyn consolidated to vanco monotherapy pending sensitivities. On previous vanco dosing, calculated AUC resulted subtherapeutic at 327 (goal 400-600). Patient likely clearing vanco extensively given young age & optimal renal function. Increase vancomycin to 1500 mg Q12H starting at 1800 tonight, for a predicted AUC of 490. Consider repeat level at steady state ~10/5 if warranted depending on duration of therapy, currently TBD. Pharmacy will continue following.      Dino Israel, AdrianoD

## 2023-10-03 NOTE — ANESTHESIA TIME REPORT
Anesthesia Start and Stop Event Times     Date Time Event    10/3/2023 1532 Ready for Procedure     1541 Anesthesia Start     1636 Anesthesia Stop        Responsible Staff  10/03/23    Name Role Begin End    Manuel Trevizo M.D. Anesth 1541 1636        Overtime Reason:  no overtime (within assigned shift)    Comments:

## 2023-10-03 NOTE — OP REPORT
DATE OF OPERATION: 10/3/2023     PREOPERATIVE DIAGNOSIS: Right olecranon septic bursitis    POSTOPERATIVE DIAGNOSIS: Same    PROCEDURE PERFORMED: Right elbow olecranon bursectomy    SURGEON: Omar Simpson M.D.     ASSISTANT: Siva Zuniga MD, fellow    ANESTHESIA: General    SPECIMEN: None    ESTIMATED BLOOD LOSS: 15 mL    IMPLANTS: None      INDICATIONS: The patient is a 38 y.o. female who presented with right septic olecranon bursitis status post failed bedside I&D as well as IV antibiotics.  I discussed the risks and benefits of the procedure which include but are not limited to risks of infection, wound healing complication, neurovascular injury, pain, malunion, non-union, malrotation, and the medical risks of anesthesia including MI, stroke, and death.  Alternatives to surgery were also discussed, including non-operative management, which I did not recommend.  The patient was in agreement with the plan to proceed, and the informed consent was signed and documented.  I met with the patient pre-operatively and marked the operative extremity with their agreement.  We proceeded to the operating room.     DESCRIPTION OF PROCEDURE:  Patient was seen in the preoperative holding area on the day of surgery. The operative site was marked with my initials.  she was taken to the operating room and placed supine on the operative table.  Anesthesia was induced.  The operative extremity was prepped and draped in the normal sterile fashion.  Operative pause was conducted and the correct patient, site, side, procedure, and surgeon's initials on extremity were identified.  4 cm incision was performed over the posterior olecranon.  The previous bedside I&D incision was excised.  There is obvious outflow of purulent fluid which was collected and sent for culture.  The bursa was obviously infected.  This point time performed excision of the bursa with a rongeur and/or Bovie cautery.  Once we felt all the obviously infected  necrotic tissue was excised the wound was thoroughly irrigated sterile saline.  It was then closed in layered fashion with 2-0 PDS, 3-0 nylon suture.  Patient was placed in sterile dressings followed by well-padded posterior slab splint.  She was awoken taken to PACU in stable condition.    POSTOPERATIVE PLAN: Nonweightbearing right upper extremity.  Leave splint in place for 2 weeks until follow-up.  Antibiotics per ID/primary team.    ____________________________________   Omar Simpson M.D.   DD: 10/3/2023  4:11 PM

## 2023-10-03 NOTE — ANESTHESIA PROCEDURE NOTES
Airway    Date/Time: 10/3/2023 3:47 PM    Performed by: Manuel Trevizo M.D.  Authorized by: Manuel Trevizo M.D.    Location:  OR  Urgency:  Elective  Indications for Airway Management:  Anesthesia      Spontaneous Ventilation: absent    Sedation Level:  Deep  Preoxygenated: Yes    Patient Position:  Sniffing  Mask Difficulty Assessment:  0 - not attempted  Final Airway Type:  Endotracheal airway  Final Endotracheal Airway:  ETT  Cuffed: Yes    Technique Used for Successful ETT Placement:  Direct laryngoscopy  Devices/Methods Used in Placement:  Cricoid pressure    Insertion Site:  Oral  Blade Type:  Tono  Laryngoscope Blade/Videolaryngoscope Blade Size:  3  ETT Size (mm):  6.5  Measured from:  Teeth  ETT to Teeth (cm):  21  Placement Verified by: auscultation and capnometry    Cormack-Lehane Classification:  Grade I - full view of glottis  Number of Attempts at Approach:  1

## 2023-10-03 NOTE — CARE PLAN
The patient is Stable - Low risk of patient condition declining or worsening    Shift Goals  Clinical Goals: VSS, trend labs, and IV abx  Patient Goals: Comfort  Family Goals: Update pt POC    Progress made toward(s) clinical / shift goals:    Problem: Skin Integrity  Goal: Skin integrity is maintained or improved  Outcome: Progressing  Note: Skin ulcer preventative in place: Waffle overlay mattress and heel mepilex in place,  offload pt using pillows and implemented Q 2 turns.       Problem: Fall Risk  Goal: Patient will remain free from falls  Outcome: Progressing  Note: Fall precaution in place: Bed locked and in lowest position. Bed frame alarm is on. Call lights, belongings within reach, and non-skid socks in place.         Patient is not progressing towards the following goals:

## 2023-10-03 NOTE — CARE PLAN
The patient is Stable - Low risk of patient condition declining or worsening    Shift Goals  Clinical Goals: pain management, abx  Patient Goals: comfort  Family Goals: not present    Progress made toward(s) clinical / shift goals:    Problem: Pain - Standard  Goal: Alleviation of pain or a reduction in pain to the patient’s comfort goal  Outcome: Progressing  Note: Medicated per MAR       Problem: Mechanical Ventilation  Goal: Safe management of artificial airway and ventilation  Outcome: Met  Goal: Successful weaning off mechanical ventilator, spontaneously maintains adequate gas exchange  Outcome: Met  Goal: Patient will be able to express needs and understand communication  Outcome: Met     Problem: Skin Integrity  Goal: Skin integrity is maintained or improved  Outcome: Progressing  Note: Patient is moderate risk for skin breakdown, ordered low air loss mattress, skin note done.      Problem: Fall Risk  Goal: Patient will remain free from falls  Outcome: Progressing  Note: Patient is moderate risk for falls, bed alarm is on       Patient is not progressing towards the following goals:

## 2023-10-03 NOTE — ANESTHESIA PREPROCEDURE EVALUATION
Case: [id number] Date/Time: 10/03/23 1456    Procedure: IRRIGATION AND DEBRIDEMENT, WOUND (Right: Elbow)    Location: [facility] OR 16 / SURGERY [facility]    Surgeons: Omar Simpson M.D.        37 yo F w/ MS, h/o acute disseminated encephalomyelitis (2009, nonverbal, diffuse contractures), admitted (10/1/23) w/ R elbow pain and sepsis    She is able to communicate via thumbs up/thumbs down, or the notes mylene    Relevant Problems   NEURO   (positive) Headache, unspecified headache type   (positive) Intractable chronic migraine without aura and without status migrainosus      CARDIAC   (positive) Intractable chronic migraine without aura and without status migrainosus       Physical Exam    Airway   Mallampati: II  TM distance: >3 FB  Neck ROM: full       Cardiovascular - normal exam  Rhythm: regular  Rate: normal  (-) murmur     Dental - normal exam           Pulmonary - normal exam  Breath sounds clear to auscultation     Abdominal    Neurological - normal exam                 Anesthesia Plan    ASA 3 (H/o acute disseminated encephalomyelitis)       Plan - general       Airway plan will be ETT          Induction: intravenous    Postoperative Plan: Postoperative administration of opioids is intended.    Pertinent diagnostic labs and testing reviewed    Informed Consent:    Anesthetic plan and risks discussed with patient.    Use of blood products discussed with: patient whom consented to blood products.

## 2023-10-03 NOTE — CONSULTS
10/3/2023    Time Called: 0900  Time Arrived: 1500      HPI: Griselda Swanson is a 38 y.o. female who presents with right elbow pain and swelling.  She was seen on 9/21 started on antibiotics with some mild improvement.  However she eventually progressively worsened ultimately requiring a trip to the emergency department.  In the ED she underwent bedside I&D of olecranon bursa and was admitted for antibiotics.  She is continued to worsen clinically and orthopedic surgery was consulted for possible debridement.  Of note she is nonverbal so the entirety of the history was obtained via the chart.    Past Medical History:   Diagnosis Date    ADEM (acute disseminated encephalomyelitis)     Back pain     Breath shortness     since 2014 not an issue at this time (4/2018)    C. difficile colitis 2015    Coma ([facility]) August 2009    1 month, lesions on brain,  unknown etiology    Coma ([facility]) 2008    Spontaneous -     Demyelinating disorder ([facility])     demyelinating encephpomyeltis     Encephalitis 2009    Heart burn     Indigestion     Lesion of brain     unknown cuase    MEDICAL HOME     Migraines     MS (multiple sclerosis) ([facility])     Other specified disorder of intestines     constipation    Pain     Psychiatric problem     depression and anxiety    Renal disorder     kidney stones    Urinary incontinence        Past Surgical History:   Procedure Laterality Date    CA UPPER GI ENDOSCOPY,DIAGNOSIS N/A 3/16/2023    Procedure: GASTROSCOPY;  Surgeon: Evelin Bautista M.D.;  Location: SURGERY SAME DAY [facility];  Service: Gastroenterology    CA UPPER GI ENDOSCOPY,BIOPSY N/A 3/16/2023    Procedure: GASTROSCOPY, WITH BIOPSY;  Surgeon: Evelin Bautista M.D.;  Location: SURGERY SAME [facility];  Service: Gastroenterology    PUMP INSERT/REMOVE Left 7/1/2016    Procedure: PUMP REMOVAL;  Surgeon: Catrachito Vega M.D.;  Location: SURGERY [facility];  Service:     CATH PLACE PERM EPIDURAL Left 6/14/2016    Procedure: CATH PLACE PERM  EPIDURAL - BACLOFEN PUMP AND CATHETER REPLACEMENT  - BACK AND ABDOMEN;  Surgeon: Pari Roberson M.D.;  Location: SURGERY [facility];  Service:     [place] BACLOFEN INTRATHECAL TRIAL  3/8/2016    Dr. Roberson  [facility]    CATH PLACE PERM EPIDURAL N/A 3/8/2016    Procedure: BACLOFEN PUMP TRIAL;  Surgeon: Pari Roberson M.D.;  Location: SURGERY [facility];  Service:     PUMP REVISION  10/8/2013    Performed by Pari Roberson M.D. at [facility]    PUMP INSERT/REMOVE  3/12/2013    Performed by Pari Roberson M.D. at [facility]    CATH PLACE PERM EPIDURAL  6/26/2012    Performed by APRI ROBERSON at [facility]    CATH PLACE PERM EPIDURAL  3/6/2012    Performed by PARI ROBERSON at [facility]    MAMMOPLASTY AUGMENTATION  2002    TONSILLECTOMY         Medications  No current facility-administered medications on file prior to encounter.     Current Outpatient Medications on File Prior to Encounter   Medication Sig Dispense Refill    sumatriptan (IMITREX) 100 MG tablet TAKE 1 TABLET BY MOUTH AT ONSET OF HEADACHE, MAY REPEAT IN 2 HOURS.(MAX 2 TABLETS PER DAY) 60 Tablet 11    meloxicam (MOBIC) 15 MG tablet Take 1 Tablet by mouth every day. 90 Tablet 3    topiramate (TOPAMAX) 25 MG Tab TAKE 1 TABLET BY MOUTH TWICE DAILY 60 Tablet 11    docusate sodium (COLACE) 100 MG Cap TAKE 1 CAPSULE BY MOUTH TWICE DAILY AS NEEDED 60 Capsule 3    omeprazole (PRILOSEC) 40 MG delayed-release capsule Take 1 Capsule by mouth 2 times a day. 60 Capsule 2    cyclobenzaprine (FLEXERIL) 10 mg Tab TAKE 1 TABLET BY MOUTH THREE TIMES DAILY AS NEEDED 270 Tablet 3    citalopram (CELEXA) 20 MG Tab TAKE 1 TABLET BY MOUTH EVERY DAY 90 Tablet 3    baclofen (LIORESAL) 10 MG Tab TAKE 3 TABLETS BY MOUTH FOUR TIMES DAILY 360 Tablet 11    linaCLOtide 145 MCG Cap Take 145 mcg by mouth every morning before breakfast. 30 Capsule 11    SUMAtriptan Succinate 6 MG/0.5ML Solution Auto-injector  INJECT 6MG SUBCUTANEOUS 1 TIMES DAILY AS NEEDED FOR UP TO 9 DAYS 15 mL 11    polyethylene glycol 3350 (MIRALAX) 17 GM/SCOOP Powder MIX 1 CAPFUL WITH 8OZ OF WATER AND DRINK DAILY FOR CONSTIPATION 510 g 0    bisacodyl (DULCOLAX) 10 MG Suppos INSERT 1 SUPPOSITORY RECTALLY EVERY DAY 50 Suppository 11    lactulose 10 GM/15ML Solution TAKE 15 TO 30 MLS BY MOUTH EVERY 4 HOURS AS NEEDED 40676 mL 0    Misc. Devices Misc Bedside commode with padding 1 Each 11    Misc. Devices Misc New latch for new dynavox  Wheel chair 1 Each 11    Misc. Devices Misc Bedside commode 1 Each 11    simethicone (MYLICON) 125 MG chewable tablet Take 125 mg by mouth 4 times a day as needed. Indications: Gas (Patient not taking: Reported on 9/21/2023)      Misc. Devices Misc W/C stabilizer needs eval and possible replacement 1 Each 11    Misc. Devices Misc Please eval and fix electric W/C. Please eval also for W/C needs. 1 Each 11    Misc. Devices Misc Please allow patient to have support/therapy dog in appt. Regardless of weight due to multiple chronic illnesses and debility. 1 Each 11    Multiple Vitamins-Minerals (MULTIVITAMIN GUMMIES ADULTS PO) Take 1 Tab by mouth every day. Indications: vitamin supplement.      vitamin D (CHOLECALCIFEROL) 1000 UNIT Tab Take 1,000 Units by mouth 4 times a day. Indications: supplement         Allergies  Bactrim and Fentanyl    ROS  Unable to obtain    Family History   Problem Relation Age of Onset    Cancer Mother         Sarcoma    Bipolar disorder Brother     Other Brother         spina bifida    Diabetes Maternal Grandmother     Other Daughter         Migrains       Social History     Socioeconomic History    Marital status: Single   Tobacco Use    Smoking status: Former     Current packs/day: 0.00     Average packs/day: 1 pack/day for 12.0 years (12.0 ttl pk-yrs)     Types: Cigarettes     Start date: 1/1/1996     Quit date: 1/1/2008     Years since quitting: 15.7    Smokeless tobacco: Never    Tobacco  "comments:     Started smoking at age 11   Substance and Sexual Activity    Alcohol use: No    Drug use: No    Sexual activity: Never     Partners: Male   Other Topics Concern     Service No    Blood Transfusions No    Caffeine Concern No    Occupational Exposure No    Hobby Hazards No    Sleep Concern Yes    Stress Concern No    Weight Concern No    Special Diet Yes     Comment: Keto    Back Care No    Exercise No    Bike Helmet No    Seat Belt Yes    Self-Exams No       Physical Exam  Vitals  /68   Pulse 100   Temp 36.8 °C (98.2 °F) (Temporal)   Resp 16   Ht 1.702 m (5' 7\")   Wt 67.1 kg (148 lb)   SpO2 94%   General: Well Developed, Well Nourished, Age appropriate appearance  HEENT: Normocephalic, atraumatic  Psych: Normal mood and affect  Neck: Supple, nontender, no masses  Lungs: Breathing unlabored, No audible wheezing  Heart: Regular heart rate and rhythm  Abdomen: Soft, NT, ND  Neuro: Sensation grossly intact to BUE and BLE, moving all four extremities  Vascular: , Capillary refill <2 seconds  MSK: Right elbow: Boggy fluctuance over olecranon bursa.  Surrounding erythema noted.      Radiographs:  DX-CHEST-PORTABLE (1 VIEW)   Final Result      1.  Elevation of the right hemidiaphragm with hypoinflation and bibasilar atelectasis.   2.  Air-filled loop of hepatic flexure noted beneath the right hemidiaphragm.          Laboratory Values  Recent Labs     09/30/23  2149 10/02/23  0849 10/03/23  0205   WBC 12.3* 8.5 6.2   RBC 4.43 3.93* 3.81*   HEMOGLOBIN 14.0 12.6 11.9*   HEMATOCRIT 41.9 37.4 36.5*   MCV 94.6 95.2 95.8   MCH 31.6 32.1 31.2   MCHC 33.4 33.7 32.6   RDW 43.6 44.1 44.9   PLATELETCT 237 192 196   MPV 9.5 9.5 9.6     Recent Labs     09/30/23  2149 10/02/23  0849 10/03/23  0205   SODIUM 139 139 141   POTASSIUM 3.6 3.7 3.9   CHLORIDE 106 109 111   CO2 22 19* 20   GLUCOSE 108* 109* 111*   BUN 17 12 16             Impression: 38-year-old nonverbal female with history of right septic " olecranon bursitis.  Failed bedside I&D and antibiotics.  Plan for formal irrigation debridement in the OR today.    Plan:We discussed the diagnosis and findings with the patient at length.  We reviewed possible non operative and operative interventions and the risks and benefits of each of these.  she had a chance to ask questions and all of these were answered to her satisfaction. The patient chose to proceed with surgical intervention. Risks and benefits of surgery were discussed which include but are not limited to bleeding, infection, neurovascular damage, malunion, nonunion, instability, limb length discrepancy, DVT, PE, MI, Stroke and death. They understand these risks and wish to proceed.      Omar Simpson MD  Orthopedic Trauma Surgery

## 2023-10-03 NOTE — PROGRESS NOTES
Hospital Medicine Daily Progress Note    Date of Service  10/3/2023    Chief Complaint  Griselda Swanson is a 38 y.o. 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  Seen 9/21 by PCP for R elbow swelling and treated for soft tissue infection with amox.  Presented to the ED on 9/30 with worsening. S/p I&D to ED.  She was started on IV antibiotics.  Orthopedics evaluated and scheduled for surgical intervention        Interval Problem Update    10/3/2023    Vitals remained stable  Labs reviewed, normal white count sodium 141  Wound culture growing MRSA sensitive to vancomycin    Case discussed with orthopedic surgeon Dr. Simpson.  Ortho to evaluate for intervention today  Continue vancomycin  ID evaluation in a.m.    Repeat BMP in a.m. to monitor electrolytes and toxicity while on IV antibiotics, need close monitoring for Vanco trough  Repeat CBC in a.m. to monitor white count and hemoglobin     Requiring IV narcotics for pain management.  Monitor for toxicity    I have discussed this patient's plan of care and discharge plan at IDT rounds today with Case Management, Nursing, Nursing leadership, and other members of the IDT team.    Consultants/Specialty  orthopedics    Code Status  Full Code    Disposition  The patient is not medically cleared for discharge to home or a post-acute facility.  Anticipate discharge to: skilled nursing facility    I have placed the appropriate orders for post-discharge needs.    Review of Systems  Review of Systems   Unable to perform ROS: Other   She is aphasic.     Physical Exam  Temp:  [36.3 °C (97.3 °F)-37.7 °C (99.9 °F)] 36.8 °C (98.2 °F)  Pulse:  [] 100  Resp:  [16] 16  BP: ()/(57-68) 106/68  SpO2:  [91 %-96 %] 94 %    Physical Exam  Musculoskeletal:      Comments:   Diffuse redness and tenderness around right elbow, noted drainage.   Neurological:      Motor:  Weakness present.      Comments: Patient is nonverbal at baseline, follows command         Fluids    Intake/Output Summary (Last 24 hours) at 10/3/2023 1512  Last data filed at 10/2/2023 1935  Gross per 24 hour   Intake --   Output 150 ml   Net -150 ml       Laboratory  Recent Labs     09/30/23  2149 10/02/23  0849 10/03/23  0205   WBC 12.3* 8.5 6.2   RBC 4.43 3.93* 3.81*   HEMOGLOBIN 14.0 12.6 11.9*   HEMATOCRIT 41.9 37.4 36.5*   MCV 94.6 95.2 95.8   MCH 31.6 32.1 31.2   MCHC 33.4 33.7 32.6   RDW 43.6 44.1 44.9   PLATELETCT 237 192 196   MPV 9.5 9.5 9.6     Recent Labs     09/30/23  2149 10/02/23  0849 10/03/23  0205   SODIUM 139 139 141   POTASSIUM 3.6 3.7 3.9   CHLORIDE 106 109 111   CO2 22 19* 20   GLUCOSE 108* 109* 111*   BUN 17 12 16   CREATININE 0.65 0.54 0.61   CALCIUM 9.1 8.0* 8.1*                   Imaging  DX-CHEST-PORTABLE (1 VIEW)   Final Result      1.  Elevation of the right hemidiaphragm with hypoinflation and bibasilar atelectasis.   2.  Air-filled loop of hepatic flexure noted beneath the right hemidiaphragm.           Assessment/Plan  * SIRS (systemic inflammatory response syndrome) (HCC)  Assessment & Plan  Acute     SIRS criteria identified on my evaluation include:  Tachycardia, with heart rate greater than 90 BPM and Leukocytosis, with WBC greater than 12,000      Tachycardia  Assessment & Plan  Resolved    Bursitis of right elbow  Assessment & Plan  Failed outpt Amox  Status post I&D by ER physician  Continue with vancomycin/Unasyn  Wound cultures have come back MRSA: DC amp/sulbactam  Blood cultures remain neg  Orthopedic consulted-follow official recommendations  Plan to monitor on Abx's and see if we can avoid the OR  Clinically is a little improved however was also febrile yesterday evening    10/3/2023    Case discussed with orthopedic surgeon Dr. Simpson.  Ortho to evaluate for intervention today  Continue vancomycin  ID evaluation in a.m.    Cellulitis of right arm  Assessment &  Plan  Acute   Failed outpt amox  Vancomycin and Unasyn initially  Wound cultures MRSA: DC amp/sulbactam  Monitor BMP to assess for renal toxicity from vancomycin administration  Monitor for rash and Unasyn administration  Follow-up cultures    Elbow pain- (present on admission)  Assessment & Plan  Pain control with IV narcotics, monitor respiratory status closely         VTE prophylaxis: scd, start on  enoxaparin after surgery    I have performed a physical exam and reviewed and updated ROS and Plan today (10/3/2023). In review of yesterday's note (10/2/2023), there are no changes except as documented above.

## 2023-10-03 NOTE — PROGRESS NOTES
Assumed pt care at shift change, report received from day RN. Pt alert, non-verbal. Pt sitting in bed,eating dinner. Pt on 1LNC. Reinforce-dressing in place on the right elbow. Noted serosanguineous drainage. Repositioned pt. Completed assessment. Updated on POC,communication board updated. Bed locked and in lowest position. Call light and belongings within reach. Non-skid socks in place. Needs met, and hourly rounding in place.

## 2023-10-03 NOTE — PROGRESS NOTES
4 Eyes Skin Assessment Completed by NOHEMI Bowie and NOHEMI Wilhelm.    Head WDL  Ears WDL  Nose WDL  Mouth WDL  Neck WDL  Breast/Chest WDL  Shoulder Blades WDL  Spine WDL  (R) Arm/Elbow/Hand Swelling, Discoloration, and Weeping, known injury to elbow  (L) Arm/Elbow/Hand WDL  Abdomen WDL  Groin Redness and Blanching  Scrotum/Coccyx/Buttocks Redness and Blanching  (R) Leg WDL  (L) Leg WDL  (R) Heel/Foot/Toe Redness and Blanching  (L) Heel/Foot/Toe Redness and Blanching          Devices In Places Blood Pressure Cuff, Pulse Ox, SCD's, and Nasal Cannula      Interventions In Place Gray Ear Foams, Heel Mepilex, Heel Float Boots, Elbow Mepilex, Q2 Turns, Barrier Cream, and ZFlo Pillow    Possible Skin Injury No    Pictures Uploaded Into Epic N/A  Wound Consult Placed N/A  RN Wound Prevention Protocol Ordered Yes

## 2023-10-04 LAB
ALBUMIN SERPL BCP-MCNC: 3.2 G/DL (ref 3.2–4.9)
ALBUMIN/GLOB SERPL: 1 G/DL
ALP SERPL-CCNC: 63 U/L (ref 30–99)
ALT SERPL-CCNC: 15 U/L (ref 2–50)
ANION GAP SERPL CALC-SCNC: 10 MMOL/L (ref 7–16)
AST SERPL-CCNC: 15 U/L (ref 12–45)
BASOPHILS # BLD AUTO: 0.3 % (ref 0–1.8)
BASOPHILS # BLD: 0.02 K/UL (ref 0–0.12)
BILIRUB SERPL-MCNC: 0.3 MG/DL (ref 0.1–1.5)
BUN SERPL-MCNC: 16 MG/DL (ref 8–22)
CALCIUM ALBUM COR SERPL-MCNC: 9.2 MG/DL (ref 8.5–10.5)
CALCIUM SERPL-MCNC: 8.6 MG/DL (ref 8.5–10.5)
CHLORIDE SERPL-SCNC: 111 MMOL/L (ref 96–112)
CO2 SERPL-SCNC: 18 MMOL/L (ref 20–33)
CREAT SERPL-MCNC: 0.49 MG/DL (ref 0.5–1.4)
EOSINOPHIL # BLD AUTO: 0 K/UL (ref 0–0.51)
EOSINOPHIL NFR BLD: 0 % (ref 0–6.9)
ERYTHROCYTE [DISTWIDTH] IN BLOOD BY AUTOMATED COUNT: 44.7 FL (ref 35.9–50)
GFR SERPLBLD CREATININE-BSD FMLA CKD-EPI: 122 ML/MIN/1.73 M 2
GLOBULIN SER CALC-MCNC: 3.1 G/DL (ref 1.9–3.5)
GLUCOSE SERPL-MCNC: 109 MG/DL (ref 65–99)
GRAM STN SPEC: NORMAL
HCT VFR BLD AUTO: 40 % (ref 37–47)
HGB BLD-MCNC: 12.6 G/DL (ref 12–16)
IMM GRANULOCYTES # BLD AUTO: 0.02 K/UL (ref 0–0.11)
IMM GRANULOCYTES NFR BLD AUTO: 0.3 % (ref 0–0.9)
LYMPHOCYTES # BLD AUTO: 0.77 K/UL (ref 1–4.8)
LYMPHOCYTES NFR BLD: 12.4 % (ref 22–41)
MCH RBC QN AUTO: 30.7 PG (ref 27–33)
MCHC RBC AUTO-ENTMCNC: 31.5 G/DL (ref 32.2–35.5)
MCV RBC AUTO: 97.6 FL (ref 81.4–97.8)
MONOCYTES # BLD AUTO: 0.33 K/UL (ref 0–0.85)
MONOCYTES NFR BLD AUTO: 5.3 % (ref 0–13.4)
NEUTROPHILS # BLD AUTO: 5.09 K/UL (ref 1.82–7.42)
NEUTROPHILS NFR BLD: 81.7 % (ref 44–72)
NRBC # BLD AUTO: 0 K/UL
NRBC BLD-RTO: 0 /100 WBC (ref 0–0.2)
PLATELET # BLD AUTO: 248 K/UL (ref 164–446)
PMV BLD AUTO: 9.3 FL (ref 9–12.9)
POTASSIUM SERPL-SCNC: 4.1 MMOL/L (ref 3.6–5.5)
PROT SERPL-MCNC: 6.3 G/DL (ref 6–8.2)
RBC # BLD AUTO: 4.1 M/UL (ref 4.2–5.4)
SIGNIFICANT IND 70042: NORMAL
SITE SITE: NORMAL
SODIUM SERPL-SCNC: 139 MMOL/L (ref 135–145)
SOURCE SOURCE: NORMAL
WBC # BLD AUTO: 6.2 K/UL (ref 4.8–10.8)

## 2023-10-04 PROCEDURE — 99233 SBSQ HOSP IP/OBS HIGH 50: CPT | Performed by: STUDENT IN AN ORGANIZED HEALTH CARE EDUCATION/TRAINING PROGRAM

## 2023-10-04 PROCEDURE — A9270 NON-COVERED ITEM OR SERVICE: HCPCS | Performed by: STUDENT IN AN ORGANIZED HEALTH CARE EDUCATION/TRAINING PROGRAM

## 2023-10-04 PROCEDURE — 85025 COMPLETE CBC W/AUTO DIFF WBC: CPT

## 2023-10-04 PROCEDURE — 700111 HCHG RX REV CODE 636 W/ 250 OVERRIDE (IP): Mod: JZ | Performed by: STUDENT IN AN ORGANIZED HEALTH CARE EDUCATION/TRAINING PROGRAM

## 2023-10-04 PROCEDURE — 700102 HCHG RX REV CODE 250 W/ 637 OVERRIDE(OP): Performed by: STUDENT IN AN ORGANIZED HEALTH CARE EDUCATION/TRAINING PROGRAM

## 2023-10-04 PROCEDURE — 770001 HCHG ROOM/CARE - MED/SURG/GYN PRIV*

## 2023-10-04 PROCEDURE — 36415 COLL VENOUS BLD VENIPUNCTURE: CPT

## 2023-10-04 PROCEDURE — 80053 COMPREHEN METABOLIC PANEL: CPT

## 2023-10-04 PROCEDURE — 99223 1ST HOSP IP/OBS HIGH 75: CPT | Performed by: INTERNAL MEDICINE

## 2023-10-04 PROCEDURE — 700105 HCHG RX REV CODE 258: Performed by: STUDENT IN AN ORGANIZED HEALTH CARE EDUCATION/TRAINING PROGRAM

## 2023-10-04 RX ADMIN — BACLOFEN 30 MG: 10 TABLET ORAL at 09:13

## 2023-10-04 RX ADMIN — TOPIRAMATE 25 MG: 25 TABLET, FILM COATED ORAL at 04:57

## 2023-10-04 RX ADMIN — OXYCODONE 5 MG: 5 TABLET ORAL at 15:19

## 2023-10-04 RX ADMIN — OXYCODONE 5 MG: 5 TABLET ORAL at 04:58

## 2023-10-04 RX ADMIN — BACLOFEN 30 MG: 10 TABLET ORAL at 13:00

## 2023-10-04 RX ADMIN — VANCOMYCIN HYDROCHLORIDE 1500 MG: 5 INJECTION, POWDER, LYOPHILIZED, FOR SOLUTION INTRAVENOUS at 18:37

## 2023-10-04 RX ADMIN — DOCUSATE SODIUM 50 MG AND SENNOSIDES 8.6 MG 2 TABLET: 8.6; 5 TABLET, FILM COATED ORAL at 04:57

## 2023-10-04 RX ADMIN — ENOXAPARIN SODIUM 40 MG: 100 INJECTION SUBCUTANEOUS at 17:05

## 2023-10-04 RX ADMIN — CITALOPRAM HYDROBROMIDE 20 MG: 20 TABLET ORAL at 04:58

## 2023-10-04 RX ADMIN — DOCUSATE SODIUM 50 MG AND SENNOSIDES 8.6 MG 2 TABLET: 8.6; 5 TABLET, FILM COATED ORAL at 17:04

## 2023-10-04 RX ADMIN — MELOXICAM 15 MG: 7.5 TABLET ORAL at 04:58

## 2023-10-04 RX ADMIN — OMEPRAZOLE 40 MG: 20 CAPSULE, DELAYED RELEASE ORAL at 17:01

## 2023-10-04 RX ADMIN — BACLOFEN 30 MG: 10 TABLET ORAL at 17:00

## 2023-10-04 RX ADMIN — TOPIRAMATE 25 MG: 25 TABLET, FILM COATED ORAL at 17:03

## 2023-10-04 RX ADMIN — BACLOFEN 30 MG: 10 TABLET ORAL at 23:04

## 2023-10-04 RX ADMIN — VANCOMYCIN HYDROCHLORIDE 1500 MG: 5 INJECTION, POWDER, LYOPHILIZED, FOR SOLUTION INTRAVENOUS at 05:02

## 2023-10-04 RX ADMIN — OXYCODONE 5 MG: 5 TABLET ORAL at 09:14

## 2023-10-04 RX ADMIN — OXYCODONE 5 MG: 5 TABLET ORAL at 19:22

## 2023-10-04 RX ADMIN — OMEPRAZOLE 40 MG: 20 CAPSULE, DELAYED RELEASE ORAL at 04:58

## 2023-10-04 RX ADMIN — BISACODYL 10 MG: 10 SUPPOSITORY RECTAL at 04:59

## 2023-10-04 ASSESSMENT — PAIN SCALES - WONG BAKER: WONGBAKER_NUMERICALRESPONSE: HURTS EVEN MORE

## 2023-10-04 ASSESSMENT — PAIN DESCRIPTION - PAIN TYPE
TYPE: ACUTE PAIN
TYPE: ACUTE PAIN

## 2023-10-04 NOTE — CONSULTS
[facility] INFECTIOUS DISEASES INPATIENT CONSULT NOTE     Date of Service: 10/4/2023    Consult Requested By: Adarsh Odell M.D.    Reason for Consultation: Septic olecranon bursitis    Chief Complaint: Auricular bursitis    History of Present Illness:     Griselda Swanson is a 38 y.o. female admitted 9/30/2023.  Extensive review of documentation from multiple specialties performed in generating this HPI.  Patient  with multiple sclerosis/disseminated encephalomyelitis with residual quadriparesis and nonverbal, was seen on 9/21 by PCP for right elbow swelling and infection that was treated initially with amoxicillin but worsened and was presented to the ER on 9/30, underwent bedside I&D but continued to worsen.  She was thus taken to the OR on 10/3 for olecranon bursectomy.  Tmax was 100.6 with an initial white count 12.3, fever curve is improved, white count of 6.2 today, creatinine 0.41, normal transaminases.  Cultures of the right elbow positive for MRSA, susceptible to doxycycline and Bactrim.  Blood cultures x2 from 9/30 no growth to date.    Review of Systems:  All other systems reviewed and are negative expect as noted in HPI    Past Medical History:   Diagnosis Date    ADEM (acute disseminated encephalomyelitis)     Back pain     Breath shortness     since 2014 not an issue at this time (4/2018)    C. difficile colitis 2015    Coma ([facility]) August 2009    1 month, lesions on brain,  unknown etiology    Coma ([facility]) 2008    Spontaneous -     Demyelinating disorder (HCC)     demyelinating encephpomyeltis     Encephalitis 2009    Heart burn     Indigestion     Lesion of brain     unknown cuase    MEDICAL HOME     Migraines     MS (multiple sclerosis) ([facility])     Other specified disorder of intestines     constipation    Pain     Psychiatric problem     depression and anxiety    Renal disorder     kidney stones    Urinary incontinence        Past Surgical History:   Procedure Laterality Date    IRRIGATION & DEBRIDEMENT  GENERAL Right 10/3/2023    Procedure: IRRIGATION AND DEBRIDEMENT, WOUND;  Surgeon: Omar iSmpson M.D.;  Location: SURGERY [facility];  Service: Orthopedics    CA UPPER GI ENDOSCOPY,DIAGNOSIS N/A 3/16/2023    Procedure: GASTROSCOPY;  Surgeon: Evelin Bautista M.D.;  Location: SURGERY SAME DAY [facility];  Service: Gastroenterology    CA UPPER GI ENDOSCOPY,BIOPSY N/A 3/16/2023    Procedure: GASTROSCOPY, WITH BIOPSY;  Surgeon: Evelin Bautista M.D.;  Location: SURGERY SAME DAY [facility];  Service: Gastroenterology    PUMP INSERT/REMOVE Left 7/1/2016    Procedure: PUMP REMOVAL;  Surgeon: Pari Roberson M.D.;  Location: [facility];  Service:     CATH PLACE PERM EPIDURAL Left 6/14/2016    Procedure: CATH PLACE PERM EPIDURAL - BACLOFEN PUMP AND CATHETER REPLACEMENT  - BACK AND ABDOMEN;  Surgeon: Pari Roberson M.D.;  Location: [facility];  Service:     CA BACLOFEN INTRATHECAL TRIAL  3/8/2016    Dr. Roberson  [facility]    CATH PLACE PERM EPIDURAL N/A 3/8/2016    Procedure: BACLOFEN PUMP TRIAL;  Surgeon: Pari Roberson M.D.;  Location: [facility];  Service:     PUMP REVISION  10/8/2013    Performed by Pari Roberson M.D. at [facility]    PUMP INSERT/REMOVE  3/12/2013    Performed by Pari Roberson M.D. at [facility]    CATH PLACE PERM EPIDURAL  6/26/2012    Performed by PARI ROBERSON at [facility]    CATH PLACE PERM EPIDURAL  3/6/2012    Performed by PARI ROBERSON at [facility]    MAMMOPLASTY AUGMENTATION  2002    TONSILLECTOMY         Family History   Problem Relation Age of Onset    Cancer Mother         Sarcoma    Bipolar disorder Brother     Other Brother         spina bifida    Diabetes Maternal Grandmother     Other Daughter         Migrains       Social History     Socioeconomic History    Marital status: Single     Spouse name: Not on file    Number of children: Not on file    Years of education: Not on file     Highest education level: Not on file   Occupational History    Not on file   Tobacco Use    Smoking status: Former     Current packs/day: 0.00     Average packs/day: 1 pack/day for 12.0 years (12.0 ttl pk-yrs)     Types: Cigarettes     Start date: 1/1/1996     Quit date: 1/1/2008     Years since quitting: 15.7    Smokeless tobacco: Never    Tobacco comments:     Started smoking at age 11   Substance and Sexual Activity    Alcohol use: No    Drug use: No    Sexual activity: Never     Partners: Male   Other Topics Concern    Primary/coprimary nurse & associates Not Asked    Family contact information Not Asked    OK to release patient information to the following Not Asked    Patient preferred routine/Privacy concerns Not Asked    Patient likes and dislikes Not Asked    Participating In Research Study Not Asked    Miscellaneous Not Asked     Service No    Blood Transfusions No    Caffeine Concern No    Occupational Exposure No    Hobby Hazards No    Sleep Concern Yes    Stress Concern No    Weight Concern No    Special Diet Yes     Comment: Keto    Back Care No    Exercise No    Bike Helmet No    Seat Belt Yes    Self-Exams No   Social History Narrative    Not on file     Social Determinants of Health     Financial Resource Strain: Not on file   Food Insecurity: Not on file   Transportation Needs: Not on file   Physical Activity: Not on file   Stress: Not on file   Social Connections: Not on file   Intimate Partner Violence: Not on file   Housing Stability: Not on file       Allergies   Allergen Reactions    Bactrim      Reaction unknown    Fentanyl      Makes her agressive       Medications:    Current Facility-Administered Medications:     vancomycin (Vancocin) 1,500 mg in  mL IVPB, 1,500 mg, Intravenous, Q12HR, Adarsh Odell M.D., Last Rate: 125 mL/hr at 10/04/23 0502, 1,500 mg at 10/04/23 0502    SUMAtriptan (Imitrex) tablet 25 mg, 25 mg, Oral, Q2HRS PRN, Adarsh Odell M.D.    enoxaparin (Lovenox)  "inj 40 mg, 40 mg, Subcutaneous, DAILY AT 1800, Adarsh Odell M.D., 40 mg at 10/03/23 1748    senna-docusate (Pericolace Or Senokot S) 8.6-50 MG per tablet 2 Tablet, 2 Tablet, Oral, BID, 2 Tablet at 10/04/23 0457 **AND** polyethylene glycol/lytes (Miralax) PACKET 1 Packet, 1 Packet, Oral, QDAY PRN **AND** magnesium hydroxide (Milk Of Magnesia) suspension 30 mL, 30 mL, Oral, QDAY PRN **AND** bisacodyl (Dulcolax) suppository 10 mg, 10 mg, Rectal, QDAY PRN, Jozef Hahn M.D.    MD Alert...Vancomycin per Pharmacy, , Other, PHARMACY TO DOSE, Adarsh Odell M.D.    oxyCODONE immediate-release (Roxicodone) tablet 5 mg, 5 mg, Oral, Q4HRS PRN, Jozef Hahn M.D., 5 mg at 10/04/23 0914    acetaminophen (Tylenol) tablet 650 mg, 650 mg, Oral, Q4HRS PRN, Jozef Hahn M.D., 650 mg at 10/01/23 2030    baclofen (Lioresal) tablet 30 mg, 30 mg, Oral, 4X/DAY, Jozef Hahn M.D., 30 mg at 10/04/23 0913    bisacodyl (Dulcolax) suppository 10 mg, 10 mg, Rectal, DAILY, Jozef Hahn M.D., 10 mg at 10/04/23 0459    citalopram (CeleXA) tablet 20 mg, 20 mg, Oral, QDAY, Jozef Hahn M.D., 20 mg at 10/04/23 0458    cyclobenzaprine (Flexeril) tablet 10 mg, 10 mg, Oral, TID PRN, Jozef Hahn M.D., 10 mg at 10/01/23 1306    meloxicam (Mobic) tablet 15 mg, 15 mg, Oral, DAILY, Jozef Hahn M.D., 15 mg at 10/04/23 0458    omeprazole (PriLOSEC) capsule 40 mg, 40 mg, Oral, BID, Jozef Hahn M.D., 40 mg at 10/04/23 0458    topiramate (Topamax) tablet 25 mg, 25 mg, Oral, BID, Jozef Hahn M.D., 25 mg at 10/04/23 0457    Physical Exam:   Vital Signs: BP 99/64   Pulse 100   Temp 36.6 °C (97.9 °F) (Temporal)   Resp 16   Ht 1.702 m (5' 7\")   Wt 67.1 kg (148 lb)   SpO2 98%   BMI 23.18 kg/m²   Temp  Av °C (98.6 °F)  Min: 36.3 °C (97.3 °F)  Max: 38.1 °C (100.6 °F)  Vital signs reviewed  Constitutional: Patient is nonverbal, no acute distress  Head: Atraumatic, normocephalic  Eyes: Conjunctivae normal  Mouth/Throat: Lips " without lesions  Cardiovascular: Normal rate, regular rhythm. No pedal edema.  Pulmonary/Chest: No respiratory distress. Unlabored respiratory effort  Musculoskeletal: Right upper extremity clean and dry surgical dressing.  Multiple contractures  Skin: Skin is warm and dry. No rashes or embolic phenomena noted on exposed skin  Psychiatric: Unable to assess    LABS:  Recent Labs     10/02/23  0849 10/03/23  0205 10/04/23  0250   WBC 8.5 6.2 6.2      Recent Labs     10/02/23  0849 10/03/23  0205 10/04/23  0250   HEMOGLOBIN 12.6 11.9* 12.6   HEMATOCRIT 37.4 36.5* 40.0   MCV 95.2 95.8 97.6   MCH 32.1 31.2 30.7   PLATELETCT 192 196 248       Recent Labs     10/02/23  0849 10/03/23  0205 10/04/23  0250   SODIUM 139 141 139   POTASSIUM 3.7 3.9 4.1   CHLORIDE 109 111 111   CO2 19* 20 18*   CREATININE 0.54 0.61 0.49*        Recent Labs     10/02/23  0849 10/03/23  0205 10/04/23  0250   ALBUMIN 3.0* 3.1* 3.2        MICRO:  Blood Culture   Date Value Ref Range Status   06/30/2016 No growth after 5 days of incubation.  Final        Latest pertinent labs were reviewed    IMAGING STUDIES:  As above    Hospital Course/Assessment:   Griselda Swanson is a 38 y.o. female patient with history of multiple sclerosis/diffuse encephalomyelitis with residual quadriparesis and nonverbal, admitted with worsening right septic olecranon bursitis with MRSA, failed bedside I&D, was taken to the OR for formal bursectomy on 10/3    Pertinent Diagnoses:  Septic olecranon bursitis  MRSA infection  Nonverbal  Spastic quadriparesis    Plan:   -Okay to continue IV vancomycin while inpatient.  Monitor vancomycin levels and renal function  -Surgical source control achieved.  Can complete course with p.o. doxycycline 100 mg twice daily or p.o. Bactrim 1 DS tab twice daily through 10/12/2023  -There is always risk of recurrence with bursitis, monitor closely    Disposition: No ID specific disposition needs  Need for PICC line: No    Plan was discussed  with the primary team, Dr. Odell.  ID will sign off.  Please call us back if any changes.  This illness poses a threat to limb function    Edouard Ramirez M.D.    Please note that this dictation was created using voice recognition software. I have worked with technical experts from [facility] to optimize the interface.  I have made every reasonable attempt to correct obvious errors, but there may be errors of grammar and possibly content that I did not discover before finalizing the note.

## 2023-10-04 NOTE — CARE PLAN
The patient is Stable - Low risk of patient condition declining or worsening    Shift Goals  Clinical Goals: abx, pain control  Patient Goals: rest  Family Goals: not present    Progress made toward(s) clinical / shift goals:  yes  Problem: Pain - Standard  Goal: Alleviation of pain or a reduction in pain to the patient’s comfort goal  Outcome: Progressing     Problem: Skin Integrity  Goal: Skin integrity is maintained or improved  Outcome: Progressing     Problem: Fall Risk  Goal: Patient will remain free from falls  Outcome: Progressing       Patient is not progressing towards the following goals:

## 2023-10-04 NOTE — PROGRESS NOTES
4 Eyes Skin Assessment Completed by NOHEMI Cooper and NOHEMI Mayorga.    Head WDL  Ears WDL  Nose WDL  Mouth piercing on  Neck WDL  Breast/Chest WDL  Shoulder Blades WDL  Spine WDL  (R) Arm/Elbow/Hand  DIP  (L) Arm/Elbow/Hand Redness and Blanching  Abdomen WDL  Groin WDL  Scrotum/Coccyx/Buttocks Redness and Blanching  (R) Leg scratchese  (L) Leg WDL  (R) Heel/Foot/Toe Redness and Blanching  (L) Heel/Foot/Toe Redness and Blanching          Devices In Places Pulse Ox and SCD's      Interventions In Place Gray Ear Foams, Heel Mepilex, and Heel Float Boots    Possible Skin Injury No    Pictures Uploaded Into Epic N/A  Wound Consult Placed N/A  RN Wound Prevention Protocol Ordered Yes

## 2023-10-04 NOTE — CARE PLAN
The patient is Stable - Low risk of patient condition declining or worsening    Shift Goals  Clinical Goals: pain control  Patient Goals: rest  Family Goals: not present    Progress made toward(s) clinical / shift goals:    Problem: Pain - Standard  Goal: Alleviation of pain or a reduction in pain to the patient’s comfort goal  Outcome: Progressing   Educated on pain scale. Encouraged to verbalize pain. Will medicate as per MAR.    Problem: Skin Integrity  Goal: Skin integrity is maintained or improved  Outcome: Progressing   Q 2 hour turn in effect. On low Airloss mattress. Heel float boots in use.    Problem: Fall Risk  Goal: Patient will remain free from falls  Outcome: Progressing   Fall protocol in effect. Call light with in reach. Reminded patient to call for assist. Reminded patient to call for assist. Hourly rounds in effect. Verbalized understanding.     Problem: Knowledge Deficit - Standard  Goal: Patient and family/care givers will demonstrate understanding of plan of care, disease process/condition, diagnostic tests and medications  Outcome: Progressing   POC discussed with the patient. Isolation precaution observed. Questions answered. Verbalized understanding      Patient is not progressing towards the following goals:

## 2023-10-04 NOTE — PROGRESS NOTES
4 Eyes Skin Assessment Completed by NOHEMI Vance and NOHEMI Haas.    Head WDL  Ears WDL  Nose WDL  Mouth WDL piercing on  Neck WDL  Breast/Chest Redness and Blanching under right chest  Shoulder Blades WDL  Spine WDL  (R) Arm/Elbow/Hand dressing CD and I  (L) Arm/Elbow/Hand WDL, elbow mepilex placed  Abdomen Redness and Blanching right side  Groin WDL  Scrotum/Coccyx/Buttocks Redness and Blanching clearm applied  (R) Leg scratches on shin area  (L) Leg WDL  (R) Heel/Foot/Toe Redness and Blanching  (L) Heel/Foot/Toe Redness and Blanching          Devices In Places Blood Pressure Cuff, SCD's, and Nasal Cannula      Interventions In Place Gray Ear Foams, NC W/Ear Foams, Heel Float Boots, TAP System, Pillows, Elbow Mepilex, Q2 Turns, and Low Air Loss Mattress; elbow mepilex placed    Possible Skin Injury No    Pictures Uploaded Into Epic N/A  Wound Consult Placed N/A  RN Wound Prevention Protocol Ordered No

## 2023-10-04 NOTE — OR NURSING
Arrived to PACU iso room in stable condition. Medicated for pain with IV and oral medications. She communicated with her thumbs and is now feeling better. Resting and awakens easily. Left arm elevated and on pillow. Dressing CDI. Her VSS and I think she is ready to transfer to her room.

## 2023-10-04 NOTE — PROGRESS NOTES
Patient A&OX4 but nonverbal. Patient uses thumbs up and down and smiles and ipad to communicate. Patient on MRSA isolation. Two RN skin check done. Pure wic on. I pad within reach. Water and call light within reach.   Communicated with patient's sister for updating

## 2023-10-04 NOTE — PROGRESS NOTES
Hospital Medicine Daily Progress Note    Date of Service  10/4/2023    Chief Complaint  Griselda Swanson is a 38 y.o. 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  Seen 9/21 by PCP for R elbow swelling and treated for soft tissue infection with amox.  Presented to the ED on 9/30 with worsening. S/p I&D to ED.  She was started on IV antibiotics.  Orthopedics evaluated and scheduled for surgical intervention. S/p  Right elbow olecranon bursectomy        Interval Problem Update    10/4/2023  Vitals remained stable  Labs reviewed, normal white count,  Wound culture growing MRSA sensitive to vancomycin    S/p  Right elbow olecranon bursectomy  Case discussed with ID Dr. Ngo       Continue vancomycin  Continue wean of oxygen  Pain management  Repeat BMP in a.m. to monitor electrolytes and toxicity while on IV antibiotics, need close monitoring for Vanco trough      Requiring IV narcotics for pain management.  Monitor for toxicity    I have discussed this patient's plan of care and discharge plan at IDT rounds today with Case Management, Nursing, Nursing leadership, and other members of the IDT team.    Consultants/Specialty  orthopedics    Code Status  Full Code    Disposition  The patient is not medically cleared for discharge to home or a post-acute facility.  Anticipate discharge to: home with organized home healthcare and close outpatient follow-up    I have placed the appropriate orders for post-discharge needs.    Review of Systems  Review of Systems   Unable to perform ROS: Other   She is aphasic.     Physical Exam  Temp:  [36.3 °C (97.3 °F)-37.4 °C (99.3 °F)] 36.9 °C (98.4 °F)  Pulse:  [] 103  Resp:  [16-22] 16  BP: ()/(56-82) 92/57  SpO2:  [90 %-98 %] 95 %    Physical Exam  Musculoskeletal:      Comments: Noted right elbow dressing   Neurological:      Motor: Weakness present.       Comments: Patient is nonverbal at baseline, follows command         Fluids    Intake/Output Summary (Last 24 hours) at 10/4/2023 1404  Last data filed at 10/3/2023 1636  Gross per 24 hour   Intake 300 ml   Output --   Net 300 ml         Laboratory  Recent Labs     10/02/23  0849 10/03/23  0205 10/04/23  0250   WBC 8.5 6.2 6.2   RBC 3.93* 3.81* 4.10*   HEMOGLOBIN 12.6 11.9* 12.6   HEMATOCRIT 37.4 36.5* 40.0   MCV 95.2 95.8 97.6   MCH 32.1 31.2 30.7   MCHC 33.7 32.6 31.5*   RDW 44.1 44.9 44.7   PLATELETCT 192 196 248   MPV 9.5 9.6 9.3       Recent Labs     10/02/23  0849 10/03/23  0205 10/04/23  0250   SODIUM 139 141 139   POTASSIUM 3.7 3.9 4.1   CHLORIDE 109 111 111   CO2 19* 20 18*   GLUCOSE 109* 111* 109*   BUN 12 16 16   CREATININE 0.54 0.61 0.49*   CALCIUM 8.0* 8.1* 8.6                     Imaging  DX-CHEST-PORTABLE (1 VIEW)   Final Result      1.  Elevation of the right hemidiaphragm with hypoinflation and bibasilar atelectasis.   2.  Air-filled loop of hepatic flexure noted beneath the right hemidiaphragm.             Assessment/Plan  * SIRS (systemic inflammatory response syndrome) (HCC)  Assessment & Plan  Acute     SIRS criteria identified on my evaluation include:  Tachycardia, with heart rate greater than 90 BPM and Leukocytosis, with WBC greater than 12,000      Tachycardia  Assessment & Plan  Resolved    Bursitis of right elbow  Assessment & Plan  Failed outpt Amox  Status post I&D by ER physician  Continue with vancomycin/Unasyn  Wound cultures have come back MRSA: DC amp/sulbactam  Blood cultures remain neg  Orthopedic consulted-follow official recommendations  Plan to monitor on Abx's and see if we can avoid the OR  Clinically is a little improved however was also febrile yesterday evening    10/4/2023  S/p  Right elbow olecranon bursectomy    Cellulitis of right arm  Assessment & Plan  S/p  Right elbow olecranon bursectomy  On vancomycin    Elbow pain- (present on admission)  Assessment & Plan  Pain  control with IV narcotics, monitor respiratory status closely         VTE prophylaxis: lovenox    I have performed a physical exam and reviewed and updated ROS and Plan today (10/4/2023). In review of yesterday's note (10/3/2023), there are no changes except as documented above.         Greater than 51 minutes spent preparing to see patient (e.g. review of tests) obtaining and/or reviewing separately obtained history. Performing a medically appropriate examination and/ evaluation.  Counseling and educating the patient/family/caregiver.  Ordering medications, tests, or procedures.  Referring and communicating with other health care professionals.  Documenting clinical information in EPIC.  Independently interpreting results and communicating results to patient/family/caregiver.  Care coordination.

## 2023-10-05 LAB
ANION GAP SERPL CALC-SCNC: 7 MMOL/L (ref 7–16)
BACTERIA BLD CULT: NORMAL
BACTERIA TISS AEROBE CULT: ABNORMAL
BACTERIA TISS AEROBE CULT: ABNORMAL
BASOPHILS # BLD AUTO: 1.7 % (ref 0–1.8)
BASOPHILS # BLD: 0.09 K/UL (ref 0–0.12)
BUN SERPL-MCNC: 17 MG/DL (ref 8–22)
CALCIUM SERPL-MCNC: 8.3 MG/DL (ref 8.5–10.5)
CHLORIDE SERPL-SCNC: 111 MMOL/L (ref 96–112)
CO2 SERPL-SCNC: 21 MMOL/L (ref 20–33)
CREAT SERPL-MCNC: 0.58 MG/DL (ref 0.5–1.4)
EOSINOPHIL # BLD AUTO: 0.28 K/UL (ref 0–0.51)
EOSINOPHIL NFR BLD: 5.2 % (ref 0–6.9)
ERYTHROCYTE [DISTWIDTH] IN BLOOD BY AUTOMATED COUNT: 45.5 FL (ref 35.9–50)
GFR SERPLBLD CREATININE-BSD FMLA CKD-EPI: 117 ML/MIN/1.73 M 2
GLUCOSE SERPL-MCNC: 83 MG/DL (ref 65–99)
GRAM STN SPEC: ABNORMAL
HCT VFR BLD AUTO: 34 % (ref 37–47)
HGB BLD-MCNC: 10.7 G/DL (ref 12–16)
LYMPHOCYTES # BLD AUTO: 1.6 K/UL (ref 1–4.8)
LYMPHOCYTES NFR BLD: 29.6 % (ref 22–41)
MANUAL DIFF BLD: NORMAL
MCH RBC QN AUTO: 30.7 PG (ref 27–33)
MCHC RBC AUTO-ENTMCNC: 31.5 G/DL (ref 32.2–35.5)
MCV RBC AUTO: 97.4 FL (ref 81.4–97.8)
MONOCYTES # BLD AUTO: 0.14 K/UL (ref 0–0.85)
MONOCYTES NFR BLD AUTO: 2.6 % (ref 0–13.4)
MORPHOLOGY BLD-IMP: NORMAL
NEUTROPHILS # BLD AUTO: 3.29 K/UL (ref 1.82–7.42)
NEUTROPHILS NFR BLD: 60.9 % (ref 44–72)
NRBC # BLD AUTO: 0 K/UL
NRBC BLD-RTO: 0 /100 WBC (ref 0–0.2)
PLATELET # BLD AUTO: 246 K/UL (ref 164–446)
PLATELET BLD QL SMEAR: NORMAL
PMV BLD AUTO: 9.2 FL (ref 9–12.9)
POTASSIUM SERPL-SCNC: 3.7 MMOL/L (ref 3.6–5.5)
RBC # BLD AUTO: 3.49 M/UL (ref 4.2–5.4)
RBC BLD AUTO: NORMAL
SIGNIFICANT IND 70042: ABNORMAL
SIGNIFICANT IND 70042: NORMAL
SITE SITE: ABNORMAL
SITE SITE: NORMAL
SODIUM SERPL-SCNC: 139 MMOL/L (ref 135–145)
SOURCE SOURCE: ABNORMAL
SOURCE SOURCE: NORMAL
WBC # BLD AUTO: 5.4 K/UL (ref 4.8–10.8)

## 2023-10-05 PROCEDURE — A9270 NON-COVERED ITEM OR SERVICE: HCPCS | Performed by: STUDENT IN AN ORGANIZED HEALTH CARE EDUCATION/TRAINING PROGRAM

## 2023-10-05 PROCEDURE — 85025 COMPLETE CBC W/AUTO DIFF WBC: CPT

## 2023-10-05 PROCEDURE — 700105 HCHG RX REV CODE 258: Performed by: STUDENT IN AN ORGANIZED HEALTH CARE EDUCATION/TRAINING PROGRAM

## 2023-10-05 PROCEDURE — 80048 BASIC METABOLIC PNL TOTAL CA: CPT

## 2023-10-05 PROCEDURE — 36415 COLL VENOUS BLD VENIPUNCTURE: CPT

## 2023-10-05 PROCEDURE — 700102 HCHG RX REV CODE 250 W/ 637 OVERRIDE(OP): Performed by: STUDENT IN AN ORGANIZED HEALTH CARE EDUCATION/TRAINING PROGRAM

## 2023-10-05 PROCEDURE — 700111 HCHG RX REV CODE 636 W/ 250 OVERRIDE (IP): Mod: JZ | Performed by: STUDENT IN AN ORGANIZED HEALTH CARE EDUCATION/TRAINING PROGRAM

## 2023-10-05 PROCEDURE — 85007 BL SMEAR W/DIFF WBC COUNT: CPT

## 2023-10-05 PROCEDURE — 770001 HCHG ROOM/CARE - MED/SURG/GYN PRIV*

## 2023-10-05 PROCEDURE — 99232 SBSQ HOSP IP/OBS MODERATE 35: CPT | Performed by: STUDENT IN AN ORGANIZED HEALTH CARE EDUCATION/TRAINING PROGRAM

## 2023-10-05 RX ORDER — DOXYCYCLINE 100 MG/1
100 CAPSULE ORAL 2 TIMES DAILY
Qty: 20 CAPSULE | Refills: 0 | Status: ACTIVE | OUTPATIENT
Start: 2023-10-05 | End: 2023-10-09 | Stop reason: SDUPTHER

## 2023-10-05 RX ADMIN — OMEPRAZOLE 40 MG: 20 CAPSULE, DELAYED RELEASE ORAL at 05:26

## 2023-10-05 RX ADMIN — OXYCODONE 5 MG: 5 TABLET ORAL at 02:32

## 2023-10-05 RX ADMIN — TOPIRAMATE 25 MG: 25 TABLET, FILM COATED ORAL at 05:26

## 2023-10-05 RX ADMIN — SUMATRIPTAN SUCCINATE 25 MG: 50 TABLET ORAL at 11:58

## 2023-10-05 RX ADMIN — VANCOMYCIN HYDROCHLORIDE 1500 MG: 5 INJECTION, POWDER, LYOPHILIZED, FOR SOLUTION INTRAVENOUS at 05:34

## 2023-10-05 RX ADMIN — OXYCODONE 5 MG: 5 TABLET ORAL at 16:10

## 2023-10-05 RX ADMIN — TOPIRAMATE 25 MG: 25 TABLET, FILM COATED ORAL at 16:10

## 2023-10-05 RX ADMIN — BACLOFEN 30 MG: 10 TABLET ORAL at 20:37

## 2023-10-05 RX ADMIN — OXYCODONE 5 MG: 5 TABLET ORAL at 08:52

## 2023-10-05 RX ADMIN — BACLOFEN 30 MG: 10 TABLET ORAL at 16:09

## 2023-10-05 RX ADMIN — BACLOFEN 30 MG: 10 TABLET ORAL at 08:52

## 2023-10-05 RX ADMIN — DOCUSATE SODIUM 50 MG AND SENNOSIDES 8.6 MG 2 TABLET: 8.6; 5 TABLET, FILM COATED ORAL at 05:26

## 2023-10-05 RX ADMIN — BISACODYL 10 MG: 10 SUPPOSITORY RECTAL at 05:26

## 2023-10-05 RX ADMIN — MELOXICAM 15 MG: 7.5 TABLET ORAL at 05:26

## 2023-10-05 RX ADMIN — VANCOMYCIN HYDROCHLORIDE 1500 MG: 5 INJECTION, POWDER, LYOPHILIZED, FOR SOLUTION INTRAVENOUS at 18:03

## 2023-10-05 RX ADMIN — CITALOPRAM HYDROBROMIDE 20 MG: 20 TABLET ORAL at 05:26

## 2023-10-05 RX ADMIN — BACLOFEN 30 MG: 10 TABLET ORAL at 11:49

## 2023-10-05 RX ADMIN — ENOXAPARIN SODIUM 40 MG: 100 INJECTION SUBCUTANEOUS at 16:09

## 2023-10-05 RX ADMIN — DOCUSATE SODIUM 50 MG AND SENNOSIDES 8.6 MG 2 TABLET: 8.6; 5 TABLET, FILM COATED ORAL at 16:09

## 2023-10-05 RX ADMIN — OMEPRAZOLE 40 MG: 20 CAPSULE, DELAYED RELEASE ORAL at 17:59

## 2023-10-05 ASSESSMENT — PAIN DESCRIPTION - PAIN TYPE
TYPE: SURGICAL PAIN
TYPE: SURGICAL PAIN

## 2023-10-05 ASSESSMENT — PAIN SCALES - WONG BAKER
WONGBAKER_NUMERICALRESPONSE: HURTS EVEN MORE
WONGBAKER_NUMERICALRESPONSE: DOESN'T HURT AT ALL

## 2023-10-05 NOTE — CARE PLAN
The patient is Stable - Low risk of patient condition declining or worsening    Shift Goals  Clinical Goals: safety, infection control  Patient Goals: comfort  Family Goals: n/a    Progress made toward(s) clinical / shift goals:    Problem: Pain - Standard  Goal: Alleviation of pain or a reduction in pain to the patient’s comfort goal  Outcome: Progressing     Problem: Hemodynamics  Goal: Patient's hemodynamics, fluid balance and neurologic status will be stable or improve  Outcome: Progressing     Problem: Urinary - Renal Perfusion  Goal: Ability to achieve and maintain adequate renal perfusion and functioning will improve  Outcome: Progressing     Problem: Respiratory  Goal: Patient will achieve/maintain optimum respiratory ventilation and gas exchange  Outcome: Progressing     Problem: Physical Regulation  Goal: Diagnostic test results will improve  Outcome: Progressing  Goal: Signs and symptoms of infection will decrease  Outcome: Progressing     Problem: Skin Integrity  Goal: Skin integrity is maintained or improved  Outcome: Progressing     Problem: Fall Risk  Goal: Patient will remain free from falls  Outcome: Progressing

## 2023-10-05 NOTE — PROGRESS NOTES
Hospital Medicine Daily Progress Note    Date of Service  10/5/2023    Chief Complaint  Griselda Swanson is a 38 y.o. 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  Seen 9/21 by PCP for R elbow swelling and treated for soft tissue infection with amox.  Presented to the ED on 9/30 with worsening. S/p I&D to ED.  She was started on IV antibiotics.  Orthopedics evaluated and scheduled for surgical intervention. S/p  Right elbow olecranon bursectomy        Interval Problem Update      10/5/2023  Vitals remained stable  Labs reviewed, normal white count, BMP unremarkable  Wound culture growing MRSA sensitive to vancomycin    S/p  Right elbow olecranon bursectomy      Continue vancomycin  Continue wean of oxygen  Pain management  Plan is to switch to oral antibiotic on discharge  Patient need caregiver at home.   assisting with discharge plan    Monitor Vanco trough for toxicity  Requiring IV narcotics for pain management.  Monitor for toxicity    I have discussed this patient's plan of care and discharge plan at IDT rounds today with Case Management, Nursing, Nursing leadership, and other members of the IDT team.    Consultants/Specialty  orthopedics    Code Status  Full Code    Disposition  The patient is medically cleared for discharge to home or a post-acute facility.  Anticipate discharge to: home with organized home healthcare and close outpatient follow-up    I have placed the appropriate orders for post-discharge needs.    Review of Systems  Review of Systems   Unable to perform ROS: Other   She is aphasic.     Physical Exam  Temp:  [36.5 °C (97.7 °F)-36.7 °C (98.1 °F)] 36.7 °C (98.1 °F)  Pulse:  [] 91  Resp:  [18] 18  BP: ()/(60-64) 100/64  SpO2:  [94 %-99 %] 99 %    Physical Exam  Musculoskeletal:      Comments: Noted right elbow dressing   Neurological:      Motor: Weakness  present.      Comments: Patient is nonverbal at baseline, follows command         Fluids    Intake/Output Summary (Last 24 hours) at 10/5/2023 1410  Last data filed at 10/5/2023 0740  Gross per 24 hour   Intake 240 ml   Output 850 ml   Net -610 ml         Laboratory  Recent Labs     10/03/23  0205 10/04/23  0250 10/05/23  0313   WBC 6.2 6.2 5.4   RBC 3.81* 4.10* 3.49*   HEMOGLOBIN 11.9* 12.6 10.7*   HEMATOCRIT 36.5* 40.0 34.0*   MCV 95.8 97.6 97.4   MCH 31.2 30.7 30.7   MCHC 32.6 31.5* 31.5*   RDW 44.9 44.7 45.5   PLATELETCT 196 248 246   MPV 9.6 9.3 9.2       Recent Labs     10/03/23  0205 10/04/23  0250 10/05/23  0313   SODIUM 141 139 139   POTASSIUM 3.9 4.1 3.7   CHLORIDE 111 111 111   CO2 20 18* 21   GLUCOSE 111* 109* 83   BUN 16 16 17   CREATININE 0.61 0.49* 0.58   CALCIUM 8.1* 8.6 8.3*                     Imaging  DX-CHEST-PORTABLE (1 VIEW)   Final Result      1.  Elevation of the right hemidiaphragm with hypoinflation and bibasilar atelectasis.   2.  Air-filled loop of hepatic flexure noted beneath the right hemidiaphragm.             Assessment/Plan  * SIRS (systemic inflammatory response syndrome) (HCC)  Assessment & Plan  Acute     SIRS criteria identified on my evaluation include:  Tachycardia, with heart rate greater than 90 BPM and Leukocytosis, with WBC greater than 12,000      Tachycardia  Assessment & Plan  Resolved    Bursitis of right elbow  Assessment & Plan  Failed outpt Amox  Status post I&D by ER physician  Continue with vancomycin/Unasyn  Wound cultures have come back MRSA: DC amp/sulbactam  Blood cultures remain neg  Orthopedic consulted-follow official recommendations  Plan to monitor on Abx's and see if we can avoid the OR  Clinically is a little improved however was also febrile yesterday evening    10/4/2023  S/p  Right elbow olecranon bursectomy    Cellulitis of right arm  Assessment & Plan  S/p  Right elbow olecranon bursectomy  On vancomycin    Elbow pain- (present on  admission)  Assessment & Plan  Pain control with IV narcotics, monitor respiratory status closely         VTE prophylaxis: lovenox    I have performed a physical exam and reviewed and updated ROS and Plan today (10/5/2023). In review of yesterday's note (10/4/2023), there are no changes except as documented above.

## 2023-10-05 NOTE — DISCHARGE INSTRUCTIONS
Diet    Resume your normal diet as tolerated.  A diet low in cholesterol, fat, and sodium is recommended for good health. Finger foods

## 2023-10-05 NOTE — CARE PLAN
The patient is Stable - Low risk of patient condition declining or worsening    Shift Goals  Clinical Goals: q2 turns, bowel movement, pain mangement  Patient Goals: pain relief and comfort  Family Goals: no family present    Progress made toward(s) clinical / shift goals:    Problem: Pain - Standard  Goal: Alleviation of pain or a reduction in pain to the patient’s comfort goal  Outcome: Progressing   Educated on pain scale. Encouraged to verbalize pain. Will medicate as per MAR.    Problem: Skin Integrity  Goal: Skin integrity is maintained or improved  Outcome: Progressing   Q 2 turn in effect. On Low Airloss mattress. Heel float, helle mepilex an elbow mepilex in use. Cream applied for every after incontinence. Frequent incontinence checks./    Problem: Fall Risk  Goal: Patient will remain free from falls  Outcome: Progressing   Fall protocol in effect. Call light within reach. Reminded patient to call for assist. Kept room clutter free. Hourly rounds in effect. Verbalized understanding.      Patient is not progressing towards the following goals:    Problem: Knowledge Deficit - Standard  Goal: Patient and family/care givers will demonstrate understanding of plan of care, disease process/condition, diagnostic tests and medications  Outcome: Progressing   POC discussed with the patient and sister (Emi) over the phone. Isolation precaution observed. Questions answered. Verbalized understanding

## 2023-10-06 LAB
BACTERIA BLD CULT: NORMAL
SIGNIFICANT IND 70042: NORMAL
SITE SITE: NORMAL
SOURCE SOURCE: NORMAL

## 2023-10-06 PROCEDURE — 99232 SBSQ HOSP IP/OBS MODERATE 35: CPT | Performed by: STUDENT IN AN ORGANIZED HEALTH CARE EDUCATION/TRAINING PROGRAM

## 2023-10-06 PROCEDURE — A9270 NON-COVERED ITEM OR SERVICE: HCPCS | Performed by: STUDENT IN AN ORGANIZED HEALTH CARE EDUCATION/TRAINING PROGRAM

## 2023-10-06 PROCEDURE — 770001 HCHG ROOM/CARE - MED/SURG/GYN PRIV*

## 2023-10-06 PROCEDURE — 700111 HCHG RX REV CODE 636 W/ 250 OVERRIDE (IP): Performed by: STUDENT IN AN ORGANIZED HEALTH CARE EDUCATION/TRAINING PROGRAM

## 2023-10-06 PROCEDURE — 700105 HCHG RX REV CODE 258: Performed by: INTERNAL MEDICINE

## 2023-10-06 PROCEDURE — 700111 HCHG RX REV CODE 636 W/ 250 OVERRIDE (IP): Performed by: INTERNAL MEDICINE

## 2023-10-06 PROCEDURE — 700102 HCHG RX REV CODE 250 W/ 637 OVERRIDE(OP): Performed by: STUDENT IN AN ORGANIZED HEALTH CARE EDUCATION/TRAINING PROGRAM

## 2023-10-06 PROCEDURE — 700105 HCHG RX REV CODE 258: Performed by: STUDENT IN AN ORGANIZED HEALTH CARE EDUCATION/TRAINING PROGRAM

## 2023-10-06 RX ORDER — SODIUM CHLORIDE 9 MG/ML
INJECTION, SOLUTION INTRAVENOUS CONTINUOUS
Status: ACTIVE | OUTPATIENT
Start: 2023-10-06 | End: 2023-10-07

## 2023-10-06 RX ORDER — HYDROMORPHONE HYDROCHLORIDE 1 MG/ML
0.5 INJECTION, SOLUTION INTRAMUSCULAR; INTRAVENOUS; SUBCUTANEOUS EVERY 6 HOURS PRN
Status: DISCONTINUED | OUTPATIENT
Start: 2023-10-06 | End: 2023-10-09 | Stop reason: HOSPADM

## 2023-10-06 RX ADMIN — OXYCODONE 5 MG: 5 TABLET ORAL at 11:15

## 2023-10-06 RX ADMIN — OXYCODONE 5 MG: 5 TABLET ORAL at 19:36

## 2023-10-06 RX ADMIN — MELOXICAM 15 MG: 7.5 TABLET ORAL at 05:32

## 2023-10-06 RX ADMIN — DOCUSATE SODIUM 50 MG AND SENNOSIDES 8.6 MG 2 TABLET: 8.6; 5 TABLET, FILM COATED ORAL at 05:32

## 2023-10-06 RX ADMIN — CITALOPRAM HYDROBROMIDE 20 MG: 20 TABLET ORAL at 05:31

## 2023-10-06 RX ADMIN — BACLOFEN 30 MG: 10 TABLET ORAL at 19:35

## 2023-10-06 RX ADMIN — TOPIRAMATE 25 MG: 25 TABLET, FILM COATED ORAL at 18:03

## 2023-10-06 RX ADMIN — VANCOMYCIN HYDROCHLORIDE 1500 MG: 5 INJECTION, POWDER, LYOPHILIZED, FOR SOLUTION INTRAVENOUS at 18:15

## 2023-10-06 RX ADMIN — OMEPRAZOLE 40 MG: 20 CAPSULE, DELAYED RELEASE ORAL at 05:31

## 2023-10-06 RX ADMIN — OXYCODONE 5 MG: 5 TABLET ORAL at 15:42

## 2023-10-06 RX ADMIN — OXYCODONE 5 MG: 5 TABLET ORAL at 06:18

## 2023-10-06 RX ADMIN — BACLOFEN 30 MG: 10 TABLET ORAL at 18:02

## 2023-10-06 RX ADMIN — OMEPRAZOLE 40 MG: 20 CAPSULE, DELAYED RELEASE ORAL at 18:04

## 2023-10-06 RX ADMIN — TOPIRAMATE 25 MG: 25 TABLET, FILM COATED ORAL at 05:32

## 2023-10-06 RX ADMIN — BACLOFEN 30 MG: 10 TABLET ORAL at 08:54

## 2023-10-06 RX ADMIN — DOCUSATE SODIUM 50 MG AND SENNOSIDES 8.6 MG 2 TABLET: 8.6; 5 TABLET, FILM COATED ORAL at 18:04

## 2023-10-06 RX ADMIN — ENOXAPARIN SODIUM 40 MG: 100 INJECTION SUBCUTANEOUS at 18:05

## 2023-10-06 RX ADMIN — VANCOMYCIN HYDROCHLORIDE 1500 MG: 5 INJECTION, POWDER, LYOPHILIZED, FOR SOLUTION INTRAVENOUS at 05:35

## 2023-10-06 RX ADMIN — SODIUM CHLORIDE: 9 INJECTION, SOLUTION INTRAVENOUS at 15:51

## 2023-10-06 RX ADMIN — BACLOFEN 30 MG: 10 TABLET ORAL at 13:03

## 2023-10-06 ASSESSMENT — PAIN DESCRIPTION - PAIN TYPE
TYPE: SURGICAL PAIN
TYPE: SURGICAL PAIN

## 2023-10-06 NOTE — CARE PLAN
Problem: Fall Risk  Goal: Patient will remain free from falls  Description: Target End Date:  Prior to discharge or change in level of care    Document interventions on the [facility]usto Fall Risk Assessment    1.  Assess for fall risk factors  2.  Implement fall precautions  Outcome: Progressing   The patient is Watcher - Medium risk of patient condition declining or worsening    Shift Goals  Clinical Goals: q2 turns, bowel movement, pain mangement  Patient Goals: pain relief and comfort  Family Goals: no family present    Progress made toward(s) clinical / shift goals:  Pt is bed bound.  Q2 turns in place.  Call light within reach.  Non verbal, uses hand gestures such as thumbs up and thumbs down     Patient is not progressing towards the following goals:

## 2023-10-06 NOTE — PROGRESS NOTES
Hospital Medicine Daily Progress Note    Date of Service  10/6/2023    Chief Complaint  Griselda Swanson is a 38 y.o. 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  Seen 9/21 by PCP for R elbow swelling and treated for soft tissue infection with amox.  Presented to the ED on 9/30 with worsening. S/p I&D to ED.  She was started on IV antibiotics.  Orthopedics evaluated and scheduled for surgical intervention. S/p  Right elbow olecranon bursectomy        Interval Problem Update      10/6/2023  Borderline hypotensive  On room air now  Remained asymptomatic  Reports of right elbow pain      S/p  Right elbow olecranon bursectomy      Continue vancomycin  Pain pain medication adjusted  Plan is to switch to oral antibiotic on discharge  Patient need caregiver at home.   assisting with discharge plan    Monitor Vanco trough for toxicity  Requiring IV narcotics for pain management.  Monitor for toxicity    I have discussed this patient's plan of care and discharge plan at IDT rounds today with Case Management, Nursing, Nursing leadership, and other members of the IDT team.    Consultants/Specialty  orthopedics    Code Status  Full Code    Disposition  The patient is medically cleared for discharge to home or a post-acute facility.  Anticipate discharge to: home with close outpatient follow-up    I have placed the appropriate orders for post-discharge needs.    Review of Systems  Review of Systems   Unable to perform ROS: Other   She is aphasic.     Physical Exam  Temp:  [36.2 °C (97.2 °F)-37 °C (98.6 °F)] 36.7 °C (98.1 °F)  Pulse:  [] 107  Resp:  [14-18] 14  BP: ()/(57-65) 88/59  SpO2:  [91 %-98 %] 91 %    Physical Exam  Musculoskeletal:      Comments: Noted right elbow dressing   Neurological:      Motor: Weakness present.      Comments: Patient is nonverbal at baseline, follows command          Fluids    Intake/Output Summary (Last 24 hours) at 10/6/2023 1511  Last data filed at 10/6/2023 0900  Gross per 24 hour   Intake 240 ml   Output 1250 ml   Net -1010 ml         Laboratory  Recent Labs     10/04/23  0250 10/05/23  0313   WBC 6.2 5.4   RBC 4.10* 3.49*   HEMOGLOBIN 12.6 10.7*   HEMATOCRIT 40.0 34.0*   MCV 97.6 97.4   MCH 30.7 30.7   MCHC 31.5* 31.5*   RDW 44.7 45.5   PLATELETCT 248 246   MPV 9.3 9.2       Recent Labs     10/04/23  0250 10/05/23  0313   SODIUM 139 139   POTASSIUM 4.1 3.7   CHLORIDE 111 111   CO2 18* 21   GLUCOSE 109* 83   BUN 16 17   CREATININE 0.49* 0.58   CALCIUM 8.6 8.3*                     Imaging  DX-CHEST-PORTABLE (1 VIEW)   Final Result      1.  Elevation of the right hemidiaphragm with hypoinflation and bibasilar atelectasis.   2.  Air-filled loop of hepatic flexure noted beneath the right hemidiaphragm.             Assessment/Plan  * SIRS (systemic inflammatory response syndrome) (HCC)  Assessment & Plan  Acute     SIRS criteria identified on my evaluation include:  Tachycardia, with heart rate greater than 90 BPM and Leukocytosis, with WBC greater than 12,000      Tachycardia  Assessment & Plan  Resolved    Bursitis of right elbow  Assessment & Plan  Failed outpt Amox  Status post I&D by ER physician  Continue with vancomycin/Unasyn  Wound cultures have come back MRSA: DC amp/sulbactam  Blood cultures remain neg  Orthopedic consulted-follow official recommendations  Plan to monitor on Abx's and see if we can avoid the OR  Clinically is a little improved however was also febrile yesterday evening    10/4/2023  S/p  Right elbow olecranon bursectomy    Cellulitis of right arm  Assessment & Plan  S/p  Right elbow olecranon bursectomy  On vancomycin    Elbow pain- (present on admission)  Assessment & Plan  Pain control with IV narcotics, monitor respiratory status closely         VTE prophylaxis: lovenox    I have performed a physical exam and reviewed and updated ROS and  Plan today (10/6/2023). In review of yesterday's note (10/5/2023), there are no changes except as documented above.

## 2023-10-06 NOTE — DISCHARGE PLANNING
Case Management Discharge Planning    Admission Date: 9/30/2023  GMLOS: 9.6  ALOS: 5    6-Clicks ADL Score:    6-Clicks Mobility Score:        Anticipated Discharge Dispo: Discharge Disposition: Discharged to home/self care (01)  Discharge Address: [address] [place] [address]    DME Needed: No    Action(s) Taken: OTHER    Patient is medically cleared to discharge. However, patient's caregiver/sister is out of town and does not have anyone else that would provide 24/7 care. Emi/sister will return on Monday to pick patient up.     No other CM needs identified at this time.    Escalations Completed: None    Medically Clear: Yes

## 2023-10-07 LAB
ANION GAP SERPL CALC-SCNC: 9 MMOL/L (ref 7–16)
BACTERIA SPEC ANAEROBE CULT: NORMAL
BASOPHILS # BLD AUTO: 1.7 % (ref 0–1.8)
BASOPHILS # BLD: 0.09 K/UL (ref 0–0.12)
BUN SERPL-MCNC: 15 MG/DL (ref 8–22)
CALCIUM SERPL-MCNC: 7.9 MG/DL (ref 8.5–10.5)
CHLORIDE SERPL-SCNC: 115 MMOL/L (ref 96–112)
CO2 SERPL-SCNC: 17 MMOL/L (ref 20–33)
CREAT SERPL-MCNC: 0.39 MG/DL (ref 0.5–1.4)
EOSINOPHIL # BLD AUTO: 0.22 K/UL (ref 0–0.51)
EOSINOPHIL NFR BLD: 4.3 % (ref 0–6.9)
ERYTHROCYTE [DISTWIDTH] IN BLOOD BY AUTOMATED COUNT: 42.7 FL (ref 35.9–50)
GFR SERPLBLD CREATININE-BSD FMLA CKD-EPI: 129 ML/MIN/1.73 M 2
GLUCOSE SERPL-MCNC: 86 MG/DL (ref 65–99)
HCT VFR BLD AUTO: 36 % (ref 37–47)
HGB BLD-MCNC: 11.8 G/DL (ref 12–16)
LYMPHOCYTES # BLD AUTO: 0.92 K/UL (ref 1–4.8)
LYMPHOCYTES NFR BLD: 18.1 % (ref 22–41)
MANUAL DIFF BLD: NORMAL
MCH RBC QN AUTO: 30.6 PG (ref 27–33)
MCHC RBC AUTO-ENTMCNC: 32.8 G/DL (ref 32.2–35.5)
MCV RBC AUTO: 93.3 FL (ref 81.4–97.8)
MONOCYTES # BLD AUTO: 0.18 K/UL (ref 0–0.85)
MONOCYTES NFR BLD AUTO: 3.5 % (ref 0–13.4)
MORPHOLOGY BLD-IMP: NORMAL
NEUTROPHILS # BLD AUTO: 3.69 K/UL (ref 1.82–7.42)
NEUTROPHILS NFR BLD: 72.4 % (ref 44–72)
NRBC # BLD AUTO: 0 K/UL
NRBC BLD-RTO: 0 /100 WBC (ref 0–0.2)
PLATELET # BLD AUTO: 296 K/UL (ref 164–446)
PLATELET BLD QL SMEAR: NORMAL
PMV BLD AUTO: 9.1 FL (ref 9–12.9)
POTASSIUM SERPL-SCNC: 3.4 MMOL/L (ref 3.6–5.5)
RBC # BLD AUTO: 3.86 M/UL (ref 4.2–5.4)
RBC BLD AUTO: NORMAL
SIGNIFICANT IND 70042: NORMAL
SITE SITE: NORMAL
SODIUM SERPL-SCNC: 141 MMOL/L (ref 135–145)
SOURCE SOURCE: NORMAL
WBC # BLD AUTO: 5.1 K/UL (ref 4.8–10.8)

## 2023-10-07 PROCEDURE — 700102 HCHG RX REV CODE 250 W/ 637 OVERRIDE(OP): Mod: JZ | Performed by: STUDENT IN AN ORGANIZED HEALTH CARE EDUCATION/TRAINING PROGRAM

## 2023-10-07 PROCEDURE — 85025 COMPLETE CBC W/AUTO DIFF WBC: CPT

## 2023-10-07 PROCEDURE — 700105 HCHG RX REV CODE 258: Performed by: INTERNAL MEDICINE

## 2023-10-07 PROCEDURE — 80048 BASIC METABOLIC PNL TOTAL CA: CPT

## 2023-10-07 PROCEDURE — 700102 HCHG RX REV CODE 250 W/ 637 OVERRIDE(OP): Performed by: STUDENT IN AN ORGANIZED HEALTH CARE EDUCATION/TRAINING PROGRAM

## 2023-10-07 PROCEDURE — A9270 NON-COVERED ITEM OR SERVICE: HCPCS | Performed by: STUDENT IN AN ORGANIZED HEALTH CARE EDUCATION/TRAINING PROGRAM

## 2023-10-07 PROCEDURE — 99232 SBSQ HOSP IP/OBS MODERATE 35: CPT | Performed by: STUDENT IN AN ORGANIZED HEALTH CARE EDUCATION/TRAINING PROGRAM

## 2023-10-07 PROCEDURE — 36415 COLL VENOUS BLD VENIPUNCTURE: CPT

## 2023-10-07 PROCEDURE — 85007 BL SMEAR W/DIFF WBC COUNT: CPT

## 2023-10-07 PROCEDURE — 770001 HCHG ROOM/CARE - MED/SURG/GYN PRIV*

## 2023-10-07 PROCEDURE — A9270 NON-COVERED ITEM OR SERVICE: HCPCS | Mod: JZ | Performed by: STUDENT IN AN ORGANIZED HEALTH CARE EDUCATION/TRAINING PROGRAM

## 2023-10-07 PROCEDURE — 700111 HCHG RX REV CODE 636 W/ 250 OVERRIDE (IP): Performed by: INTERNAL MEDICINE

## 2023-10-07 PROCEDURE — 700111 HCHG RX REV CODE 636 W/ 250 OVERRIDE (IP): Mod: JZ | Performed by: STUDENT IN AN ORGANIZED HEALTH CARE EDUCATION/TRAINING PROGRAM

## 2023-10-07 RX ORDER — POTASSIUM CHLORIDE 1500 MG/1
40 TABLET, EXTENDED RELEASE ORAL EVERY 4 HOURS
Status: COMPLETED | OUTPATIENT
Start: 2023-10-07 | End: 2023-10-07

## 2023-10-07 RX ADMIN — VANCOMYCIN HYDROCHLORIDE 1500 MG: 5 INJECTION, POWDER, LYOPHILIZED, FOR SOLUTION INTRAVENOUS at 04:10

## 2023-10-07 RX ADMIN — VANCOMYCIN HYDROCHLORIDE 1500 MG: 5 INJECTION, POWDER, LYOPHILIZED, FOR SOLUTION INTRAVENOUS at 18:38

## 2023-10-07 RX ADMIN — DOCUSATE SODIUM 50 MG AND SENNOSIDES 8.6 MG 2 TABLET: 8.6; 5 TABLET, FILM COATED ORAL at 18:20

## 2023-10-07 RX ADMIN — CITALOPRAM HYDROBROMIDE 20 MG: 20 TABLET ORAL at 04:30

## 2023-10-07 RX ADMIN — MELOXICAM 15 MG: 7.5 TABLET ORAL at 04:07

## 2023-10-07 RX ADMIN — BACLOFEN 30 MG: 10 TABLET ORAL at 22:31

## 2023-10-07 RX ADMIN — BACLOFEN 30 MG: 10 TABLET ORAL at 18:18

## 2023-10-07 RX ADMIN — TOPIRAMATE 25 MG: 25 TABLET, FILM COATED ORAL at 18:21

## 2023-10-07 RX ADMIN — TOPIRAMATE 25 MG: 25 TABLET, FILM COATED ORAL at 04:07

## 2023-10-07 RX ADMIN — OXYCODONE 5 MG: 5 TABLET ORAL at 22:32

## 2023-10-07 RX ADMIN — BISACODYL 10 MG: 10 SUPPOSITORY RECTAL at 04:07

## 2023-10-07 RX ADMIN — OMEPRAZOLE 40 MG: 20 CAPSULE, DELAYED RELEASE ORAL at 04:07

## 2023-10-07 RX ADMIN — BACLOFEN 30 MG: 10 TABLET ORAL at 12:55

## 2023-10-07 RX ADMIN — POTASSIUM CHLORIDE 40 MEQ: 1500 TABLET, EXTENDED RELEASE ORAL at 14:40

## 2023-10-07 RX ADMIN — DOCUSATE SODIUM 50 MG AND SENNOSIDES 8.6 MG 2 TABLET: 8.6; 5 TABLET, FILM COATED ORAL at 04:07

## 2023-10-07 RX ADMIN — OXYCODONE 5 MG: 5 TABLET ORAL at 18:33

## 2023-10-07 RX ADMIN — OXYCODONE 5 MG: 5 TABLET ORAL at 09:45

## 2023-10-07 RX ADMIN — BACLOFEN 30 MG: 10 TABLET ORAL at 09:12

## 2023-10-07 RX ADMIN — OMEPRAZOLE 40 MG: 20 CAPSULE, DELAYED RELEASE ORAL at 18:19

## 2023-10-07 RX ADMIN — POTASSIUM CHLORIDE 40 MEQ: 1500 TABLET, EXTENDED RELEASE ORAL at 18:39

## 2023-10-07 RX ADMIN — ENOXAPARIN SODIUM 40 MG: 100 INJECTION SUBCUTANEOUS at 18:21

## 2023-10-07 RX ADMIN — OXYCODONE 5 MG: 5 TABLET ORAL at 14:36

## 2023-10-07 ASSESSMENT — PAIN DESCRIPTION - PAIN TYPE
TYPE: SURGICAL PAIN
TYPE: SURGICAL PAIN

## 2023-10-07 NOTE — CARE PLAN
The patient is Stable - Low risk of patient condition declining or worsening    Shift Goals  Clinical Goals: pain control, Q2 turn  Patient Goals: pain control  Family Goals: NA    Progress made toward(s) clinical / shift goals:  yes     Patient's pain control is progressing.    Patient is not progressing towards the following goals:

## 2023-10-07 NOTE — PROGRESS NOTES
4 Eyes Skin Assessment Completed by NOHEMI Stover and Marita RN.    Head WDL  Ears WDL  Nose WDL  Mouth WDL  Neck WDL  Breast/Chest Redness and Blanching, under breasts  Shoulder Blades WDL  Spine WDL  (R) Arm/Elbow/Hand surgical site to elbow with dressing in place, CDI  (L) Arm/Elbow/Hand WDL  Abdomen Redness and Blanching  Groin WDL  Scrotum/Coccyx/Buttocks Redness and Blanching  (R) Leg scabbed scratches to shin  (L) Leg WDL  (R) Heel/Foot/Toe Redness and Blanching  (L) Heel/Foot/Toe Redness and Blanching          Devices In Places Pulse Ox and SCD's      Interventions In Place Heel Mepilex, Heel Float Boots, TAP System, Pillows, Q2 Turns, Low Air Loss Mattress, and Dri-Vineet Pads    Possible Skin Injury No    Pictures Uploaded Into Epic N/A  Wound Consult Placed N/A  RN Wound Prevention Protocol Ordered No

## 2023-10-07 NOTE — PROGRESS NOTES
Hospital Medicine Daily Progress Note    Date of Service  10/7/2023    Chief Complaint  Griselda Swanson is a 38 y.o. 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  Seen 9/21 by PCP for R elbow swelling and treated for soft tissue infection with amox.  Presented to the ED on 9/30 with worsening. S/p I&D to ED.  She was started on IV antibiotics.  Orthopedics evaluated and scheduled for surgical intervention. S/p  Right elbow olecranon bursectomy        Interval Problem Update    10/7/2023  Vitals remained stable  Labs reviewed, hemoglobin 11.8, normal white count, potassium 3.4, bicarbonate 17        Continue vancomycin  Replace potassium  Pain pain medication adjusted  Patient need caregiver at home.   assisting with discharge plan      Repeat BMP in a.m. to monitor electrolytes  Monitor Vanco trough for toxicity  Requiring IV narcotics for pain management.  Monitor for toxicity    I have discussed this patient's plan of care and discharge plan at IDT rounds today with Case Management, Nursing, Nursing leadership, and other members of the IDT team.    Consultants/Specialty  orthopedics    Code Status  Full Code    Disposition  The patient is medically cleared for discharge to home or a post-acute facility.  Anticipate discharge to: home with close outpatient follow-up    I have placed the appropriate orders for post-discharge needs.    Review of Systems  Review of Systems   Unable to perform ROS: Other   She is aphasic.     Physical Exam  Temp:  [36.5 °C (97.7 °F)-36.8 °C (98.2 °F)] 36.5 °C (97.7 °F)  Pulse:  [] 82  Resp:  [14-17] 17  BP: ()/(58-66) 105/66  SpO2:  [91 %-94 %] 94 %    Physical Exam  Musculoskeletal:      Comments: Noted right elbow dressing   Neurological:      Motor: Weakness present.      Comments: Patient is nonverbal at baseline, follows command          Fluids    Intake/Output Summary (Last 24 hours) at 10/7/2023 1325  Last data filed at 10/6/2023 1858  Gross per 24 hour   Intake --   Output 700 ml   Net -700 ml         Laboratory  Recent Labs     10/05/23  0313 10/07/23  0820   WBC 5.4 5.1   RBC 3.49* 3.86*   HEMOGLOBIN 10.7* 11.8*   HEMATOCRIT 34.0* 36.0*   MCV 97.4 93.3   MCH 30.7 30.6   MCHC 31.5* 32.8   RDW 45.5 42.7   PLATELETCT 246 296   MPV 9.2 9.1       Recent Labs     10/05/23  0313 10/07/23  0820   SODIUM 139 141   POTASSIUM 3.7 3.4*   CHLORIDE 111 115*   CO2 21 17*   GLUCOSE 83 86   BUN 17 15   CREATININE 0.58 0.39*   CALCIUM 8.3* 7.9*                     Imaging  DX-CHEST-PORTABLE (1 VIEW)   Final Result      1.  Elevation of the right hemidiaphragm with hypoinflation and bibasilar atelectasis.   2.  Air-filled loop of hepatic flexure noted beneath the right hemidiaphragm.             Assessment/Plan  * SIRS (systemic inflammatory response syndrome) (HCC)  Assessment & Plan  Acute     SIRS criteria identified on my evaluation include:  Tachycardia, with heart rate greater than 90 BPM and Leukocytosis, with WBC greater than 12,000      Tachycardia  Assessment & Plan  Resolved    Bursitis of right elbow  Assessment & Plan  Failed outpt Amox  Status post I&D by ER physician  Continue with vancomycin/Unasyn  Wound cultures have come back MRSA: DC amp/sulbactam  Blood cultures remain neg  Orthopedic consulted-follow official recommendations  Plan to monitor on Abx's and see if we can avoid the OR  Clinically is a little improved however was also febrile yesterday evening    10/4/2023  S/p  Right elbow olecranon bursectomy    Cellulitis of right arm  Assessment & Plan  S/p  Right elbow olecranon bursectomy  On vancomycin    Elbow pain- (present on admission)  Assessment & Plan  Pain control with IV narcotics, monitor respiratory status closely         VTE prophylaxis: lovenox    I have performed a physical exam and reviewed and updated ROS and Plan  today (10/7/2023). In review of yesterday's note (10/6/2023), there are no changes except as documented above.

## 2023-10-08 LAB
ANION GAP SERPL CALC-SCNC: 8 MMOL/L (ref 7–16)
BASE EXCESS BLDA CALC-SCNC: -6 MMOL/L (ref -4–3)
BODY TEMPERATURE: 37 CENTIGRADE
BUN SERPL-MCNC: 13 MG/DL (ref 8–22)
CALCIUM SERPL-MCNC: 8.1 MG/DL (ref 8.5–10.5)
CHLORIDE SERPL-SCNC: 116 MMOL/L (ref 96–112)
CO2 SERPL-SCNC: 18 MMOL/L (ref 20–33)
CREAT SERPL-MCNC: 0.41 MG/DL (ref 0.5–1.4)
GFR SERPLBLD CREATININE-BSD FMLA CKD-EPI: 127 ML/MIN/1.73 M 2
GLUCOSE SERPL-MCNC: 89 MG/DL (ref 65–99)
HCO3 BLDA-SCNC: 18 MMOL/L (ref 17–25)
INHALED O2 FLOW RATE: ABNORMAL L/MIN
PCO2 BLDA: 29 MMHG (ref 26–37)
PH BLDA: 7.41 [PH] (ref 7.4–7.5)
PO2 BLDA: 52.2 MMHG (ref 64–87)
POTASSIUM SERPL-SCNC: 4.1 MMOL/L (ref 3.6–5.5)
SAO2 % BLDA: 85.6 % (ref 93–99)
SODIUM SERPL-SCNC: 142 MMOL/L (ref 135–145)

## 2023-10-08 PROCEDURE — 99232 SBSQ HOSP IP/OBS MODERATE 35: CPT | Performed by: STUDENT IN AN ORGANIZED HEALTH CARE EDUCATION/TRAINING PROGRAM

## 2023-10-08 PROCEDURE — 36415 COLL VENOUS BLD VENIPUNCTURE: CPT

## 2023-10-08 PROCEDURE — 700105 HCHG RX REV CODE 258: Performed by: STUDENT IN AN ORGANIZED HEALTH CARE EDUCATION/TRAINING PROGRAM

## 2023-10-08 PROCEDURE — 700111 HCHG RX REV CODE 636 W/ 250 OVERRIDE (IP): Mod: JZ | Performed by: STUDENT IN AN ORGANIZED HEALTH CARE EDUCATION/TRAINING PROGRAM

## 2023-10-08 PROCEDURE — A9270 NON-COVERED ITEM OR SERVICE: HCPCS | Performed by: STUDENT IN AN ORGANIZED HEALTH CARE EDUCATION/TRAINING PROGRAM

## 2023-10-08 PROCEDURE — 700102 HCHG RX REV CODE 250 W/ 637 OVERRIDE(OP): Performed by: STUDENT IN AN ORGANIZED HEALTH CARE EDUCATION/TRAINING PROGRAM

## 2023-10-08 PROCEDURE — 80048 BASIC METABOLIC PNL TOTAL CA: CPT

## 2023-10-08 PROCEDURE — 770001 HCHG ROOM/CARE - MED/SURG/GYN PRIV*

## 2023-10-08 PROCEDURE — 82803 BLOOD GASES ANY COMBINATION: CPT

## 2023-10-08 PROCEDURE — 700111 HCHG RX REV CODE 636 W/ 250 OVERRIDE (IP): Performed by: STUDENT IN AN ORGANIZED HEALTH CARE EDUCATION/TRAINING PROGRAM

## 2023-10-08 RX ADMIN — ENOXAPARIN SODIUM 40 MG: 100 INJECTION SUBCUTANEOUS at 17:24

## 2023-10-08 RX ADMIN — OXYCODONE 5 MG: 5 TABLET ORAL at 20:12

## 2023-10-08 RX ADMIN — OXYCODONE 5 MG: 5 TABLET ORAL at 04:43

## 2023-10-08 RX ADMIN — BISACODYL 10 MG: 10 SUPPOSITORY RECTAL at 04:34

## 2023-10-08 RX ADMIN — BACLOFEN 30 MG: 10 TABLET ORAL at 09:06

## 2023-10-08 RX ADMIN — TOPIRAMATE 25 MG: 25 TABLET, FILM COATED ORAL at 04:43

## 2023-10-08 RX ADMIN — BACLOFEN 30 MG: 10 TABLET ORAL at 20:12

## 2023-10-08 RX ADMIN — BACLOFEN 30 MG: 10 TABLET ORAL at 17:24

## 2023-10-08 RX ADMIN — OMEPRAZOLE 40 MG: 20 CAPSULE, DELAYED RELEASE ORAL at 17:24

## 2023-10-08 RX ADMIN — TOPIRAMATE 25 MG: 25 TABLET, FILM COATED ORAL at 17:24

## 2023-10-08 RX ADMIN — DOCUSATE SODIUM 50 MG AND SENNOSIDES 8.6 MG 2 TABLET: 8.6; 5 TABLET, FILM COATED ORAL at 17:24

## 2023-10-08 RX ADMIN — VANCOMYCIN HYDROCHLORIDE 1.5 G: 5 INJECTION, POWDER, LYOPHILIZED, FOR SOLUTION INTRAVENOUS at 04:47

## 2023-10-08 RX ADMIN — OMEPRAZOLE 40 MG: 20 CAPSULE, DELAYED RELEASE ORAL at 04:43

## 2023-10-08 RX ADMIN — BACLOFEN 30 MG: 10 TABLET ORAL at 13:22

## 2023-10-08 RX ADMIN — DOCUSATE SODIUM 50 MG AND SENNOSIDES 8.6 MG 2 TABLET: 8.6; 5 TABLET, FILM COATED ORAL at 04:43

## 2023-10-08 RX ADMIN — CITALOPRAM HYDROBROMIDE 20 MG: 20 TABLET ORAL at 04:43

## 2023-10-08 RX ADMIN — VANCOMYCIN HYDROCHLORIDE 1500 MG: 5 INJECTION, POWDER, LYOPHILIZED, FOR SOLUTION INTRAVENOUS at 17:28

## 2023-10-08 RX ADMIN — MELOXICAM 15 MG: 7.5 TABLET ORAL at 04:43

## 2023-10-08 ASSESSMENT — PAIN DESCRIPTION - PAIN TYPE: TYPE: ACUTE PAIN;SURGICAL PAIN

## 2023-10-08 NOTE — CARE PLAN
The patient is Stable - Low risk of patient condition declining or worsening    Shift Goals  Clinical Goals: pain control, safety  Patient Goals: pain control  Family Goals: NA    Progress made toward(s) clinical / shift goals:    Problem: Pain - Standard  Goal: Alleviation of pain or a reduction in pain to the patient’s comfort goal  Outcome: Progressing     Problem: Skin Integrity  Goal: Skin integrity is maintained or improved  Outcome: Progressing       Patient is not progressing towards the following goals:

## 2023-10-08 NOTE — PROGRESS NOTES
Hospital Medicine Daily Progress Note    Date of Service  10/8/2023    Chief Complaint  Griselda Swanson is a 38 y.o. 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  Seen 9/21 by PCP for R elbow swelling and treated for soft tissue infection with amox.  Presented to the ED on 9/30 with worsening. S/p I&D to ED.  She was started on IV antibiotics.  Orthopedics evaluated and scheduled for surgical intervention. S/p  Right elbow olecranon bursectomy        Interval Problem Update    10/7/2023  Vitals remained stable  Labs reviewed with elevated chloride level, bicarbonate 116    Continue vancomycin  Pain pain medication adjusted  Patient need caregiver at home.   assisting with discharge plan  Monitor Vanco trough for toxicity  Requiring IV narcotics for pain management.  Monitor for toxicity    I have discussed this patient's plan of care and discharge plan at IDT rounds today with Case Management, Nursing, Nursing leadership, and other members of the IDT team.    Consultants/Specialty  orthopedics    Code Status  Full Code    Disposition  Medically Cleared  I have placed the appropriate orders for post-discharge needs.    Review of Systems  Review of Systems   Unable to perform ROS: Other   She is aphasic.     Physical Exam  Temp:  [36.5 °C (97.7 °F)-36.9 °C (98.4 °F)] 36.5 °C (97.7 °F)  Pulse:  [] 80  Resp:  [17-18] 17  BP: ()/(61-65) 99/61  SpO2:  [93 %-94 %] 93 %    Physical Exam  Musculoskeletal:      Comments: Noted right elbow dressing   Neurological:      Motor: Weakness present.      Comments: Patient is nonverbal at baseline, follows command         Fluids    Intake/Output Summary (Last 24 hours) at 10/8/2023 1328  Last data filed at 10/7/2023 1800  Gross per 24 hour   Intake --   Output 700 ml   Net -700 ml         Laboratory  Recent Labs     10/07/23  0820   WBC 5.1   RBC  3.86*   HEMOGLOBIN 11.8*   HEMATOCRIT 36.0*   MCV 93.3   MCH 30.6   MCHC 32.8   RDW 42.7   PLATELETCT 296   MPV 9.1       Recent Labs     10/07/23  0820 10/08/23  0252   SODIUM 141 142   POTASSIUM 3.4* 4.1   CHLORIDE 115* 116*   CO2 17* 18*   GLUCOSE 86 89   BUN 15 13   CREATININE 0.39* 0.41*   CALCIUM 7.9* 8.1*                     Imaging  DX-CHEST-PORTABLE (1 VIEW)   Final Result      1.  Elevation of the right hemidiaphragm with hypoinflation and bibasilar atelectasis.   2.  Air-filled loop of hepatic flexure noted beneath the right hemidiaphragm.             Assessment/Plan  * SIRS (systemic inflammatory response syndrome) (HCC)  Assessment & Plan  Acute     SIRS criteria identified on my evaluation include:  Tachycardia, with heart rate greater than 90 BPM and Leukocytosis, with WBC greater than 12,000      Tachycardia  Assessment & Plan  Resolved    Bursitis of right elbow  Assessment & Plan  Failed outpt Amox  Status post I&D by ER physician  Continue with vancomycin/Unasyn  Wound cultures have come back MRSA: DC amp/sulbactam  Blood cultures remain neg  Orthopedic consulted-follow official recommendations  Plan to monitor on Abx's and see if we can avoid the OR  Clinically is a little improved however was also febrile yesterday evening    10/4/2023  S/p  Right elbow olecranon bursectomy    Cellulitis of right arm  Assessment & Plan  S/p  Right elbow olecranon bursectomy  On vancomycin    Elbow pain- (present on admission)  Assessment & Plan  Pain control with IV narcotics, monitor respiratory status closely         VTE prophylaxis: lovenox    I have performed a physical exam and reviewed and updated ROS and Plan today (10/8/2023). In review of yesterday's note (10/7/2023), there are no changes except as documented above.

## 2023-10-08 NOTE — PROGRESS NOTES
Patients sister Emi asking for [facility] transport at 1145 am because caregivers are available between [phone] pm to help patient get back into her house.   Emi requests phone call to let her know the time set up

## 2023-10-08 NOTE — PROGRESS NOTES
Patient nonverbal. Patient using ipad and thumbs up and down to communicate. Patient on RA. Patient on MRSA isolation. Patient stated pain. Meds administered per MAR as well as prn pain med. Scds and float boots on. Water and call light within reach.

## 2023-10-09 ENCOUNTER — PHARMACY VISIT (OUTPATIENT)
Dept: PHARMACY | Facility: MEDICAL CENTER | Age: 38
End: 2023-10-09
Payer: COMMERCIAL

## 2023-10-09 VITALS
OXYGEN SATURATION: 96 % | TEMPERATURE: 98.1 F | HEART RATE: 84 BPM | RESPIRATION RATE: 14 BRPM | HEIGHT: 67 IN | SYSTOLIC BLOOD PRESSURE: 114 MMHG | WEIGHT: 148 LBS | DIASTOLIC BLOOD PRESSURE: 70 MMHG | BODY MASS INDEX: 23.23 KG/M2

## 2023-10-09 PROCEDURE — A9270 NON-COVERED ITEM OR SERVICE: HCPCS | Performed by: STUDENT IN AN ORGANIZED HEALTH CARE EDUCATION/TRAINING PROGRAM

## 2023-10-09 PROCEDURE — 99239 HOSP IP/OBS DSCHRG MGMT >30: CPT | Performed by: STUDENT IN AN ORGANIZED HEALTH CARE EDUCATION/TRAINING PROGRAM

## 2023-10-09 PROCEDURE — RXMED WILLOW AMBULATORY MEDICATION CHARGE: Performed by: STUDENT IN AN ORGANIZED HEALTH CARE EDUCATION/TRAINING PROGRAM

## 2023-10-09 PROCEDURE — 700102 HCHG RX REV CODE 250 W/ 637 OVERRIDE(OP): Performed by: STUDENT IN AN ORGANIZED HEALTH CARE EDUCATION/TRAINING PROGRAM

## 2023-10-09 PROCEDURE — 700111 HCHG RX REV CODE 636 W/ 250 OVERRIDE (IP): Performed by: STUDENT IN AN ORGANIZED HEALTH CARE EDUCATION/TRAINING PROGRAM

## 2023-10-09 PROCEDURE — 700105 HCHG RX REV CODE 258: Performed by: STUDENT IN AN ORGANIZED HEALTH CARE EDUCATION/TRAINING PROGRAM

## 2023-10-09 RX ORDER — DOXYCYCLINE 100 MG/1
100 CAPSULE ORAL 2 TIMES DAILY
Qty: 6 CAPSULE | Refills: 0 | Status: ACTIVE | OUTPATIENT
Start: 2023-10-09 | End: 2023-10-12

## 2023-10-09 RX ORDER — OXYCODONE AND ACETAMINOPHEN 5; 325 MG/1; MG/1
1 TABLET ORAL EVERY 6 HOURS PRN
Qty: 7 TABLET | Refills: 0 | Status: SHIPPED | OUTPATIENT
Start: 2023-10-09 | End: 2023-10-13 | Stop reason: SDUPTHER

## 2023-10-09 RX ADMIN — CITALOPRAM HYDROBROMIDE 20 MG: 20 TABLET ORAL at 07:24

## 2023-10-09 RX ADMIN — TOPIRAMATE 25 MG: 25 TABLET, FILM COATED ORAL at 07:25

## 2023-10-09 RX ADMIN — BACLOFEN 30 MG: 10 TABLET ORAL at 09:26

## 2023-10-09 RX ADMIN — BISACODYL 10 MG: 10 SUPPOSITORY RECTAL at 06:00

## 2023-10-09 RX ADMIN — OMEPRAZOLE 40 MG: 20 CAPSULE, DELAYED RELEASE ORAL at 07:24

## 2023-10-09 RX ADMIN — DOCUSATE SODIUM 50 MG AND SENNOSIDES 8.6 MG 2 TABLET: 8.6; 5 TABLET, FILM COATED ORAL at 07:24

## 2023-10-09 RX ADMIN — VANCOMYCIN HYDROCHLORIDE 1500 MG: 5 INJECTION, POWDER, LYOPHILIZED, FOR SOLUTION INTRAVENOUS at 07:32

## 2023-10-09 RX ADMIN — OXYCODONE 5 MG: 5 TABLET ORAL at 03:25

## 2023-10-09 RX ADMIN — MELOXICAM 15 MG: 7.5 TABLET ORAL at 07:24

## 2023-10-09 ASSESSMENT — PAIN DESCRIPTION - PAIN TYPE
TYPE: ACUTE PAIN;SURGICAL PAIN
TYPE: ACUTE PAIN;SURGICAL PAIN

## 2023-10-09 ASSESSMENT — PAIN SCALES - WONG BAKER: WONGBAKER_NUMERICALRESPONSE: DOESN'T HURT AT ALL

## 2023-10-09 NOTE — DISCHARGE SUMMARY
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  Seen 9/21 by PCP for R elbow swelling and treated for soft tissue infection with amox.  Presented to the ED on 9/30 with worsening. S/p I&D to ED.  She was started on IV antibiotics.  Orthopedics evaluated and scheduled for surgical intervention. S/p  Right elbow olecranon bursectomy.  Culture growing MRSA sensitive to doxycycline.  ID recommended antibiotic till 10/12/2023 .  She has completed 10 days course of vancomycin during hospitalization.  Patient to be discharged home with a caregiver.   assisting.  She will follow-up with orthopedics in 1 week for splint removal.    At the time of discharge patient remain  hemodynamically stable ,asymptomatic ,labs remain unremarkable .  Patient will be discharged with close follow up with PCP .     Discharge plan was discussed with patient's Sister Emi in detail.  All question answered        Therefore, she is discharged in good and stable condition to home with close outpatient follow-up.    The patient met 2-midnight criteria for an inpatient stay at the time of discharge.    Discharge Date  10/9/2023          DISCHARGE DIAGNOSES  Principal Problem:    SIRS (systemic inflammatory response syndrome) (HCC) (POA: Unknown)  Active Problems:    Elbow pain (POA: Yes)    Cellulitis of right arm (POA: Unknown)    Bursitis of right elbow (POA: Unknown)    Tachycardia (POA: Unknown)  Resolved Problems:    * No resolved hospital problems. *      FOLLOW UP  Future Appointments   Date Time Provider Department Center   11/14/2023  9:00 AM Melissa P Bloch, M.D. [facility] None     Omar Simpson M.D.  [address] [place]  [id number] [phone]    Follow up in 1 week(s)        MEDICATIONS ON DISCHARGE     Medication List        START taking these medications        Instructions   doxycycline 100 MG capsule  Commonly known as: Monodox   Take 1 Capsule by mouth 2 times a day for 3 days.  Dose: 100 mg     oxyCODONE-acetaminophen 5-325 MG Tabs  Commonly known as: Percocet   Take 1 Tablet by mouth every 6 hours as needed for Severe Pain for up to 5 days.  Dose: 1 Tablet            CONTINUE taking these medications        Instructions   baclofen 10 MG Tabs  Commonly known as: Lioresal   TAKE 3 TABLETS BY MOUTH FOUR TIMES DAILY     bisacodyl 10 MG Supp  Commonly known as: Dulcolax   INSERT 1 SUPPOSITORY RECTALLY EVERY DAY     citalopram 20 MG Tabs  Commonly known as: CeleXA   TAKE 1 TABLET BY MOUTH EVERY DAY  Dose: 20 mg     cyclobenzaprine 10 mg Tabs  Commonly known as: Flexeril   TAKE 1 TABLET BY MOUTH THREE TIMES DAILY AS NEEDED     docusate sodium 100 MG Caps  Commonly known as: Colace   TAKE 1 CAPSULE BY MOUTH TWICE DAILY AS NEEDED     lactulose 10 GM/15ML Soln   Doctor's comments: **Patient requests 90 days supply**  TAKE 15 TO 30 MLS BY MOUTH EVERY 4 HOURS AS NEEDED     linaCLOtide 145 MCG Caps   Take 145 mcg by mouth every morning before breakfast.  Dose: 145 mcg     meloxicam 15 MG tablet  Commonly known as: Mobic   Take 1 Tablet by mouth every day.  Dose: 15 mg     * Misc. Devices Misc   Please allow patient to have support/therapy dog in appt. Regardless of weight due to multiple chronic illnesses and debility.     * Misc. Devices Misc   Please eval and fix electric W/C. Please eval also for W/C needs.     * Misc. Devices Misc   W/C stabilizer needs eval and possible replacement     * Misc. Devices Misc   Bedside commode     * Misc. Devices Misc   New latch for new dynavox  Wheel chair     * Misc. Devices Misc   Bedside commode with padding     MULTIVITAMIN GUMMIES ADULTS PO   Take 1 Tab by mouth every day. Indications: vitamin  supplement.  Dose: 1 Tablet     omeprazole 40 MG delayed-release capsule  Commonly known as: PriLOSEC   Take 1 Capsule by mouth 2 times a day.  Dose: 40 mg     polyethylene glycol 3350 17 GM/SCOOP Powd  Commonly known as: Miralax   MIX 1 CAPFUL WITH 8OZ OF WATER AND DRINK DAILY FOR CONSTIPATION     simethicone 125 MG chewable tablet  Commonly known as: Mylicon   Take 125 mg by mouth 4 times a day as needed. Indications: Gas  Dose: 125 mg     * SUMAtriptan Succinate 6 MG/0.5ML Soaj   INJECT 6MG SUBCUTANEOUS 1 TIMES DAILY AS NEEDED FOR UP TO 9 DAYS     * sumatriptan 100 MG tablet  Commonly known as: Imitrex   TAKE 1 TABLET BY MOUTH AT ONSET OF HEADACHE, MAY REPEAT IN 2 HOURS.(MAX 2 TABLETS PER DAY)     topiramate 25 MG Tabs  Commonly known as: Topamax   TAKE 1 TABLET BY MOUTH TWICE DAILY     vitamin D 1000 Unit (25 mcg) Tabs  Commonly known as: cholecalciferol   Take 1,000 Units by mouth 4 times a day. Indications: supplement  Dose: 1,000 Units           * This list has 8 medication(s) that are the same as other medications prescribed for you. Read the directions carefully, and ask your doctor or other care provider to review them with you.                  Allergies  Allergies   Allergen Reactions    Bactrim      Reaction unknown    Fentanyl      Makes her agressive       DIET  Orders Placed This Encounter   Procedures    Diet Order Diet: Regular; Miscellaneous modifications: (optional): Finger Foods     Standing Status:   Standing     Number of Occurrences:   1     Order Specific Question:   Diet:     Answer:   Regular [1]     Order Specific Question:   Miscellaneous modifications: (optional)     Answer:   Finger Foods  [2]       ACTIVITY  As tolerated.  Weight bearing as tolerated    CONSULTATIONS  Ortho   ID     PROCEDURES  DX-CHEST-PORTABLE (1 VIEW)   Final Result      1.  Elevation of the right hemidiaphragm with hypoinflation and bibasilar atelectasis.   2.  Air-filled loop of hepatic flexure noted beneath the  right hemidiaphragm.            LABORATORY  Lab Results   Component Value Date    SODIUM 142 10/08/2023    POTASSIUM 4.1 10/08/2023    CHLORIDE 116 (H) 10/08/2023    CO2 18 (L) 10/08/2023    GLUCOSE 89 10/08/2023    BUN 13 10/08/2023    CREATININE 0.41 (L) 10/08/2023        Lab Results   Component Value Date    WBC 5.1 10/07/2023    HEMOGLOBIN 11.8 (L) 10/07/2023    HEMATOCRIT 36.0 (L) 10/07/2023    PLATELETCT 296 10/07/2023        Total time of the discharge process exceeds 37  minutes.

## 2023-10-09 NOTE — PROGRESS NOTES
Discharge order received. PIV removed, tip intact, no issues noted. Printed discharge instructions and prescriptions given to pt. All discharge education complete, specifically need to f/u with ortho/PCP and come back through the ED for new or worsening symptoms, all questions and concerns were addressed. Pt discharged home via [facility].

## 2023-10-09 NOTE — CARE PLAN
The patient is Stable - Low risk of patient condition declining or worsening    Shift Goals  Clinical Goals: pain control, safety and Q2H turns  Patient Goals: pain control  Family Goals: N/A    Progress made toward(s) clinical / shift goals:  education done on POC, all questions and concerns were addressed    Patient is not progressing towards the following goals:      Problem: Pain - Standard  Goal: Alleviation of pain or a reduction in pain to the patient’s comfort goal  Outcome: Progressing     Problem: Fluid Volume  Goal: Fluid volume balance will be maintained  Outcome: Progressing     Problem: Respiratory  Goal: Patient will achieve/maintain optimum respiratory ventilation and gas exchange  Outcome: Progressing

## 2023-10-09 NOTE — CARE PLAN
Problem: Pain - Standard  Goal: Alleviation of pain or a reduction in pain to the patient’s comfort goal  Outcome: Progressing     Problem: Skin Integrity  Goal: Skin integrity is maintained or improved  Outcome: Progressing   The patient is Stable - Low risk of patient condition declining or worsening    Shift Goals  Clinical Goals: pain control, safety and Q2H turns  Patient Goals: pain control  Family Goals: N/A    Progress made toward(s) clinical / shift goals:  Will continue to assess and manage pain and assist with ADLs as needed.     Patient is not progressing towards the following goals:

## 2023-10-09 NOTE — DISCHARGE PLANNING
Case Management Discharge Planning    Admission Date: 9/30/2023  GMLOS: 9.6  ALOS: 8    6-Clicks ADL Score:    6-Clicks Mobility Score:        Anticipated Discharge Dispo: Discharge Disposition: Discharged to home/self care (01)  Discharge Address: [address] [place] [address]    DME Needed: No    Action(s) Taken: OTHER    LSW received phone call from sister/Emi Swanson requesting transport time of 1145. Transport time confirmed for 1145.       No other CM needs identified at this time.    Escalations Completed: None    Medically Clear: Yes    Next Steps: Pending dc @1145    Barriers to Discharge: None    Is the patient up for discharge tomorrow: No

## 2023-10-09 NOTE — DISCHARGE PLANNING
DC Transport Scheduled    Received request at: 10/9/2023 at 0953    Transport Company Scheduled:  [employer]  Spoke with Siva at [facility] to schedule transport.    Scheduled Date: 10/9/2023  Scheduled Time: 1145    Destination: Home at [address] [place]     Notified care team of scheduled transport via [employer].     If there are any changes needed to the DC transportation scheduled, please contact [facility] at ext. [phone] between the hours of 6774-0544 Mon-Fri. If outside those hours, contact the ED Case Manager at ext. [phone].

## 2023-10-10 ENCOUNTER — PATIENT OUTREACH (OUTPATIENT)
Dept: MEDICAL GROUP | Facility: MEDICAL CENTER | Age: 38
End: 2023-10-10
Payer: MEDICARE

## 2023-10-11 NOTE — PROGRESS NOTES
Transitional Care Management  TCM Outreach Date and Time: Filed (10/10/2023  2:26 PM)    Discharge Questions  Actual Discharge Date: 10/09/23  Now that you are home, how are you feeling?: Good  Did you receive any new prescriptions?: Yes  Were you able to get them filled?: Yes  Meds to Bed or Pharmacy filled?: Meds to Bed  Do you have any questions about your current medications or new medications (Review Med Rec)?: No. Unable to review entire med list, sister did not have it available at time of call  Do you have a follow up appointment scheduled with your PCP?: No  Was an appointment scheduled for the patient?: No  Reason not scheduled?: Patient to Schedule (patient's other sister will reschedule appt as a HFV with [facility].PHone number provided and instructions to ask for a hospital follow up visit.)  Any issues or paperwork you wish to discuss with your PCP?: No  Does this patient qualify for the CCM program?: No    Transitional Care  Number of attempts made to contact patient: 1  Current or previous attempts competed within two business days of discharge? : Yes  Provided education regarding treatment plan, medications, self-management, ADLs?: Yes  Has patient completed an Advanced Directive?: Yes  Is the patient's advanced directive on file?: Yes  Has the Care Manager's phone number provided?: No  Is there anything else I can help you with?: No    Discharge Summary  Chief Complaint: Elbow Pain - Swelling and redness to right elbow  Admitting Diagnosis: Elbow pain (M25.529)  Discharge Diagnosis: SIRS (systemic inflammatory response syndrome)

## 2023-10-13 ENCOUNTER — OFFICE VISIT (OUTPATIENT)
Dept: MEDICAL GROUP | Facility: MEDICAL CENTER | Age: 38
End: 2023-10-13
Payer: MEDICARE

## 2023-10-13 VITALS
DIASTOLIC BLOOD PRESSURE: 72 MMHG | HEIGHT: 67 IN | BODY MASS INDEX: 23.23 KG/M2 | SYSTOLIC BLOOD PRESSURE: 116 MMHG | OXYGEN SATURATION: 98 % | HEART RATE: 119 BPM | TEMPERATURE: 98.8 F | WEIGHT: 148 LBS

## 2023-10-13 DIAGNOSIS — M71.121 SEPTIC BURSITIS OF ELBOW, RIGHT: ICD-10-CM

## 2023-10-13 DIAGNOSIS — M79.89 SWELLING OF LOWER EXTREMITY: ICD-10-CM

## 2023-10-13 DIAGNOSIS — N39.0 URINARY TRACT INFECTION WITHOUT HEMATURIA, SITE UNSPECIFIED: ICD-10-CM

## 2023-10-13 DIAGNOSIS — R00.0 TACHYCARDIA: ICD-10-CM

## 2023-10-13 DIAGNOSIS — T14.8XXA SKIN ABRASION: ICD-10-CM

## 2023-10-13 PROCEDURE — 3078F DIAST BP <80 MM HG: CPT | Performed by: STUDENT IN AN ORGANIZED HEALTH CARE EDUCATION/TRAINING PROGRAM

## 2023-10-13 PROCEDURE — 99214 OFFICE O/P EST MOD 30 MIN: CPT | Performed by: STUDENT IN AN ORGANIZED HEALTH CARE EDUCATION/TRAINING PROGRAM

## 2023-10-13 PROCEDURE — 3074F SYST BP LT 130 MM HG: CPT | Performed by: STUDENT IN AN ORGANIZED HEALTH CARE EDUCATION/TRAINING PROGRAM

## 2023-10-13 RX ORDER — CEFUROXIME AXETIL 250 MG/1
250 TABLET ORAL 2 TIMES DAILY
Qty: 10 TABLET | Refills: 0 | Status: ON HOLD | OUTPATIENT
Start: 2023-10-13 | End: 2023-10-18

## 2023-10-13 RX ORDER — OXYCODONE HYDROCHLORIDE AND ACETAMINOPHEN 5; 325 MG/1; MG/1
1 TABLET ORAL EVERY 8 HOURS PRN
Qty: 20 TABLET | Refills: 0 | Status: ON HOLD | OUTPATIENT
Start: 2023-10-13 | End: 2023-10-19

## 2023-10-13 ASSESSMENT — FIBROSIS 4 INDEX: FIB4 SCORE: 0.52

## 2023-10-13 NOTE — PROGRESS NOTES
"Subjective:     Griselda Swanson is a 38 y.o. female who presents for Hospital Follow-up.    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  Culture grew MRSA patient was seen on ID recommend oral antibiotics until 10/12/2023 .    Neuro right    #Leg swelling bilateral foot   Patient reports since hospitalization has noted bilateral lower extremity swelling  Etiology likely fluid overload due to SIRS  Patient denies current shortness of breath  Patient does have tachycardia however currently have right elbow pain and she has been tachycardic in clinic before  We will recommend conservative management at this point with elevation, compression stocking in a.m.  We will avoid diuretics in the setting of comorbidities      #Elbow pain  \"Bursitis status post olecranon bursectomy status post IV vancomycin and completion of oral antibiotics.  Patient has a follow-up with Ortho on Tuesday the 17th  She reports she continues have mild pain and report Percocet 5/3/2025 twice daily as needed has been helping.  Elbow in cast cannot examine  However movement of right hand intact pulse radial palpable there is a small shallow ulcer in dorsal aspect of right wrist and shallow abrasion and palmar of breast likely related to friction of wrist against cast.  Recommend use of petroleum jelly.  Recommend use of gauze.    #Peeing issue   Id note reviewed 9/30/2023 - dc on doxy or bactrim   Patient history of recurrent UTIs  Is difficult to collect steriles sample  She reports since she left the hospital she has been having sensation of dysuria and left flank discomfort.  We will empirically treat with cefuroxime to 50 mg twice daily  Discussed with patient and sister if symptoms progressive for the patient develop fever and chills worsening pain worsening tachycardia or " feels general illness she should be seen in the ED      current medicines (including reconciliation performed today)  Current Outpatient Medications   Medication Sig Dispense Refill    cefUROXime (CEFTIN) 250 MG Tab Take 1 Tablet by mouth 2 times a day for 5 days. 10 Tablet 0    oxyCODONE-acetaminophen (PERCOCET) 5-325 MG Tab Take 1 Tablet by mouth every 8 hours as needed for Severe Pain for up to 7 days. 20 Tablet 0    sumatriptan (IMITREX) 100 MG tablet TAKE 1 TABLET BY MOUTH AT ONSET OF HEADACHE, MAY REPEAT IN 2 HOURS.(MAX 2 TABLETS PER DAY) 60 Tablet 11    meloxicam (MOBIC) 15 MG tablet Take 1 Tablet by mouth every day. 90 Tablet 3    topiramate (TOPAMAX) 25 MG Tab TAKE 1 TABLET BY MOUTH TWICE DAILY 60 Tablet 11    docusate sodium (COLACE) 100 MG Cap TAKE 1 CAPSULE BY MOUTH TWICE DAILY AS NEEDED 60 Capsule 3    omeprazole (PRILOSEC) 40 MG delayed-release capsule Take 1 Capsule by mouth 2 times a day. 60 Capsule 2    cyclobenzaprine (FLEXERIL) 10 mg Tab TAKE 1 TABLET BY MOUTH THREE TIMES DAILY AS NEEDED 270 Tablet 3    citalopram (CELEXA) 20 MG Tab TAKE 1 TABLET BY MOUTH EVERY DAY 90 Tablet 3    baclofen (LIORESAL) 10 MG Tab TAKE 3 TABLETS BY MOUTH FOUR TIMES DAILY 360 Tablet 11    linaCLOtide 145 MCG Cap Take 145 mcg by mouth every morning before breakfast. 30 Capsule 11    SUMAtriptan Succinate 6 MG/0.5ML Solution Auto-injector INJECT 6MG SUBCUTANEOUS 1 TIMES DAILY AS NEEDED FOR UP TO 9 DAYS 15 mL 11    polyethylene glycol 3350 (MIRALAX) 17 GM/SCOOP Powder MIX 1 CAPFUL WITH 8OZ OF WATER AND DRINK DAILY FOR CONSTIPATION 510 g 0    bisacodyl (DULCOLAX) 10 MG Suppos INSERT 1 SUPPOSITORY RECTALLY EVERY DAY 50 Suppository 11    lactulose 10 GM/15ML Solution TAKE 15 TO 30 MLS BY MOUTH EVERY 4 HOURS AS NEEDED 27945 mL 0    Misc. Devices Misc Bedside commode with padding 1 Each 11    Misc. Devices Misc New latch for new dynavox  Wheel chair 1 Each 11    Misc. Devices Misc Bedside commode 1 Each 11    simethicone  "(MYLICON) 125 MG chewable tablet Take 125 mg by mouth 4 times a day as needed. Indications: Gas      Misc. Devices Misc W/C stabilizer needs eval and possible replacement 1 Each 11    Misc. Devices Misc Please eval and fix electric W/C. Please eval also for W/C needs. 1 Each 11    Misc. Devices Misc Please allow patient to have support/therapy dog in appt. Regardless of weight due to multiple chronic illnesses and debility. 1 Each 11    Multiple Vitamins-Minerals (MULTIVITAMIN GUMMIES ADULTS PO) Take 1 Tab by mouth every day. Indications: vitamin supplement.      vitamin D (CHOLECALCIFEROL) 1000 UNIT Tab Take 1,000 Units by mouth 4 times a day. Indications: supplement       No current facility-administered medications for this visit.       Allergies:   Bactrim and Fentanyl    Social History     Tobacco Use    Smoking status: Former     Current packs/day: 0.00     Average packs/day: 1 pack/day for 12.0 years (12.0 ttl pk-yrs)     Types: Cigarettes     Start date: 1/1/1996     Quit date: 1/1/2008     Years since quitting: 15.7    Smokeless tobacco: Never    Tobacco comments:     Started smoking at age 11   Vaping Use    Vaping Use: Never used   Substance Use Topics    Alcohol use: No    Drug use: No       ROS:  Patient report right elbow pain.    Objective:     Vitals:    10/13/23 1500   BP: 116/72   Pulse: (!) 119   Temp: 37.1 °C (98.8 °F)   SpO2: 98%   Weight: 67.1 kg (148 lb)   Height: 1.702 m (5' 7\")     Body mass index is 23.18 kg/m².    Physical Exam:  Physical Exam  Constitutional:       Appearance: Normal appearance.   Cardiovascular:      Rate and Rhythm: Regular rhythm. Tachycardia present.   Pulmonary:      Effort: Pulmonary effort is normal.      Breath sounds: Normal breath sounds.   Musculoskeletal:      Cervical back: Normal range of motion and neck supple.      Right lower leg: Edema present.      Left lower leg: Edema present.   Skin:     Comments: Skin exam as per HPI   Neurological:      Mental " Status: She is alert.           Assessment and Plan:     1. Septic bursitis of elbow, right  5. Tachycardia  Acute, stable  Status post hospitalization  Completion of oral antibiotics  Has follow-up with orthopedics next Tuesday  Patient has some pain in her right elbow request for extension of short-term opiate medication.  Plan  We will send prescription for 7 days  Risks of opiate medication discussed with patient, discussed the patient to avoid overdose.  Opiate can be sedating and addictive.  Tachycardia patient has history of tachycardia during clinic visit.  Discussed with patient and sister and agree we should continue to monitor for symptoms.  If patient develop fever, chills, shortness of breath, worsening symptoms she should head to the ER for further evaluation.  Differential not limited to PE, worsening infection to elbow, UTI.  Recommend close follow-up hide patient would like to follow-up in 6-month and/as needed      - oxyCODONE-acetaminophen (PERCOCET) 5-325 MG Tab; Take 1 Tablet by mouth every 8 hours as needed for Severe Pain for up to 7 days.  Dispense: 20 Tablet; Refill: 0    2. Urinary tract infection without hematuria, site unspecified  Chronic, stable  History of UTI and urinary tract symptoms and urinary incontinence.  Unable to collect sterile sample.  Patient reports she has been experience dysuria.  She also noted some nonspecific left CVA tenderness none noted on exam.  Plan  We will empirically treat for uncomplicated UTI  If symptoms progress will consider broad-spectrum   Patient and sister understand if symptoms for progress should seek urgent care/ER  - cefUROXime (CEFTIN) 250 MG Tab; Take 1 Tablet by mouth 2 times a day for 5 days.  Dispense: 10 Tablet; Refill: 0    3. Skin abrasion  Acute, stable as noted in HPI  Recommend conservative management with skin emollient    4. Swelling of lower extremity  Acute, stable  Bilateral 2+ edema likely related to fluid overload in the  hospital  No previous evidence of cardiac myopathy, heart failure DVT,pe.  Discussed differential with sister.  Plan  I think it is reasonable to continue to monitor the symptoms if patient continues to have tachycardia or develops shortness of breath she had to the ER  Otherwise recommend conservative management with foot elevation, compression stocking  We will avoid diuretic in the setting of urinary incontinence and comorbidities        - Chart and discharge summary were reviewed.   - Hospitalization and results reviewed with patient.   - Medications reviewed including instructions regarding high risk medications, dosing and side effects.  - Recommended Services: No services needed at this time  - Advance directive/POLST on file?  Yes    Follow-up:Return in about 6 months (around 4/13/2024), or if symptoms worsen or fail to improve.    Face-to-face transitional care management services with MODERATE (today's visit is within 14 days post discharge & LACE+ score of 28-58) medical decision complexity were provided.                Bill For Surgical Tray: no Performing Laboratory: [phone] Expected Date Of Service: 06/03/2021 Billing Type: [employer] Clinical Notes (To The Lab): Non fasting\\nDx: Z01.89, Z11.59, Z01.84, R21

## 2023-10-16 DIAGNOSIS — F51.01 PRIMARY INSOMNIA: ICD-10-CM

## 2023-10-17 ENCOUNTER — HOSPITAL ENCOUNTER (INPATIENT)
Facility: MEDICAL CENTER | Age: 38
LOS: 3 days | DRG: 919 | End: 2023-10-20
Attending: EMERGENCY MEDICINE | Admitting: HOSPITALIST
Payer: MEDICARE

## 2023-10-17 DIAGNOSIS — M71.021 OLECRANON BURSA ABSCESS, RIGHT: ICD-10-CM

## 2023-10-17 PROBLEM — M71.121 SEPTIC BURSITIS OF ELBOW, RIGHT: Status: ACTIVE | Noted: 2023-10-17

## 2023-10-17 PROBLEM — G82.50 QUADRIPLEGIA (HCC): Status: ACTIVE | Noted: 2023-10-17

## 2023-10-17 LAB
ALBUMIN SERPL BCP-MCNC: 4.1 G/DL (ref 3.2–4.9)
ALBUMIN/GLOB SERPL: 1.3 G/DL
ALP SERPL-CCNC: 72 U/L (ref 30–99)
ALT SERPL-CCNC: 19 U/L (ref 2–50)
ANION GAP SERPL CALC-SCNC: 12 MMOL/L (ref 7–16)
AST SERPL-CCNC: 18 U/L (ref 12–45)
BASOPHILS # BLD AUTO: 1 % (ref 0–1.8)
BASOPHILS # BLD: 0.09 K/UL (ref 0–0.12)
BILIRUB SERPL-MCNC: 0.6 MG/DL (ref 0.1–1.5)
BUN SERPL-MCNC: 9 MG/DL (ref 8–22)
CALCIUM ALBUM COR SERPL-MCNC: 9 MG/DL (ref 8.5–10.5)
CALCIUM SERPL-MCNC: 9.1 MG/DL (ref 8.5–10.5)
CHLORIDE SERPL-SCNC: 106 MMOL/L (ref 96–112)
CO2 SERPL-SCNC: 24 MMOL/L (ref 20–33)
CREAT SERPL-MCNC: 0.44 MG/DL (ref 0.5–1.4)
CRP SERPL HS-MCNC: 2.35 MG/DL (ref 0–0.75)
EOSINOPHIL # BLD AUTO: 0.01 K/UL (ref 0–0.51)
EOSINOPHIL NFR BLD: 0.1 % (ref 0–6.9)
ERYTHROCYTE [DISTWIDTH] IN BLOOD BY AUTOMATED COUNT: 46 FL (ref 35.9–50)
ERYTHROCYTE [SEDIMENTATION RATE] IN BLOOD BY WESTERGREN METHOD: 22 MM/HOUR (ref 0–25)
GFR SERPLBLD CREATININE-BSD FMLA CKD-EPI: 125 ML/MIN/1.73 M 2
GLOBULIN SER CALC-MCNC: 3.1 G/DL (ref 1.9–3.5)
GLUCOSE SERPL-MCNC: 83 MG/DL (ref 65–99)
HCT VFR BLD AUTO: 39.9 % (ref 37–47)
HGB BLD-MCNC: 12.8 G/DL (ref 12–16)
IMM GRANULOCYTES # BLD AUTO: 0.02 K/UL (ref 0–0.11)
IMM GRANULOCYTES NFR BLD AUTO: 0.2 % (ref 0–0.9)
LACTATE SERPL-SCNC: 0.9 MMOL/L (ref 0.5–2)
LYMPHOCYTES # BLD AUTO: 1.34 K/UL (ref 1–4.8)
LYMPHOCYTES NFR BLD: 14.7 % (ref 22–41)
MCH RBC QN AUTO: 30.7 PG (ref 27–33)
MCHC RBC AUTO-ENTMCNC: 32.1 G/DL (ref 32.2–35.5)
MCV RBC AUTO: 95.7 FL (ref 81.4–97.8)
MONOCYTES # BLD AUTO: 0.4 K/UL (ref 0–0.85)
MONOCYTES NFR BLD AUTO: 4.4 % (ref 0–13.4)
NEUTROPHILS # BLD AUTO: 7.23 K/UL (ref 1.82–7.42)
NEUTROPHILS NFR BLD: 79.6 % (ref 44–72)
NRBC # BLD AUTO: 0 K/UL
NRBC BLD-RTO: 0 /100 WBC (ref 0–0.2)
PLATELET # BLD AUTO: 303 K/UL (ref 164–446)
PMV BLD AUTO: 9.4 FL (ref 9–12.9)
POTASSIUM SERPL-SCNC: 3.8 MMOL/L (ref 3.6–5.5)
PROT SERPL-MCNC: 7.2 G/DL (ref 6–8.2)
RBC # BLD AUTO: 4.17 M/UL (ref 4.2–5.4)
SODIUM SERPL-SCNC: 142 MMOL/L (ref 135–145)
WBC # BLD AUTO: 9.1 K/UL (ref 4.8–10.8)

## 2023-10-17 PROCEDURE — 700111 HCHG RX REV CODE 636 W/ 250 OVERRIDE (IP): Mod: JZ | Performed by: HOSPITALIST

## 2023-10-17 PROCEDURE — 83605 ASSAY OF LACTIC ACID: CPT

## 2023-10-17 PROCEDURE — 700102 HCHG RX REV CODE 250 W/ 637 OVERRIDE(OP): Performed by: HOSPITALIST

## 2023-10-17 PROCEDURE — 86140 C-REACTIVE PROTEIN: CPT

## 2023-10-17 PROCEDURE — 87040 BLOOD CULTURE FOR BACTERIA: CPT | Mod: 91

## 2023-10-17 PROCEDURE — 80053 COMPREHEN METABOLIC PANEL: CPT

## 2023-10-17 PROCEDURE — 96375 TX/PRO/DX INJ NEW DRUG ADDON: CPT

## 2023-10-17 PROCEDURE — 85652 RBC SED RATE AUTOMATED: CPT

## 2023-10-17 PROCEDURE — A9270 NON-COVERED ITEM OR SERVICE: HCPCS | Performed by: HOSPITALIST

## 2023-10-17 PROCEDURE — 36415 COLL VENOUS BLD VENIPUNCTURE: CPT

## 2023-10-17 PROCEDURE — 85025 COMPLETE CBC W/AUTO DIFF WBC: CPT

## 2023-10-17 PROCEDURE — 96365 THER/PROPH/DIAG IV INF INIT: CPT

## 2023-10-17 PROCEDURE — 99223 1ST HOSP IP/OBS HIGH 75: CPT | Performed by: HOSPITALIST

## 2023-10-17 PROCEDURE — 99285 EMERGENCY DEPT VISIT HI MDM: CPT

## 2023-10-17 PROCEDURE — 700105 HCHG RX REV CODE 258: Performed by: EMERGENCY MEDICINE

## 2023-10-17 PROCEDURE — 700111 HCHG RX REV CODE 636 W/ 250 OVERRIDE (IP): Mod: JZ,UD | Performed by: EMERGENCY MEDICINE

## 2023-10-17 PROCEDURE — 770001 HCHG ROOM/CARE - MED/SURG/GYN PRIV*

## 2023-10-17 RX ORDER — PROMETHAZINE HYDROCHLORIDE 25 MG/1
12.5-25 SUPPOSITORY RECTAL EVERY 4 HOURS PRN
Status: DISCONTINUED | OUTPATIENT
Start: 2023-10-17 | End: 2023-10-20 | Stop reason: HOSPADM

## 2023-10-17 RX ORDER — ONDANSETRON 4 MG/1
4 TABLET, ORALLY DISINTEGRATING ORAL EVERY 4 HOURS PRN
Status: DISCONTINUED | OUTPATIENT
Start: 2023-10-17 | End: 2023-10-20 | Stop reason: HOSPADM

## 2023-10-17 RX ORDER — PROMETHAZINE HYDROCHLORIDE 25 MG/1
12.5-25 TABLET ORAL EVERY 4 HOURS PRN
Status: DISCONTINUED | OUTPATIENT
Start: 2023-10-17 | End: 2023-10-20 | Stop reason: HOSPADM

## 2023-10-17 RX ORDER — OXYCODONE HYDROCHLORIDE 10 MG/1
10 TABLET ORAL
Status: DISCONTINUED | OUTPATIENT
Start: 2023-10-17 | End: 2023-10-20 | Stop reason: HOSPADM

## 2023-10-17 RX ORDER — ENOXAPARIN SODIUM 100 MG/ML
40 INJECTION SUBCUTANEOUS DAILY
Status: DISCONTINUED | OUTPATIENT
Start: 2023-10-17 | End: 2023-10-20 | Stop reason: HOSPADM

## 2023-10-17 RX ORDER — ONDANSETRON 2 MG/ML
4 INJECTION INTRAMUSCULAR; INTRAVENOUS ONCE
Status: COMPLETED | OUTPATIENT
Start: 2023-10-17 | End: 2023-10-17

## 2023-10-17 RX ORDER — HYDROMORPHONE HYDROCHLORIDE 1 MG/ML
0.5 INJECTION, SOLUTION INTRAMUSCULAR; INTRAVENOUS; SUBCUTANEOUS ONCE
Status: COMPLETED | OUTPATIENT
Start: 2023-10-17 | End: 2023-10-17

## 2023-10-17 RX ORDER — HYDROMORPHONE HYDROCHLORIDE 1 MG/ML
0.5 INJECTION, SOLUTION INTRAMUSCULAR; INTRAVENOUS; SUBCUTANEOUS
Status: DISCONTINUED | OUTPATIENT
Start: 2023-10-17 | End: 2023-10-20 | Stop reason: HOSPADM

## 2023-10-17 RX ORDER — ONDANSETRON 2 MG/ML
4 INJECTION INTRAMUSCULAR; INTRAVENOUS EVERY 4 HOURS PRN
Status: DISCONTINUED | OUTPATIENT
Start: 2023-10-17 | End: 2023-10-20 | Stop reason: HOSPADM

## 2023-10-17 RX ORDER — OXYCODONE HYDROCHLORIDE 5 MG/1
5 TABLET ORAL
Status: DISCONTINUED | OUTPATIENT
Start: 2023-10-17 | End: 2023-10-20 | Stop reason: HOSPADM

## 2023-10-17 RX ORDER — PROCHLORPERAZINE EDISYLATE 5 MG/ML
5-10 INJECTION INTRAMUSCULAR; INTRAVENOUS EVERY 4 HOURS PRN
Status: DISCONTINUED | OUTPATIENT
Start: 2023-10-17 | End: 2023-10-20 | Stop reason: HOSPADM

## 2023-10-17 RX ORDER — SUMATRIPTAN 100 MG/1
100 TABLET, FILM COATED ORAL
COMMUNITY
End: 2023-10-31 | Stop reason: SDUPTHER

## 2023-10-17 RX ADMIN — ONDANSETRON 4 MG: 2 INJECTION INTRAMUSCULAR; INTRAVENOUS at 20:44

## 2023-10-17 RX ADMIN — ENOXAPARIN SODIUM 40 MG: 100 INJECTION SUBCUTANEOUS at 23:26

## 2023-10-17 RX ADMIN — OXYCODONE HYDROCHLORIDE 10 MG: 10 TABLET ORAL at 23:27

## 2023-10-17 RX ADMIN — VANCOMYCIN HYDROCHLORIDE 1750 MG: 5 INJECTION, POWDER, LYOPHILIZED, FOR SOLUTION INTRAVENOUS at 21:31

## 2023-10-17 RX ADMIN — HYDROMORPHONE HYDROCHLORIDE 0.5 MG: 1 INJECTION, SOLUTION INTRAMUSCULAR; INTRAVENOUS; SUBCUTANEOUS at 20:45

## 2023-10-17 ASSESSMENT — ENCOUNTER SYMPTOMS
BRUISES/BLEEDS EASILY: 0
WHEEZING: 0
BLOOD IN STOOL: 0
TINGLING: 0
FEVER: 1
NECK PAIN: 0
SINUS PAIN: 0
SHORTNESS OF BREATH: 0
COUGH: 0
BLURRED VISION: 0
PHOTOPHOBIA: 0
FLANK PAIN: 0
DIAPHORESIS: 0
FALLS: 0
PND: 0
CONSTIPATION: 0
STRIDOR: 0
POLYDIPSIA: 0
DEPRESSION: 0
DIZZINESS: 0
SORE THROAT: 0
CHILLS: 1
HEADACHES: 0
WEAKNESS: 0
CLAUDICATION: 0
PALPITATIONS: 0
ABDOMINAL PAIN: 0
BACK PAIN: 0
DOUBLE VISION: 0
HEMOPTYSIS: 0
TREMORS: 0
MYALGIAS: 0
ORTHOPNEA: 0
SPUTUM PRODUCTION: 0
DIARRHEA: 0
NAUSEA: 0
EYE PAIN: 0
HEARTBURN: 0
VOMITING: 0
HALLUCINATIONS: 0

## 2023-10-17 ASSESSMENT — FIBROSIS 4 INDEX: FIB4 SCORE: 0.52

## 2023-10-17 ASSESSMENT — PAIN DESCRIPTION - PAIN TYPE
TYPE: ACUTE PAIN
TYPE: ACUTE PAIN

## 2023-10-17 ASSESSMENT — PAIN SCALES - WONG BAKER: WONGBAKER_NUMERICALRESPONSE: HURTS EVEN MORE

## 2023-10-17 ASSESSMENT — LIFESTYLE VARIABLES: SUBSTANCE_ABUSE: 0

## 2023-10-18 LAB
ALBUMIN SERPL BCP-MCNC: 3.5 G/DL (ref 3.2–4.9)
ALBUMIN/GLOB SERPL: 1.2 G/DL
ALP SERPL-CCNC: 64 U/L (ref 30–99)
ALT SERPL-CCNC: 15 U/L (ref 2–50)
ANION GAP SERPL CALC-SCNC: 11 MMOL/L (ref 7–16)
AST SERPL-CCNC: 14 U/L (ref 12–45)
BASOPHILS # BLD AUTO: 1.3 % (ref 0–1.8)
BASOPHILS # BLD: 0.09 K/UL (ref 0–0.12)
BILIRUB SERPL-MCNC: 0.7 MG/DL (ref 0.1–1.5)
BUN SERPL-MCNC: 12 MG/DL (ref 8–22)
CALCIUM ALBUM COR SERPL-MCNC: 9 MG/DL (ref 8.5–10.5)
CALCIUM SERPL-MCNC: 8.6 MG/DL (ref 8.5–10.5)
CHLORIDE SERPL-SCNC: 106 MMOL/L (ref 96–112)
CO2 SERPL-SCNC: 22 MMOL/L (ref 20–33)
CREAT SERPL-MCNC: 0.47 MG/DL (ref 0.5–1.4)
EOSINOPHIL # BLD AUTO: 0.05 K/UL (ref 0–0.51)
EOSINOPHIL NFR BLD: 0.7 % (ref 0–6.9)
ERYTHROCYTE [DISTWIDTH] IN BLOOD BY AUTOMATED COUNT: 45.9 FL (ref 35.9–50)
GFR SERPLBLD CREATININE-BSD FMLA CKD-EPI: 123 ML/MIN/1.73 M 2
GLOBULIN SER CALC-MCNC: 2.9 G/DL (ref 1.9–3.5)
GLUCOSE SERPL-MCNC: 73 MG/DL (ref 65–99)
HCT VFR BLD AUTO: 39.3 % (ref 37–47)
HGB BLD-MCNC: 12.3 G/DL (ref 12–16)
IMM GRANULOCYTES # BLD AUTO: 0.02 K/UL (ref 0–0.11)
IMM GRANULOCYTES NFR BLD AUTO: 0.3 % (ref 0–0.9)
LYMPHOCYTES # BLD AUTO: 1.16 K/UL (ref 1–4.8)
LYMPHOCYTES NFR BLD: 17 % (ref 22–41)
MCH RBC QN AUTO: 30.3 PG (ref 27–33)
MCHC RBC AUTO-ENTMCNC: 31.3 G/DL (ref 32.2–35.5)
MCV RBC AUTO: 96.8 FL (ref 81.4–97.8)
MONOCYTES # BLD AUTO: 0.47 K/UL (ref 0–0.85)
MONOCYTES NFR BLD AUTO: 6.9 % (ref 0–13.4)
NEUTROPHILS # BLD AUTO: 5.02 K/UL (ref 1.82–7.42)
NEUTROPHILS NFR BLD: 73.8 % (ref 44–72)
NRBC # BLD AUTO: 0 K/UL
NRBC BLD-RTO: 0 /100 WBC (ref 0–0.2)
PLATELET # BLD AUTO: 269 K/UL (ref 164–446)
PMV BLD AUTO: 9.5 FL (ref 9–12.9)
POTASSIUM SERPL-SCNC: 4 MMOL/L (ref 3.6–5.5)
PROT SERPL-MCNC: 6.4 G/DL (ref 6–8.2)
RBC # BLD AUTO: 4.06 M/UL (ref 4.2–5.4)
SODIUM SERPL-SCNC: 139 MMOL/L (ref 135–145)
WBC # BLD AUTO: 6.8 K/UL (ref 4.8–10.8)

## 2023-10-18 PROCEDURE — A9270 NON-COVERED ITEM OR SERVICE: HCPCS | Performed by: INTERNAL MEDICINE

## 2023-10-18 PROCEDURE — 99233 SBSQ HOSP IP/OBS HIGH 50: CPT | Performed by: GENERAL PRACTICE

## 2023-10-18 PROCEDURE — 302098 PASTE RING (FLAT): Performed by: GENERAL PRACTICE

## 2023-10-18 PROCEDURE — 700102 HCHG RX REV CODE 250 W/ 637 OVERRIDE(OP): Performed by: INTERNAL MEDICINE

## 2023-10-18 PROCEDURE — 700105 HCHG RX REV CODE 258: Performed by: HOSPITALIST

## 2023-10-18 PROCEDURE — 51798 US URINE CAPACITY MEASURE: CPT

## 2023-10-18 PROCEDURE — 80053 COMPREHEN METABOLIC PANEL: CPT

## 2023-10-18 PROCEDURE — 770001 HCHG ROOM/CARE - MED/SURG/GYN PRIV*

## 2023-10-18 PROCEDURE — 97605 NEG PRS WND THER DME<=50SQCM: CPT

## 2023-10-18 PROCEDURE — RXMED WILLOW AMBULATORY MEDICATION CHARGE: Performed by: GENERAL PRACTICE

## 2023-10-18 PROCEDURE — 85025 COMPLETE CBC W/AUTO DIFF WBC: CPT

## 2023-10-18 PROCEDURE — 700102 HCHG RX REV CODE 250 W/ 637 OVERRIDE(OP): Performed by: HOSPITALIST

## 2023-10-18 PROCEDURE — 700102 HCHG RX REV CODE 250 W/ 637 OVERRIDE(OP): Performed by: GENERAL PRACTICE

## 2023-10-18 PROCEDURE — 97602 WOUND(S) CARE NON-SELECTIVE: CPT

## 2023-10-18 PROCEDURE — A9270 NON-COVERED ITEM OR SERVICE: HCPCS | Performed by: HOSPITALIST

## 2023-10-18 PROCEDURE — A9270 NON-COVERED ITEM OR SERVICE: HCPCS | Performed by: GENERAL PRACTICE

## 2023-10-18 PROCEDURE — 99222 1ST HOSP IP/OBS MODERATE 55: CPT | Performed by: INTERNAL MEDICINE

## 2023-10-18 PROCEDURE — 700111 HCHG RX REV CODE 636 W/ 250 OVERRIDE (IP): Performed by: HOSPITALIST

## 2023-10-18 PROCEDURE — 36415 COLL VENOUS BLD VENIPUNCTURE: CPT

## 2023-10-18 RX ORDER — CYCLOBENZAPRINE HCL 10 MG
10 TABLET ORAL 3 TIMES DAILY PRN
Status: DISCONTINUED | OUTPATIENT
Start: 2023-10-18 | End: 2023-10-20 | Stop reason: HOSPADM

## 2023-10-18 RX ORDER — DOXYCYCLINE 100 MG/1
100 TABLET ORAL EVERY 12 HOURS
Qty: 52 TABLET | Refills: 0 | Status: ACTIVE | OUTPATIENT
Start: 2023-10-20 | End: 2023-11-15

## 2023-10-18 RX ORDER — POLYETHYLENE GLYCOL 3350 17 G/17G
1 POWDER, FOR SOLUTION ORAL
Status: DISCONTINUED | OUTPATIENT
Start: 2023-10-18 | End: 2023-10-20 | Stop reason: HOSPADM

## 2023-10-18 RX ORDER — AMOXICILLIN 250 MG
2 CAPSULE ORAL 2 TIMES DAILY
Status: DISCONTINUED | OUTPATIENT
Start: 2023-10-18 | End: 2023-10-20 | Stop reason: HOSPADM

## 2023-10-18 RX ORDER — BACLOFEN 10 MG/1
10 TABLET ORAL 3 TIMES DAILY
Status: DISCONTINUED | OUTPATIENT
Start: 2023-10-18 | End: 2023-10-20 | Stop reason: HOSPADM

## 2023-10-18 RX ORDER — TOPIRAMATE 25 MG/1
25 TABLET ORAL 2 TIMES DAILY
Status: DISCONTINUED | OUTPATIENT
Start: 2023-10-18 | End: 2023-10-20 | Stop reason: HOSPADM

## 2023-10-18 RX ORDER — BISACODYL 10 MG
10 SUPPOSITORY, RECTAL RECTAL
Status: DISCONTINUED | OUTPATIENT
Start: 2023-10-18 | End: 2023-10-20 | Stop reason: HOSPADM

## 2023-10-18 RX ORDER — BISACODYL 10 MG
10 SUPPOSITORY, RECTAL RECTAL DAILY
Status: DISCONTINUED | OUTPATIENT
Start: 2023-10-18 | End: 2023-10-20 | Stop reason: HOSPADM

## 2023-10-18 RX ORDER — DOXYCYCLINE 100 MG/1
100 TABLET ORAL EVERY 12 HOURS
Status: DISCONTINUED | OUTPATIENT
Start: 2023-10-18 | End: 2023-10-20 | Stop reason: HOSPADM

## 2023-10-18 RX ORDER — SUMATRIPTAN 50 MG/1
100 TABLET, FILM COATED ORAL
Status: DISCONTINUED | OUTPATIENT
Start: 2023-10-18 | End: 2023-10-20 | Stop reason: HOSPADM

## 2023-10-18 RX ORDER — CITALOPRAM 20 MG/1
20 TABLET ORAL DAILY
Status: DISCONTINUED | OUTPATIENT
Start: 2023-10-18 | End: 2023-10-20 | Stop reason: HOSPADM

## 2023-10-18 RX ORDER — OMEPRAZOLE 20 MG/1
40 CAPSULE, DELAYED RELEASE ORAL 2 TIMES DAILY
Status: DISCONTINUED | OUTPATIENT
Start: 2023-10-18 | End: 2023-10-20 | Stop reason: HOSPADM

## 2023-10-18 RX ORDER — MELOXICAM 7.5 MG/1
15 TABLET ORAL DAILY
Status: DISCONTINUED | OUTPATIENT
Start: 2023-10-18 | End: 2023-10-20 | Stop reason: HOSPADM

## 2023-10-18 RX ADMIN — DOCUSATE SODIUM 50 MG AND SENNOSIDES 8.6 MG 2 TABLET: 8.6; 5 TABLET, FILM COATED ORAL at 17:21

## 2023-10-18 RX ADMIN — CITALOPRAM HYDROBROMIDE 20 MG: 20 TABLET ORAL at 09:15

## 2023-10-18 RX ADMIN — SUMATRIPTAN SUCCINATE 100 MG: 50 TABLET ORAL at 12:09

## 2023-10-18 RX ADMIN — BACLOFEN 10 MG: 10 TABLET ORAL at 12:10

## 2023-10-18 RX ADMIN — ENOXAPARIN SODIUM 40 MG: 100 INJECTION SUBCUTANEOUS at 17:22

## 2023-10-18 RX ADMIN — TOPIRAMATE 25 MG: 25 TABLET, FILM COATED ORAL at 09:15

## 2023-10-18 RX ADMIN — TOPIRAMATE 25 MG: 25 TABLET, FILM COATED ORAL at 17:22

## 2023-10-18 RX ADMIN — OXYCODONE HYDROCHLORIDE 10 MG: 10 TABLET ORAL at 21:42

## 2023-10-18 RX ADMIN — DOCUSATE SODIUM 50 MG AND SENNOSIDES 8.6 MG 2 TABLET: 8.6; 5 TABLET, FILM COATED ORAL at 09:15

## 2023-10-18 RX ADMIN — DOXYCYCLINE 100 MG: 100 TABLET, FILM COATED ORAL at 17:22

## 2023-10-18 RX ADMIN — BACLOFEN 10 MG: 10 TABLET ORAL at 17:22

## 2023-10-18 RX ADMIN — OXYCODONE HYDROCHLORIDE 10 MG: 10 TABLET ORAL at 17:21

## 2023-10-18 RX ADMIN — VANCOMYCIN HYDROCHLORIDE 1000 MG: 5 INJECTION, POWDER, LYOPHILIZED, FOR SOLUTION INTRAVENOUS at 09:14

## 2023-10-18 RX ADMIN — OXYCODONE HYDROCHLORIDE 10 MG: 10 TABLET ORAL at 09:15

## 2023-10-18 RX ADMIN — OXYCODONE HYDROCHLORIDE 10 MG: 10 TABLET ORAL at 03:29

## 2023-10-18 RX ADMIN — BACLOFEN 10 MG: 10 TABLET ORAL at 09:15

## 2023-10-18 RX ADMIN — OXYCODONE HYDROCHLORIDE 10 MG: 10 TABLET ORAL at 12:11

## 2023-10-18 RX ADMIN — BISACODYL 10 MG: 10 SUPPOSITORY RECTAL at 13:26

## 2023-10-18 RX ADMIN — OMEPRAZOLE 40 MG: 20 CAPSULE, DELAYED RELEASE ORAL at 09:15

## 2023-10-18 RX ADMIN — OMEPRAZOLE 40 MG: 20 CAPSULE, DELAYED RELEASE ORAL at 17:22

## 2023-10-18 RX ADMIN — MELOXICAM 15 MG: 7.5 TABLET ORAL at 09:15

## 2023-10-18 ASSESSMENT — COGNITIVE AND FUNCTIONAL STATUS - GENERAL
SUGGESTED CMS G CODE MODIFIER DAILY ACTIVITY: CM
TURNING FROM BACK TO SIDE WHILE IN FLAT BAD: UNABLE
MOVING FROM LYING ON BACK TO SITTING ON SIDE OF FLAT BED: UNABLE
MOVING TO AND FROM BED TO CHAIR: UNABLE
PERSONAL GROOMING: A LOT
MOBILITY SCORE: 6
WALKING IN HOSPITAL ROOM: TOTAL
HELP NEEDED FOR BATHING: TOTAL
EATING MEALS: A LOT
TOILETING: TOTAL
DRESSING REGULAR LOWER BODY CLOTHING: TOTAL
CLIMB 3 TO 5 STEPS WITH RAILING: TOTAL
STANDING UP FROM CHAIR USING ARMS: TOTAL
SUGGESTED CMS G CODE MODIFIER MOBILITY: CN
DRESSING REGULAR UPPER BODY CLOTHING: TOTAL
DAILY ACTIVITIY SCORE: 8

## 2023-10-18 ASSESSMENT — LIFESTYLE VARIABLES
HAVE PEOPLE ANNOYED YOU BY CRITICIZING YOUR DRINKING: NO
DOES PATIENT WANT TO STOP DRINKING: NO
EVER HAD A DRINK FIRST THING IN THE MORNING TO STEADY YOUR NERVES TO GET RID OF A HANGOVER: NO
CONSUMPTION TOTAL: NEGATIVE
TOTAL SCORE: 0
AVERAGE NUMBER OF DAYS PER WEEK YOU HAVE A DRINK CONTAINING ALCOHOL: 0
HAVE YOU EVER FELT YOU SHOULD CUT DOWN ON YOUR DRINKING: NO
ALCOHOL_USE: NO
ON A TYPICAL DAY WHEN YOU DRINK ALCOHOL HOW MANY DRINKS DO YOU HAVE: 0
EVER FELT BAD OR GUILTY ABOUT YOUR DRINKING: NO
TOTAL SCORE: 0
TOTAL SCORE: 0
HOW MANY TIMES IN THE PAST YEAR HAVE YOU HAD 5 OR MORE DRINKS IN A DAY: 0

## 2023-10-18 ASSESSMENT — PATIENT HEALTH QUESTIONNAIRE - PHQ9
SUM OF ALL RESPONSES TO PHQ9 QUESTIONS 1 AND 2: 0
2. FEELING DOWN, DEPRESSED, IRRITABLE, OR HOPELESS: NOT AT ALL
1. LITTLE INTEREST OR PLEASURE IN DOING THINGS: NOT AT ALL

## 2023-10-18 ASSESSMENT — PAIN DESCRIPTION - PAIN TYPE
TYPE: ACUTE PAIN

## 2023-10-18 NOTE — ED NOTES
Bedside report received from off going RN/tech: Hailey, assumed care of patient.  POC discussed with patient. Call light within reach, all needs addressed at this time.       Fall risk interventions in place: Patient's personal possessions are with in their safe reach, Place socks on patient, Place fall risk sign on patient's door, and Keep floor surfaces clean and dry (all applicable per [place] risk assessment)   Continuous monitoring: Pulse Ox or Blood Pressure  IVF/IV medications: Not Applicable   Oxygen: Room Air  Bedside sitter: Not Applicable   Isolation: Not Applicable

## 2023-10-18 NOTE — WOUND TEAM
[facility]  Inpatient Services  Initial Wound and Skin Care Evaluation    Admission Date: 10/17/2023     Last order of IP CONSULT TO WOUND CARE was found on 10/18/2023 from Hospital Encounter on 10/17/2023     HPI, PMH, SH: Reviewed    Past Surgical History:   Procedure Laterality Date    IRRIGATION & DEBRIDEMENT GENERAL Right 10/3/2023    Procedure: IRRIGATION AND DEBRIDEMENT, WOUND;  Surgeon: Omar Simpson M.D.;  Location: [facility];  Service: Orthopedics    WI UPPER GI ENDOSCOPY,DIAGNOSIS N/A 3/16/2023    Procedure: GASTROSCOPY;  Surgeon: Evelin Bautista M.D.;  Location: SURGERY SAME DAY [facility];  Service: Gastroenterology    WI UPPER GI ENDOSCOPY,BIOPSY N/A 3/16/2023    Procedure: GASTROSCOPY, WITH BIOPSY;  Surgeon: Evelin Bautista M.D.;  Location: SURGERY SAME DAY [facility];  Service: Gastroenterology    PUMP INSERT/REMOVE Left 7/1/2016    Procedure: PUMP REMOVAL;  Surgeon: Pari Roberson M.D.;  Location: [facility];  Service:     CATH PLACE PERM EPIDURAL Left 6/14/2016    Procedure: CATH PLACE PERM EPIDURAL - BACLOFEN PUMP AND CATHETER REPLACEMENT  - BACK AND ABDOMEN;  Surgeon: Pari Roberson M.D.;  Location: [facility];  Service:     WI BACLOFEN INTRATHECAL TRIAL  3/8/2016    Dr. Roberson  [facility]    CATH PLACE PERM EPIDURAL N/A 3/8/2016    Procedure: BACLOFEN PUMP TRIAL;  Surgeon: Pari Roberson M.D.;  Location: [facility];  Service:     PUMP REVISION  10/8/2013    Performed by Pari Roberson M.D. at [facility]    PUMP INSERT/REMOVE  3/12/2013    Performed by Pari Roberson M.D. at [facility]    CATH PLACE PERM EPIDURAL  6/26/2012    Performed by PARI ROBERSON at [facility]    CATH PLACE PERM EPIDURAL  3/6/2012    Performed by PARI ROBERSON at [facility]    MAMMOPLASTY AUGMENTATION  2002    TONSILLECTOMY       Social History     Tobacco Use    Smoking status: Former      Current packs/day: 0.00     Average packs/day: 1 pack/day for 12.0 years (12.0 ttl pk-yrs)     Types: Cigarettes     Start date: 1/1/1996     Quit date: 1/1/2008     Years since quitting: 15.8    Smokeless tobacco: Never    Tobacco comments:     Started smoking at age 11   Substance Use Topics    Alcohol use: No     Chief Complaint   Patient presents with    Arm Pain    Wound Infection     Diagnosis: Septic bursitis of elbow, right [M71.121]    Unit where seen by Wound Team: T417/00     WOUND CONSULT RELATED TO:  Right Elbow    WOUND TEAM PLAN OF CARE - Frequency of Follow-up:   Nursing to follow dressing orders written for wound care. Contact wound team if area fails to progress, deteriorates or with any questions/concerns if something comes up before next scheduled follow up (See below as to whether wound is following and frequency of wound follow up)   NPWT change 3 times weekly - MWF    WOUND HISTORY:   Pt is a 38yr old female with history of deisseminated encephalomyelitis, as well as GI Bleed. Pt presented to ER with arm pain and wound infection. Pt underwent a right elbow olecranon bursectomy on 10/3/23 and was subsequently discharged home on PO ABX. Unfortunately surgical site dehisced with pustular drainage noted. Pt was evaluated by ortho and wound team was consulted to place a veraflo wound vac.       WOUND ASSESSMENT/LDA  Wound 10/03/23 Full Thickness Wound Open Incision Elbow Right (Active)   Date First Assessed/Time First Assessed: 10/03/23 1612   Primary Wound Type: Full Thickness Wound  Surgical Wound Type: Open Incision  Location: Elbow  Laterality: Right      Assessments 10/18/2023  1:00 PM   Wound Image       Site Assessment Red;Yellow   Periwound Assessment Clean;Intact;Dry   Margins Attached edges;Defined edges   Closure Secondary intention   Drainage Amount Small   Drainage Description Serosanguineous   Treatments Cleansed;Nonselective debridement;Site care;Offloading   Wound Cleansing  Approved Wound Cleanser   Periwound Protectant No-sting Skin Prep;Drape;Paste Ring   Dressing Status Clean;Dry;Intact   Dressing Changed Changed   Dressing Cleansing/Solutions Normal Saline   Dressing Options Wound Vac;Offloading Dressing - Heel   Dressing Change/Treatment Frequency Monday, Wednesday, Friday, and As Needed   NEXT Dressing Change/Treatment Date 10/20/23   NEXT Weekly Photo (Inpatient Only) 10/25/23   Wound Team Following 3x Weekly   Non-staged Wound Description Full thickness   Wound Length (cm) 3 cm   Wound Width (cm) 3 cm   Wound Depth (cm) 0.5 cm   Wound Surface Area (cm^2) 9 cm^2   Wound Volume (cm^3) 4.5 cm^3   Undermining (cm) 3.4 cm   Undermining of Wound, 1st Location From 1 o'clock;To 12 o'clock   Shape Circular   Wound Odor None   WOUND NURSE ONLY - Time Spent with Patient (mins) 60       Negative Pressure Wound Therapy 10/18/23 Elbow Right (Active)   Placement Date/Time: 10/18/23 1319   Location: Elbow  Laterality: Right      Assessments 10/18/2023  1:00 PM   Wound Image     NPWT Pump Mode / Pressure Setting Ulta   Dressing Type Small;Black Foam (Veraflo)   Number of Foam Pieces Used 2   Canister Changed Yes   NEXT Dressing Change/Treatment Date 10/20/23   VAC VeraFlo Irrigant Normal Saline   VAC VeraFlo Soak Time (mins) 6   VAC VeraFlo Instill Volume (ml) 14   VAC VeraFlo - Therapy Time (hrs) 2.5        Vascular:    JUNIE:   No results found.    Lab Values:    Lab Results   Component Value Date/Time    WBC 6.8 10/18/2023 03:58 AM    RBC 4.06 (L) 10/18/2023 03:58 AM    HEMOGLOBIN 12.3 10/18/2023 03:58 AM    HEMATOCRIT 39.3 10/18/2023 03:58 AM    CREACTPROT 2.35 (H) 10/17/2023 06:55 PM    SEDRATEWES 22 10/17/2023 06:55 PM         Culture Results show:  Recent Results (from the past 720 hour(s))   CULTURE WOUND W/ GRAM STAIN    Collection Time: 10/01/23 12:45 AM    Specimen: Abscess; Wound   Result Value Ref Range    Significant Indicator POS (POS)     Source WND     Site Right Elbow Abscess      Culture Result - (A)     Gram Stain Result Few WBCs.  No organisms seen.       Culture Result (A)      Methicillin Resistant Staphylococcus aureus  Light growth         Susceptibility    Methicillin resistant staphylococcus aureus - CHAD     Azithromycin >4 Resistant mcg/mL     Clindamycin 0.5 Sensitive mcg/mL     Cefazolin <=8 Resistant mcg/mL     Cefepime 8 Resistant mcg/mL     Daptomycin 1 Sensitive mcg/mL     Ampicillin/sulbactam <=8/4 Resistant mcg/mL     Erythromycin >4 Resistant mcg/mL     Vancomycin 1 Sensitive mcg/mL     Oxacillin >2 Resistant mcg/mL     Trimeth/Sulfa <=0.5/9.5 Sensitive mcg/mL     Tetracycline <=4 Sensitive mcg/mL       Pain Level/Medicated:  None, Tolerated without pain medication       INTERVENTIONS BY WOUND TEAM:  Chart and images reviewed. Discussed with bedside RN. All areas of concern (based on picture review, LDA review and discussion with bedside RN) have been thoroughly assessed. Documentation of areas based on significant findings. This RN in to assess patient. Performed standard wound care which includes appropriate positioning, dressing removal and non-selective debridement. Pictures and measurements obtained weekly if/when required.    Wound:  Right Elbow  Preparation for Dressing removal: Removed without difficulty  Cleansed/Non-selectively Debrided with:  Wound cleanser and Gauze  Shena wound: Cleansed with Wound cleanser and Gauze, Prepped with No Sting, Paste Rings, and Drape  Primary Dressing:  One continuous piece of half thickness spiraled black veraflo foam applied into wound bed and secured with drape.  Secondary (Outer) Dressing: a hole was cut in drape and a 2nd piece of half thickness circular black foam was applied as a button for trac pad. Trac pad applied and fill assist used for veraflo settings. Fenestrated heel offloading dressing applied.     Advanced Wound Care Discharge Planning  Number of Clinicians necessary to complete wound care: 1  Is patient  requiring IV pain medications for dressing changes:  No   Length of time for dressing change 30 min. (This does not include chart review, pre-medication time, set up, clean up or time spent charting.)    Interdisciplinary consultation: Patient, Bedside RN (Sol), Angle HARDING (Wound RN).  Pressure injury and staging reviewed with N/A.    EVALUATION / RATIONALE FOR TREATMENT:     Date:  10/18/23  Wound Status:  Initial evaluation    Shena-wound is red. Wound has circumferential undermining with greatest depth at 12 O'Clock. Veraflo applied to cleanse and debride. Pt will need home wound vac therapy and home health care for dressing changes 3x weekly. Will plan to change again on Friday or Sunday pending staff availability.         Goals: Steady decrease in wound area and depth weekly.    NURSING PLAN OF CARE ORDERS:  Dressing changes: See Dressing Care orders    NUTRITION RECOMMENDATIONS   Wound Team Recommendations:  N/A    DIET ORDERS (From admission to next 24h)       Start     Ordered    10/18/23 1035  Diet Order Diet: Regular  ALL MEALS        Question:  Diet:  Answer:  Regular    10/18/23 1034                    PREVENTATIVE INTERVENTIONS:    Q shift Greg - performed per nursing policy  Q shift pressure point assessments - performed per nursing policy    Surface/Positioning  Standard/trauma mattress - Currently in Place  TAPs Turning system - Currently in Place  Waffle overlay  - Ordered  Z Isabel Pillow - Ordered    Offloading/Redistribution  Sacral offloading dressing (Silicone dressing) - Applied this Visit  Heel offloading dressing (Silicone dressing) - Applied this Visit  Float Heels off Bed with Pillows - Applied this Visit           Containment/Moisture Prevention    Dri-isabel pad - Currently in Place  Diapered - Currently in Place    Mobilization      Unable to assess     Anticipated discharge plans:  Home Health Care        Vac Discharge Needs:  Vac Discharge plan is purely a recommendation from wound team and  not a requirement for discharge unless otherwise stated by physician.  Veraflo Vac while inpatient, ok to transition to Regular Vac on discharge

## 2023-10-18 NOTE — FACE TO FACE
Face to Face Note  -  Durable Medical Equipment    Maria Elena Barbosa D.O. - NPI: [id number]  I certify that this patient is under my care and that they have had a durable medical equipment(DME)face to face encounter by myself that meets the physician DME face-to-face encounter requirements with this patient on:    Date of encounter:   Patient:                    MRN:                       YOB: 2025  Griselda Swanson  [id number]  1985     The encounter with the patient was in whole, or in part, for the following medical condition, which is the primary reason for durable medical equipment:  Wound Care and Post-Op Surgery    I certify that, based on my findings, the following durable medical equipment is medically necessary:  Wound Vac.        ------------------------------------------------------------------------------------------------------------------    Face to Face Supporting Documentation - Home Health    The encounter with this patient was in whole or in part the primary reason for home health admission.    Date of encounter:   Patient:                    MRN:                       YOB: 2025  Griselda Swanson  [id number]  1985     Home health to see patient for:  Skilled Nursing care for assessment, interventions & education, Wound Care, and Home health aide    Skilled need for:  Exacerbation of Chronic Disease State septic bursitis with wound dehiscence    Skilled nursing interventions to include:  Home IV infusion therapy, Wound Care, Line/Drain/Airway education and care, and Comment: HH    Homebound evidenced status by:  Need the aid of supportive devices such as crutches, canes, wheelchairs or walkers. Leaving home must require a considerable and taxing effort. There must exist a normal inability to leave the home.    Community Physician to provide follow up care: Margo Cook P.A.-C.     Optional Interventions    Wound information & treatment:     Home Infusion Therapy orders:    Line/Drain/Airway:    I certify the face to face encounter for this home care referral meets the CMS requirements and the encounter/clinical assessment with the patient was, in whole, or in part, for the medical condition(s) listed above, which is the primary reason for home health care. Based on my clinical findings: the service(s) are medically necessary, support the need for home health care, and the homebound criteria are met.  I certify that this patient has had a face to face encounter by myself.  Maria Elena Barbosa D.O. - NPI: [id number]    *Debility, frailty and advanced age in the absence of an acute deterioration or exacerbation of a condition do not qualify a patient for home health.

## 2023-10-18 NOTE — ED NOTES
Med rec updated and complete. Allergies reviewed. Confirmed name and date of birth with pt ; via pts Ipad. Placed call to family to verify mediations. Left messages.  Placed call to michelle ( 24)  to confirm current prescription medications.   Unable to verify last doses taken..  Pt has an active  prescription for cefuroxime .  Start date 10/13/23 for a 5 days.    Home pharmacy   [facility]  [phone]

## 2023-10-18 NOTE — PROGRESS NOTES
"Pharmacy Vancomycin Kinetics Note for 10/18/2023     38 y.o. female on Vancomycin day # 1     Vancomycin Indication (AUC Dosing): Skin/skin structure infection    Provider specified end date: 10/24/23    Active Antibiotics (From admission, onward)      Ordered     Ordering Provider       Wed Oct 18, 2023  1:24 AM    10/18/23 0124  vancomycin (Vancocin) 1,000 mg in  mL IVPB  (vancomycin (VANCOCIN) IV (LD + Maintenance))  EVERY 12 HOURS         Patria Clayton M.D.       lora Oct 17, 2023  9:11 PM    10/17/23 2111  MD Alert...Vancomycin per Pharmacy  PHARMACY TO DOSE        Question:  Indication(s) for vancomycin?  Answer:  Skin and soft tissue infection    Patria Clayton M.D.       lora Oct 17, 2023  8:25 PM    10/17/23 2025  vancomycin (Vancocin) 1,750 mg in  mL IVPB  (vancomycin (VANCOCIN) IV (LD + Maintenance))  ONCE         Dino Hamilton M.D.            Dosing Weight: 67.1 kg (147 lb 14.9 oz)      Admission History: Admitted on 10/17/2023 for Septic bursitis of elbow, right [M71.121]  Pertinent history: Patient presents with right elbow wound following recent bursectomy. Surgical cultures positive for MRSA, no improvement despite oral antibiotics.    Allergies:     Fentanyl and Sulfamethoxazole w-trimethoprim     Pertinent cultures to date:     Results       Procedure Component Value Units Date/Time    Blood Culture [[id number]] Collected: 10/17/23 2041    Order Status: Sent Specimen: Blood from Peripheral Updated: 10/17/23 2105    Narrative:      2 of 2 blood culture x2  Sites order. Per Hospital Policy:  Only change Specimen Src: to \"Line\" if specified by physician  order.  Release to patient->Immediate    Blood Culture [[id number]] Collected: 10/17/23 1855    Order Status: Sent Specimen: Blood from Peripheral Updated: 10/17/23 1917    Narrative:      1 of 2 for Blood Culture x 2 sites order. Per Hospital  Policy: Only change Specimen Src: to \"Line\" if specified by  physician order.  Release to " "patient->Immediate            Labs:     Estimated Creatinine Clearance: 165.3 mL/min (A) (by C-G formula based on SCr of 0.44 mg/dL (L)).  Recent Labs     10/17/23  185   WBC 9.1   NEUTSPOLYS 79.60*     Recent Labs     10/17/23  1855   BUN 9   CREATININE 0.44*   ALBUMIN 4.1     No intake or output data in the 24 hours ending 10/18/23 0124   /65   Pulse (!) 104   Temp 36.7 °C (98.1 °F) (Temporal)   Resp 20   Ht 1.702 m (5' 7\")   Wt 67.1 kg (148 lb)   SpO2 95%  Temp (24hrs), Av °C (98.6 °F), Min:36.5 °C (97.7 °F), Max:37.8 °C (100 °F)      List concerns for Vancomycin clearance:     BUN/Scr ratio greater than 20:1    Pharmacokinetics:     AUC kinetics:   Ke (hr ^-1): 0.098 hr^-1  Half life: 7.07 hr  Clearance: 4.274  Estimated TDD: 2137  Estimated Dose: 810  Estimated interval: 9.1      A/P:     -  Vancomycin dose: Give 1750 mg IV loading dose followed by 1000 mg IV q12h scheduled dosing    -  Next vancomycin level(s): TBD    -  Predicted vancomycin AUC from initial AUC test calculator: 468 mg·hr/L    -  Comments: Therapy with vancomycin initiated empirically for SSTI. Anticipate patient will tolerate AUC based dosing with regimen outlined above. Will plan to check levels when patient closer to steady state in order to ensure appropriate clearance. Recommend de-escalation if MRSA can be ruled out.  Pharmacy will continue to follow.      Ernie Friedman PharmD, BCPS   "

## 2023-10-18 NOTE — PROGRESS NOTES
Report received from previous shift RN.  Assessment complete.  Patient is non-verbal, communicates with Ipad and thumbs/thumbs down.   Patient resting in bed comfortably, even and unlabored breathing present.  Right elbow wound with dressing in place, CDI.  TAPS and Q2H turns in place.   All needs met at this time. Call light within reach. Hourly rounding in place.

## 2023-10-18 NOTE — ED PROVIDER NOTES
ED Provider Note    CHIEF COMPLAINT  Chief Complaint   Patient presents with    Arm Pain    Wound Infection       EXTERNAL RECORDS REVIEWED  Reviewed recent operative records, laboratory studies and orthopedic urgent care notes from today    HPI/ROS  LIMITATION TO HISTORY   Patient is nonverbal uses iPad to communicate  OUTSIDE HISTORIAN(S):  Reviewed orthopedic urgent care records previous admission records    Griselda Swanson is a 38 y.o. female who presents evaluation of fever elevated heart rate opening of wound on the right elbow and concern for infection.  The patient has a complex medical history as listed below including acute disseminated encephalitis which has caused her to be nonverbal.  She developed a right-sided septic bursitis that grew out MRSA on the right elbow.  She was admitted at this facility earlier this month and had multiple orthopedic washouts and [facility].  She presented there with fever elevated heart rate and evidence of a dehisced wound with purulent material and exposed joint.  The felt that she would require additional IV antibiotics surgical consultation and admission to the hospital and called an ambulance to take her here.  The patient is nonverbal history is obtained from talking to the sister over the phone using the patient's iPad to communicate as well as chart review.    PAST MEDICAL HISTORY   has a past medical history of ADEM (acute disseminated encephalomyelitis), Back pain, Breath shortness, C. difficile colitis (2015), Coma ([facility]) (August 2009), Coma ([facility]) (2008), Demyelinating disorder ([facility]), Encephalitis (2009), Heart burn, Indigestion, Lesion of brain, MEDICAL HOME, Migraines, MS (multiple sclerosis) ([facility]), Other specified disorder of intestines, Pain, Psychiatric problem, Renal disorder, and Urinary incontinence.    SURGICAL HISTORY   has a past surgical history that includes tonsillectomy; cath place perm epidural (3/6/2012); cath  place perm epidural (6/26/2012); pump insert/remove (3/12/2013); mammoplasty augmentation (2002); pump revision (10/8/2013); baclofen intrathecal trial (3/8/2016); cath place perm epidural (N/A, 3/8/2016); cath place perm epidural (Left, 6/14/2016); pump insert/remove (Left, 7/1/2016); upper gi endoscopy,diagnosis (N/A, 3/16/2023); upper gi endoscopy,biopsy (N/A, 3/16/2023); and irrigation & debridement general (Right, 10/3/2023).    FAMILY HISTORY  Family History   Problem Relation Age of Onset    Cancer Mother         Sarcoma    Bipolar disorder Brother     Other Brother         spina bifida    Diabetes Maternal Grandmother     Other Daughter         Migrains       SOCIAL HISTORY  Social History     Tobacco Use    Smoking status: Former     Current packs/day: 0.00     Average packs/day: 1 pack/day for 12.0 years (12.0 ttl pk-yrs)     Types: Cigarettes     Start date: 1/1/1996     Quit date: 1/1/2008     Years since quitting: 15.8    Smokeless tobacco: Never    Tobacco comments:     Started smoking at age 11   Vaping Use    Vaping Use: Never used   Substance and Sexual Activity    Alcohol use: No    Drug use: No    Sexual activity: Never     Partners: Male       CURRENT MEDICATIONS  Home Medications       Reviewed by Hailey Roman R.N. (Registered Nurse) on 10/17/23 at 1850  Med List Status: <None>     Medication Last Dose Status   baclofen (LIORESAL) 10 MG Tab  Active   bisacodyl (DULCOLAX) 10 MG Suppos  Active   cefUROXime (CEFTIN) 250 MG Tab  Active   citalopram (CELEXA) 20 MG Tab  Active   cyclobenzaprine (FLEXERIL) 10 mg Tab  Active   docusate sodium (COLACE) 100 MG Cap  Active   lactulose 10 GM/15ML Solution  Active   linaCLOtide 145 MCG Cap  Active   meloxicam (MOBIC) 15 MG tablet  Active   Misc. Devices Misc  Active   Misc. Devices Misc  Active   Misc. Devices Misc  Active   Misc. Devices Misc  Active   Misc. Devices Misc  Active   Misc. Devices Misc  Active   Multiple Vitamins-Minerals (MULTIVITAMIN  "GUMMIES ADULTS PO)  Active   omeprazole (PRILOSEC) 40 MG delayed-release capsule  Active   oxyCODONE-acetaminophen (PERCOCET) 5-325 MG Tab  Active   polyethylene glycol 3350 (MIRALAX) 17 GM/SCOOP Powder  Active   simethicone (MYLICON) 125 MG chewable tablet  Active   sumatriptan (IMITREX) 100 MG tablet  Active   SUMAtriptan Succinate 6 MG/0.5ML Solution Auto-injector  Active   topiramate (TOPAMAX) 25 MG Tab  Active   vitamin D (CHOLECALCIFEROL) 1000 UNIT Tab  Active                    ALLERGIES  Allergies   Allergen Reactions    Bactrim      Reaction unknown    Fentanyl      Makes her agressive       PHYSICAL EXAM  VITAL SIGNS: /62   Pulse (!) 111   Temp 37.8 °C (100 °F) (Oral)   Resp 18   Ht 1.702 m (5' 7\")   Wt 67.1 kg (148 lb)   SpO2 97%   BMI 23.18 kg/m²    Pulse ox interpretation: I interpret this pulse ox as normal.  Constitutional: Patient appears chronically ill   HEENT: Atraumatic normocephalic, pupils are equal round reactive to light extraocular movements are intact. The nares is clear, external ears are normal, mouth shows moist mucous membranes normal dentition for age  Neck: Supple, no JVD no tracheal deviation  Cardiovascular: Mild tachycardia no murmur rub or gallop 2+ pulses peripherally x4  Thorax & Lungs: No respiratory distress, no wheezes rales or rhonchi, No chest tenderness.   GI: Soft nontender nondistended positive bowel sounds, no peritoneal signs  Skin: Warm dry no acute rash or lesion  Musculoskeletal: Right upper extremity is notable for a large open wound overlying the lateral aspect of the elbow.  There is dehiscence and erythema and purulent material around the open wound      Neurologic: Cranial nerves III through XII are grossly intact no sensory deficit no cerebellar dysfunction   Psychiatric: Appropriate affect for situation at this time          DIAGNOSTIC STUDIES / PROCEDURES      LABS  Results for orders placed or performed during the hospital encounter of 10/17/23 "   CBC With Differential   Result Value Ref Range    WBC 9.1 4.8 - 10.8 K/uL    RBC 4.17 (L) 4.20 - 5.40 M/uL    Hemoglobin 12.8 12.0 - 16.0 g/dL    Hematocrit 39.9 37.0 - 47.0 %    MCV 95.7 81.4 - 97.8 fL    MCH 30.7 27.0 - 33.0 pg    MCHC 32.1 (L) 32.2 - 35.5 g/dL    RDW 46.0 35.9 - 50.0 fL    Platelet Count 303 164 - 446 K/uL    MPV 9.4 9.0 - 12.9 fL    Neutrophils-Polys 79.60 (H) 44.00 - 72.00 %    Lymphocytes 14.70 (L) 22.00 - 41.00 %    Monocytes 4.40 0.00 - 13.40 %    Eosinophils 0.10 0.00 - 6.90 %    Basophils 1.00 0.00 - 1.80 %    Immature Granulocytes 0.20 0.00 - 0.90 %    Nucleated RBC 0.00 0.00 - 0.20 /100 WBC    Neutrophils (Absolute) 7.23 1.82 - 7.42 K/uL    Lymphs (Absolute) 1.34 1.00 - 4.80 K/uL    Monos (Absolute) 0.40 0.00 - 0.85 K/uL    Eos (Absolute) 0.01 0.00 - 0.51 K/uL    Baso (Absolute) 0.09 0.00 - 0.12 K/uL    Immature Granulocytes (abs) 0.02 0.00 - 0.11 K/uL    NRBC (Absolute) 0.00 K/uL   Comp Metabolic Panel   Result Value Ref Range    Sodium 142 135 - 145 mmol/L    Potassium 3.8 3.6 - 5.5 mmol/L    Chloride 106 96 - 112 mmol/L    Co2 24 20 - 33 mmol/L    Anion Gap 12.0 7.0 - 16.0    Glucose 83 65 - 99 mg/dL    Bun 9 8 - 22 mg/dL    Creatinine 0.44 (L) 0.50 - 1.40 mg/dL    Calcium 9.1 8.5 - 10.5 mg/dL    Correct Calcium 9.0 8.5 - 10.5 mg/dL    AST(SGOT) 18 12 - 45 U/L    ALT(SGPT) 19 2 - 50 U/L    Alkaline Phosphatase 72 30 - 99 U/L    Total Bilirubin 0.6 0.1 - 1.5 mg/dL    Albumin 4.1 3.2 - 4.9 g/dL    Total Protein 7.2 6.0 - 8.2 g/dL    Globulin 3.1 1.9 - 3.5 g/dL    A-G Ratio 1.3 g/dL   Lactic Acid   Result Value Ref Range    Lactic Acid 0.9 0.5 - 2.0 mmol/L   C Reactive Protein Quantitative (Non-Cardiac)   Result Value Ref Range    Stat C-Reactive Protein 2.35 (H) 0.00 - 0.75 mg/dL   ESTIMATED GFR   Result Value Ref Range    GFR (CKD-EPI) 125 >60 mL/min/1.73 m 2         RADIOLOGY    None required  COURSE & MEDICAL DECISION MAKING    ED Observation Status? No; Patient does not meet  criteria for ED Observation.     INITIAL ASSESSMENT, COURSE AND PLAN  Care Narrative:     This is a very pleasant 38-year-old female who was referred over from the orthopedic clinic for an open wound overlying the right elbow.  There peers to be exposed joint of the right elbow with dehiscence of surgical wound there is also some surrounding erythema.  She had low-grade fever and tachycardia therefore septic protocols initiated.  There does not appear to be clinical evidence of sepsis as her lactic acid is reassuring white blood cell count is normal and she has no hypotension.  Consultation with Dr. Schmitt with orthopedics has been obtained.  The hospitalist will admit the patient.  I have ordered empiric vancomycin as well as pain medication and nausea medication      ADDITIONAL PROBLEM LIST    DISPOSITION AND DISCUSSIONS  I have discussed management of the patient with the following physicians and MAGY's: Discussed with admitting hospitalist as well as orthopedics    Discussion of management with other QHP or appropriate source(s): Discussed with ER pharmacist regarding antibiotic therapy    Escalation of care considered, and ultimately not performed: Considered ICU admission    Barriers to care at this time, including but not limited to: None    Decision tools and prescription drugs considered including, but not limited to: None    FINAL DIAGNOSIS  Olecranon cellulitis with wound dehiscence  Right elbow cellulitis       Electronically signed by: Dino Hamilton M.D., 10/17/2023 7:02 PM

## 2023-10-18 NOTE — CARE PLAN
The patient is Stable - Low risk of patient condition declining or worsening    Shift Goals  Clinical Goals: pain control, skin integrity  Patient Goals: Pain control and rest    Progress made toward(s) clinical / shift goals:  pain managed per MAR. Patient repositioned q2H. TAPS in place.      Problem: Pain - Standard  Goal: Alleviation of pain or a reduction in pain to the patient’s comfort goal  Description: Target End Date:  Prior to discharge or change in level of care    Document on Vitals flowsheet    1.  Document pain using the appropriate pain scale per order or unit policy  2.  Educate and implement non-pharmacologic comfort measures (i.e. relaxation, distraction, massage, cold/heat therapy, etc.)  3.  Pain management medications as ordered  4.  Reassess pain after pain med administration per policy  5.  If opiods administered assess patient's response to pain medication is appropriate per POSS sedation scale  6.  Follow pain management plan developed in collaboration with patient and interdisciplinary team (including palliative care or pain specialists if applicable)  Outcome: Progressing     Problem: Knowledge Deficit - Standard  Goal: Patient and family/care givers will demonstrate understanding of plan of care, disease process/condition, diagnostic tests and medications  Description: Target End Date:  1-3 days or as soon as patient condition allows    Document in Patient Education    1.  Patient and family/caregiver oriented to unit, equipment, visitation policy and means for communicating concern  2.  Complete/review Learning Assessment  3.  Assess knowledge level of disease process/condition, treatment plan, diagnostic tests and medications  4.  Explain disease process/condition, treatment plan, diagnostic tests and medications  Outcome: Progressing

## 2023-10-18 NOTE — DISCHARGE PLANNING
Care Transition Team Assessment    Referral sent to [facility] for Wound Vac on 10/18/2023. It was delivered today at 12 noon. I spoke with Patient's Sister Emi regarding transport time for tomorrow at 230 pm. The Caregiver will be at the house upon arrival. Referral has been made to [facility]. Patient has 2 established caregivers. Josiah will pick Patient up tomorrow at 2:30 pm.    Information Source Patient's Sister Emi  Orientation Level: Oriented X4  Informant's Name: Emi         Elopement Risk No  Legal Hold: No  Ambulatory or Self Mobile in Wheelchair: No-Not an Elopement Risk  Elopement Risk: Not at Risk for Elopement                        Vision / Hearing Impairment  Vision Impairment : Yes  Right Eye Vision: Impaired, Wears Glasses  Left Eye Vision: Impaired, Wears Glasses  Hearing Impairment : No              Domestic Abuse  Have you ever been the victim of abuse or violence?: No  Physical Abuse or Sexual Abuse: No  Verbal Abuse or Emotional Abuse: No  Possible Abuse/Neglect Reported to:: Not Applicable

## 2023-10-18 NOTE — PROGRESS NOTES
Case discussed with Dr Schmitt and Dr Simpson.  Prior surgical debridement did not show any communication of the infection to the joint.  The amount of dehiscence with likely continued colonization makes surgical closure not realistic. Ortho recommends veraflo vac treatment to closure.

## 2023-10-18 NOTE — PROGRESS NOTES
Hospital Medicine Daily Progress Note    Date of Service  10/18/2023    Chief Complaint  Griselda Swanson is a 38 y.o. female admitted 10/17/2023 with elbow pain    Hospital Course  This is a 38-year-old female with past medical history of disseminated encephalomyelitis with paralysis, history of C. difficile, and history of GI bleed who was admitted on 10/17/2023 for right elbow wound dehiscence and pain.    Patient had recent admission in late September for septic bursitis of the right elbow requiring bursectomy and I&D 10/3, culture grew MRSA, patient completed 10-day course of vancomycin during hospitalization, and completed the remaining course with doxycycline. Orthopedic surgery consulted, patient is for repeat I&D on 10/19. patient currently on vancomycin.    Interval Problem Update  No need for I&D as per orthopedic surgery.    Wound VAC ordered, patient seen at bedside with wound team, wound VAC applied.  Discussed with wound team to have wound VAC changed on Friday morning.  If wound is healing well, can anticipate discharge home with home health.    Case discussed with infectious disease, plan for prolonged course of oral antibiotics, this will be sent to med to beds.    Patient's sister called, updated on plan of care, all questions answered.    I have discussed this patient's plan of care and discharge plan at IDT rounds today with Case Management, Nursing, Nursing leadership, and other members of the IDT team.    Consultants/Specialty  orthopedics    Code Status  Full Code    Disposition  The patient is not medically cleared for discharge to home or a post-acute facility.  Anticipate discharge to: home with organized home healthcare and close outpatient follow-up    I have placed the appropriate orders for post-discharge needs.    Review of Systems  Review of Systems   All other systems reviewed and are negative.       Physical Exam  Temp:  [36.3 °C (97.3 °F)-37.8 °C (100 °F)] 36.9 °C (98.4  °F)  Pulse:  [] 100  Resp:  [16-20] 16  BP: ()/(56-75) 128/75  SpO2:  [90 %-97 %] 90 %    Physical Exam  Vitals and nursing note reviewed.   Constitutional:       General: She is not in acute distress.  HENT:      Head: Normocephalic and atraumatic.      Mouth/Throat:      Mouth: Mucous membranes are moist.      Pharynx: No oropharyngeal exudate.   Eyes:      Extraocular Movements: Extraocular movements intact.      Pupils: Pupils are equal, round, and reactive to light.   Cardiovascular:      Rate and Rhythm: Normal rate and regular rhythm.      Pulses: Normal pulses.      Heart sounds: No murmur heard.     No friction rub. No gallop.   Pulmonary:      Effort: Pulmonary effort is normal. No respiratory distress.      Breath sounds: No wheezing, rhonchi or rales.   Abdominal:      General: Bowel sounds are normal. There is no distension.      Palpations: Abdomen is soft. There is no mass.      Tenderness: There is no abdominal tenderness.   Musculoskeletal:         General: No swelling or tenderness. Normal range of motion.      Cervical back: Normal range of motion. No rigidity. No muscular tenderness.      Right lower leg: No edema.      Left lower leg: No edema.      Comments: Right elbow, erythematous and with pus emanating from wound   Skin:     General: Skin is warm and dry.      Capillary Refill: Capillary refill takes less than 2 seconds.      Findings: No erythema or rash.   Neurological:      General: No focal deficit present.      Mental Status: She is alert and oriented to person, place, and time.      Motor: No weakness.      Gait: Gait normal.         Fluids    Intake/Output Summary (Last 24 hours) at 10/18/2023 1554  Last data filed at 10/17/2023 2230  Gross per 24 hour   Intake 120 ml   Output --   Net 120 ml       Laboratory  Recent Labs     10/17/23  1855 10/18/23  0358   WBC 9.1 6.8   RBC 4.17* 4.06*   HEMOGLOBIN 12.8 12.3   HEMATOCRIT 39.9 39.3   MCV 95.7 96.8   MCH 30.7 30.3   MCHC  32.1* 31.3*   RDW 46.0 45.9   PLATELETCT 303 269   MPV 9.4 9.5     Recent Labs     10/17/23  1855 10/18/23  0358   SODIUM 142 139   POTASSIUM 3.8 4.0   CHLORIDE 106 106   CO2 24 22   GLUCOSE 83 73   BUN 9 12   CREATININE 0.44* 0.47*   CALCIUM 9.1 8.6                   Imaging  No orders to display        Assessment/Plan  * Septic bursitis of elbow, right- (present on admission)  Assessment & Plan  History of MRSA  Wound VAC ordered, patient seen at bedside with wound team, wound VAC applied.  Discussed with wound team to have wound VAC changed on Friday morning.  If wound is healing well, can anticipate discharge home with home health.    Case discussed with infectious disease, plan for prolonged course of oral antibiotics (doxycycline), this will be sent to [facility] to beds.    Quadriplegia (HCC)  Assessment & Plan  Turn every 2 hours and change positions  Apparently eats normally and does not have a PEG tube    Intractable chronic migraine without aura and without status migrainosus- (present on admission)  Assessment & Plan  Resume home medications    ADEM (acute disseminated encephalomyelitis)- (present on admission)  Assessment & Plan  With a history of quadriplegia  Uses a iPad to communicate         VTE prophylaxis: Lovenox    I have performed a physical exam and reviewed and updated ROS and Plan today (10/18/2023). In review of yesterday's note (10/17/2023), there are no changes except as documented above.

## 2023-10-18 NOTE — PROGRESS NOTES
4 Eyes Skin Assessment Completed by NOHEMI Duran and NOHEMI Santizo.    Head WDL  Ears WDL  Nose WDL  Mouth WDL  Neck WDL  Breast/Chest Redness and Blanching  Shoulder Blades Redness and Blanching  Spine Redness and Blanching  (R) Arm/Elbow/Hand R elbow wound  (L) Arm/Elbow/Hand WDL  Abdomen WDL  Groin WDL  Scrotum/Coccyx/Buttocks Redness and Blanching  (R) Leg Swelling and Abrasion  (L) Leg Swelling  (R) Heel/Foot/Toe Redness, Blanching, and Swelling  (L) Heel/Foot/Toe Redness, Blanching, and Swelling          Devices In Places Blood Pressure Cuff, Pulse Ox, and SCD's      Interventions In Place Heel Mepilex, Sacral Mepilex, TAP System, Pillows, Q2 Turns, Barrier Cream, and Heels Loaded W/Pillows    Possible Skin Injury Yes    Pictures Uploaded Into Epic Yes  Wound Consult Placed N/A  RN Wound Prevention Protocol Ordered No

## 2023-10-18 NOTE — ED TRIAGE NOTES
"Chief Complaint   Patient presents with    Arm Pain    Wound Infection     BIBA for above complaint. PER EMS sister called to bring her to the hospital. Patient is non-verbal at baseline and uses Ipad to translate.   Patient has had a wound infection on her right arm.  Patient had an I&D done on 10/3 on left elbow.   Was seen at [place] today and was told to come to the ED for further workup on arm and that her infection is getting worse.   Blood Pressure: 115/62, Pulse: (!) 111, Respiration: 18, Temperature: 37.8 °C (100 °F), Height: 170.2 cm (5' 7\"), Weight: 67.1 kg (148 lb), BMI (Calculated): 23.18, BSA (Calculated): 1.8, Pulse Oximetry: 97 %, O2 (LPM): 0, O2 Delivery Device: None - Room Air      Sister's Maryellen #: [phone]  "

## 2023-10-18 NOTE — CONSULTS
"INFECTIOUS DISEASES INPATIENT CONSULT NOTE     Date of Service:10/18/2023    Consult Requested By: Maria Elena Barbosa D.O.    Reason for Consultation: MRSA infection elbow    History of Present Illness:   Griselda Swanson is a 38 y.o. female with recent surgery on right elbow for septic bursitis admitted 10/17/2023 for right elbow pain. . Per admit note :\" 38 y.o. female who presented 10/17/2023 with past medical history of disseminated encephalomyelitis, history of C. difficile, GI bleed who comes into the hospital for right elbow wound.  The patient underwent a right elbow olecranon bursectomy on 10/3/2023.  Patient was discharged with doxycycline.  Apparently the surgical site has dehisced and there is pustular drainage from the area.  The surgical cultures grew MRSA.  The patient is nonverbal at baseline and uses iPad to communicate.  She did state that her right elbow was painful and she feels feverish \"  No fever since admission  Pictures reviewed in media from admission-open wound with callus/sutures no necrosis or purulence. No exposed bone or joint. Wound through skin to muscle, possible exposed tendon vs fascia  Erythema improved  Currently on vancomycin  Infectious Diseases consulted for antibiotic recommendation and management  Ortho consulted :\"Case discussed with Dr Schmitt and Dr Simpson.  Prior surgical debridement did not show any communication of the infection to the joint.  The amount of dehiscence with likely continued colonization makes surgical closure not realistic. Ortho recommends veraflo vac treatment to closure.    Seen by ID last admit and per recommendation antibiotics continued through 10/12/2023    Review Of Systems:  Nonverbal    PMH:   Past Medical History:   Diagnosis Date    ADEM (acute disseminated encephalomyelitis)     Back pain     Breath shortness     since 2014 not an issue at this time (4/2018)    C. difficile colitis 2015    Coma ([facility]) August 2009    1 month, lesions on " brain,  unknown etiology    Coma (HCC) 2008    Spontaneous -     Demyelinating disorder (HCC)     demyelinating encephpomyeltis     Encephalitis 2009    Heart burn     Indigestion     Lesion of brain     unknown cuase    MEDICAL HOME     Migraines     MS (multiple sclerosis) ([facility])     Other specified disorder of intestines     constipation    Pain     Psychiatric problem     depression and anxiety    Renal disorder     kidney stones    Urinary incontinence          PSH:  Past Surgical History:   Procedure Laterality Date    IRRIGATION & DEBRIDEMENT GENERAL Right 10/3/2023    Procedure: IRRIGATION AND DEBRIDEMENT, WOUND;  Surgeon: Omar Simpson M.D.;  Location: [facility];  Service: Orthopedics    ID UPPER GI ENDOSCOPY,DIAGNOSIS N/A 3/16/2023    Procedure: GASTROSCOPY;  Surgeon: Evelin Bautista M.D.;  Location: SURGERY SAME DAY [facility];  Service: Gastroenterology    ID UPPER GI ENDOSCOPY,BIOPSY N/A 3/16/2023    Procedure: GASTROSCOPY, WITH BIOPSY;  Surgeon: Evelin Bautista M.D.;  Location: SURGERY SAME DAY [facility];  Service: Gastroenterology    PUMP INSERT/REMOVE Left 7/1/2016    Procedure: PUMP REMOVAL;  Surgeon: Pari Roberson M.D.;  Location: [facility];  Service:     CATH PLACE PERM EPIDURAL Left 6/14/2016    Procedure: CATH PLACE PERM EPIDURAL - BACLOFEN PUMP AND CATHETER REPLACEMENT  - BACK AND ABDOMEN;  Surgeon: Pari Roberson M.D.;  Location: [facility];  Service:     ID BACLOFEN INTRATHECAL TRIAL  3/8/2016    Dr. Roberson  [facility]    CATH PLACE PERM EPIDURAL N/A 3/8/2016    Procedure: BACLOFEN PUMP TRIAL;  Surgeon: Pari Roberson M.D.;  Location: [facility];  Service:     PUMP REVISION  10/8/2013    Performed by Pari Roberson M.D. at [facility]    PUMP INSERT/REMOVE  3/12/2013    Performed by Pari Roberson M.D. at [facility]    CATH PLACE PERM EPIDURAL  6/26/2012    Performed by PARI ROBERSON at [facility]  [facility] ORS    CATH PLACE PERM EPIDURAL  3/6/2012    Performed by PARI ROBERSON at SURGERY [facility] ORS    MAMMOPLASTY AUGMENTATION  2002    TONSILLECTOMY         FAMILY HX:  Family History   Problem Relation Age of Onset    Cancer Mother         Sarcoma    Bipolar disorder Brother     Other Brother         spina bifida    Diabetes Maternal Grandmother     Other Daughter         Migrains       SOCIAL HX:  Social History     Socioeconomic History    Marital status: Single     Spouse name: Not on file    Number of children: Not on file    Years of education: Not on file    Highest education level: Not on file   Occupational History    Not on file   Tobacco Use    Smoking status: Former     Current packs/day: 0.00     Average packs/day: 1 pack/day for 12.0 years (12.0 ttl pk-yrs)     Types: Cigarettes     Start date: 1/1/1996     Quit date: 1/1/2008     Years since quitting: 15.8    Smokeless tobacco: Never    Tobacco comments:     Started smoking at age 11   Vaping Use    Vaping Use: Never used   Substance and Sexual Activity    Alcohol use: No    Drug use: No    Sexual activity: Never     Partners: Male   Other Topics Concern    Primary/coprimary nurse & associates Not Asked    Family contact information Not Asked    OK to release patient information to the following Not Asked    Patient preferred routine/Privacy concerns Not Asked    Patient likes and dislikes Not Asked    Participating In Research Study Not Asked    Miscellaneous Not Asked     Service No    Blood Transfusions No    Caffeine Concern No    Occupational Exposure No    Hobby Hazards No    Sleep Concern Yes    Stress Concern No    Weight Concern No    Special Diet Yes     Comment: Keto    Back Care No    Exercise No    Bike Helmet No    Seat Belt Yes    Self-Exams No   Social History Narrative    Not on file     Social Determinants of Health     Financial Resource Strain: Not on file   Food Insecurity: Not on file   Transportation Needs:  Not on file   Physical Activity: Not on file   Stress: Not on file   Social Connections: Not on file   Intimate Partner Violence: Not on file   Housing Stability: Not on file     Social History     Tobacco Use   Smoking Status Former    Current packs/day: 0.00    Average packs/day: 1 pack/day for 12.0 years (12.0 ttl pk-yrs)    Types: Cigarettes    Start date: 1/1/1996    Quit date: 1/1/2008    Years since quitting: 15.8   Smokeless Tobacco Never   Tobacco Comments    Started smoking at age 11     Social History     Substance and Sexual Activity   Alcohol Use No       Allergies/Intolerances:  Allergies   Allergen Reactions    Fentanyl      Makes her agressive    Sulfamethoxazole W-Trimethoprim Unspecified     Unknown reaction         Other Current Medications:    Current Facility-Administered Medications:     vancomycin (Vancocin) 1,000 mg in  mL IVPB, 1,000 mg, Intravenous, Q12HR, Patria Clayton M.D., Last Rate: 125 mL/hr at 10/18/23 0914, 1,000 mg at 10/18/23 0914    baclofen (Lioresal) tablet 10 mg, 10 mg, Oral, TID, Maria Elena Fabara, D.O., 10 mg at 10/18/23 0915    bisacodyl (Dulcolax) suppository 10 mg, 10 mg, Rectal, DAILY, Maria Elena Fabara, D.O.    citalopram (CeleXA) tablet 20 mg, 20 mg, Oral, DAILY, Maria Elena Fabara, D.O., 20 mg at 10/18/23 0915    cyclobenzaprine (Flexeril) tablet 10 mg, 10 mg, Oral, TID PRN, Maria Elena Fabara, D.O.    meloxicam (Mobic) tablet 15 mg, 15 mg, Oral, DAILY, Maria Elena Fabara, D.O., 15 mg at 10/18/23 0915    omeprazole (PriLOSEC) capsule 40 mg, 40 mg, Oral, BID, Maria Elena Fabara, D.O., 40 mg at 10/18/23 0915    SUMAtriptan (Imitrex) tablet 100 mg, 100 mg, Oral, QDAY PRN, Maria Elena Fabara, D.O.    topiramate (Topamax) tablet 25 mg, 25 mg, Oral, BID, Maria Elena Fabara, D.O., 25 mg at 10/18/23 0915    senna-docusate (Pericolace Or Senokot S) 8.6-50 MG per tablet 2 Tablet, 2 Tablet, Oral, BID, 2 Tablet at 10/18/23 0915 **AND** polyethylene glycol/lytes (Miralax) PACKET 1 Packet, 1 Packet, Oral,  "QDAY PRN **AND** magnesium hydroxide (Milk Of Magnesia) suspension 30 mL, 30 mL, Oral, QDAY PRN **AND** bisacodyl (Dulcolax) suppository 10 mg, 10 mg, Rectal, QDAY PRN, Maria Elena Barbosa D.O.    enoxaparin (Lovenox) inj 40 mg, 40 mg, Subcutaneous, DAILY AT 1800, Patria Clayton M.D., 40 mg at 10/17/23 2326    ondansetron (Zofran) syringe/vial injection 4 mg, 4 mg, Intravenous, Q4HRS PRN, Patria Clayton M.D.    ondansetron (Zofran ODT) dispertab 4 mg, 4 mg, Oral, Q4HRS PRN, Patria Clayton M.D.    promethazine (Phenergan) tablet 12.5-25 mg, 12.5-25 mg, Oral, Q4HRS PRN, Patria Clayton M.D.    promethazine (Phenergan) suppository 12.5-25 mg, 12.5-25 mg, Rectal, Q4HRS PRN, Patria Clayton M.D.    prochlorperazine (Compazine) injection 5-10 mg, 5-10 mg, Intravenous, Q4HRS PRN, Patria Clayton M.D.    Notify provider if pain remains uncontrolled, , , CONTINUOUS **AND** Use the Numeric Rating Scale (NRS), Osborn-Baker Faces (WBF), or FLACC on regular floors and Critical-Care Pain Observation Tool (CPOT) on ICUs/Trauma to assess pain, , , CONTINUOUS **AND** Pulse Ox, , , CONTINUOUS **AND** Pharmacy Consult Request ...Pain Management Review 1 Each, 1 Each, Other, PHARMACY TO DOSE **AND** If patient difficult to arouse and/or has respiratory depression (respiratory rate of 10 or less), stop any opiates that are currently infusing and call a Rapid Response., , , CONTINUOUS, Patria Clayton M.D.    oxyCODONE immediate-release (Roxicodone) tablet 5 mg, 5 mg, Oral, Q3HRS PRN **OR** oxyCODONE immediate release (Roxicodone) tablet 10 mg, 10 mg, Oral, Q3HRS PRN, 10 mg at 10/18/23 0915 **OR** HYDROmorphone (Dilaudid) injection 0.5 mg, 0.5 mg, Intravenous, Q3HRS PRN, Patria Clayton M.D.    MD Alert...Vancomycin per Pharmacy, , Other, PHARMACY TO DOSE, Patria Clayton M.D.      Most Recent Vital Signs:  /64   Pulse 99   Temp 36.6 °C (97.9 °F) (Temporal)   Resp 18   Ht 1.702 m (5' 7\")   Wt 67.1 kg (148 lb)   SpO2 91%   BMI 23.18 kg/m²   Temp  " "Av.8 °C (98.2 °F)  Min: 36.3 °C (97.3 °F)  Max: 37.8 °C (100 °F)    Physical Exam:  General: chronically ill no acute distress pale  +muscle wasting  Contractures  Extremities: Open wound right elbow through muscle ligia pictures  Neuro: Nonverbal/quad  Sleeping soundly-did not awaken to voice    Pertinent Lab Results:  Recent Labs     10/17/23  1855 10/18/23  0358   WBC 9.1 6.8      Recent Labs     10/17/23  1855 10/18/23  0358   HEMOGLOBIN 12.8 12.3   HEMATOCRIT 39.9 39.3   MCV 95.7 96.8   MCH 30.7 30.3   PLATELETCT 303 269         Recent Labs     10/17/23  1855 10/18/23  0358   SODIUM 142 139   POTASSIUM 3.8 4.0   CHLORIDE 106 106   CO2 24 22   CREATININE 0.44* 0.47*        Recent Labs     10/17/23  1855 10/18/23  0358   ALBUMIN 4.1 3.5        Pertinent Micro:  Results       Procedure Component Value Units Date/Time    Blood Culture [[id number]] Collected: 10/17/23 2041    Order Status: Completed Specimen: Blood from Peripheral Updated: 10/18/23 0905     Significant Indicator NEG     Source BLD     Site PERIPHERAL     Culture Result No Growth  Note: Blood cultures are incubated for 5 days and  are monitored continuously.Positive blood cultures  are called to the RN and reported as soon as  they are identified.      Narrative:      2 of 2 blood culture x2  Sites order. Per Hospital Policy:  Only change Specimen Src: to \"Line\" if specified by physician  order.  Release to patient->Immediate  No site indicated    Blood Culture [[id number]] Collected: 10/17/23 1855    Order Status: Completed Specimen: Blood from Peripheral Updated: 10/18/23 0905     Significant Indicator NEG     Source BLD     Site PERIPHERAL     Culture Result No Growth  Note: Blood cultures are incubated for 5 days and  are monitored continuously.Positive blood cultures  are called to the RN and reported as soon as  they are identified.      Narrative:      1 of 2 for Blood Culture x 2 sites order. Per Hospital  Policy: Only change Specimen Src: " "to \"Line\" if specified by  physician order.  Release to patient->Immediate  No site indicated          Blood Culture   Date Value Ref Range Status   06/30/2016 No growth after 5 days of incubation.  Final        Studies:  none  IMPRESSION:   Wound dehiscence with open wound-colonized MRSA No purulence now  Per Ortho-no communication with joint  Septic olecranon bursitis  -s/p OR bursectomy 10/3 Culture +MRSA  MRSA infection  Nonverbal  Spastic quadriparesis      PLAN:   Blood cultures negative  DC vanco  Start doxy 100 mg PO BID for 2-4 weeks depending on rate of healing  Scrupulous wound care  FU Ortho    Plan of care discussed with IM Maria Elena Barbosa D.O.. Will sign off  Please reconsult if needed    Priya Ribeiro M.D.    "

## 2023-10-18 NOTE — WOUND TEAM
Assisted Jonathan ALVAREZ (Wound RN), with wound care, non-selective debridement performed using wound cleanser/NS and gauze. Please see Jonathan ALVAREZ (Wound RN) wound note for further wound care details.

## 2023-10-18 NOTE — PROGRESS NOTES
4 Eyes Skin Assessment Completed by NOHEMI Hernández and NOHEMI Verde.    Head WDL  Ears WDL  Nose WDL  Mouth WDL  Neck WDL  Breast/Chest WDL  Shoulder Blades Redness and Blanching  Spine Redness and Blanching  (R) Arm/Elbow/Hand Edema, wound to elbow, WV in place  (L) Arm/Elbow/Hand WDL  Abdomen WDL  Groin WDL  Scrotum/Coccyx/Buttocks Redness and Blanching  (R) Leg Redness, Blanching, and Abrasion  (L) Leg Redness and Blanching  (R) Heel/Foot/Toe Redness and Blanching  (L) Heel/Foot/Toe Redness and Blanching          Devices In Places Pulse Ox and SCD's      Interventions In Place Heel Mepilex, TAP System, Pillows, Elbow Mepilex, Q2 Turns, Barrier Cream, Dri-Vineet Pads, Heels Loaded W/Pillows, and Pressure Redistribution Mattress    Possible Skin Injury No    Pictures Uploaded Into Epic Yes  Wound Consult Placed N/A  RN Wound Prevention Protocol Ordered Yes

## 2023-10-18 NOTE — HOSPITAL COURSE
This is a 38-year-old female with past medical history of disseminated encephalomyelitis with paralysis, history of C. difficile, and history of GI bleed who was admitted on 10/17/2023 for right elbow wound dehiscence and pain.    Patient had recent admission in late September for septic bursitis of the right elbow requiring bursectomy and I&D 10/3, culture grew MRSA, patient completed 10-day course of vancomycin during hospitalization, and completed the remaining course with doxycycline.     Orthopedic surgery consulted, no need for repeat I&D, wound VAC placed.  ID consulted, recommend 2 to 4-week course of doxycycline.  Patient will follow-up with home health for wound care.

## 2023-10-18 NOTE — H&P
Hospital Medicine History & Physical Note    Date of Service  10/17/2023    Primary Care Physician  Margo Cook P.A.-C.    Consultants  orthopedics    Specialist Names: Dr. Schmitt    Code Status  Full Code    Chief Complaint  Chief Complaint   Patient presents with    Arm Pain    Wound Infection       History of Presenting Illness  Griselda Swanson is a 38 y.o. female who presented 10/17/2023 with past medical history of disseminated encephalomyelitis, history of C. difficile, GI bleed who comes into the hospital for right elbow wound.  The patient underwent a right elbow olecranon bursectomy on 10/3/2023.  Patient was discharged with doxycycline.  Apparently the surgical site has dehisced and there is pustular drainage from the area.  The surgical cultures grew MRSA.  The patient is nonverbal at baseline and uses iPad to communicate.  She did state that her right elbow was painful and she feels feverish.    I discussed the plan of care with patient.    Review of Systems  Review of Systems   Constitutional:  Positive for chills and fever. Negative for diaphoresis and malaise/fatigue.   HENT:  Negative for congestion, ear discharge, ear pain, hearing loss, nosebleeds, sinus pain, sore throat and tinnitus.    Eyes:  Negative for blurred vision, double vision, photophobia and pain.   Respiratory:  Negative for cough, hemoptysis, sputum production, shortness of breath, wheezing and stridor.    Cardiovascular:  Negative for chest pain, palpitations, orthopnea, claudication, leg swelling and PND.   Gastrointestinal:  Negative for abdominal pain, blood in stool, constipation, diarrhea, heartburn, melena, nausea and vomiting.   Genitourinary:  Negative for dysuria, flank pain, frequency, hematuria and urgency.   Musculoskeletal:  Positive for joint pain. Negative for back pain, falls, myalgias and neck pain.   Skin:  Negative for itching and rash.   Neurological:  Negative for dizziness, tingling, tremors, weakness  and headaches.   Endo/Heme/Allergies:  Negative for environmental allergies and polydipsia. Does not bruise/bleed easily.   Psychiatric/Behavioral:  Negative for depression, hallucinations, substance abuse and suicidal ideas.        Past Medical History   has a past medical history of ADEM (acute disseminated encephalomyelitis), Back pain, Breath shortness, C. difficile colitis (2015), Coma ([facility]) (August 2009), Coma ([facility]) (2008), Demyelinating disorder ([facility]), Encephalitis (2009), Heart burn, Indigestion, Lesion of brain, [facility] HOME, Migraines, MS (multiple sclerosis) ([facility]), Other specified disorder of intestines, Pain, Psychiatric problem, Renal disorder, and Urinary incontinence.    Surgical History   has a past surgical history that includes tonsillectomy; cath place perm epidural (3/6/2012); cath place perm epidural (6/26/2012); pump insert/remove (3/12/2013); mammoplasty augmentation (2002); pump revision (10/8/2013); pr baclofen intrathecal trial (3/8/2016); cath place perm epidural (N/A, 3/8/2016); cath place perm epidural (Left, 6/14/2016); pump insert/remove (Left, 7/1/2016); pr upper gi endoscopy,diagnosis (N/A, 3/16/2023); pr upper gi endoscopy,biopsy (N/A, 3/16/2023); and irrigation & debridement general (Right, 10/3/2023).     Family History  family history includes Bipolar disorder in her brother; Cancer in her mother; Diabetes in her maternal grandmother; Other in her brother and daughter.   Family history reviewed with patient. There is no family history that is pertinent to the chief complaint.     Social History   reports that she quit smoking about 15 years ago. Her smoking use included cigarettes. She started smoking about 27 years ago. She has a 12.0 pack-year smoking history. She has never used smokeless tobacco. She reports that she does not drink alcohol and does not use drugs.    Allergies  Allergies   Allergen Reactions    Bactrim      Reaction unknown    Fentanyl      Makes her agressive        Medications  Prior to Admission Medications   Prescriptions Last Dose Informant Patient Reported? Taking?   Misc. Devices Misc  Caregiver No No   Sig: Please allow patient to have support/therapy dog in appt. Regardless of weight due to multiple chronic illnesses and debility.   Misc. Devices Misc  Caregiver No No   Sig: Please eval and fix electric W/C. Please eval also for W/C needs.   Misc. Devices Misc  Caregiver No No   Sig: W/C stabilizer needs eval and possible replacement   Misc. Devices Misc   No No   Sig: Bedside commode   Misc. Devices Misc   No No   Sig: New latch for new dynavox  Wheel chair   Misc. Devices Misc   No No   Sig: Bedside commode with padding   Multiple Vitamins-Minerals (MULTIVITAMIN GUMMIES ADULTS PO)  Caregiver Yes No   Sig: Take 1 Tab by mouth every day. Indications: vitamin supplement.   SUMAtriptan Succinate 6 MG/0.5ML Solution Auto-injector   No No   Sig: INJECT 6MG SUBCUTANEOUS 1 TIMES DAILY AS NEEDED FOR UP TO 9 DAYS   baclofen (LIORESAL) 10 MG Tab   No No   Sig: TAKE 3 TABLETS BY MOUTH FOUR TIMES DAILY   bisacodyl (DULCOLAX) 10 MG Suppos   No No   Sig: INSERT 1 SUPPOSITORY RECTALLY EVERY DAY   cefUROXime (CEFTIN) 250 MG Tab   No No   Sig: Take 1 Tablet by mouth 2 times a day for 5 days.   citalopram (CELEXA) 20 MG Tab   No No   Sig: TAKE 1 TABLET BY MOUTH EVERY DAY   cyclobenzaprine (FLEXERIL) 10 mg Tab   No No   Sig: TAKE 1 TABLET BY MOUTH THREE TIMES DAILY AS NEEDED   docusate sodium (COLACE) 100 MG Cap   No No   Sig: TAKE 1 CAPSULE BY MOUTH TWICE DAILY AS NEEDED   lactulose 10 GM/15ML Solution   No No   Sig: TAKE 15 TO 30 MLS BY MOUTH EVERY 4 HOURS AS NEEDED   linaCLOtide 145 MCG Cap   No No   Sig: Take 145 mcg by mouth every morning before breakfast.   meloxicam (MOBIC) 15 MG tablet   No No   Sig: Take 1 Tablet by mouth every day.   omeprazole (PRILOSEC) 40 MG delayed-release capsule   No No   Sig: Take 1 Capsule by mouth 2 times a day.   oxyCODONE-acetaminophen  (PERCOCET) 5-325 MG Tab   No No   Sig: Take 1 Tablet by mouth every 8 hours as needed for Severe Pain for up to 7 days.   polyethylene glycol 3350 (MIRALAX) 17 GM/SCOOP Powder   No No   Sig: MIX 1 CAPFUL WITH 8OZ OF WATER AND DRINK DAILY FOR CONSTIPATION   simethicone (MYLICON) 125 MG chewable tablet  Caregiver Yes No   Sig: Take 125 mg by mouth 4 times a day as needed. Indications: Gas   sumatriptan (IMITREX) 100 MG tablet   No No   Sig: TAKE 1 TABLET BY MOUTH AT ONSET OF HEADACHE, MAY REPEAT IN 2 HOURS.(MAX 2 TABLETS PER DAY)   topiramate (TOPAMAX) 25 MG Tab   No No   Sig: TAKE 1 TABLET BY MOUTH TWICE DAILY   vitamin D (CHOLECALCIFEROL) 1000 UNIT Tab  Caregiver Yes No   Sig: Take 1,000 Units by mouth 4 times a day. Indications: supplement      Facility-Administered Medications: None       Physical Exam  Temp:  [37.8 °C (100 °F)] 37.8 °C (100 °F)  Pulse:  [108-113] 113  Resp:  [18] 18  BP: (105-115)/(62-71) 105/64  SpO2:  [96 %-97 %] 97 %  Blood Pressure: 105/64   Temperature: 37.8 °C (100 °F)   Pulse: (!) 113   Respiration: 18   Pulse Oximetry: 97 %       Physical Exam  Vitals and nursing note reviewed.   Constitutional:       General: She is not in acute distress.     Appearance: Normal appearance. She is not ill-appearing, toxic-appearing or diaphoretic.      Comments: Nonverbal   HENT:      Head: Normocephalic and atraumatic.      Nose: No congestion or rhinorrhea.      Mouth/Throat:      Pharynx: No oropharyngeal exudate or posterior oropharyngeal erythema.   Eyes:      General: No scleral icterus.  Neck:      Vascular: No carotid bruit or JVD.   Cardiovascular:      Rate and Rhythm: Normal rate and regular rhythm.      Pulses: Normal pulses.      Heart sounds: Normal heart sounds. No murmur heard.     No friction rub. No gallop.   Pulmonary:      Effort: Pulmonary effort is normal. No respiratory distress.      Breath sounds: No stridor. No wheezing, rhonchi or rales.   Abdominal:      General: Abdomen is  "flat. There is no distension.      Palpations: There is no mass.      Tenderness: There is no abdominal tenderness. There is no left CVA tenderness, guarding or rebound.      Hernia: No hernia is present.   Musculoskeletal:         General: No swelling. Normal range of motion.      Cervical back: No rigidity. No muscular tenderness.      Right lower leg: No edema.      Left lower leg: No edema.   Lymphadenopathy:      Cervical: No cervical adenopathy.   Skin:     General: Skin is warm and dry.      Capillary Refill: Capillary refill takes more than 3 seconds.      Coloration: Skin is not jaundiced or pale.      Findings: No bruising or erythema.      Comments: Right elbow wound with bone exposure   Neurological:      Mental Status: She is alert.      Comments: Paraplegia         Laboratory:  Recent Labs     10/17/23  1855   WBC 9.1   RBC 4.17*   HEMOGLOBIN 12.8   HEMATOCRIT 39.9   MCV 95.7   MCH 30.7   MCHC 32.1*   RDW 46.0   PLATELETCT 303   MPV 9.4     Recent Labs     10/17/23  1855   SODIUM 142   POTASSIUM 3.8   CHLORIDE 106   CO2 24   GLUCOSE 83   BUN 9   CREATININE 0.44*   CALCIUM 9.1     Recent Labs     10/17/23  1855   ALTSGPT 19   ASTSGOT 18   ALKPHOSPHAT 72   TBILIRUBIN 0.6   GLUCOSE 83         No results for input(s): \"NTPROBNP\" in the last 72 hours.      No results for input(s): \"TROPONINT\" in the last 72 hours.    Imaging:  No orders to display       no X-Ray or EKG requiring interpretation    Assessment/Plan:  Justification for Admission Status  I anticipate this patient will require at least two midnights for appropriate medical management, necessitating inpatient admission because right elbow septic arthritis    Patient will need a Med/Surg bed on ORTHOPEDICS service .  The need is secondary to right elbow septic arthritis.    * Septic bursitis of elbow, right- (present on admission)  Assessment & Plan  History of MRSA  Start IV vancomycin and follow trough levels  Pain control  Follow-up with blood " cultures  N.p.o. after midnight  Surgery has been consulted    Quadriplegia (HCC)  Assessment & Plan  Turn every 2 hours and change positions  Apparently eats normally and does not have a PEG tube    ADEM (acute disseminated encephalomyelitis)- (present on admission)  Assessment & Plan  With a history of quadriplegia  Uses a iPad to communicate        VTE prophylaxis: SCDs/TEDs

## 2023-10-18 NOTE — ASSESSMENT & PLAN NOTE
History of MRSA  Wound VAC ordered, patient seen at bedside with wound team, wound VAC applied.  Discussed with wound team to have wound VAC changed on Friday morning.  If wound is healing well, can anticipate discharge home with home health.    Case discussed with infectious disease, plan for prolonged course of oral antibiotics (doxycycline), this will be sent to med to beds.

## 2023-10-19 LAB
ANION GAP SERPL CALC-SCNC: 9 MMOL/L (ref 7–16)
BUN SERPL-MCNC: 11 MG/DL (ref 8–22)
CALCIUM SERPL-MCNC: 8.4 MG/DL (ref 8.5–10.5)
CHLORIDE SERPL-SCNC: 106 MMOL/L (ref 96–112)
CO2 SERPL-SCNC: 21 MMOL/L (ref 20–33)
CREAT SERPL-MCNC: 0.45 MG/DL (ref 0.5–1.4)
ERYTHROCYTE [DISTWIDTH] IN BLOOD BY AUTOMATED COUNT: 44.3 FL (ref 35.9–50)
GFR SERPLBLD CREATININE-BSD FMLA CKD-EPI: 124 ML/MIN/1.73 M 2
GLUCOSE SERPL-MCNC: 78 MG/DL (ref 65–99)
HCT VFR BLD AUTO: 36.6 % (ref 37–47)
HGB BLD-MCNC: 11.6 G/DL (ref 12–16)
MCH RBC QN AUTO: 30.5 PG (ref 27–33)
MCHC RBC AUTO-ENTMCNC: 31.7 G/DL (ref 32.2–35.5)
MCV RBC AUTO: 96.3 FL (ref 81.4–97.8)
PLATELET # BLD AUTO: 261 K/UL (ref 164–446)
PMV BLD AUTO: 9.8 FL (ref 9–12.9)
POTASSIUM SERPL-SCNC: 3.6 MMOL/L (ref 3.6–5.5)
RBC # BLD AUTO: 3.8 M/UL (ref 4.2–5.4)
SODIUM SERPL-SCNC: 136 MMOL/L (ref 135–145)
WBC # BLD AUTO: 5.1 K/UL (ref 4.8–10.8)

## 2023-10-19 PROCEDURE — RXMED WILLOW AMBULATORY MEDICATION CHARGE: Performed by: GENERAL PRACTICE

## 2023-10-19 PROCEDURE — 700102 HCHG RX REV CODE 250 W/ 637 OVERRIDE(OP): Performed by: GENERAL PRACTICE

## 2023-10-19 PROCEDURE — 770001 HCHG ROOM/CARE - MED/SURG/GYN PRIV*

## 2023-10-19 PROCEDURE — 700102 HCHG RX REV CODE 250 W/ 637 OVERRIDE(OP): Performed by: HOSPITALIST

## 2023-10-19 PROCEDURE — 700111 HCHG RX REV CODE 636 W/ 250 OVERRIDE (IP): Mod: JZ | Performed by: HOSPITALIST

## 2023-10-19 PROCEDURE — 99232 SBSQ HOSP IP/OBS MODERATE 35: CPT | Performed by: GENERAL PRACTICE

## 2023-10-19 PROCEDURE — 85027 COMPLETE CBC AUTOMATED: CPT

## 2023-10-19 PROCEDURE — A9270 NON-COVERED ITEM OR SERVICE: HCPCS | Performed by: INTERNAL MEDICINE

## 2023-10-19 PROCEDURE — A9270 NON-COVERED ITEM OR SERVICE: HCPCS | Performed by: HOSPITALIST

## 2023-10-19 PROCEDURE — 36415 COLL VENOUS BLD VENIPUNCTURE: CPT

## 2023-10-19 PROCEDURE — 80048 BASIC METABOLIC PNL TOTAL CA: CPT

## 2023-10-19 PROCEDURE — A9270 NON-COVERED ITEM OR SERVICE: HCPCS | Performed by: GENERAL PRACTICE

## 2023-10-19 PROCEDURE — 700102 HCHG RX REV CODE 250 W/ 637 OVERRIDE(OP): Performed by: INTERNAL MEDICINE

## 2023-10-19 RX ORDER — ZOLPIDEM TARTRATE 5 MG/1
TABLET ORAL
Qty: 90 TABLET | Refills: 1 | Status: SHIPPED | OUTPATIENT
Start: 2023-10-19 | End: 2024-01-03 | Stop reason: SDUPTHER

## 2023-10-19 RX ORDER — OXYCODONE HYDROCHLORIDE 10 MG/1
10 TABLET ORAL EVERY 6 HOURS PRN
Qty: 20 TABLET | Refills: 0 | Status: SHIPPED | OUTPATIENT
Start: 2023-10-19 | End: 2023-10-25

## 2023-10-19 RX ORDER — HYDROMORPHONE HYDROCHLORIDE 1 MG/ML
1 INJECTION, SOLUTION INTRAMUSCULAR; INTRAVENOUS; SUBCUTANEOUS
Status: COMPLETED | OUTPATIENT
Start: 2023-10-19 | End: 2023-10-20

## 2023-10-19 RX ADMIN — OMEPRAZOLE 40 MG: 20 CAPSULE, DELAYED RELEASE ORAL at 18:20

## 2023-10-19 RX ADMIN — DOCUSATE SODIUM 50 MG AND SENNOSIDES 8.6 MG 2 TABLET: 8.6; 5 TABLET, FILM COATED ORAL at 04:42

## 2023-10-19 RX ADMIN — ENOXAPARIN SODIUM 40 MG: 100 INJECTION SUBCUTANEOUS at 18:20

## 2023-10-19 RX ADMIN — BACLOFEN 10 MG: 10 TABLET ORAL at 18:20

## 2023-10-19 RX ADMIN — OXYCODONE HYDROCHLORIDE 10 MG: 10 TABLET ORAL at 08:40

## 2023-10-19 RX ADMIN — DOCUSATE SODIUM 50 MG AND SENNOSIDES 8.6 MG 2 TABLET: 8.6; 5 TABLET, FILM COATED ORAL at 18:20

## 2023-10-19 RX ADMIN — OXYCODONE HYDROCHLORIDE 10 MG: 10 TABLET ORAL at 01:58

## 2023-10-19 RX ADMIN — MELOXICAM 15 MG: 7.5 TABLET ORAL at 04:42

## 2023-10-19 RX ADMIN — OMEPRAZOLE 40 MG: 20 CAPSULE, DELAYED RELEASE ORAL at 04:42

## 2023-10-19 RX ADMIN — TOPIRAMATE 25 MG: 25 TABLET, FILM COATED ORAL at 04:42

## 2023-10-19 RX ADMIN — CITALOPRAM HYDROBROMIDE 20 MG: 20 TABLET ORAL at 04:42

## 2023-10-19 RX ADMIN — OXYCODONE HYDROCHLORIDE 10 MG: 10 TABLET ORAL at 16:19

## 2023-10-19 RX ADMIN — OXYCODONE HYDROCHLORIDE 10 MG: 10 TABLET ORAL at 19:49

## 2023-10-19 RX ADMIN — BACLOFEN 10 MG: 10 TABLET ORAL at 12:22

## 2023-10-19 RX ADMIN — TOPIRAMATE 25 MG: 25 TABLET, FILM COATED ORAL at 18:20

## 2023-10-19 RX ADMIN — DOXYCYCLINE 100 MG: 100 TABLET, FILM COATED ORAL at 04:42

## 2023-10-19 RX ADMIN — BISACODYL 10 MG: 10 SUPPOSITORY RECTAL at 04:42

## 2023-10-19 RX ADMIN — DOXYCYCLINE 100 MG: 100 TABLET, FILM COATED ORAL at 18:20

## 2023-10-19 RX ADMIN — BACLOFEN 10 MG: 10 TABLET ORAL at 04:42

## 2023-10-19 RX ADMIN — OXYCODONE HYDROCHLORIDE 10 MG: 10 TABLET ORAL at 12:22

## 2023-10-19 ASSESSMENT — PAIN DESCRIPTION - PAIN TYPE
TYPE: ACUTE PAIN

## 2023-10-19 NOTE — CARE PLAN
The patient is Stable - Low risk of patient condition declining or worsening    Shift Goals  Clinical Goals: pain control <4, q2 turns, maintain skin integrity  Patient Goals: pain control, comfort    Progress made toward(s) clinical / shift goals:  q2 turns in place, skin integrity remains unchanged, pain controlled with medications    Patient is not progressing towards the following goals:      Problem: Pain - Standard  Goal: Alleviation of pain or a reduction in pain to the patient’s comfort goal  Outcome: Progressing     Problem: Skin Integrity  Goal: Skin integrity is maintained or improved  Outcome: Progressing     Problem: Fall Risk  Goal: Patient will remain free from falls  Outcome: Progressing

## 2023-10-19 NOTE — CARE PLAN
The patient is Stable - Low risk of patient condition declining or worsening    Shift Goals  Clinical Goals: Pain control and skin integrity  Patient Goals: pain control and rest    Progress made toward(s) clinical / shift goals:  Pain controlled per MAR. Skin integrity maintained with Q2 turns,mepilex on heels/ sacrum, and TAPS. Pt slept intermittently throughout shift.      Problem: Pain - Standard  Goal: Alleviation of pain or a reduction in pain to the patient’s comfort goal  10/19/2023 0212 by Renee Gonzales RMyN.  Outcome: Progressing  10/19/2023 0212 by Renee Gonzales R.N.  Outcome: Progressing     Problem: Skin Integrity  Goal: Skin integrity is maintained or improved  Outcome: Progressing

## 2023-10-19 NOTE — PROGRESS NOTES
4 Eyes Skin Assessment Completed by NOHEMI Duran and MARIELLA RN.     Head WDL  Ears WDL  Nose WDL  Mouth WDL  Neck WDL  Breast/Chest Redness and Blanching  Shoulder Blades Redness and Blanching  Spine Redness and Blanching  (R) Arm/Elbow/Hand R elbow wound with [place]  (L) Arm/Elbow/Hand WDL  Abdomen WDL  Groin WDL  Scrotum/Coccyx/Buttocks Redness and Blanching  (R) Leg Swelling and Abrasion  (L) Leg Swelling  (R) Heel/Foot/Toe Redness, Blanching, and Swelling  (L) Heel/Foot/Toe Redness, Blanching, and Swelling and missing big toe              Devices In Places Blood Pressure Cuff, Pulse Ox, and SCD's        Interventions In Place Heel Mepilex, Sacral Mepilex, TAP System, Pillows, Q2 Turns, Barrier Cream, and Heels Loaded W/Pillows     Possible Skin Injury Yes     Pictures Uploaded Into Epic Yes  Wound Consult Placed N/A  RN Wound Prevention Protocol Ordered No

## 2023-10-19 NOTE — PROGRESS NOTES
Assumed care of patient at 0700. Patient is alert and oriented, respirations are unlabored and regular on room air. Patient non verbal, gives thumbs up/down to yes/no questions, uses Ipad for further communications. Pain stated at 8, appropriate pain medication administered. Lung sounds clear throughout, diminished in bases. Abdomen soft, non tender, +bowel sounds, BM PTA. Voiding with purewick. Right elbow wound with wound vac in place/mepilex covering. Bilateral wrist contractures, BLE contractures/foot drop, mepilex to heels, SCDs in place, repositioned patient, generalized redness to BUE, BLE, heels. Bed in lowest position, call light within reach, patient states no needs at this time.

## 2023-10-19 NOTE — PROGRESS NOTES
Bedside report received, assessment completed    A&O x  4, pt calls appropriately  Mobility: Up with x2 assist to WC  Fall Risk Assessment: Low, door notifications in use  Pain Assessment / Reassessment completed, medication provided per MAR  Diet: Regular  LDA:   IV Access: 20 L FA, CDI/ flushed/ SL  Wound vac: R elbow     GI/: + void, + flatus, PTA BM  DVT Prophylaxis: Lovenox, SCD on      Reviewed plan of care with patient, bed in lowest position and locked, pt resting comfortably now, call light within reach, all needs met at this time. Interventions will be executed per plan of care

## 2023-10-19 NOTE — PROGRESS NOTES
4 Eyes Skin Assessment Completed by NOHEMI Jarvis and NOHEMI Colin.    Head WDL  Ears WDL  Nose WDL  Mouth WDL  Neck WDL  Breast/Chest WDL  Shoulder Blades WDL  Spine WDL  (R) Arm/Elbow/Hand wound to right elbow with wound vac in place  (L) Arm/Elbow/Hand WDL  Abdomen WDL  Groin WDL purewick in place  Scrotum/Coccyx/Buttocks Redness and Blanching  (R) Leg Scab to right ankle, contractures, mepilex in place  (L) Leg mepilex in place, contractures   (R) Heel/Foot/Toe Redness and Blanching  (L) Heel/Foot/Toe Redness and Blanching          Devices In Places Pulse Ox and SCD's, purewick      Interventions In Place Heel Mepilex, Sacral Mepilex, Pillows, Elbow Mepilex, Q2 Turns, and Low Air Loss Mattress    Possible Skin Injury No

## 2023-10-19 NOTE — DISCHARGE PLANNING
1116  Received Choice Form at: 1116 am  Agency/Facility Name: [facility]  Sent Referral per Choice Form at: 1116 am

## 2023-10-19 NOTE — PROGRESS NOTES
Hospital Medicine Daily Progress Note    Date of Service  10/19/2023    Chief Complaint  Griselda Swanson is a 38 y.o. female admitted 10/17/2023 with elbow pain    Hospital Course  This is a 38-year-old female with past medical history of disseminated encephalomyelitis with paralysis, history of C. difficile, and history of GI bleed who was admitted on 10/17/2023 for right elbow wound dehiscence and pain.    Patient had recent admission in late September for septic bursitis of the right elbow requiring bursectomy and I&D 10/3, culture grew MRSA, patient completed 10-day course of vancomycin during hospitalization, and completed the remaining course with doxycycline.     Orthopedic surgery consulted, no need for repeat I&D, wound VAC placed.  ID consulted, recommend 2 to 4-week course of doxycycline.  Patient will follow-up with home health for wound care.    Interval Problem Update  Meds to beds sent for patient's doxycycline course.    We will plan for discharge tomorrow afternoon, after wound VAC change to evaluate patient's wound, if healing well can discharge.    Patient's sister called, updated on plan of care, all questions answered.    I have discussed this patient's plan of care and discharge plan at IDT rounds today with Case Management, Nursing, Nursing leadership, and other members of the IDT team.    Consultants/Specialty  orthopedics    Code Status  Full Code    Disposition  The patient is not medically cleared for discharge to home or a post-acute facility.  Anticipate discharge to: home with organized home healthcare and close outpatient follow-up    I have placed the appropriate orders for post-discharge needs.    Review of Systems  Review of Systems   All other systems reviewed and are negative.       Physical Exam  Temp:  [36.5 °C (97.7 °F)-36.9 °C (98.4 °F)] 36.5 °C (97.7 °F)  Pulse:  [] 99  Resp:  [16-18] 17  BP: ()/(59-75) 112/65  SpO2:  [90 %-95 %] 93 %    Physical Exam  Vitals  and nursing note reviewed.   Constitutional:       General: She is not in acute distress.  HENT:      Head: Normocephalic and atraumatic.      Mouth/Throat:      Mouth: Mucous membranes are moist.      Pharynx: No oropharyngeal exudate.   Eyes:      Extraocular Movements: Extraocular movements intact.      Pupils: Pupils are equal, round, and reactive to light.   Cardiovascular:      Rate and Rhythm: Normal rate and regular rhythm.      Pulses: Normal pulses.      Heart sounds: No murmur heard.     No friction rub. No gallop.   Pulmonary:      Effort: Pulmonary effort is normal. No respiratory distress.      Breath sounds: No wheezing, rhonchi or rales.   Abdominal:      General: Bowel sounds are normal. There is no distension.      Palpations: Abdomen is soft. There is no mass.      Tenderness: There is no abdominal tenderness.   Musculoskeletal:         General: No swelling or tenderness. Normal range of motion.      Cervical back: Normal range of motion. No rigidity. No muscular tenderness.      Right lower leg: No edema.      Left lower leg: No edema.      Comments: Right elbow with wound VAC in place   Skin:     General: Skin is warm and dry.      Capillary Refill: Capillary refill takes less than 2 seconds.      Findings: No erythema or rash.   Neurological:      General: No focal deficit present.      Mental Status: She is alert and oriented to person, place, and time.      Motor: No weakness.      Gait: Gait normal.         Fluids    Intake/Output Summary (Last 24 hours) at 10/19/2023 1436  Last data filed at 10/19/2023 1222  Gross per 24 hour   Intake 480 ml   Output 1400 ml   Net -920 ml       Laboratory  Recent Labs     10/17/23  1855 10/18/23  0358 10/19/23  0439   WBC 9.1 6.8 5.1   RBC 4.17* 4.06* 3.80*   HEMOGLOBIN 12.8 12.3 11.6*   HEMATOCRIT 39.9 39.3 36.6*   MCV 95.7 96.8 96.3   MCH 30.7 30.3 30.5   MCHC 32.1* 31.3* 31.7*   RDW 46.0 45.9 44.3   PLATELETCT 303 269 261   MPV 9.4 9.5 9.8     Recent  Labs     10/17/23  1855 10/18/23  0358 10/19/23  0439   SODIUM 142 139 136   POTASSIUM 3.8 4.0 3.6   CHLORIDE 106 106 106   CO2 24 22 21   GLUCOSE 83 73 78   BUN 9 12 11   CREATININE 0.44* 0.47* 0.45*   CALCIUM 9.1 8.6 8.4*                   Imaging  No orders to display        Assessment/Plan  * Septic bursitis of elbow, right- (present on admission)  Assessment & Plan  History of MRSA  Wound VAC ordered, patient seen at bedside with wound team, wound VAC applied.  Discussed with wound team to have wound VAC changed on Friday morning.  If wound is healing well, can anticipate discharge home with home health.    Case discussed with infectious disease, plan for prolonged course of oral antibiotics (doxycycline), [facility] be [facility] to [facility] beds.    Quadriplegia (HCC)  Assessment & Plan  Turn every 2 hours and change positions  Apparently eats normally and does not have a PEG tube    Intractable chronic migraine without aura and without status migrainosus- (present on admission)  Assessment & Plan  Resume home medications    ADEM (acute disseminated encephalomyelitis)- (present on admission)  Assessment & Plan  With a history of quadriplegia  Uses a iPad to communicate         VTE prophylaxis: Lovenox    I have performed a physical exam and reviewed and updated ROS and Plan today (10/19/2023). In review of yesterday's note (10/18/2023), there are no changes except as documented above.

## 2023-10-19 NOTE — DISCHARGE PLANNING
DC Transport Scheduled    Received request at: 10/19/2023 at 1312    Transport Company Scheduled:  [employer]  Spoke with Griselda at [facility] to schedule transport.    Scheduled Date: 10/20/2023  Scheduled Time: 1430    Destination: Home at [address] [place]     Notified care team of scheduled transport via [employer].     If there are any changes needed to the DC transportation scheduled, please contact [facility] at ext. [phone] between the hours of 8114-4293 Mon-Fri. If outside those hours, contact the ED Case Manager at ext. [phone].

## 2023-10-20 ENCOUNTER — PHARMACY VISIT (OUTPATIENT)
Dept: PHARMACY | Facility: MEDICAL CENTER | Age: 38
End: 2023-10-20
Payer: COMMERCIAL

## 2023-10-20 VITALS
TEMPERATURE: 98.8 F | HEIGHT: 67 IN | OXYGEN SATURATION: 96 % | WEIGHT: 148 LBS | RESPIRATION RATE: 18 BRPM | SYSTOLIC BLOOD PRESSURE: 124 MMHG | BODY MASS INDEX: 23.23 KG/M2 | DIASTOLIC BLOOD PRESSURE: 90 MMHG | HEART RATE: 77 BPM

## 2023-10-20 PROCEDURE — 700102 HCHG RX REV CODE 250 W/ 637 OVERRIDE(OP): Performed by: HOSPITALIST

## 2023-10-20 PROCEDURE — A9270 NON-COVERED ITEM OR SERVICE: HCPCS | Performed by: INTERNAL MEDICINE

## 2023-10-20 PROCEDURE — 97605 NEG PRS WND THER DME<=50SQCM: CPT

## 2023-10-20 PROCEDURE — A9270 NON-COVERED ITEM OR SERVICE: HCPCS | Performed by: GENERAL PRACTICE

## 2023-10-20 PROCEDURE — 700102 HCHG RX REV CODE 250 W/ 637 OVERRIDE(OP): Performed by: INTERNAL MEDICINE

## 2023-10-20 PROCEDURE — 700102 HCHG RX REV CODE 250 W/ 637 OVERRIDE(OP): Performed by: GENERAL PRACTICE

## 2023-10-20 PROCEDURE — A9270 NON-COVERED ITEM OR SERVICE: HCPCS | Performed by: HOSPITALIST

## 2023-10-20 PROCEDURE — 700111 HCHG RX REV CODE 636 W/ 250 OVERRIDE (IP): Performed by: GENERAL PRACTICE

## 2023-10-20 PROCEDURE — 302098 PASTE RING (FLAT): Performed by: GENERAL PRACTICE

## 2023-10-20 PROCEDURE — 99239 HOSP IP/OBS DSCHRG MGMT >30: CPT | Performed by: GENERAL PRACTICE

## 2023-10-20 RX ADMIN — HYDROMORPHONE HYDROCHLORIDE 1 MG: 1 INJECTION, SOLUTION INTRAMUSCULAR; INTRAVENOUS; SUBCUTANEOUS at 09:27

## 2023-10-20 RX ADMIN — MELOXICAM 15 MG: 7.5 TABLET ORAL at 04:53

## 2023-10-20 RX ADMIN — CITALOPRAM HYDROBROMIDE 20 MG: 20 TABLET ORAL at 04:53

## 2023-10-20 RX ADMIN — OXYCODONE HYDROCHLORIDE 10 MG: 10 TABLET ORAL at 04:54

## 2023-10-20 RX ADMIN — OMEPRAZOLE 40 MG: 20 CAPSULE, DELAYED RELEASE ORAL at 04:53

## 2023-10-20 RX ADMIN — OXYCODONE HYDROCHLORIDE 10 MG: 10 TABLET ORAL at 00:46

## 2023-10-20 RX ADMIN — OXYCODONE HYDROCHLORIDE 10 MG: 10 TABLET ORAL at 13:01

## 2023-10-20 RX ADMIN — BACLOFEN 10 MG: 10 TABLET ORAL at 04:53

## 2023-10-20 RX ADMIN — BISACODYL 10 MG: 10 SUPPOSITORY RECTAL at 04:54

## 2023-10-20 RX ADMIN — BACLOFEN 10 MG: 10 TABLET ORAL at 13:02

## 2023-10-20 RX ADMIN — DOXYCYCLINE 100 MG: 100 TABLET, FILM COATED ORAL at 04:53

## 2023-10-20 RX ADMIN — DOCUSATE SODIUM 50 MG AND SENNOSIDES 8.6 MG 2 TABLET: 8.6; 5 TABLET, FILM COATED ORAL at 04:53

## 2023-10-20 RX ADMIN — TOPIRAMATE 25 MG: 25 TABLET, FILM COATED ORAL at 04:53

## 2023-10-20 ASSESSMENT — PAIN DESCRIPTION - PAIN TYPE
TYPE: ACUTE PAIN

## 2023-10-20 NOTE — WOUND TEAM
[facility]  Inpatient Services  Wound and Skin Care Follow-up    Admission Date: 10/17/2023     Last order of IP CONSULT TO WOUND CARE was found on 10/18/2023 from Hospital Encounter on 10/17/2023     HPI, PMH, SH: Reviewed    Past Surgical History:   Procedure Laterality Date    IRRIGATION & DEBRIDEMENT GENERAL Right 10/3/2023    Procedure: IRRIGATION AND DEBRIDEMENT, WOUND;  Surgeon: Omar Simpson M.D.;  Location: [facility];  Service: Orthopedics    ME UPPER GI ENDOSCOPY,DIAGNOSIS N/A 3/16/2023    Procedure: GASTROSCOPY;  Surgeon: Evelin Bautista M.D.;  Location: SURGERY SAME DAY [facility];  Service: Gastroenterology    ME UPPER GI ENDOSCOPY,BIOPSY N/A 3/16/2023    Procedure: GASTROSCOPY, WITH BIOPSY;  Surgeon: Evelin Bautista M.D.;  Location: SURGERY SAME DAY [facility];  Service: Gastroenterology    PUMP INSERT/REMOVE Left 7/1/2016    Procedure: PUMP REMOVAL;  Surgeon: Pari Roberson M.D.;  Location: [facility];  Service:     CATH PLACE PERM EPIDURAL Left 6/14/2016    Procedure: CATH PLACE PERM EPIDURAL - BACLOFEN PUMP AND CATHETER REPLACEMENT  - BACK AND ABDOMEN;  Surgeon: Pari Roberson M.D.;  Location: [facility];  Service:     ME BACLOFEN INTRATHECAL TRIAL  3/8/2016    Dr. Roberson  [facility]    CATH PLACE PERM EPIDURAL N/A 3/8/2016    Procedure: BACLOFEN PUMP TRIAL;  Surgeon: Pari Robesron M.D.;  Location: [facility];  Service:     PUMP REVISION  10/8/2013    Performed by Pari Rboerson M.D. at [facility]    PUMP INSERT/REMOVE  3/12/2013    Performed by Pari Roberson M.D. at [facility]    CATH PLACE PERM EPIDURAL  6/26/2012    Performed by PARI ROBERSON at [facility]    CATH PLACE PERM EPIDURAL  3/6/2012    Performed by PARI ROBERSON at [facility]    MAMMOPLASTY AUGMENTATION  2002    TONSILLECTOMY       Social History     Tobacco Use    Smoking status: Former      Current packs/day: 0.00     Average packs/day: 1 pack/day for 12.0 years (12.0 ttl pk-yrs)     Types: Cigarettes     Start date: 1/1/1996     Quit date: 1/1/2008     Years since quitting: 15.8    Smokeless tobacco: Never    Tobacco comments:     Started smoking at age 11   Substance Use Topics    Alcohol use: No     Chief Complaint   Patient presents with    Arm Pain    Wound Infection     Diagnosis: Septic bursitis of elbow, right [M71.121]    Unit where seen by Wound Team: T417/00     WOUND FOLLOW UP RELATED TO:  R elbow NPWT       WOUND TEAM PLAN OF CARE - Frequency of Follow-up:   Nursing to follow dressing orders written for wound care. Contact wound team if area fails to progress, deteriorates or with any questions/concerns if something comes up before next scheduled follow up (See below as to whether wound is following and frequency of wound follow up)  Dressing changes by wound team:                   NPWT change 3 times weekly - R elbow, MWF schedule    WOUND HISTORY:       Pt is a 38yr old female with history of deisseminated encephalomyelitis, as well as GI Bleed. Pt presented to ER with arm pain and wound infection. Pt underwent a right elbow olecranon bursectomy on 10/3/23 and was subsequently discharged home on PO ABX. Unfortunately surgical site dehisced with pustular drainage noted. Pt was evaluated by ortho and wound team was consulted to place a veraflo wound vac.       WOUND ASSESSMENT/LDA  Wound 10/03/23 Full Thickness Wound Open Incision Elbow Right (Active)   Date First Assessed/Time First Assessed: 10/03/23 1612   Primary Wound Type: Full Thickness Wound  Surgical Wound Type: Open Incision  Location: Elbow  Laterality: Right      Assessments 10/20/2023 11:00 AM   Site Assessment Pink;Undermining;White   Periwound Assessment Clean;Dry;Intact   Margins Defined edges;Unattached edges   Closure Secondary intention   Drainage Amount Scant   Drainage Description Serosanguineous   Treatments Cleansed    Wound Cleansing Approved Wound Cleanser   Periwound Protectant Skin Protectant Wipes to Periwound;Paste Ring;Drape   Dressing Status Clean;Dry;Intact   Dressing Changed Changed   Dressing Cleansing/Solutions Not Applicable   Dressing Options Collagen Dressing;Wound Vac;Offloading Dressing - Heel   Dressing Change/Treatment Frequency Monday, Wednesday, Friday, and As Needed   NEXT Dressing Change/Treatment Date 10/23/23   NEXT Weekly Photo (Inpatient Only) 10/25/23   Wound Team Following 3x Weekly   Non-staged Wound Description Full thickness   Shape Round   Wound Odor None   WOUND NURSE ONLY - Time Spent with Patient (mins) 45       Negative Pressure Wound Therapy 10/18/23 Elbow Right (Active)   Placement Date/Time: 10/18/23 1319   Location: Elbow  Laterality: Right      Assessments 10/20/2023 11:00 AM   NPWT Pump Mode / Pressure Setting Ulta;Continuous;125 mmHg   Dressing Type Small;Black Foam (Veraflo)   Number of Foam Pieces Used 1   Canister Changed No   NEXT Dressing Change/Treatment Date 10/23/23        Vascular:    JUNIE:   No results found.    Lab Values:    Lab Results   Component Value Date/Time    WBC 5.1 10/19/2023 04:39 AM    RBC 3.80 (L) 10/19/2023 04:39 AM    HEMOGLOBIN 11.6 (L) 10/19/2023 04:39 AM    HEMATOCRIT 36.6 (L) 10/19/2023 04:39 AM    CREACTPROT 2.35 (H) 10/17/2023 06:55 PM    SEDRATEWES 22 10/17/2023 06:55 PM         Culture Results show:  Recent Results (from the past 720 hour(s))   CULTURE WOUND W/ GRAM STAIN    Collection Time: 10/01/23 12:45 AM    Specimen: Abscess; Wound   Result Value Ref Range    Significant Indicator POS (POS)     Source WND     Site Right Elbow Abscess     Culture Result - (A)     Gram Stain Result Few WBCs.  No organisms seen.       Culture Result (A)      Methicillin Resistant Staphylococcus aureus  Light growth         Susceptibility    Methicillin resistant staphylococcus aureus - CHAD     Azithromycin >4 Resistant mcg/mL     Clindamycin 0.5 Sensitive mcg/mL      Cefazolin <=8 Resistant mcg/mL     Cefepime 8 Resistant mcg/mL     Daptomycin 1 Sensitive mcg/mL     Ampicillin/sulbactam <=8/4 Resistant mcg/mL     Erythromycin >4 Resistant mcg/mL     Vancomycin 1 Sensitive mcg/mL     Oxacillin >2 Resistant mcg/mL     Trimeth/Sulfa <=0.5/9.5 Sensitive mcg/mL     Tetracycline <=4 Sensitive mcg/mL       Pain Level/Medicated:  IV pain medications administered by bedside RN 15 prior (Pt educated that IV medication will not be available on outpatient basis)       INTERVENTIONS BY WOUND TEAM:  Chart and images reviewed. Discussed with bedside RN. All areas of concern (based on picture review, LDA review and discussion with bedside RN) have been thoroughly assessed. Documentation of areas based on significant findings. This RN in to assess patient. Performed standard wound care which includes appropriate positioning, dressing removal and non-selective debridement. Pictures and measurements obtained weekly if/when required.    Wound:  R Elbow  Preparation for Dressing removal: Removed without difficulty  Cleansed/Non-selectively Debrided with:  Wound cleanser and Gauze  Shena wound: Cleansed with Wound cleanser and Gauze, Prepped with No Sting, Paste Rings, and Drape  Primary Dressing:  Lucía applied into undermining and over exposed connective tissue. Small piece of adaptic then applied over. One piece of half thickness black foam applied into wound bed and slightly bridged out to distal arm. Secured with drape.  Secondary (Outer) Dressing: Trac pad applied and suction resumed at 125mmHg. Offloading adhesive foam applied to offload tubing.    Advanced Wound Care Discharge Planning  Number of Clinicians necessary to complete wound care: 1  Is patient requiring IV pain medications for dressing changes:  No   Length of time for dressing change 30 min. (This does not include chart review, pre-medication time, set up, clean up or time spent charting.)    Interdisciplinary consultation:  Patient, Bedside RN (Sadia), Clari MELARA (Wound RN).    EVALUATION / RATIONALE FOR TREATMENT:     Date:  10/20/23  Wound Status:  Wound improving  Small area of exposed connective tissue. Dr. Barbosa at bedside during Vac change. Lucía dressing used to absorb destructive components of wound exudate and create an optimal environment for cellular growth. Area then covered with adaptic for further protection. Home vac at bedside, this RN briefly went over function of home vac and how to attach cannister.  Home Vac charging and patient ready to be swapped upon discharge.    Date:  10/18/23  Wound Status:  Initial evaluation    Shena-wound is red. Wound has circumferential undermining with greatest depth at 12 O'Clock. Veraflo applied to cleanse and debride. Pt will need home wound vac therapy and home health care for dressing changes 3x weekly. Will plan to change again on Friday or Sunday pending staff availability.         Goals: Steady decrease in wound area and depth weekly.    NURSING PLAN OF CARE ORDERS:  No new orders this visit    NUTRITION RECOMMENDATIONS   Wound Team Recommendations:  N/A     DIET ORDERS (From admission to next 24h)       Start     Ordered    10/18/23 1035  Diet Order Diet: Regular  ALL MEALS        Question:  Diet:  Answer:  Regular    10/18/23 1034                    PREVENTATIVE INTERVENTIONS:   Q shift Greg - performed per nursing policy  Q shift pressure point assessments - performed per nursing policy    Surface/Positioning  Low Airloss - Currently in Place  Reposition q 2 hours - Currently in Place    Offloading/Redistribution  Sacral offloading dressing (Silicone dressing) - Currently in Place  Heel offloading dressing (Silicone dressing) - Currently in Place    Anticipated discharge plans:  Home Health Care        Vac Discharge Needs:  Vac Discharge plan is purely a recommendation from wound team and not a requirement for discharge unless otherwise stated by physician.  Regular Vac in  use and continued at discharge   Veraflo tubing clamped and disconnected.

## 2023-10-20 NOTE — PROGRESS NOTES
Patient discharged per order. Patient sent home via [facility]. Discussed discharge information with pt. Pt. Acknowledged understanding. No further questions. Signed copy in chart. Opiate consent form signed. Pt received meds to beds. Pt belongings D/C with pt. IV removed. No complications noted. Patient sent home with home wound vac and wound vac supplies

## 2023-10-20 NOTE — PROGRESS NOTES
Report received at bedside from previous shift RN   Assumed care. Pt in bed. A/O x 4. VSS  Responds appropriately using hand signalling, pt is nonverbal.   Pain at 7/10, denies SOB/ denies chest pain. Provided non pharmacological interventions for comfort.  Denies N/V tolerating regular diet appropriately  Patient on RA  +Void, +flatus, last BM 10/20  Purewick in place  R elbow wound w/ wound vac in place  Patient is paraplegic, x2 assist to wheelchair  SCDs on  Assessment complete.   Discussed POC, pt verbalizes understanding.   Explained importance of calling before getting OOB.   Call light and belongings within reach. Bed alarm on, patient moderate fall risk per balaji borden.   Bed in the lowest position. Treaded socks in place. Hourly rounding in progress.

## 2023-10-20 NOTE — CARE PLAN
Problem: Pain - Standard  Goal: Alleviation of pain or a reduction in pain to the patient’s comfort goal  Outcome: Progressing     Problem: Knowledge Deficit - Standard  Goal: Patient and family/care givers will demonstrate understanding of plan of care, disease process/condition, diagnostic tests and medications  Outcome: Progressing     Problem: Skin Integrity  Goal: Skin integrity is maintained or improved  Outcome: Progressing     Problem: Fall Risk  Goal: Patient will remain free from falls  Outcome: Progressing   The patient is Stable - Low risk of patient condition declining or worsening    Shift Goals  Clinical Goals: pain control, skin integrity, wound care  Patient Goals: pain control, comfort, rest    Progress made toward(s) clinical / shift goals:    Patients pain managed with PRN medications. Q 2 hour numerical pain assessments in place until transport arrives at 14:30. Patient able to communicate pain level with hand gestures.  Skin integrity interventions in place until picked up by transport. Q2 hour turns in place. Mepilex on bony prominences. GRACE mattress and TAPS in place.  Fall risk interventions in place.

## 2023-10-20 NOTE — DISCHARGE PLANNING
Case Management Discharge Planning  Patient will transport home today at 2:30pm via [facility]. I spoke with Emi (Sister) yesterday and Caregiver will be at the residence when Patient arrives. [facility] will be providing services.  Admission Date: 10/17/2023  GMLOS: 4.4  ALOS: 3    6-Clicks ADL Score: 8  6-Clicks Mobility Score: 6  PT and/or OT Eval ordered: Yes  Post-acute Referrals Ordered: Yes  Post-acute Choice Obtained: Yes  Has referral(s) been sent to post-acute provider:  Yes      Anticipated Discharge Dispo:      DME Needed: Yes    DME Ordered: Yes    Action(s) Taken: Updated Provider/Nurse on Discharge Plan, DME Face to Face , Acceptance Received, Transport Arranged , and Authorization Received     Escalations Completed: Ride Line, Insurance , and Speciality Provider    Medically Clear: Yes    Next Steps:     Barriers to Discharge: None    Is the patient up for discharge tomorrow: No

## 2023-10-20 NOTE — CARE PLAN
The patient is Stable - Low risk of patient condition declining or worsening    Shift Goals  Clinical Goals: Pain control and maintain skin integrity  Patient Goals: Pain control and rest    Progress made toward(s) clinical / shift goals:  Pain controlled per MAR. Skin integrity maintained with TAPS, Q2 turns, and mepilex. Pt slept intermittently throughout shift.     Problem: Pain - Standard  Goal: Alleviation of pain or a reduction in pain to the patient’s comfort goal  Outcome: Progressing     Problem: Skin Integrity  Goal: Skin integrity is maintained or improved  Outcome: Progressing

## 2023-10-20 NOTE — PROGRESS NOTES
4 Eyes Skin Assessment Completed by NOHEMI Duran and NOHEMI Gong.     Head WDL  Ears WDL  Nose WDL  Mouth WDL  Neck WDL  Breast/Chest Redness and Blanching  Shoulder Blades Redness and Blanching  Spine Redness and Blanching  (R) Arm/Elbow/Hand R elbow wound with WV  (L) Arm/Elbow/Hand WDL  Abdomen WDL  Groin WDL  Scrotum/Coccyx/Buttocks Redness and Blanching  (R) Leg Swelling and scattered abrasions to shin  (L) Leg Swelling  (R) Heel/Foot/Toe Redness, Blanching, and Swelling  (L) Heel/Foot/Toe Redness, Blanching, and Swelling and missing big toe              Devices In Places Blood Pressure Cuff, Pulse Ox, and SCD's        Interventions In Place Heel Mepilex, Sacral Mepilex, TAP System, Pillows, Q2 Turns, Barrier Cream, and Heels Loaded W/Pillows     Possible Skin Injury Yes     Pictures Uploaded Into Epic Yes  Wound Consult Placed N/A  RN Wound Prevention Protocol Ordered No

## 2023-10-20 NOTE — DISCHARGE SUMMARY
Discharge Summary    CHIEF COMPLAINT ON ADMISSION  Chief Complaint   Patient presents with    Arm Pain    Wound Infection       Reason for Admission  ems     Admission Date  10/17/2023    CODE STATUS  Full Code    HPI & HOSPITAL COURSE  This is a 38-year-old female with past medical history of disseminated encephalomyelitis with paralysis, history of C. difficile, and history of GI bleed who was admitted on 10/17/2023 for right elbow wound dehiscence and pain.    Patient had recent admission in late September for septic bursitis of the right elbow requiring bursectomy and I&D 10/3, culture grew MRSA, patient completed 10-day course of vancomycin during hospitalization, and completed the remaining course with doxycycline.     Orthopedic surgery consulted, no need for repeat I&D, wound VAC placed.  ID consulted, recommend 2 to 4-week course of doxycycline.  Patient will follow-up with home health for wound care.    Therefore, she is discharged in good and stable condition to home with organized home healthcare and close outpatient follow-up.    The patient met 2-midnight criteria for an inpatient stay at the time of discharge.    Discharge Date  10/20/2023    FOLLOW UP ITEMS POST DISCHARGE  Primary care physician  Wound care    DISCHARGE DIAGNOSES  Principal Problem:    Septic bursitis of elbow, right (POA: Yes)  Active Problems:    ADEM (acute disseminated encephalomyelitis) (POA: Yes)      Overview: Debilitation with kirstie-quadriplegia able to move arms and legs, but not       coordinated and unable to perform ADLs    Intractable chronic migraine without aura and without status migrainosus (POA: Yes)    Quadriplegia (HCC) (POA: Unknown)  Resolved Problems:    * No resolved hospital problems. *      FOLLOW UP  Future Appointments   Date Time Provider Department Center   11/14/2023  9:00 AM Melissa P Bloch, M.D. [facility] None   3/12/2024 10:20 AM Tylor Chowdhury M.D. [facility] Cook P.A[facility]  [address]  [address]  [facility] [id number] [phone]    Follow up      Omar Simpson M.D.  [address]  [facility] [id number] [phone]    Schedule an appointment as soon as possible for a visit  For wound re-check      MEDICATIONS ON DISCHARGE     Medication List        START taking these medications        Instructions   doxycycline monohydrate 100 MG tablet  Commonly known as: Adoxa   Take 1 Tablet by mouth every 12 hours for 26 days.  Dose: 100 mg     oxyCODONE immediate release 10 MG immediate release tablet  Commonly known as: Roxicodone   Take 1 Tablet by mouth every 6 hours as needed for Severe Pain for up to 5 days.  Dose: 10 mg     tamsulosin 0.4 MG capsule  Commonly known as: Flomax   TAKE 1 CAPSULE BY MOUTH EVERY DAY  Dose: 0.4 mg     zolpidem 5 MG Tabs  Commonly known as: Ambien   TAKE 1 TABLET BY MOUTH AT BEDTIME AS NEEDED FOR SLEEP            CHANGE how you take these medications        Instructions   cyclobenzaprine 10 mg Tabs  What changed: how to take this  Commonly known as: Flexeril   TAKE 1 TABLET BY MOUTH THREE TIMES DAILY AS NEEDED     docusate sodium 100 MG Caps  What changed: how to take this  Commonly known as: Colace   TAKE 1 CAPSULE BY MOUTH TWICE DAILY AS NEEDED     sumatriptan 100 MG tablet  What changed: Another medication with the same name was removed. Continue taking this medication, and follow the directions you see here.  Commonly known as: Imitrex   Take 100 mg by mouth one time as needed for Migraine.  Dose: 100 mg            CONTINUE taking these medications        Instructions   baclofen 10 MG Tabs  Commonly known as: Lioresal   TAKE 3 TABLETS BY MOUTH FOUR TIMES DAILY     bisacodyl 10 MG Supp  Commonly known as: Dulcolax   INSERT 1 SUPPOSITORY RECTALLY EVERY DAY     citalopram 20 MG Tabs  Commonly known as: CeleXA   TAKE 1 TABLET BY MOUTH EVERY DAY  Dose: 20 mg     linaCLOtide 145 MCG Caps   Take 145 mcg by mouth every morning before breakfast.  Dose: 145  mcg     meloxicam 15 MG tablet  Commonly known as: Mobic   Take 1 Tablet by mouth every day.  Dose: 15 mg     * Misc. Devices Misc   Please allow patient to have support/therapy dog in appt. Regardless of weight due to multiple chronic illnesses and debility.     * Misc. Devices Misc   Please eval and fix electric W/C. Please eval also for W/C needs.     * Misc. Devices Misc   W/C stabilizer needs eval and possible replacement     * Misc. Devices Misc   Bedside commode     * Misc. Devices Misc   New latch for new dynavox  Wheel chair     * Misc. Devices Misc   Bedside commode with padding     omeprazole 40 MG delayed-release capsule  Commonly known as: PriLOSEC   Take 1 Capsule by mouth 2 times a day.  Dose: 40 mg     topiramate 25 MG Tabs  Commonly known as: Topamax   TAKE 1 TABLET BY MOUTH TWICE DAILY           * This list has 6 medication(s) that are the same as other medications prescribed for you. Read the directions carefully, and ask your doctor or other care provider to review them with you.                STOP taking these medications      cefUROXime 250 MG Tabs  Commonly known as: Ceftin     oxyCODONE-acetaminophen 5-325 MG Tabs  Commonly known as: Percocet              Allergies  Allergies   Allergen Reactions    Fentanyl      Makes her agressive    Sulfamethoxazole W-Trimethoprim Unspecified     Unknown reaction       DIET  Orders Placed This Encounter   Procedures    Diet Order Diet: Regular     Standing Status:   Standing     Number of Occurrences:   1     Order Specific Question:   Diet:     Answer:   Regular [1]       ACTIVITY  As tolerated.  Weight bearing as tolerated    CONSULTATIONS  Orthopedic surgery    PROCEDURES  None    LABORATORY  Lab Results   Component Value Date    SODIUM 136 10/19/2023    POTASSIUM 3.6 10/19/2023    CHLORIDE 106 10/19/2023    CO2 21 10/19/2023    GLUCOSE 78 10/19/2023    BUN 11 10/19/2023    CREATININE 0.45 (L) 10/19/2023        Lab Results   Component Value Date    WBC  5.1 10/19/2023    HEMOGLOBIN 11.6 (L) 10/19/2023    HEMATOCRIT 36.6 (L) 10/19/2023    PLATELETCT 261 10/19/2023      No orders to display      Total time of the discharge process exceeds 45 minutes.

## 2023-10-20 NOTE — PROGRESS NOTES
Bedside report received, assessment completed     A&O x  4, pt calls appropriately  Mobility: Up with x2 assist to WC  Fall Risk Assessment: Moderate, bed alarm on, door notifications in use  Pain Assessment / Reassessment completed, medication provided per MAR  Diet: Regular  LDA:   IV Access: 20 L FA, CDI/ flushed/ SL  Wound vac: R elbow      GI/: + void, + flatus, PTA BM  DVT Prophylaxis: Lovenox, SCD on        Reviewed plan of care with patient, bed in lowest position and locked, pt resting comfortably now, call light within reach, all needs met at this time. Interventions will be executed per plan of care

## 2023-10-21 ENCOUNTER — HOSPITAL ENCOUNTER (EMERGENCY)
Facility: MEDICAL CENTER | Age: 38
End: 2023-10-21
Attending: STUDENT IN AN ORGANIZED HEALTH CARE EDUCATION/TRAINING PROGRAM
Payer: MEDICARE

## 2023-10-21 VITALS
WEIGHT: 148 LBS | HEIGHT: 67 IN | SYSTOLIC BLOOD PRESSURE: 92 MMHG | DIASTOLIC BLOOD PRESSURE: 60 MMHG | RESPIRATION RATE: 16 BRPM | HEART RATE: 88 BPM | OXYGEN SATURATION: 96 % | TEMPERATURE: 98.4 F | BODY MASS INDEX: 23.23 KG/M2

## 2023-10-21 DIAGNOSIS — Z46.89 ENCOUNTER FOR MANAGEMENT OF WOUND VAC: ICD-10-CM

## 2023-10-21 DIAGNOSIS — Z51.89 ENCOUNTER FOR WOUND RE-CHECK: ICD-10-CM

## 2023-10-21 PROCEDURE — 97605 NEG PRS WND THER DME<=50SQCM: CPT

## 2023-10-21 PROCEDURE — 700102 HCHG RX REV CODE 250 W/ 637 OVERRIDE(OP): Mod: UD | Performed by: STUDENT IN AN ORGANIZED HEALTH CARE EDUCATION/TRAINING PROGRAM

## 2023-10-21 PROCEDURE — 99284 EMERGENCY DEPT VISIT MOD MDM: CPT

## 2023-10-21 PROCEDURE — A9270 NON-COVERED ITEM OR SERVICE: HCPCS | Mod: UD | Performed by: STUDENT IN AN ORGANIZED HEALTH CARE EDUCATION/TRAINING PROGRAM

## 2023-10-21 RX ORDER — OXYCODONE HYDROCHLORIDE 5 MG/1
5 TABLET ORAL ONCE
Status: COMPLETED | OUTPATIENT
Start: 2023-10-21 | End: 2023-10-21

## 2023-10-21 RX ADMIN — OXYCODONE HYDROCHLORIDE 5 MG: 5 TABLET ORAL at 12:40

## 2023-10-21 ASSESSMENT — LIFESTYLE VARIABLES: DO YOU DRINK ALCOHOL: NO

## 2023-10-21 ASSESSMENT — FIBROSIS 4 INDEX: FIB4 SCORE: 0.55

## 2023-10-21 NOTE — ED TRIAGE NOTES
"Chief Complaint   Patient presents with    Other     BIB EMS from home for wound vac change to RT elbow.      Non verbal at baseline. Caregiver at baseline. Sister called 911.    /64   Pulse (!) 110   Temp 37.1 °C (98.8 °F) (Oral)   Resp 18   Ht 1.702 m (5' 7\")   Wt 67.1 kg (148 lb)   SpO2 94%   BMI 23.18 kg/m²     "

## 2023-10-21 NOTE — ED NOTES
Spoke with wound care RN, plan to take off wound-vac dressing and wound care RN will come down and replace wound-vac.

## 2023-10-21 NOTE — ED PROVIDER NOTES
ED Provider Note    CHIEF COMPLAINT  Chief Complaint   Patient presents with    Other     BIB EMS from home for wound vac change to RT elbow.        EXTERNAL RECORDS REVIEWED  Inpatient Notes recent admission for septic bursitis 10/17/2023    HPI/ROS  LIMITATION TO HISTORY   Select: Patient nonverbal at baseline and communicates through iPad  OUTSIDE HISTORIAN(S):  Caregiver      Griselda Swanson is a 38 y.o. female who presents for wound check of right elbow.  Last night the patient's wound VAC was inadvertently torn off of her elbow while showering.  Her daughter was able to replace as best she could however wanted to have it evaluated today.  Otherwise reports patient is doing well no fevers    PAST MEDICAL HISTORY   has a past medical history of ADEM (acute disseminated encephalomyelitis), Back pain, Breath shortness, C. difficile colitis (2015), Coma ([facility]) (August 2009), Coma ([facility]) (2008), Demyelinating disorder ([facility]), Encephalitis (2009), Heart burn, Indigestion, Lesion of brain, MEDICAL HOME, Migraines, MS (multiple sclerosis) ([facility]), Other specified disorder of intestines, Pain, Psychiatric problem, Renal disorder, and Urinary incontinence.    SURGICAL HISTORY   has a past surgical history that includes tonsillectomy; cath place perm epidural (3/6/2012); cath place perm epidural (6/26/2012); pump insert/remove (3/12/2013); mammoplasty augmentation (2002); pump revision (10/8/2013); baclofen intrathecal trial (3/8/2016); cath place perm epidural (N/A, 3/8/2016); cath place perm epidural (Left, 6/14/2016); pump insert/remove (Left, 7/1/2016); upper gi endoscopy,diagnosis (N/A, 3/16/2023); upper gi endoscopy,biopsy (N/A, 3/16/2023); and irrigation & debridement general (Right, 10/3/2023).    FAMILY HISTORY  Family History   Problem Relation Age of Onset    Cancer Mother         Sarcoma    Bipolar disorder Brother     Other Brother         spina bifida    Diabetes Maternal Grandmother     Other Daughter          "Migrains       SOCIAL HISTORY  Social History     Tobacco Use    Smoking status: Former     Current packs/day: 0.00     Average packs/day: 1 pack/day for 12.0 years (12.0 ttl pk-yrs)     Types: Cigarettes     Start date: 1/1/1996     Quit date: 1/1/2008     Years since quitting: 15.8    Smokeless tobacco: Never    Tobacco comments:     Started smoking at age 11   Vaping Use    Vaping Use: Never used   Substance and Sexual Activity    Alcohol use: No    Drug use: No    Sexual activity: Never     Partners: Male       CURRENT MEDICATIONS  Home Medications       Reviewed by Alfonzo Spear R.N. (Registered Nurse) on 10/21/23 at 1153  Med List Status: Partial     Medication Last Dose Status   baclofen (LIORESAL) 10 MG Tab  Active   bisacodyl (DULCOLAX) 10 MG Suppos  Active   citalopram (CELEXA) 20 MG Tab  Active   cyclobenzaprine (FLEXERIL) 10 mg Tab  Active   docusate sodium (COLACE) 100 MG Cap  Active   doxycycline monohydrate (ADOXA) 100 MG tablet  Active   linaCLOtide 145 MCG Cap  Active   meloxicam (MOBIC) 15 MG tablet  Active   Misc. Devices Misc  Active   Misc. Devices Misc  Active   Misc. Devices Misc  Active   Misc. Devices Misc  Active   Misc. Devices Misc  Active   Misc. Devices Misc  Active   omeprazole (PRILOSEC) 40 MG delayed-release capsule  Active   oxyCODONE immediate release (ROXICODONE) 10 MG immediate release tablet  Active   sumatriptan (IMITREX) 100 MG tablet  Active   tamsulosin (FLOMAX) 0.4 MG capsule  Active   topiramate (TOPAMAX) 25 MG Tab  Active   zolpidem (AMBIEN) 5 MG Tab  Active                    ALLERGIES  Allergies   Allergen Reactions    Fentanyl      Makes her agressive    Sulfamethoxazole W-Trimethoprim Unspecified     Unknown reaction       PHYSICAL EXAM  VITAL SIGNS: BP 92/60   Pulse 88   Temp 36.9 °C (98.4 °F) (Temporal)   Resp 16   Ht 1.702 m (5' 7\")   Wt 67.1 kg (148 lb)   SpO2 96%   BMI 23.18 kg/m²    Physical Exam  Vitals and nursing note reviewed.   Constitutional:       " Appearance: She is well-developed.   HENT:      Head: Normocephalic.   Eyes:      Extraocular Movements: Extraocular movements intact.      Pupils: Pupils are equal, round, and reactive to light.   Cardiovascular:      Rate and Rhythm: Normal rate and regular rhythm.   Pulmonary:      Effort: Pulmonary effort is normal.      Breath sounds: Normal breath sounds.   Abdominal:      Palpations: Abdomen is soft.      Tenderness: There is no abdominal tenderness.   Musculoskeletal:      Cervical back: Normal range of motion.      Comments: Open healing wound over the right elbow there is good granulation tissue there is no surrounding erythema or exudate   Neurological:      Mental Status: She is alert and oriented to person, place, and time.      Comments: Nonverbal at baseline there chronic contractures the left ankles         DIAGNOSTIC STUDIES / PROCEDURES      COURSE & MEDICAL DECISION MAKING    ED Observation Status? No; Patient does not meet criteria for ED Observation.     INITIAL ASSESSMENT, COURSE AND PLAN  Care Narrative: 38-year-old female presents for wound VAC replacement after inadvertently being removed last night while in the shower on exam the elbow appears to be healing well there are no signs of infection the patient is nontoxic-appearing and has no infectious symptoms.  Wound care team able to come down and replaced wound VAC.  Patient has home health for further wound VAC changes.  Return precautions given for signs of infection or intractable pain        ADDITIONAL PROBLEM LIST  Past Medical History:   Diagnosis Date    ADEM (acute disseminated encephalomyelitis)     Back pain     Breath shortness     since 2014 not an issue at this time (4/2018)    C. difficile colitis 2015    Coma (HCC) August 2009    1 month, lesions on brain,  unknown etiology    Coma (HCC) 2008    Spontaneous -     Demyelinating disorder (HCC)     demyelinating encephpomyeltis     Encephalitis 2009    Heart burn     Indigestion      Lesion of brain     unknown cuase    MEDICAL HOME     Migraines     MS (multiple sclerosis) (HCC)     Other specified disorder of intestines     constipation    Pain     Psychiatric problem     depression and anxiety    Renal disorder     kidney stones    Urinary incontinence        DISPOSITION AND DISCUSSIONS  I have discussed management of the patient with the following physicians and MAGY's:      Discussion of management with other QHP or appropriate source(s): Wound care     Escalation of care considered, and ultimately not performed:blood analysis    Barriers to care at this time, including but not limited to: Patient lacks transportation .     Decision tools and prescription drugs considered including, but not limited to: .    FINAL DIAGNOSIS  1. Encounter for wound re-check    2. Encounter for management of wound VAC           Electronically signed by: Rajinder Posadas M.D., 10/21/2023 12:03 PM

## 2023-10-21 NOTE — ED NOTES
Pt resting in bed. Denies any needs using thumbs down signaling. Informed pt that we are waiting for social work to arrange a ride home for her.

## 2023-10-21 NOTE — ED NOTES
Wound-vac removed, MD assessed wound, placed a wet to dry dressing and gauze until wound care RN ready to replace wound-vac. Pt tolerated well.

## 2023-10-21 NOTE — WOUND TEAM
[facility]  Inpatient Services  Initial Wound and Skin Care Evaluation    Admission Date: 10/21/2023     Last order of IP CONSULT TO WOUND CARE was found on 10/18/2023 from Hospital Encounter on 10/17/2023     HPI, PMH, SH: Reviewed    Past Surgical History:   Procedure Laterality Date    IRRIGATION & DEBRIDEMENT GENERAL Right 10/3/2023    Procedure: IRRIGATION AND DEBRIDEMENT, WOUND;  Surgeon: Omar Simpson M.D.;  Location: [facility];  Service: Orthopedics    NJ UPPER GI ENDOSCOPY,DIAGNOSIS N/A 3/16/2023    Procedure: GASTROSCOPY;  Surgeon: Evelin Bautista M.D.;  Location: SURGERY SAME DAY [facility];  Service: Gastroenterology    NJ UPPER GI ENDOSCOPY,BIOPSY N/A 3/16/2023    Procedure: GASTROSCOPY, WITH BIOPSY;  Surgeon: Evelin Bautista M.D.;  Location: SURGERY SAME DAY [facility];  Service: Gastroenterology    PUMP INSERT/REMOVE Left 7/1/2016    Procedure: PUMP REMOVAL;  Surgeon: Pari Roberson M.D.;  Location: [facility];  Service:     CATH PLACE PERM EPIDURAL Left 6/14/2016    Procedure: CATH PLACE PERM EPIDURAL - BACLOFEN PUMP AND CATHETER REPLACEMENT  - BACK AND ABDOMEN;  Surgeon: Pari Roberson M.D.;  Location: [facility];  Service:     [place] BACLOFEN INTRATHECAL TRIAL  3/8/2016    Dr. Roberson  [facility]    CATH PLACE PERM EPIDURAL N/A 3/8/2016    Procedure: BACLOFEN PUMP TRIAL;  Surgeon: Pari Roberson M.D.;  Location: [facility];  Service:     PUMP REVISION  10/8/2013    Performed by Pari Roberson M.D. at [facility]    PUMP INSERT/REMOVE  3/12/2013    Performed by Pari Roberson M.D. at [facility]    CATH PLACE PERM EPIDURAL  6/26/2012    Performed by PARI ROBERSON at [facility]    CATH PLACE PERM EPIDURAL  3/6/2012    Performed by PARI ROBERSON at [facility]    MAMMOPLASTY AUGMENTATION  2002    TONSILLECTOMY       Social History     Tobacco Use    Smoking status: Former      Current packs/day: 0.00     Average packs/day: 1 pack/day for 12.0 years (12.0 ttl pk-yrs)     Types: Cigarettes     Start date: 1/1/1996     Quit date: 1/1/2008     Years since quitting: 15.8    Smokeless tobacco: Never    Tobacco comments:     Started smoking at age 11   Substance Use Topics    Alcohol use: No     Chief Complaint   Patient presents with    Other     BIB EMS from home for wound vac change to RT elbow.      Diagnosis: No admission diagnoses are documented for this encounter.    Unit where seen by Wound Team: RD 03/03 RED     WOUND CONSULT RELATED TO:  R Elbow    WOUND TEAM PLAN OF CARE - Frequency of Follow-up:   Nursing to follow dressing orders written for wound care. Contact wound team if area fails to progress, deteriorates or with any questions/concerns if something comes up before next scheduled follow up (See below as to whether wound is following and frequency of wound follow up)   NPWT change 3 times weekly - R elbow, MWF change schedule    WOUND HISTORY:   38 y.o. female who discharged yesterday. NPWT was changed yesterday prior to discharge by this RN.  Plan was for Vac changes to be done 3x/weekly by Home Health. Patient returns to ER as Vac was dislodged.       WOUND ASSESSMENT/LDA  Wound 10/03/23 Full Thickness Wound Open Incision Elbow Right (Active)   Date First Assessed/Time First Assessed: 10/03/23 1612   Primary Wound Type: Full Thickness Wound  Surgical Wound Type: Open Incision  Location: Elbow  Laterality: Right      Assessments 10/21/2023  2:00 PM   Site Assessment Pink;Undermining   Periwound Assessment Fragile   Margins Defined edges;Unattached edges   Closure Secondary intention   Drainage Amount Scant   Drainage Description Serosanguineous   Treatments Cleansed   Wound Cleansing Normal Saline Irrigation   Periwound Protectant Skin Protectant Wipes to Periwound;Paste Ring;Drape   Dressing Status Clean;Dry;Intact   Dressing Changed Changed   Dressing Cleansing/Solutions  Not Applicable   Dressing Options Collagen Dressing;Adaptic;Wound Vac   Dressing Change/Treatment Frequency Monday, Wednesday, Friday, and As Needed   NEXT Dressing Change/Treatment Date 10/23/23   Wound Team Following 3x Weekly   Shape Round   WOUND NURSE ONLY - Time Spent with Patient (mins) 30        Vascular:    JUNIE:   No results found.    Lab Values:    Lab Results   Component Value Date/Time    WBC 5.1 10/19/2023 04:39 AM    RBC 3.80 (L) 10/19/2023 04:39 AM    HEMOGLOBIN 11.6 (L) 10/19/2023 04:39 AM    HEMATOCRIT 36.6 (L) 10/19/2023 04:39 AM    CREACTPROT 2.35 (H) 10/17/2023 06:55 PM    SEDRATEWES 22 10/17/2023 06:55 PM         Culture Results show:  Recent Results (from the past 720 hour(s))   CULTURE WOUND W/ GRAM STAIN    Collection Time: 10/01/23 12:45 AM    Specimen: Abscess; Wound   Result Value Ref Range    Significant Indicator POS (POS)     Source WND     Site Right Elbow Abscess     Culture Result - (A)     Gram Stain Result Few WBCs.  No organisms seen.       Culture Result (A)      Methicillin Resistant Staphylococcus aureus  Light growth         Susceptibility    Methicillin resistant staphylococcus aureus - CHAD     Azithromycin >4 Resistant mcg/mL     Clindamycin 0.5 Sensitive mcg/mL     Cefazolin <=8 Resistant mcg/mL     Cefepime 8 Resistant mcg/mL     Daptomycin 1 Sensitive mcg/mL     Ampicillin/sulbactam <=8/4 Resistant mcg/mL     Erythromycin >4 Resistant mcg/mL     Vancomycin 1 Sensitive mcg/mL     Oxacillin >2 Resistant mcg/mL     Trimeth/Sulfa <=0.5/9.5 Sensitive mcg/mL     Tetracycline <=4 Sensitive mcg/mL       Pain Level/Medicated:  PO pain medications administered by bedside RN 30 prior       INTERVENTIONS BY WOUND TEAM:  Chart and images reviewed. Discussed with bedside RN. All areas of concern (based on picture review, LDA review and discussion with bedside RN) have been thoroughly assessed. Documentation of areas based on significant findings. This RN in to assess patient. Performed  standard wound care which includes appropriate positioning, dressing removal and non-selective debridement. Pictures and measurements obtained weekly if/when required.  Wound:  R Elbow  Preparation for Dressing removal: Removed without difficulty  Cleansed/Non-selectively Debrided with:  Wound cleanser and Gauze  Shena wound: Cleansed with Wound cleanser and Gauze, Prepped with No Sting, Paste Rings, and Drape  Primary Dressing:  Lucía applied into undermining and over exposed connective tissue. Small piece of adaptic then applied over. One piece of half thickness black foam applied into wound bed and slightly bridged out to distal arm. Secured with drape.  Secondary (Outer) Dressing: Trac pad applied and suction resumed at 125mmHg. Offloading adhesive foam applied to offload tubing.       Advanced Wound Care Discharge Planning  Number of Clinicians necessary to complete wound care: 1  Is patient requiring IV pain medications for dressing changes:  No   Length of time for dressing change 30 min. (This does not include chart review, pre-medication time, set up, clean up or time spent charting.)    Interdisciplinary consultation: Patient, Bedside RN (Daphne), Clari MELARA (Wound RN).    EVALUATION / RATIONALE FOR TREATMENT:     Date:  10/21/23  Wound Status:  Initial evaluation    Wound appearance the same as yesterday, small area of connective tissue still exposed. Vac replaced. Provided education to caregiver at bedside on how to fix leaks and disconnect tubing for when patient is changing clothes.         Goals: Steady decrease in wound area and depth weekly.    NURSING PLAN OF CARE ORDERS:  No new orders this visit    NUTRITION RECOMMENDATIONS   Wound Team Recommendations:  N/A    DIET ORDERS (From admission to next 24h)      None            PREVENTATIVE INTERVENTIONS:    Q shift Greg - performed per nursing policy  Q shift pressure point assessments - performed per nursing policy    Anticipated discharge  plans:  Home Health Care        Vac Discharge Needs:  Vac Discharge plan is purely a recommendation from wound team and not a requirement for discharge unless otherwise stated by physician.  Regular Vac in use and continued at discharge

## 2023-10-21 NOTE — DISCHARGE PLANNING
Notified by RN pt needs transport home.   [facility] PCS form completed and faxed   Transport arranged w/ Jessica for 1630     Pt's daughter, Emi updated on transport time. She states family will be at the home

## 2023-10-21 NOTE — ED NOTES
Pt medicated per MAR. Pt alert and gives permission to remove wound vac, O2 at 97% on RA. BP 97/66.

## 2023-10-22 LAB
BACTERIA BLD CULT: NORMAL
BACTERIA BLD CULT: NORMAL
SIGNIFICANT IND 70042: NORMAL
SIGNIFICANT IND 70042: NORMAL
SITE SITE: NORMAL
SITE SITE: NORMAL
SOURCE SOURCE: NORMAL
SOURCE SOURCE: NORMAL

## 2023-10-22 NOTE — ED NOTES
Discharge instructions reviewed with patient. Instructed on follow up wound care, report given to [facility]. All belongings with patient.

## 2023-10-23 ENCOUNTER — PATIENT MESSAGE (OUTPATIENT)
Dept: HEALTH INFORMATION MANAGEMENT | Facility: OTHER | Age: 38
End: 2023-10-23

## 2023-10-23 ENCOUNTER — PATIENT OUTREACH (OUTPATIENT)
Dept: MEDICAL GROUP | Facility: MEDICAL CENTER | Age: 38
End: 2023-10-23
Payer: MEDICARE

## 2023-10-23 NOTE — PROGRESS NOTES
Transitional Care Management  TCM Outreach Date and Time: Filed (10/23/2023  1:55 PM)    Discharge Questions  Actual Discharge Date: 10/20/23  Now that you are home, how are you feeling?: Good (spoke to Luda, Griselda's sister who does not live in town. Jessica is non-verbal. She has not texted Luda anything worrisome so she assumes she is doing ok.[employer] message sent to Griselda for outreach.)  Did you receive any new prescriptions?: Yes  Were you able to get them filled?: Yes  Meds to Bed or Pharmacy filled?: Meds to Bed  Do you have any questions about your current medications or new medications (Review Med Rec)?: No  Do you have a follow up appointment scheduled with your PCP?: Yes  Was an appointment scheduled for the patient?: No  Appointment Date: 10/31/23  Appointment Time: 1300  Reason not scheduled?: Declines  Any issues or paperwork you wish to discuss with your PCP?: No  Does this patient qualify for the CCM program?: No    Transitional Care  Number of attempts made to contact patient: 1  Current or previous attempts competed within two business days of discharge? : Yes  Provided education regarding treatment plan, medications, self-management, ADLs?: -- (GEOVANI TAVERA on referral. I left a  for CHAN to call me back if there were any issues with the referral)  Has patient completed an Advanced Directive?: No (Guardian ship document only)  Is the patient's advanced directive on file?: Yes  Has the Care Manager's phone number provided?: Yes  Is there anything else I can help you with?: No    Discharge Summary  Chief Complaint: Arm Pain    Wound Infection  Admitting Diagnosis: Septic bursitis of elbow, right (M71.121)  Discharge Diagnosis: Septic bursitis of elbow, right      Unable to review medication list at this time.

## 2023-10-25 ENCOUNTER — TELEPHONE (OUTPATIENT)
Dept: HEALTH INFORMATION MANAGEMENT | Facility: OTHER | Age: 38
End: 2023-10-25
Payer: MEDICARE

## 2023-10-25 ENCOUNTER — TELEPHONE (OUTPATIENT)
Dept: MEDICAL GROUP | Facility: MEDICAL CENTER | Age: 38
End: 2023-10-25
Payer: MEDICARE

## 2023-10-25 NOTE — TELEPHONE ENCOUNTER
Caller Name: Isaura   Call Back Number: [phone]     Isaura from [facility] called and LVM stating that pt been having a hard time with wound vac on her elbow. It would come off especially at night. Pt does not have a 24 hours caregiver and home alone. They having communication issue as pt was text her sister and they would contact [facility]. [place] does not do home visit at night. Isaura working on getting a  and has wrap pt's elbow with plastic film to see if it will stay. She also stated that pt has a HR of 108

## 2023-10-25 NOTE — TELEPHONE ENCOUNTER
I spoke to Aurora,  at [facility]. She says the patient has nurse appts on [facility]  for wound vac care. Other than than the elevated HR 108bpm today (99bpm on Tuesday) her vital signs were WNL. The   will be out tomorrow. Aurora is aware of the possibility of needing a higher level of care at this time due to the patient not having a 24-7 caregiver and being non-verbal and will discuss with PCP as necessary.     I spoke to patient's sister, Luda, who lives out of town and is her legal guardian. We discussed the situation. She is going to call [facility] to discuss the plan of care, including access to the on call nurse and discussion of a higher level of care. Luda understands ER precautions if necessary for severe pain, fever or other concerns that cannot be addressed by . Explained to Luda that the [facility]  and nurses are her main contact re: patient's current condition while on service and that they will communicate with PCP. Luda was given the phone number to [facility].

## 2023-10-31 ENCOUNTER — OFFICE VISIT (OUTPATIENT)
Dept: MEDICAL GROUP | Facility: MEDICAL CENTER | Age: 38
End: 2023-10-31
Payer: MEDICARE

## 2023-10-31 VITALS
BODY MASS INDEX: 23.23 KG/M2 | SYSTOLIC BLOOD PRESSURE: 112 MMHG | OXYGEN SATURATION: 100 % | WEIGHT: 148 LBS | HEART RATE: 100 BPM | TEMPERATURE: 98.2 F | HEIGHT: 67 IN | RESPIRATION RATE: 16 BRPM | DIASTOLIC BLOOD PRESSURE: 60 MMHG

## 2023-10-31 DIAGNOSIS — G82.50 QUADRIPLEGIA (HCC): ICD-10-CM

## 2023-10-31 DIAGNOSIS — K59.2 NEUROGENIC BOWEL: ICD-10-CM

## 2023-10-31 DIAGNOSIS — M71.121 SEPTIC BURSITIS OF ELBOW, RIGHT: ICD-10-CM

## 2023-10-31 DIAGNOSIS — R53.81 DEBILITY: ICD-10-CM

## 2023-10-31 DIAGNOSIS — R32 URINARY INCONTINENCE, UNSPECIFIED TYPE: ICD-10-CM

## 2023-10-31 PROCEDURE — 3074F SYST BP LT 130 MM HG: CPT | Performed by: STUDENT IN AN ORGANIZED HEALTH CARE EDUCATION/TRAINING PROGRAM

## 2023-10-31 PROCEDURE — 99214 OFFICE O/P EST MOD 30 MIN: CPT | Performed by: STUDENT IN AN ORGANIZED HEALTH CARE EDUCATION/TRAINING PROGRAM

## 2023-10-31 PROCEDURE — 3078F DIAST BP <80 MM HG: CPT | Performed by: STUDENT IN AN ORGANIZED HEALTH CARE EDUCATION/TRAINING PROGRAM

## 2023-10-31 RX ORDER — SUMATRIPTAN 100 MG/1
100 TABLET, FILM COATED ORAL
Qty: 10 TABLET | Refills: 11 | Status: SHIPPED | OUTPATIENT
Start: 2023-10-31 | End: 2024-01-03 | Stop reason: SDUPTHER

## 2023-10-31 RX ORDER — BISACODYL 10 MG
SUPPOSITORY, RECTAL RECTAL
Qty: 50 SUPPOSITORY | Refills: 11 | Status: SHIPPED | OUTPATIENT
Start: 2023-10-31

## 2023-10-31 RX ORDER — OXYCODONE HYDROCHLORIDE AND ACETAMINOPHEN 5; 325 MG/1; MG/1
1 TABLET ORAL EVERY 8 HOURS PRN
Qty: 30 TABLET | Refills: 0 | Status: SHIPPED | OUTPATIENT
Start: 2023-10-31 | End: 2023-11-21 | Stop reason: SDUPTHER

## 2023-10-31 RX ORDER — POLYETHYLENE GLYCOL 3350 17 G/17G
17 POWDER, FOR SOLUTION ORAL DAILY
Qty: 255 G | Refills: 3 | Status: SHIPPED | OUTPATIENT
Start: 2023-10-31

## 2023-10-31 ASSESSMENT — FIBROSIS 4 INDEX: FIB4 SCORE: 0.55

## 2023-10-31 NOTE — PROGRESS NOTES
Subjective:     Chief Complaint   Patient presents with    Hospital Follow-up     Having a lot of pain since after surgery        HPI:   Griselda presents today with     Hospital follow-up  Patient hospitalized 9/30 through 10/9 for seizures and septic bursitis right elbow.  Patient was started on IV antibiotics.  Orthopedics evaluated and scheduled status post right elbow olecranon bursectomy, culture growing MRSA sensitive to doxycycline ID recommended antibiotics till 10/12/2012/23.  Patient was discharged home with caregiver,  assisting.    Patient returned to hospital 10/17 through 10/20 for right elbow wound dehiscence and pain.  Orthopedic was consulted, no need for repeat I&D, wound VAC was placed.  Patient started on another 2 to 4-week course of doxycycline.  Patient following with wound care.    Patient is having difficulty with wound VAC, consistently having issues such as turning off and leaking, happening over the weekend.  Wound VAC in place today.  Patient being seen by wound care Monday Wednesday Friday.  Wound does look improved, hopefully issues with wound VAC have been resolved by wound care.      Patient does not have 24-hour care and no care at all from 8 PM to 8 AM.  Patient does care daily from 8:30 PM and has caregiver on weekends.  Discussion with family about 24/7 care, group homes or nursing homes.  Patient prefers to stay at home at this time patient does have call button on hand 24/7.    Patient also having a lot of pain due to elbow.  On discharge from hospital patient was giving oxycodone every 6 hours as needed for severe pain and for 5 days.  We will refill oxycodone to use as needed for severe pain, Tylenol for mild pain.          Wheelchair evaluation  Patient needs a wheelchair evaluation by [employer].  Patient has been using a chair from [employer] that needs foam replaced.  Family has been trying to get foam replaced, leading to increased friction and discomfort to  "the elbow.    Urinary incontinence  Needs refills for her incontinence supplies with [employer]. Patient uses small pull on briefs, frequency 3-4 times per day and a large under pad for this.  Also taking tamsulosin and topiramate. Patient with neurogenic bowel and bladder secondary to disseminated encephalomyelitis.     Chronic nonintractable headache, unspecified headache type  Chronic, stable. Patient continues to treat with sumatriptan injections and sumatriptan tablets.  Patient needing refill on sumatriptan.    ROS:  Gen: no fevers/chills, no changes in weight  Eyes: no changes in vision  ENT: no sore throat, no hearing loss, no bloody nose  Pulm: no sob, no cough  CV: no chest pain, no palpitations  GI: no nausea/vomiting, no diarrhea  : no dysuria  MSk: no myalgias  Skin: no rash  Neuro: no headaches, no numbness/tingling  Heme/Lymph: no easy bruising      Objective:     Exam:  /60   Pulse 100   Temp 36.8 °C (98.2 °F) (Temporal)   Resp 16   Ht 1.702 m (5' 7\")   Wt 67.1 kg (148 lb) Comment: per pt  SpO2 100%   BMI 23.18 kg/m²  Body mass index is 23.18 kg/m².    Gen: Alert and oriented, No apparent distress.  Neck: Neck is supple without lymphadenopathy.  Lungs: Normal effort, CTA bilaterally, no wheezes, rhonchi, or rales  CV: Regular rate and rhythm. No murmurs, rubs, or gallops.  Ext: No clubbing, cyanosis, edema.      Assessment & Plan:     38 y.o. female with the following -     1. Septic bursitis of elbow, right  - oxyCODONE-acetaminophen (PERCOCET) 5-325 MG Tab; Take 1 Tablet by mouth every 8 hours as needed for Severe Pain for up to 10 days.  Dispense: 30 Tablet; Refill: 0    2. Neurogenic bowel  - bisacodyl (DULCOLAX) 10 MG Suppos; INSERT 1 SUPPOSITORY RECTALLY EVERY DAY  Dispense: 50 Suppository; Refill: 11  - polyethylene glycol 3350 (MIRALAX) 17 GM/SCOOP Powder; Take 17 g by mouth every day.  Dispense: 255 g; Refill: 3  - Misc. Devices Misc; 1 Each in the morning, at noon, and at " bedtime. 1 Brief 3x/day for incontinence. Lifetime due to neurogenic bladder  Dispense: 90 Each; Refill: 11    3. Urinary incontinence, unspecified type  - Misc. Devices Misc; 1 Each in the morning, at noon, and at bedtime. 1 Brief 3x/day for incontinence. Lifetime due to neurogenic bladder  Dispense: 90 Each; Refill: 11    4. Quadriplegia (HCC)  - Misc. Devices Misc; Please eval and fix electric W/C. Please eval also for W/C needs.  Dispense: 1 Each; Refill: 11    5. Debility  - Misc. Devices Misc; Please eval and fix electric W/C. Please eval also for W/C needs.  Dispense: 1 Each; Refill: 11     No follow-ups on file.    Please note that this dictation was created using voice recognition software. I have made every reasonable attempt to correct obvious errors, but I expect that there are errors of grammar and possibly content that I did not discover before finalizing the note.

## 2023-11-01 ENCOUNTER — TELEPHONE (OUTPATIENT)
Dept: MEDICAL GROUP | Facility: MEDICAL CENTER | Age: 38
End: 2023-11-01
Payer: MEDICARE

## 2023-11-01 NOTE — TELEPHONE ENCOUNTER
Caller Name: Isaura  Call Back Number: [phone]    Isaura from [facility] and stated that pt Hr is 102. Her HR has been around 99 - 102. Isaura has raise upper limit to 110 HR so that she doesn't have to call Margo Cook PA-C every time she see pt. Paper order will be sent to Margo Cook PA-C  as well

## 2023-11-08 ENCOUNTER — APPOINTMENT (OUTPATIENT)
Dept: NEUROLOGY | Facility: MEDICAL CENTER | Age: 38
End: 2023-11-08
Attending: PSYCHIATRY & NEUROLOGY
Payer: MEDICARE

## 2023-11-21 DIAGNOSIS — M71.121 SEPTIC BURSITIS OF ELBOW, RIGHT: ICD-10-CM

## 2023-11-22 NOTE — TELEPHONE ENCOUNTER
Received request via: Pharmacy    Was the patient seen in the last year in this department? Yes    Does the patient have an active prescription (recently filled or refills available) for medication(s) requested? yes    Does the patient have [facility] and need 100 day supply (blood pressure, diabetes and cholesterol meds only)? Patient does not have SCP   Requested Prescriptions     Pending Prescriptions Disp Refills    oxyCODONE-acetaminophen (PERCOCET) 5-325 MG Tab 30 Tablet 0     Sig: Take 1 Tablet by mouth every 8 hours as needed for Severe Pain for up to 10 days.

## 2023-11-28 RX ORDER — OXYCODONE HYDROCHLORIDE AND ACETAMINOPHEN 5; 325 MG/1; MG/1
1 TABLET ORAL EVERY 8 HOURS PRN
Qty: 30 TABLET | Refills: 0 | Status: SHIPPED | OUTPATIENT
Start: 2023-11-28 | End: 2023-12-15 | Stop reason: SDUPTHER

## 2023-11-28 NOTE — TELEPHONE ENCOUNTER
Sister, Luda called and LVM stating that pt is in a lot of pain due to her wound of her elbow. Sister requesting a refill of pt pain medication.

## 2023-12-15 DIAGNOSIS — M71.121 SEPTIC BURSITIS OF ELBOW, RIGHT: ICD-10-CM

## 2023-12-15 RX ORDER — OXYCODONE HYDROCHLORIDE AND ACETAMINOPHEN 5; 325 MG/1; MG/1
1 TABLET ORAL EVERY 8 HOURS PRN
Qty: 30 TABLET | Refills: 0 | Status: SHIPPED | OUTPATIENT
Start: 2023-12-15 | End: 2024-01-08 | Stop reason: SDUPTHER

## 2024-01-03 DIAGNOSIS — F51.01 PRIMARY INSOMNIA: ICD-10-CM

## 2024-01-03 NOTE — TELEPHONE ENCOUNTER
Received request via: Patient    Was the patient seen in the last year in this department? No   Date of last office visit 02/07/22     Per last Neurology Office Visit, when was the date of next follow up visit set for?                            Date of office visit follow up request 6 months      Does the patient have an upcoming appointment? No   If yes, when?     Does the patient have an active prescription (recently filled or refills available) for medication(s) requested? No    Does the patient have [facility] and need 100 day supply (blood pressure, diabetes and cholesterol meds only)? Patient does not have SCP

## 2024-01-04 RX ORDER — ZOLPIDEM TARTRATE 5 MG/1
5 TABLET ORAL
Qty: 90 TABLET | Refills: 1 | Status: SHIPPED | OUTPATIENT
Start: 2024-01-04 | End: 2024-07-02

## 2024-01-04 RX ORDER — SUMATRIPTAN 100 MG/1
100 TABLET, FILM COATED ORAL
Qty: 10 TABLET | Refills: 11 | Status: SHIPPED | OUTPATIENT
Start: 2024-01-04

## 2024-01-08 DIAGNOSIS — M71.121 SEPTIC BURSITIS OF ELBOW, RIGHT: ICD-10-CM

## 2024-01-08 DIAGNOSIS — F33.42 RECURRENT MAJOR DEPRESSIVE DISORDER, IN FULL REMISSION (HCC): Chronic | ICD-10-CM

## 2024-01-08 RX ORDER — CITALOPRAM 20 MG/1
20 TABLET ORAL
Qty: 90 TABLET | Refills: 3 | Status: SHIPPED | OUTPATIENT
Start: 2024-01-08 | End: 2024-03-12 | Stop reason: SDUPTHER

## 2024-01-10 ENCOUNTER — DOCUMENTATION (OUTPATIENT)
Dept: HEALTH INFORMATION MANAGEMENT | Facility: OTHER | Age: 39
End: 2024-01-10
Payer: MEDICARE

## 2024-01-10 RX ORDER — OXYCODONE HYDROCHLORIDE AND ACETAMINOPHEN 5; 325 MG/1; MG/1
1 TABLET ORAL EVERY 8 HOURS PRN
Qty: 30 TABLET | Refills: 0 | Status: SHIPPED | OUTPATIENT
Start: 2024-01-10 | End: 2024-02-09

## 2024-01-23 DIAGNOSIS — L60.9 NAIL ABNORMALITIES: Primary | ICD-10-CM

## 2024-01-25 DIAGNOSIS — R53.81 DEBILITY: ICD-10-CM

## 2024-01-25 DIAGNOSIS — G82.50 QUADRIPLEGIA (HCC): ICD-10-CM

## 2024-01-25 DIAGNOSIS — R25.2 SPASTICITY: ICD-10-CM

## 2024-02-16 DIAGNOSIS — R25.2 SPASTICITY: ICD-10-CM

## 2024-02-20 DIAGNOSIS — R25.2 SPASTICITY: ICD-10-CM

## 2024-02-20 RX ORDER — BACLOFEN 10 MG/1
TABLET ORAL
Qty: 360 TABLET | Refills: 0 | Status: SHIPPED | OUTPATIENT
Start: 2024-02-20

## 2024-02-20 NOTE — TELEPHONE ENCOUNTER
----- Message from Saray Phoenix sent at 2/20/2024  1:51 PM PST -----  Regarding: refill  Hello,    Pt is out of her Baclofen.  She is a Margo pt.. Pt uses [facility] on [address]. Pt's sister Luda is asking for this to refilled ASAP.   Luda is asking for a call back ASAP as well to let her know what's going on with RX @ [phone]. Pt 's sister states she will call back in a few hours if she hasn't heard anything.    Thank you,  Saray :)

## 2024-02-20 NOTE — TELEPHONE ENCOUNTER
Received request via: Patient    Was the patient seen in the last year in this department? Yes    Does the patient have an active prescription (recently filled or refills available) for medication(s) requested? No    Pharmacy Name: [facility]     Does the [facility] and need 100 day supply (blood pressure, diabetes and cholesterol meds only)? Patient does not have SCP

## 2024-02-21 RX ORDER — BACLOFEN 10 MG/1
TABLET ORAL
Qty: 360 TABLET | Refills: 11 | Status: SHIPPED | OUTPATIENT
Start: 2024-02-21

## 2024-03-08 ENCOUNTER — TELEPHONE (OUTPATIENT)
Dept: HEALTH INFORMATION MANAGEMENT | Facility: OTHER | Age: 39
End: 2024-03-08
Payer: MEDICARE

## 2024-03-12 ENCOUNTER — OFFICE VISIT (OUTPATIENT)
Dept: MEDICAL GROUP | Facility: MEDICAL CENTER | Age: 39
End: 2024-03-12
Payer: MEDICARE

## 2024-03-12 VITALS
WEIGHT: 148 LBS | HEIGHT: 67 IN | TEMPERATURE: 98.1 F | OXYGEN SATURATION: 100 % | HEART RATE: 94 BPM | DIASTOLIC BLOOD PRESSURE: 78 MMHG | BODY MASS INDEX: 23.23 KG/M2 | SYSTOLIC BLOOD PRESSURE: 112 MMHG

## 2024-03-12 DIAGNOSIS — G04.00 ADEM (ACUTE DISSEMINATED ENCEPHALOMYELITIS): ICD-10-CM

## 2024-03-12 DIAGNOSIS — F33.42 RECURRENT MAJOR DEPRESSIVE DISORDER, IN FULL REMISSION (HCC): Chronic | ICD-10-CM

## 2024-03-12 DIAGNOSIS — K59.09 CHRONIC CONSTIPATION: ICD-10-CM

## 2024-03-12 DIAGNOSIS — G82.50 QUADRIPLEGIA (HCC): ICD-10-CM

## 2024-03-12 DIAGNOSIS — M71.121 SEPTIC BURSITIS OF ELBOW, RIGHT: ICD-10-CM

## 2024-03-12 DIAGNOSIS — R25.2 SPASTICITY: ICD-10-CM

## 2024-03-12 PROCEDURE — 3078F DIAST BP <80 MM HG: CPT | Performed by: STUDENT IN AN ORGANIZED HEALTH CARE EDUCATION/TRAINING PROGRAM

## 2024-03-12 PROCEDURE — 99214 OFFICE O/P EST MOD 30 MIN: CPT | Performed by: STUDENT IN AN ORGANIZED HEALTH CARE EDUCATION/TRAINING PROGRAM

## 2024-03-12 PROCEDURE — 3074F SYST BP LT 130 MM HG: CPT | Performed by: STUDENT IN AN ORGANIZED HEALTH CARE EDUCATION/TRAINING PROGRAM

## 2024-03-12 RX ORDER — DOCUSATE SODIUM 100 MG/1
100 CAPSULE, LIQUID FILLED ORAL 2 TIMES DAILY PRN
Qty: 180 CAPSULE | Refills: 3 | Status: SHIPPED | OUTPATIENT
Start: 2024-03-12

## 2024-03-12 RX ORDER — CITALOPRAM 20 MG/1
20 TABLET ORAL
Qty: 90 TABLET | Refills: 3 | Status: SHIPPED | OUTPATIENT
Start: 2024-03-12

## 2024-03-12 RX ORDER — OXYCODONE HYDROCHLORIDE AND ACETAMINOPHEN 5; 325 MG/1; MG/1
1 TABLET ORAL EVERY 4 HOURS PRN
Qty: 30 TABLET | Refills: 0 | Status: SHIPPED | OUTPATIENT
Start: 2024-03-12 | End: 2024-04-11

## 2024-03-12 RX ORDER — OXYCODONE HYDROCHLORIDE AND ACETAMINOPHEN 5; 325 MG/1; MG/1
1 TABLET ORAL EVERY 4 HOURS PRN
Qty: 30 TABLET | Refills: 0 | Status: SHIPPED | OUTPATIENT
Start: 2024-05-11 | End: 2024-06-10

## 2024-03-12 RX ORDER — OXYCODONE HYDROCHLORIDE AND ACETAMINOPHEN 5; 325 MG/1; MG/1
1 TABLET ORAL EVERY 4 HOURS PRN
Qty: 30 TABLET | Refills: 0 | Status: SHIPPED | OUTPATIENT
Start: 2024-04-11 | End: 2024-05-11

## 2024-03-12 RX ORDER — CYCLOBENZAPRINE HCL 10 MG
10 TABLET ORAL 3 TIMES DAILY PRN
Qty: 270 TABLET | Refills: 3 | Status: SHIPPED | OUTPATIENT
Start: 2024-03-12

## 2024-03-12 ASSESSMENT — PATIENT HEALTH QUESTIONNAIRE - PHQ9
4. FEELING TIRED OR HAVING LITTLE ENERGY: NOT AT ALL
6. FEELING BAD ABOUT YOURSELF - OR THAT YOU ARE A FAILURE OR HAVE LET YOURSELF OR YOUR FAMILY DOWN: NOT AL ALL
2. FEELING DOWN, DEPRESSED, IRRITABLE, OR HOPELESS: NOT AT ALL
SUM OF ALL RESPONSES TO PHQ9 QUESTIONS 1 AND 2: 0
5. POOR APPETITE OR OVEREATING: NOT AT ALL
7. TROUBLE CONCENTRATING ON THINGS, SUCH AS READING THE NEWSPAPER OR WATCHING TELEVISION: NOT AT ALL
9. THOUGHTS THAT YOU WOULD BE BETTER OFF DEAD, OR OF HURTING YOURSELF: NOT AT ALL
1. LITTLE INTEREST OR PLEASURE IN DOING THINGS: NOT AT ALL
SUM OF ALL RESPONSES TO PHQ QUESTIONS 1-9: 0
8. MOVING OR SPEAKING SO SLOWLY THAT OTHER PEOPLE COULD HAVE NOTICED. OR THE OPPOSITE, BEING SO FIGETY OR RESTLESS THAT YOU HAVE BEEN MOVING AROUND A LOT MORE THAN USUAL: NOT AT ALL
3. TROUBLE FALLING OR STAYING ASLEEP OR SLEEPING TOO MUCH: NOT AT ALL

## 2024-03-12 ASSESSMENT — FIBROSIS 4 INDEX: FIB4 SCORE: 0.55

## 2024-03-12 NOTE — PROGRESS NOTES
"Subjective:     CC: presents for refill.     HPI:   Griselda presents today with    PMH history of disseminated encephalomyelitis with residual paralysis, hx of c diff, hx of gi bleed, hx of  septic bursitis of elbow    Patient has communicating with typing on ipad  She reports she need refill on medication for pain in her right elbow  She reports she is taking medication over the counter aleve twice day   Request for medication refill  Request for referral to physiatry- prior patient of helena reece md  She signals she is getting home health for her right elbow bursitis ( referral placed 10/18/2023)  Unable to sign CS agreement due to medical condition     Medication  Baclofen  cyclobenzaprine  Meloxicam  omeprazole  Citalopram  Docusate  polyethylene  Sumatriptan  Topiramate  Tamsulosin  Zolpidem     No problems updated.    Health Maintenance:     ROS:  ROS at baseline    Objective:     Exam:  /78 (BP Location: Left arm, Patient Position: Sitting)   Pulse 94   Temp 36.7 °C (98.1 °F) (Temporal)   Ht 1.702 m (5' 7\")   Wt 67.1 kg (148 lb)   SpO2 100%   BMI 23.18 kg/m²  Body mass index is 23.18 kg/m².    Physical Exam  Constitutional:       Appearance: Normal appearance.   Musculoskeletal:      Cervical back: Normal range of motion and neck supple.   Skin:     General: Skin is warm and dry.      Comments: On examination of elbow, dressing appears dry there is no surrounding erythema suggestive of cellulitis.    Neurological:      Mental Status: She is alert.   Psychiatric:         Mood and Affect: Mood normal.         Behavior: Behavior normal.           Labs:  last lab post op slight anemia, renal function fine 10/2023    Assessment & Plan:     38 y.o. female with the following -     1. Septic bursitis of elbow, right  2. Quadriplegia (HCC)  Chronic stable  Following with wound care HH   She feels her elbow is doing good  She request for refill of medication. Last refilled in January. It appears she is " requiring percocet at lower frequency as last refill was in January. She reports she is also controlling pain with aleve 1 tablet twice a day over the counter.   Discussed with patient it may be reasonable to provide typed history prior to her visit to facilitate our visits and to make sure her concerns are addressed.   CSA not signed due to medical conditions  Plan  - will sent in percocet PRN  - oxyCODONE-acetaminophen (PERCOCET) 5-325 MG Tab; Take 1 Tablet by mouth every four hours as needed for Severe Pain (septic bursitis due to chronic med condition) for up to 30 days.  Dispense: 30 Tablet; Refill: 0  -Obtained and reviewed patient utilization report from [facility] pharmacy database on 3/12/2024 11:04 AM  prior to writing prescription for controlled substance II, III or IV per [place] bill . Based on assessment of the report, the prescription is medically necessary.       3. Spasticity  Chronic stable  On flexeril and baclofen    4. ADEM (acute disseminated encephalomyelitis)  Chronic stable  With residual incompete tetraplegia  Previously followed with physiatry.         Return in about 3 months (around 6/12/2024), or if symptoms worsen or fail to improve.    Please note that this dictation was created using voice recognition software. I have made every reasonable attempt to correct obvious errors, but I expect that there are errors of grammar and possibly content that I did not discover before finalizing the note.

## 2024-04-02 ENCOUNTER — APPOINTMENT (OUTPATIENT)
Dept: PHYSICAL MEDICINE AND REHAB | Facility: MEDICAL CENTER | Age: 39
End: 2024-04-02
Payer: MEDICARE

## 2024-04-02 VITALS
HEIGHT: 67 IN | BODY MASS INDEX: 23.23 KG/M2 | TEMPERATURE: 98.3 F | OXYGEN SATURATION: 94 % | SYSTOLIC BLOOD PRESSURE: 128 MMHG | HEART RATE: 107 BPM | WEIGHT: 148 LBS | DIASTOLIC BLOOD PRESSURE: 80 MMHG

## 2024-04-02 DIAGNOSIS — Z99.3 WHEELCHAIR DEPENDENCE: ICD-10-CM

## 2024-04-02 DIAGNOSIS — Z71.82 EXERCISE COUNSELING: ICD-10-CM

## 2024-04-02 DIAGNOSIS — G04.00 ADEM (ACUTE DISSEMINATED ENCEPHALOMYELITIS): ICD-10-CM

## 2024-04-02 DIAGNOSIS — R25.2 SPASTICITY: ICD-10-CM

## 2024-04-02 DIAGNOSIS — L89.90 PRESSURE ULCERS OF SKIN OF MULTIPLE TOPOGRAPHIC SITES: ICD-10-CM

## 2024-04-02 DIAGNOSIS — Z71.3 DIETARY COUNSELING: ICD-10-CM

## 2024-04-02 DIAGNOSIS — R47.01 APHASIA: ICD-10-CM

## 2024-04-02 DIAGNOSIS — M62.49 CONTRACTURE OF MUSCLE OF MULTIPLE SITES: ICD-10-CM

## 2024-04-02 DIAGNOSIS — Z91.81 RISK FOR FALLS: ICD-10-CM

## 2024-04-02 PROCEDURE — 3074F SYST BP LT 130 MM HG: CPT | Performed by: GENERAL PRACTICE

## 2024-04-02 PROCEDURE — G2212 PROLONG OUTPT/OFFICE VIS: HCPCS | Performed by: GENERAL PRACTICE

## 2024-04-02 PROCEDURE — 99215 OFFICE O/P EST HI 40 MIN: CPT | Performed by: GENERAL PRACTICE

## 2024-04-02 PROCEDURE — 3079F DIAST BP 80-89 MM HG: CPT | Performed by: GENERAL PRACTICE

## 2024-04-02 RX ORDER — DANTROLENE SODIUM 25 MG/1
CAPSULE ORAL
Qty: 70 CAPSULE | Refills: 0 | Status: SHIPPED | OUTPATIENT
Start: 2024-04-02 | End: 2024-04-30

## 2024-04-02 ASSESSMENT — FIBROSIS 4 INDEX: FIB4 SCORE: 0.55

## 2024-04-02 NOTE — PROGRESS NOTES
[facility]  [facility]   [address], [address] [place] [address]  Ph: [phone]    SPASTICITY CLINIC    Patient Name: Griselda Swanson   Patient : 1985  Patient Age: 38 y.o.   PCP: Margo Cook P.A.-C.    Referring Physician: Tylor Chowdhury M.D.   Reason for Referral: Spasticity Management   Examining Physician: Nolan Camejo DO  Date of Service: 2024      SUBJECTIVE:   Patient Identification: Griselda Swanson is a 38 y.o. RHD previously now LHD female with rehabilitation history significant for depression, chronic pain, GERD and rehabilitation history significant for incomplete tetraplegia secondary to postinfectious acute disseminated encephalomyelitis   and is presenting to PM&R spasticity clinic for a ESTABLISHING/FOLLOW UP evaluation with the following chief complaint/s:    Chief Complaint: Spasticity    Previously established with Dr. Rouse from 2019 until 2023.  Her last management plan:  Rehab/Neuro:   Incomplete tetraplegia secondary to postinfectious acute disseminated encephalomyelitis 2009: Differential diagnosis initially included tumefactive MS versus ADEM, patient initially evaluated at [facility] and then went to [facility].  Neurology has been following and given that her MRIs have been completely stable this favors ADEM. ARU at [place] in .   -Referral: Home health for speech therapy using [employer] and nursing for skin ulcers     Skin: Due to neurologic diagnosis of nontraumatic tetraplegia and subsequent sensorimotor impairments, patient is at great risk for skin breakdown.   Pressure also is due to contractures  -Counseled on utility of Botox which I do not think would be efficacious at all since we have tried it in the past already.  At this point contractures are a surgical issue which would need release.  This would be at the discretion of the surgeons.  She could consider another baclofen pump though she did have  significant issues in the past including infection leading to removal.     Ortho:   Multiple Joint Contractures:  - Ortho 9/27/22 - no acute management recommended. No show to appointment with Dr. Moura 10/25/2022  -Information given for orthopedic office for her to make an appointment with the physician in order to discuss contracture release.     :  Subjective UTI  Presence of IUD  -Very difficult to get urine sample  - Med management: Ciprofloxacin 500 mg twice daily for 7 days  -Referral to gynecology for IUD replacement     Follow up: As needed    Orthopedics evaluated and scheduled status post right elbow olecranon bursectomy 10/3/23, culture growing MRSA sensitive to doxycycline ID recommended antibiotics till 10/12/2012/23.     Accompanied by Today: Self-uses iPad for communication; Sister on phone providing most of the history.  She does not live in proximity.   History of Present Illness:      Today, 4/2/2024 ongoing stiffness of legs and toes.  Prior botox with limited success.  Currently taking baclofen.  Patient only has caregivers during the daytime.  At nighttime, patient is left unsupervised and given adult diapers.  Sister [place] a different [place].  Patient is aphasic but can use her ipad and phone.   For her right elbow olecranon bursectomy, she has not had a follow up with Dr. Simpson.  Reported wound care nurse changing bandages. Limited activities of daily living secondary to infection and 2009 infection.      CV 1/17/23  - Griselda presents alone today, she has several concerns.  - First would like to know how to get her legs less stiff.  - She is continuing to take baclofen 30 mg 4 times daily.  - Is still off of the tizanidine which she has been off for a while due to side effect of sedation.  - She is having new hip and groin pain which she thinks is due to being up in the chair or sitting in bed a lot.  - She also has a sore on the bottom of her great toe.  It is callused but  partially open.  - She denies any new shoes or new areas of pressure that would have caused this.  - Is wondering if she may benefit from Achilles tendon lengthening or contracture release.  - Is having a lot of foot pain and toe curling which is painful.     CV 7/25/2023   And has been doing well.  Her sister is on iPad via [employer] to assist with history.  Griselda has made notes from previous to discuss with me.  She thinks she has a UTI.  She has had burning while peeing for a couple of weeks now.  She also states that she needs her IUD replaced and does not have a gynecologist.  Additionally she has skin ulcers on multiple sites due to her contractures.  She needs home health nursing to look at her wounds.  They are wondering what else can be done about her tone, she feels like it is significantly worse.      Current Spasticity Medications and Dosages:  Baclofen 10 mg 3 times a day    Past Spasticity Medications and Dosages:  Stop tizanidine due to sedation side effect  Valium    Previous Spasticity Injection History:  2/16/2023  Botox Plan: I plan to inject to the bilateral feet  Location Botox Amount in Units   Flexor digitorum brevis, right 100 units (multiple locations)   Flexor digitorum brevis, left 100 units (multiple locations)   Total Units 200 units total     1/18/23  Location Botox Amount in Units   Right adductor longus  50 units   Right adductor brevis  50 units   Right adductor Angelito  50 units   Left Adductor longus  50 units   Left Adductor brevis  50 units   Left Adductor angelito  50 units   Total Units  300 units        Review of Systems:  Red Flags ROS:   Fever, Chills, Sweats: Denies  Involuntary Weight Loss: Denies  Breathing difficulties: denies  Chest pain: denies  Bladder Incontinence: Denies  Bowel Incontinence: denies  Loss of genital sensation: Denies  Falls: denies  progressive motor weakness: denies  All other systems reviewed and negative.         1/12/2023     1:20 PM 1/18/2021      1:20 PM 2020    11:10 AM 2020    11:00 AM 2020    12:50 PM   PHQ-9 Screening   Little interest or pleasure in doing things 0 - not at all 0 - not at all 0 - not at all 0 - not at all 0 - not at all   Feeling down, depressed, or hopeless 0 - not at all 0 - not at all 0 - not at all 0 - not at all 0 - not at all   PHQ-2 Total Score 0 0 0 0 0       Interpretation of PHQ-9 Total Score   Score Severity   1-4 No Depression   5-9 Mild Depression   10-14 Moderate Depression   15-19 Moderately Severe Depression   20-27 Severe Depression      Past Medical History:  Past Medical History:   Diagnosis Date    C. difficile colitis     Coma ([facility]) 2009    1 month, lesions on brain,  unknown etiology    Encephalitis     Coma ([facility])     Spontaneous -     ADEM (acute disseminated encephalomyelitis)     Back pain     Breath shortness     since  not an issue at this time (2018)    Demyelinating disorder ([facility])     demyelinating encephpomyeltis     Heart burn     Indigestion     Lesion of brain     unknown cuase    MEDICAL HOME     Migraines     MS (multiple sclerosis) ([facility])     Other specified disorder of intestines     constipation    Pain     Psychiatric problem     depression and anxiety    Renal disorder     kidney stones    Urinary incontinence       Allergies   Allergen Reactions    Fentanyl      Makes her agressive    Sulfamethoxazole W-Trimethoprim Unspecified     Unknown reaction        Past Social History:  Social History     Socioeconomic History    Marital status: Single     Spouse name: Not on file    Number of children: Not on file    Years of education: Not on file    Highest education level: Not on file   Occupational History    Not on file   Tobacco Use    Smoking status: Former     Current packs/day: 0.00     Average packs/day: 1 pack/day for 12.0 years (12.0 ttl pk-yrs)     Types: Cigarettes     Start date: 1996     Quit date: 2008     Years since quittin.2     Smokeless tobacco: Never    Tobacco comments:     Started smoking at age 11   Vaping Use    Vaping Use: Never used   Substance and Sexual Activity    Alcohol use: No    Drug use: No    Sexual activity: Never     Partners: Male   Other Topics Concern    Primary/coprimary nurse & associates Not Asked    Family contact information Not Asked    OK to release patient information to the following Not Asked    Patient preferred routine/Privacy concerns Not Asked    Patient likes and dislikes Not Asked    Participating In Research Study Not Asked    Miscellaneous Not Asked     Service No    Blood Transfusions No    Caffeine Concern No    Occupational Exposure No    Hobby Hazards No    Sleep Concern Yes    Stress Concern No    Weight Concern No    Special Diet Yes     Comment: Keto    Back Care No    Exercise No    Bike Helmet No    Seat Belt Yes    Self-Exams No   Social History Narrative    Not on file     Social Determinants of Health     Financial Resource Strain: Not on file   Food Insecurity: Not on file   Transportation Needs: Not on file   Physical Activity: Not on file   Stress: Not on file   Social Connections: Not on file   Intimate Partner Violence: Not on file   Housing Stability: Not on file        Family History:  Family History   Problem Relation Age of Onset    Cancer Mother         Sarcoma    Bipolar disorder Brother     Other Brother         spina bifida    Diabetes Maternal Grandmother     Other Daughter         Migrains         OBJECTIVE:   Vital Signs:  Vitals:    04/02/24 1056   BP: 128/80   Pulse: (!) 107   Temp: 36.8 °C (98.3 °F)   SpO2: 94%        Physical Exam:   Body Habitus: Body mass index is 23.18 kg/m².  Appearance: Well-groomed, well-nourished, not disheveled  Eyes: No scleral icterus to suggest severe liver disease, no proptosis to suggest severe hyperthyroid  ENT -no obvious auditory deficits, no external lesions, moist mucus membranes   Skin -no rashes or lesions noted. No  appreciable skin breakdown on exposed skin areas.    Respiratory-  breathing comfortably on room air, no audible wheezing, full sentences  Cardiovascular- No lower extremity edema noted.   Psychiatric- alert and oriented, calm, comfortable, cooperative   Gait - Nonambulatory.  Electric wheelchair dependent.  No braces present.  Neuromuscular- Awake alert.  Coordinated use of hands to type on iPad.  Communicative appropriately through iPad and appeared to be using sign language. . Multiple sites of severe contracture on multiple joints. Including ankles in planterflexoin and inversion. Knees contracted.   Right elbow flexion and extension with bandage for recent bursitis.   Painful left elbow flexion and extension  No spasticity of hands and wrists.   Mostly coordinate with hand use.     Pertinent Labs:  Lab Results   Component Value Date/Time    SODIUM 136 10/19/2023 04:39 AM    POTASSIUM 3.6 10/19/2023 04:39 AM    CHLORIDE 106 10/19/2023 04:39 AM    CO2 21 10/19/2023 04:39 AM    GLUCOSE 78 10/19/2023 04:39 AM    BUN 11 10/19/2023 04:39 AM    CREATININE 0.45 (L) 10/19/2023 04:39 AM       Lab Results   Component Value Date/Time    WBC 5.1 10/19/2023 04:39 AM    RBC 3.80 (L) 10/19/2023 04:39 AM    HEMOGLOBIN 11.6 (L) 10/19/2023 04:39 AM    HEMATOCRIT 36.6 (L) 10/19/2023 04:39 AM    MCV 96.3 10/19/2023 04:39 AM    MCH 30.5 10/19/2023 04:39 AM    MCHC 31.7 (L) 10/19/2023 04:39 AM    MPV 9.8 10/19/2023 04:39 AM    NEUTSPOLYS 73.80 (H) 10/18/2023 03:58 AM    LYMPHOCYTES 17.00 (L) 10/18/2023 03:58 AM    MONOCYTES 6.90 10/18/2023 03:58 AM    EOSINOPHILS 0.70 10/18/2023 03:58 AM    BASOPHILS 1.30 10/18/2023 03:58 AM    ANISOCYTOSIS 1+ 07/01/2016 04:41 AM       Lab Results   Component Value Date/Time    ASTSGOT 14 10/18/2023 03:58 AM    ALTSGPT 15 10/18/2023 03:58 AM       Imaging:   I personally reviewed following images, these are my reads  Bilateral ankle x-ray 9/27/2022: severe equinovarus alignment with midfoot  "degenerative changes     IMAGING radiology reads. I reviewed the following radiology reads     Results for orders placed during the hospital encounter of 06/28/21    MR-BRAIN-WITH & W/O    Impression  1.  Moderate cerebral atrophy with prominent white matter substance loss associated with ex vacuo dilatation of the bodies of the lateral ventricles. There is also marked volume loss in the body and splenium of the corpus callosum.  2.  Extensive confluent areas of leukoencephalopathy compatible with the given clinical history of chronic demyelinating disease. This may represent sequela of ADEM. No discrete new lesions, overall progression of lesion burden, or \"active\" enhancing  lesions to suggest progressive demyelinating process.  3.  The brainstem shows subtle striated T2 hyperintensities in the fredy along with confluent hazy T2 hyperintensity in the dorsal fredy also likely representing chronic demyelinating disease. This is unchanged from 2/24/2016. No enhancing lesions  involving the brainstem or cerebellum.                                                            Results for orders placed during the hospital encounter of 06/14/17    DX-ANKLE 2- VIEWS RIGHT    Impression  1.  No radiographic evidence of acute traumatic injury.  2.  Mild diffuse osteopenia, appearance suggests disuse osteopenia.   Results for orders placed in visit on 09/27/22    DX-ANKLE 3+ VIEWS RIGHT      Results for orders placed during the hospital encounter of 11/25/16    DX-CHEST-2 VIEWS    Impression  No acute findings.        Results for orders placed during the hospital encounter of 10/11/19    DX-FOOT-2- RIGHT    Impression  Flexion deformity the right foot and varus angulation right ankle.  Moderate osteopenia.  No acute fracture identified.            Results for orders placed during the hospital encounter of 10/11/19    DX-KNEE 2- LEFT    Impression  Moderate osteopenia with no acute fracture identified.      Results for orders " placed during the hospital encounter of 03/08/16    DX-LUMBAR SPINE-2 OR 3 VIEWS    Impression  Intraoperative images as described.           Results for orders placed during the hospital encounter of 02/24/15    DX-SPINE-SCOLIOSIS STUDY    Impression  1.  Dextroscoliosis in the thoracic spine with 24 degrees of curvature.    2.  Lumbar spine levoscoliosis with 33 degrees of curvature.       Results for orders placed during the hospital encounter of 06/14/17    DX-TIBIA AND FIBULA RIGHT    Impression  1.  No acute traumatic bony injury.           ASSESSMENT/PLAN: Griselda Swanson  is a 38 y.o. female with rehabilitation history significant for depression, chronic pain, GERD and rehabilitation history significant for incomplete tetraplegia secondary to postinfectious acute disseminated encephalomyelitis 2009  presenting for spasticity management. The following plan was discussed with the patient who is in agreement.     Visit Diagnoses     ICD-10-CM   1. ADEM (acute disseminated encephalomyelitis)  G04.00   2. Spasticity  R25.2   3. Contracture of muscle of multiple sites  M62.49   4. Pressure ulcers of skin of multiple topographic sites  L89.90   5. Wheelchair dependence  Z99.3   6. Aphasia  R47.01   7. Risk for falls  Z91.81   8. Dietary counseling  Z71.3   9. Exercise counseling  Z71.82         Spasticity secondary to above  Medication Management: baclofen 30mg QID; start dantrolene  -Dantrolene 25 mg once nightly for 7 days; increase to 25 mg 2 times daily for 7 days, increase to 25 mg 3 times daily for 7 days; some patients may require 100 mg 4 times daily; maximum dose: 400 mg/day. Monitor for adverse effects of CNS depression, photosensitivity, Nausea, vomiting, RUQ pain, or muscle weakness.  Expressed the patient to follow the prescription bottle  -Prior liver enzymes normal in October and will recheck again in 3 weeks with continued use of dantrolene.  Ambulatory: electric WC almost 5 years old in july.   Orthotics will consider at next visit  Referral physical therapy and Occupational Therapy and home exercise program Continue to focus on strengthening and stretching.  Assessment for wheelchair as her current wheelchair will be 5 years old in July.  Suboptimal support: recent change of family as patient only has caregivers during the daytime is left unsupervised at night.  However, the patient displayed coordination of the use of her iPad.  Recommend to keep iPad and phone in proximity the patient for emergencies.  Would recommend for the / to evaluate for additional community resources for the patient such as [facility] resources  Will investigate ortho community for adult tendon lengthening: consider iliopsoas, hamstrings, ankles     Other:  Right elbow olecranon bursectomy : 10/3/2023 Omar Simpson M.D. inform patient to follow-up.  Continue wound care changes  Diet and exercise counseling and fall prevention provided  -Limitations: Activity as tolerated  -Referrals: PT and OT   -Diagnostic workup: reviewed today as above  -Interventional program: I may consider the patient for an injection depending on the results of the above   -Outside records requested:  The patient signed Outside Records Request Form for her outside records including images.      Follow-up: Dantrolene management in about a month, or sooner as needed for any acute issues.  Patient expressed understanding of the management plan. Patient (and Family Members) was/were encouraged to call if any worries, issues, problems or concerns prior to the next visit     Please note that this dictation was created using voice recognition software. I have made every reasonable attempt to correct obvious errors but there may be errors of grammar and content that I may have overlooked prior to finalization of this note.    My total time spent caring for the patient on the day of the encounter was 73 minutes.    This does not include time spent on separately billable procedures/tests.        Nolan Camejo DO  Department of Physical Medicine and Rehabilitation  [facility]         CC Margo Cook P.A.-C.   CC Tylor Chowdhury M.D.

## 2024-04-02 NOTE — PATIENT INSTRUCTIONS
Dantrolene 25 mg once nightly for 7 days; increase to 25 mg 2 times daily for 7 days, increase to 25 mg 3 times daily for 7 days; some patients may require 100 mg 4 times daily; maximum dose: 400 mg/day. Monitor for adverse effects of CNS depression, photosensitivity, Nausea, vomiting, RUQ pain, or muscle weakness    10/3/2023 Right elbow olecranon bursectomy   Omar Simpson M.D.  [address] [facility] [id number] [phone]

## 2024-05-18 ENCOUNTER — HOSPITAL ENCOUNTER (EMERGENCY)
Facility: MEDICAL CENTER | Age: 39
End: 2024-05-18
Attending: EMERGENCY MEDICINE
Payer: MEDICARE

## 2024-05-18 ENCOUNTER — PHARMACY VISIT (OUTPATIENT)
Dept: PHARMACY | Facility: MEDICAL CENTER | Age: 39
End: 2024-05-18
Payer: COMMERCIAL

## 2024-05-18 VITALS
BODY MASS INDEX: 22.43 KG/M2 | HEIGHT: 68 IN | TEMPERATURE: 98.4 F | HEART RATE: 95 BPM | RESPIRATION RATE: 14 BRPM | SYSTOLIC BLOOD PRESSURE: 122 MMHG | WEIGHT: 148 LBS | DIASTOLIC BLOOD PRESSURE: 66 MMHG | OXYGEN SATURATION: 93 %

## 2024-05-18 DIAGNOSIS — L03.113 RIGHT FOREARM CELLULITIS: ICD-10-CM

## 2024-05-18 DIAGNOSIS — R51.9 FACIAL PAIN: ICD-10-CM

## 2024-05-18 DIAGNOSIS — L98.9 SKIN LESION: ICD-10-CM

## 2024-05-18 DIAGNOSIS — B95.8 STAPH INFECTION: ICD-10-CM

## 2024-05-18 LAB
ALBUMIN SERPL BCP-MCNC: 4 G/DL (ref 3.2–4.9)
ALBUMIN/GLOB SERPL: 1.4 G/DL
ALP SERPL-CCNC: 69 U/L (ref 30–99)
ALT SERPL-CCNC: 17 U/L (ref 2–50)
ANION GAP SERPL CALC-SCNC: 12 MMOL/L (ref 7–16)
AST SERPL-CCNC: 18 U/L (ref 12–45)
BASOPHILS # BLD AUTO: 1 % (ref 0–1.8)
BASOPHILS # BLD: 0.1 K/UL (ref 0–0.12)
BILIRUB SERPL-MCNC: 0.2 MG/DL (ref 0.1–1.5)
BUN SERPL-MCNC: 17 MG/DL (ref 8–22)
CALCIUM ALBUM COR SERPL-MCNC: 9.5 MG/DL (ref 8.5–10.5)
CALCIUM SERPL-MCNC: 9.5 MG/DL (ref 8.5–10.5)
CHLORIDE SERPL-SCNC: 108 MMOL/L (ref 96–112)
CO2 SERPL-SCNC: 23 MMOL/L (ref 20–33)
CREAT SERPL-MCNC: 0.52 MG/DL (ref 0.5–1.4)
EOSINOPHIL # BLD AUTO: 0.29 K/UL (ref 0–0.51)
EOSINOPHIL NFR BLD: 2.9 % (ref 0–6.9)
ERYTHROCYTE [DISTWIDTH] IN BLOOD BY AUTOMATED COUNT: 42.4 FL (ref 35.9–50)
GFR SERPLBLD CREATININE-BSD FMLA CKD-EPI: 120 ML/MIN/1.73 M 2
GLOBULIN SER CALC-MCNC: 2.8 G/DL (ref 1.9–3.5)
GLUCOSE SERPL-MCNC: 108 MG/DL (ref 65–99)
HCT VFR BLD AUTO: 40.7 % (ref 37–47)
HGB BLD-MCNC: 14.2 G/DL (ref 12–16)
IMM GRANULOCYTES # BLD AUTO: 0.03 K/UL (ref 0–0.11)
IMM GRANULOCYTES NFR BLD AUTO: 0.3 % (ref 0–0.9)
LYMPHOCYTES # BLD AUTO: 2.19 K/UL (ref 1–4.8)
LYMPHOCYTES NFR BLD: 21.6 % (ref 22–41)
MCH RBC QN AUTO: 31.6 PG (ref 27–33)
MCHC RBC AUTO-ENTMCNC: 34.9 G/DL (ref 32.2–35.5)
MCV RBC AUTO: 90.4 FL (ref 81.4–97.8)
MONOCYTES # BLD AUTO: 0.65 K/UL (ref 0–0.85)
MONOCYTES NFR BLD AUTO: 6.4 % (ref 0–13.4)
NEUTROPHILS # BLD AUTO: 6.88 K/UL (ref 1.82–7.42)
NEUTROPHILS NFR BLD: 67.8 % (ref 44–72)
NRBC # BLD AUTO: 0 K/UL
NRBC BLD-RTO: 0 /100 WBC (ref 0–0.2)
PLATELET # BLD AUTO: 218 K/UL (ref 164–446)
PMV BLD AUTO: 10 FL (ref 9–12.9)
POTASSIUM SERPL-SCNC: 4.3 MMOL/L (ref 3.6–5.5)
PROT SERPL-MCNC: 6.8 G/DL (ref 6–8.2)
RBC # BLD AUTO: 4.5 M/UL (ref 4.2–5.4)
SODIUM SERPL-SCNC: 143 MMOL/L (ref 135–145)
WBC # BLD AUTO: 10.1 K/UL (ref 4.8–10.8)

## 2024-05-18 PROCEDURE — RXMED WILLOW AMBULATORY MEDICATION CHARGE: Performed by: EMERGENCY MEDICINE

## 2024-05-18 RX ORDER — MORPHINE SULFATE 4 MG/ML
4 INJECTION INTRAVENOUS ONCE
Status: COMPLETED | OUTPATIENT
Start: 2024-05-18 | End: 2024-05-18

## 2024-05-18 RX ORDER — CLINDAMYCIN HYDROCHLORIDE 300 MG/1
300 CAPSULE ORAL 4 TIMES DAILY
Qty: 40 CAPSULE | Refills: 0 | Status: ACTIVE | OUTPATIENT
Start: 2024-05-18 | End: 2024-05-28

## 2024-05-18 RX ORDER — KETOROLAC TROMETHAMINE 15 MG/ML
15 INJECTION, SOLUTION INTRAMUSCULAR; INTRAVENOUS ONCE
Status: COMPLETED | OUTPATIENT
Start: 2024-05-18 | End: 2024-05-18

## 2024-05-18 RX ORDER — CLINDAMYCIN PHOSPHATE 900 MG/50ML
900 INJECTION, SOLUTION INTRAVENOUS ONCE
Status: COMPLETED | OUTPATIENT
Start: 2024-05-18 | End: 2024-05-18

## 2024-05-18 RX ORDER — ONDANSETRON 2 MG/ML
4 INJECTION INTRAMUSCULAR; INTRAVENOUS ONCE
Status: COMPLETED | OUTPATIENT
Start: 2024-05-18 | End: 2024-05-18

## 2024-05-18 RX ORDER — KETOROLAC TROMETHAMINE 10 MG/1
10 TABLET, FILM COATED ORAL 3 TIMES DAILY PRN
Qty: 15 TABLET | Refills: 0 | Status: SHIPPED | OUTPATIENT
Start: 2024-05-18

## 2024-05-18 RX ORDER — SODIUM CHLORIDE 9 MG/ML
1000 INJECTION, SOLUTION INTRAVENOUS ONCE
Status: COMPLETED | OUTPATIENT
Start: 2024-05-18 | End: 2024-05-18

## 2024-05-18 RX ADMIN — MORPHINE SULFATE 4 MG: 4 INJECTION INTRAVENOUS at 20:26

## 2024-05-18 RX ADMIN — CLINDAMYCIN PHOSPHATE 900 MG: 900 INJECTION, SOLUTION INTRAVENOUS at 20:27

## 2024-05-18 RX ADMIN — ONDANSETRON 4 MG: 2 INJECTION INTRAMUSCULAR; INTRAVENOUS at 20:26

## 2024-05-18 RX ADMIN — SODIUM CHLORIDE 1000 ML: 9 INJECTION, SOLUTION INTRAVENOUS at 20:26

## 2024-05-18 RX ADMIN — KETOROLAC TROMETHAMINE 15 MG: 15 INJECTION, SOLUTION INTRAMUSCULAR; INTRAVENOUS at 22:01

## 2024-05-18 ASSESSMENT — PAIN DESCRIPTION - PAIN TYPE
TYPE: ACUTE PAIN

## 2024-05-18 ASSESSMENT — FIBROSIS 4 INDEX: FIB4 SCORE: 0.55

## 2024-05-19 LAB
GRAM STN SPEC: NORMAL
SIGNIFICANT IND 70042: NORMAL
SITE SITE: NORMAL
SOURCE SOURCE: NORMAL

## 2024-05-19 NOTE — ED PROVIDER NOTES
ER Provider Note    Scribed for Dr. Nesha Nino D.O. by Juani Betancourt. 5/18/2024  7:40 PM    Primary Care Provider: Margo Cook P.A.-C.    CHIEF COMPLAINT  Chief Complaint   Patient presents with    Facial Pain     Pt HAMILTON BOWERS from home for possible cellulitis on the L side of face. Pt's daughter states it started 5/16/24, redness and swelling with pus drainage.      EXTERNAL RECORDS REVIEWED  Outpatient Notes Patient was last seen at outpatient psychiatry on 4/2/24. Last seen by PCP on 3/12 for septic bursitis of elbow. Patient was instructed ton continue Percocet as needed.    HPI/ROS  LIMITATION TO HISTORY   Select: : None    OUTSIDE HISTORIAN(S):  Family Daughter who is at bedside and contributed to medical history    Griselda Swanson is a 38 y.o. female with a history of encephalomyelitis with residual paralysis who presents to the ED for left side facial pain Daughter at bedside states the patient is non verbal. On Thursday the patient appeared to have had a cystic pimple, however this morning appeared swollen. She states she attempted to squeeze the site with puss drainage. Denies any teeth pain, nausea or vomiting. Patient reported her pain was a 6/10. She has associated cellulitis on her right elbow. Daughter states the site is currently in the healing process and she is not on antibiotics. Patient's daughter states in 2009 the patient began to slur her words, and then was in a coma. She states the patient was then unable to walk or talk. She reports that there was no clear diagnosis, however was told it was possibly MS at first but ultimately was unclear. No alleviating or exacerbating factors noted.    PAST MEDICAL HISTORY  Past Medical History:   Diagnosis Date    ADEM (acute disseminated encephalomyelitis)     Back pain     Breath shortness     since 2014 not an issue at this time (4/2018)    C. difficile colitis 2015    Coma ([facility]) August 2009    1 month, lesions on brain,  unknown etiology     Coma ([facility]) 2008    Spontaneous -     Demyelinating disorder ([facility])     demyelinating encephpomyeltis     Encephalitis 2009    Heart burn     Indigestion     Lesion of brain     unknown cuase    MEDICAL HOME     Migraines     MS (multiple sclerosis) ([facility])     Other specified disorder of intestines     constipation    Pain     Psychiatric problem     depression and anxiety    Renal disorder     kidney stones    Urinary incontinence      SURGICAL HISTORY  Past Surgical History:   Procedure Laterality Date    IRRIGATION & DEBRIDEMENT GENERAL Right 10/3/2023    Procedure: IRRIGATION AND DEBRIDEMENT, WOUND;  Surgeon: Omar Simpson M.D.;  Location: [facility];  Service: Orthopedics    TN UPPER GI ENDOSCOPY,DIAGNOSIS N/A 3/16/2023    Procedure: GASTROSCOPY;  Surgeon: Evelin Bautista M.D.;  Location: SURGERY SAME DAY [facility];  Service: Gastroenterology    TN UPPER GI ENDOSCOPY,BIOPSY N/A 3/16/2023    Procedure: GASTROSCOPY, WITH BIOPSY;  Surgeon: Evelin Bautista M.D.;  Location: SURGERY SAME DAY [facility];  Service: Gastroenterology    PUMP INSERT/REMOVE Left 7/1/2016    Procedure: PUMP REMOVAL;  Surgeon: Pari Roberson M.D.;  Location: [facility];  Service:     CATH PLACE PERM EPIDURAL Left 6/14/2016    Procedure: CATH PLACE PERM EPIDURAL - BACLOFEN PUMP AND CATHETER REPLACEMENT  - BACK AND ABDOMEN;  Surgeon: Pari Roberson M.D.;  Location: [facility];  Service:     TN BACLOFEN INTRATHECAL TRIAL  3/8/2016    Dr. Roberson  [facility]    CATH PLACE PERM EPIDURAL N/A 3/8/2016    Procedure: BACLOFEN PUMP TRIAL;  Surgeon: Pari Roberson M.D.;  Location: [facility];  Service:     PUMP REVISION  10/8/2013    Performed by Pari Roberson M.D. at [facility]    PUMP INSERT/REMOVE  3/12/2013    Performed by Pari Roberson M.D. at [facility]    CATH PLACE PERM EPIDURAL  6/26/2012    Performed by PARI ROBERSON at [facility]    CATH  PLACE PERM EPIDURAL  3/6/2012    Performed by PARI ROBERSON at SURGERY [facility] ORS    MAMMOPLASTY AUGMENTATION  2002    TONSILLECTOMY       FAMILY HISTORY  Family History   Problem Relation Age of Onset    Cancer Mother         Sarcoma    Bipolar disorder Brother     Other Brother         spina bifida    Diabetes Maternal Grandmother     Other Daughter         Migrains     SOCIAL HISTORY   reports that she quit smoking about 16 years ago. Her smoking use included cigarettes. She started smoking about 28 years ago. She has a 12 pack-year smoking history. She has never used smokeless tobacco. She reports that she does not drink alcohol and does not use drugs.    CURRENT MEDICATIONS  Previous Medications    BACLOFEN (LIORESAL) 10 MG TAB    TAKE 3 TABLETS BY MOUTH FOUR TIMES DAILY    BACLOFEN (LIORESAL) 10 MG TAB    TAKE 3 TABLETS BY MOUTH FOUR TIMES DAILY    BISACODYL (DULCOLAX) 10 MG SUPPOS    INSERT 1 SUPPOSITORY RECTALLY EVERY DAY    CITALOPRAM (CELEXA) 20 MG TAB    Take 1 Tablet by mouth every day.    CYCLOBENZAPRINE (FLEXERIL) 10 MG TAB    Take 1 Tablet by mouth 3 times a day as needed for Muscle Spasms.    DOCUSATE SODIUM (COLACE) 100 MG CAP    Take 1 Capsule by mouth 2 times a day as needed for Constipation.    MELOXICAM (MOBIC) 15 MG TABLET    Take 1 Tablet by mouth every day.    MISC. DEVICES MISC    Please allow patient to have support/therapy dog in appt. Regardless of weight due to multiple chronic illnesses and debility.    MISC. DEVICES MISC    Please eval and fix electric W/C. Please eval also for W/C needs.    MISC. DEVICES MISC    1 Each in the morning, at noon, and at bedtime. 1 Brief 3x/day for incontinence. Lifetime due to neurogenic bladder    OMEPRAZOLE (PRILOSEC) 40 MG DELAYED-RELEASE CAPSULE    Take 1 Capsule by mouth 2 times a day.    OXYCODONE-ACETAMINOPHEN (PERCOCET) 5-325 MG TAB    Take 1 Tablet by mouth every four hours as needed for Severe Pain (septic bursitis due to chronic med  "condition) for up to 30 days.    POLYETHYLENE GLYCOL 3350 (MIRALAX) 17 GM/SCOOP POWDER    Take 17 g by mouth every day.    SUMATRIPTAN (IMITREX) 100 MG TABLET    Take 1 Tablet by mouth one time as needed for Migraine.    TAMSULOSIN (FLOMAX) 0.4 MG CAPSULE    TAKE 1 CAPSULE BY MOUTH EVERY DAY    TOPIRAMATE (TOPAMAX) 25 MG TAB    TAKE 1 TABLET BY MOUTH TWICE DAILY    ZOLPIDEM (AMBIEN) 5 MG TAB    Take 1 Tablet by mouth at bedtime as needed for Sleep for up to 180 days.     ALLERGIES  Fentanyl and Sulfamethoxazole w-trimethoprim    PHYSICAL EXAM  BP (!) 161/81   Pulse (!) 101   Temp 36.9 °C (98.4 °F) (Temporal)   Resp 16   Ht 1.727 m (5' 8\")   Wt 67.1 kg (148 lb)   SpO2 94%   BMI 22.50 kg/m²   Constitutional: Patient is a chronically ill-appearing disabled woman in mild distress ,she is nonverbal so unable to give accurate history but does give thumbs up if she is asked questions.  HENT: Normocephalic,  Oropharynx moist without erythema or exudates, No dental tenderness, no gingival edema, 2 cm lesion on her let lower cheek with surrounding swelling and erythema.   Cardiovascular: Normal heart rate and Regular rhythm. No murmur  Thorax & Lungs: Clear and equal breath sounds with good excursion. No respiratory distress, no rhonchi, wheezing or rales.  Abdomen: Bowel sounds normal in all four quadrants. Soft,nontender, no rebound , guarding, palpable masses.   Skin: Warm, Dry, No erythema, No rashes.    Extremities: Peripheral pulses 4/4 No edema, No tenderness, Chronic muscle atony. Elbow has a 2 cm healing abrasion with surrounding erythema that extends into her forearm and up the posterior aspect of her upper arm. No drainage. Neurovascular intact.   Neurologic: Alert & oriented x 3, muscles are flaccid in her extremities.      DIAGNOSTIC STUDIES & PROCEDURES  Labs:   Results for orders placed or performed during the hospital encounter of 05/18/24   CBC WITH DIFFERENTIAL   Result Value Ref Range    WBC 10.1 4.8 " - 10.8 K/uL    RBC 4.50 4.20 - 5.40 M/uL    Hemoglobin 14.2 12.0 - 16.0 g/dL    Hematocrit 40.7 37.0 - 47.0 %    MCV 90.4 81.4 - 97.8 fL    MCH 31.6 27.0 - 33.0 pg    MCHC 34.9 32.2 - 35.5 g/dL    RDW 42.4 35.9 - 50.0 fL    Platelet Count 218 164 - 446 K/uL    MPV 10.0 9.0 - 12.9 fL    Neutrophils-Polys 67.80 44.00 - 72.00 %    Lymphocytes 21.60 (L) 22.00 - 41.00 %    Monocytes 6.40 0.00 - 13.40 %    Eosinophils 2.90 0.00 - 6.90 %    Basophils 1.00 0.00 - 1.80 %    Immature Granulocytes 0.30 0.00 - 0.90 %    Nucleated RBC 0.00 0.00 - 0.20 /100 WBC    Neutrophils (Absolute) 6.88 1.82 - 7.42 K/uL    Lymphs (Absolute) 2.19 1.00 - 4.80 K/uL    Monos (Absolute) 0.65 0.00 - 0.85 K/uL    Eos (Absolute) 0.29 0.00 - 0.51 K/uL    Baso (Absolute) 0.10 0.00 - 0.12 K/uL    Immature Granulocytes (abs) 0.03 0.00 - 0.11 K/uL    NRBC (Absolute) 0.00 K/uL   COMP METABOLIC PANEL   Result Value Ref Range    Sodium 143 135 - 145 mmol/L    Potassium 4.3 3.6 - 5.5 mmol/L    Chloride 108 96 - 112 mmol/L    Co2 23 20 - 33 mmol/L    Anion Gap 12.0 7.0 - 16.0    Glucose 108 (H) 65 - 99 mg/dL    Bun 17 8 - 22 mg/dL    Creatinine 0.52 0.50 - 1.40 mg/dL    Calcium 9.5 8.5 - 10.5 mg/dL    Correct Calcium 9.5 8.5 - 10.5 mg/dL    AST(SGOT) 18 12 - 45 U/L    ALT(SGPT) 17 2 - 50 U/L    Alkaline Phosphatase 69 30 - 99 U/L    Total Bilirubin 0.2 0.1 - 1.5 mg/dL    Albumin 4.0 3.2 - 4.9 g/dL    Total Protein 6.8 6.0 - 8.2 g/dL    Globulin 2.8 1.9 - 3.5 g/dL    A-G Ratio 1.4 g/dL   ESTIMATED GFR   Result Value Ref Range    GFR (CKD-EPI) 120 >60 mL/min/1.73 m 2     *Note: Due to a large number of results and/or encounters for the requested time period, some results have not been displayed. A complete set of results can be found in Results Review.     All labs reviewed by me.    COURSE & MEDICAL DECISION MAKING    INITIAL ASSESSMENT AND PLAN  Care Narrative:     7:51 PM - Patient was seen and evaluated at bedside. After my exam, I discussed with the  patient the plan of care, which includes treating the patient with medication for their symptoms, as well as obtaining lab work for further evaluation. Patient understands and verbalizes agreement to plan of care. Patient will be treated with Cleocin 900 mg, morphine 4 mg, NS infusion 4 mg and Zofran 4 mg. Ordered CBC w diff, CMP, culture wound w/ gram stain to evaluate.   Differential diagnoses include but not limited to: Overwhelming staph infection  Labs were all unremarkable, normal white blood cell count.  She was treated with IV antibiotics and given morphine and Zofran for her pain.  She was improved upon recheck will be sent home with prescriptions for antibiotics and Toradol for the pain.    HYDRATION: Based on the patient's presentation of Tachycardia the patient was given IV fluids. IV Hydration was used because oral hydration was not adequate alone. Upon recheck following hydration, the patient was improved.    9:44 PM - Patient was reevaluated at bedside. Patient feels improved at this time. Discussed lab and radiology results with the patient and informed them plan to treat symptoms with outpatient medications. I then informed the patient of my plan for discharge, which includes strict return precautions for any new or worsening symptoms. Patient and daughter understands and is in agreement to plan of care. Patient is comfortable going home at this time.                  DISPOSITION AND DISCUSSIONS  I have discussed management of the patient with the following physicians and MAGY's: None    Discussion of management with other QHP or appropriate source(s): None     Escalation of care considered, and ultimately not performed: diagnostic imaging.    Barriers to care at this time, including but not limited to: None.     Decision tools and prescription drugs considered including, but not limited to: Clindamycin and Toradol     The patient will return for new or worsening symptoms and is stable at the time of  discharge.    DISPOSITION:  Patient will be discharged home in stable condition.    FOLLOW UP:  Margo Cook P.A.-C.  [address] [facility] [address]  [place] [id number] [phone]    Schedule an appointment as soon as possible for a visit in 3 days  For wound re-check    OUTPATIENT MEDICATIONS:  New Prescriptions    CLINDAMYCIN (CLEOCIN) 300 MG CAP    Take 1 Capsule by mouth 4 times a day for 10 days. Take with food    KETOROLAC (TORADOL) 10 MG TAB    Take 1 Tablet by mouth 3 times a day as needed for Moderate Pain. Take with food     FINAL IMPRESSION   1. Facial pain    2. Skin lesion    3. Staph infection    4. Right forearm cellulitis       Juani NELSON (Scribe), am scribing for, and in the presence of, Nesha Nino D.O..    Electronically signed by: Juani Betancourt (Scribe), 5/18/2024    INesha D.O. personally performed the services described in this documentation, as scribed by Juani Betancourt in my presence, and it is both accurate and complete.    The note accurately reflects work and decisions made by me.  Nesha Nino D.O.  5/19/2024  3:25 AM

## 2024-05-19 NOTE — ED TRIAGE NOTES
Griselda Swanson   38 y.o.     Chief Complaint   Patient presents with    Facial Pain     Pt HAMIL[place] BOWERS from home for possible cellulitis on the L side of face. Pt's daughter states it started 5/16/24, redness and swelling with pus drainage.        A&O x 4. Pt is nonverbal at baseline but can answer questions with hand gestures, daughter is at bedside. Pt has a hx of cellulitis on R elbow.     EMS interventions: 97.3F temp, /82, P 110, 98 RA,      Vitals:    05/18/24 1936   BP: (!) 161/81   Pulse: (!) 101   Resp: 16   SpO2: 94%

## 2024-05-19 NOTE — DISCHARGE INSTRUCTIONS
Clean all wounds with antibacterial soap and water daily  Watch for increasing redness and swelling  Take the antibiotics until completely gone with food  Apply warm moist compresses to facial area and elbow as needed  Return if fever over 101, worsenign pain, redness, swelling

## 2024-05-19 NOTE — ED NOTES
Pt medicated per MAR, daughter at bedside verbalized understanding of medication education provided

## 2024-05-19 NOTE — ED NOTES
Discharge education provided. Patient verbalizes understanding. Patient A/0x4. Patient discharged home to care of daughter

## 2024-05-21 LAB
BACTERIA WND AEROBE CULT: NORMAL
GRAM STN SPEC: NORMAL
SIGNIFICANT IND 70042: NORMAL
SITE SITE: NORMAL
SOURCE SOURCE: NORMAL

## 2024-06-06 ENCOUNTER — TELEPHONE (OUTPATIENT)
Dept: MEDICAL GROUP | Facility: MEDICAL CENTER | Age: 39
End: 2024-06-06
Payer: MEDICARE

## 2024-06-06 NOTE — TELEPHONE ENCOUNTER
Patients orders from cesilia bermeo stated that she will be sending over her home orders since Margo is no longer here.

## 2024-08-05 ENCOUNTER — APPOINTMENT (OUTPATIENT)
Dept: MEDICAL GROUP | Facility: MEDICAL CENTER | Age: 39
End: 2024-08-05
Payer: MEDICARE

## 2024-08-12 ENCOUNTER — OFFICE VISIT (OUTPATIENT)
Dept: MEDICAL GROUP | Facility: MEDICAL CENTER | Age: 39
End: 2024-08-12
Payer: MEDICARE

## 2024-08-12 VITALS
HEART RATE: 86 BPM | BODY MASS INDEX: 22.43 KG/M2 | OXYGEN SATURATION: 95 % | TEMPERATURE: 98 F | SYSTOLIC BLOOD PRESSURE: 98 MMHG | DIASTOLIC BLOOD PRESSURE: 70 MMHG | HEIGHT: 68 IN | WEIGHT: 148 LBS

## 2024-08-12 DIAGNOSIS — L98.9 SKIN LESION: ICD-10-CM

## 2024-08-12 DIAGNOSIS — L21.9 SEBORRHEIC DERMATITIS: ICD-10-CM

## 2024-08-12 DIAGNOSIS — Z12.83 SKIN CANCER SCREENING: ICD-10-CM

## 2024-08-12 DIAGNOSIS — G82.50 QUADRIPLEGIA (HCC): ICD-10-CM

## 2024-08-12 DIAGNOSIS — L02.91 ABSCESS: ICD-10-CM

## 2024-08-12 DIAGNOSIS — R30.0 DYSURIA: ICD-10-CM

## 2024-08-12 DIAGNOSIS — G04.00 ADEM (ACUTE DISSEMINATED ENCEPHALOMYELITIS): ICD-10-CM

## 2024-08-12 DIAGNOSIS — R51.9 NONINTRACTABLE HEADACHE, UNSPECIFIED CHRONICITY PATTERN, UNSPECIFIED HEADACHE TYPE: ICD-10-CM

## 2024-08-12 DIAGNOSIS — R25.2 SPASTICITY: ICD-10-CM

## 2024-08-12 PROCEDURE — 99214 OFFICE O/P EST MOD 30 MIN: CPT | Performed by: STUDENT IN AN ORGANIZED HEALTH CARE EDUCATION/TRAINING PROGRAM

## 2024-08-12 PROCEDURE — 3078F DIAST BP <80 MM HG: CPT | Performed by: STUDENT IN AN ORGANIZED HEALTH CARE EDUCATION/TRAINING PROGRAM

## 2024-08-12 PROCEDURE — 3074F SYST BP LT 130 MM HG: CPT | Performed by: STUDENT IN AN ORGANIZED HEALTH CARE EDUCATION/TRAINING PROGRAM

## 2024-08-12 RX ORDER — SUMATRIPTAN 100 MG/1
100 TABLET, FILM COATED ORAL
Qty: 10 TABLET | Refills: 11 | Status: SHIPPED | OUTPATIENT
Start: 2024-08-12

## 2024-08-12 RX ORDER — MUPIROCIN 20 MG/G
1 OINTMENT TOPICAL 2 TIMES DAILY
Qty: 22 G | Refills: 0 | Status: SHIPPED | OUTPATIENT
Start: 2024-08-12

## 2024-08-12 RX ORDER — NITROFURANTOIN 25; 75 MG/1; MG/1
100 CAPSULE ORAL 2 TIMES DAILY
Qty: 14 CAPSULE | Refills: 0 | Status: SHIPPED | OUTPATIENT
Start: 2024-08-12 | End: 2024-08-12

## 2024-08-12 RX ORDER — DOXYCYCLINE HYCLATE 100 MG
100 TABLET ORAL 2 TIMES DAILY
Qty: 10 TABLET | Refills: 0 | Status: SHIPPED | OUTPATIENT
Start: 2024-08-12 | End: 2024-08-17

## 2024-08-12 ASSESSMENT — FIBROSIS 4 INDEX: FIB4 SCORE: 0.82

## 2024-08-12 NOTE — PROGRESS NOTES
"Subjective:     CC:     HPI:   Griselda presents today with    PMH history of disseminated encephalomyelitis with residual paralysis, hx of c diff, hx of gi bleed, hx of septic bursitis of elbow     Patient has communicating with typing on ipad   She reports she need refill of sumatriptan  Since last visit followed with Dr Camejo/ physiatry- sent dantrolene w plan to check lft, referred to PT  #urinary incontinence hx of disseminated encephalomyelitis and residual paralysis  - order for brief- formed will be sent with notes today.     # Paraplegia  - PT /OT referral wheel chair evaluation    # report UTI  # abscess between lip and nose  # seborreic dermatitis  #  dermatology         Health Maintenance:     ROS:  ROS    Objective:     Exam:  BP 98/70 (BP Location: Left arm)   Pulse 86   Temp 36.7 °C (98 °F)   Ht 1.727 m (5' 8\")   Wt 67.1 kg (148 lb)   SpO2 95%   BMI 22.50 kg/m²  Body mass index is 22.5 kg/m².    Physical Exam  Constitutional:       Appearance: Normal appearance.   Cardiovascular:      Rate and Rhythm: Normal rate and regular rhythm.   Pulmonary:      Effort: Pulmonary effort is normal.      Breath sounds: Normal breath sounds.   Musculoskeletal:      Cervical back: Normal range of motion and neck supple.   Neurological:      Mental Status: She is alert.           Labs:     Assessment & Plan:     39 y.o. female with the following -       1. Seborrheic dermatitis  - Referral to Dermatology    2. Dysuria  Acute stable  Report symptoms of dysuria.   Patient will be empirically treated for skin abscess in nasolabial triangle  - amoxicillin-clavulanate (AUGMENTIN) 875-125 MG Tab; Take 1 Tablet by mouth 2 times a day for 5 days.  Dispense: 10 Tablet; Refill: 0    3. Abscess  Subacute stable  1 cm subcutaneous induration in nasolabial   - doxycycline (VIBRAMYCIN) 100 MG Tab; Take 1 Tablet by mouth 2 times a day for 5 days.  Dispense: 10 Tablet; Refill: 0  - mupirocin (BACTROBAN) 2 % Ointment; Apply 1 " Application topically 2 times a day.  Dispense: 22 g; Refill: 0  - amoxicillin-clavulanate (AUGMENTIN) 875-125 MG Tab; Take 1 Tablet by mouth 2 times a day for 5 days.  Dispense: 10 Tablet; Refill: 0    4. ADEM (acute disseminated encephalomyelitis)  5. Quadriplegia (HCC)  6. Spasticity  Chronic stable  Wheel chair bound  Will send paper work for DME brief  Refer to PT for wheel chair assessment  - Referral to Physical Therapy  - Referral to Occupational Therapy    7. Nonintractable headache, unspecified chronicity pattern, unspecified headache type  Chronic stable   - sumatriptan (IMITREX) 100 MG tablet; Take 1 Tablet by mouth one time as needed for Migraine.  Dispense: 10 Tablet; Refill: 11    8. Skin lesion  9. Skin cancer screening  Patient request referral to derm   - Referral to Dermatology        Return in about 3 months (around 11/12/2024).    Please note that this dictation was created using voice recognition software. I have made every reasonable attempt to correct obvious errors, but I expect that there are errors of grammar and possibly content that I did not discover before finalizing the note.

## 2024-08-17 NOTE — TELEPHONE ENCOUNTER
LABOR NOTE    S:  Pt resting comfortably with epidural, no complaints.  Family at bedside and supportive.     O: BP (!) 123/56   Pulse 105   LMP 11/15/2023   SpO2 98%   Breastfeeding No     GENERAL: Calm and appropriate affect  NEURO: Alert, oriented, normal speech  ABDOMEN: Nontender, Fundus palpates soft between UC's.  FHT: Cat 1, reassuring.   CTX: q 2-4.5 minutes  SVE: 90/0      ASSESSMENT:   28 y.o.  IUP at 39w0d, FHT reassuring/ Cat 1    Patient Active Problem List   Diagnosis    Smoker    History of  premature rupture of membranes (PPROM)    Tetrahydrocannabinol (THC) use disorder, mild, abuse    Other insomnia    Encounter for supervision of normal pregnancy in multigravida    Bipolar depression    POTS (postural orthostatic tachycardia syndrome)    Anemia in pregnancy, third trimester    Decreased ROM of lumbar spine    General weakness    Chronic midline low back pain    Premature cervical dilation, third trimester    Urinary tract infection in mother during third trimester of pregnancy    Non-seasonal allergic rhinitis    Encounter for elective induction of labor         PLAN:    Pitocin Augmentation per protocol - currently infusing at 2mu  Continue to monitor maternal/fetal status closely  Anticipate progress and vaginal birth          Was the patient seen in the last year in this department? Yes     Does patient have an active prescription for medications requested? No     Received Request Via: Pharmacy

## 2024-08-20 ENCOUNTER — HOME HEALTH ADMISSION (OUTPATIENT)
Dept: HOME HEALTH SERVICES | Facility: HOME HEALTHCARE | Age: 39
End: 2024-08-20
Payer: MEDICARE

## 2024-08-20 DIAGNOSIS — R25.2 SPASTICITY: ICD-10-CM

## 2024-08-20 DIAGNOSIS — G82.50 QUADRIPLEGIA (HCC): ICD-10-CM

## 2024-08-20 DIAGNOSIS — G04.00 ADEM (ACUTE DISSEMINATED ENCEPHALOMYELITIS): ICD-10-CM

## 2024-08-26 DIAGNOSIS — R30.0 DYSURIA: ICD-10-CM

## 2024-08-26 RX ORDER — CEFUROXIME AXETIL 250 MG/1
250 TABLET ORAL 2 TIMES DAILY
Qty: 14 TABLET | Refills: 0 | Status: SHIPPED | OUTPATIENT
Start: 2024-08-26 | End: 2024-09-02

## 2024-08-28 DIAGNOSIS — N39.0 RECURRENT URINARY TRACT INFECTION: ICD-10-CM

## 2024-08-28 NOTE — TELEPHONE ENCOUNTER
Received request via: Pharmacy    Was the patient seen in the last year in this department? Yes    Does the patient have an active prescription (recently filled or refills available) for medication(s) requested? No    [facility]: [facility]    Does the patient have [facility] Plus and need 100-day supply? (This applies to ALL medications) Patient does not have SCP

## 2024-08-29 RX ORDER — TAMSULOSIN HYDROCHLORIDE 0.4 MG/1
0.4 CAPSULE ORAL
Qty: 90 CAPSULE | Refills: 3 | Status: SHIPPED | OUTPATIENT
Start: 2024-08-29

## 2024-09-11 ENCOUNTER — TELEPHONE (OUTPATIENT)
Dept: MEDICAL GROUP | Facility: MEDICAL CENTER | Age: 39
End: 2024-09-11
Payer: MEDICARE

## 2024-09-11 NOTE — TELEPHONE ENCOUNTER
Amber needs better visit notes to state why she needs a new wheelchair. They sated that you need to medically justify why she needs a new power wheelchair for continuous use.

## 2024-09-30 DIAGNOSIS — R25.2 SPASTICITY: ICD-10-CM

## 2024-09-30 RX ORDER — MELOXICAM 15 MG/1
15 TABLET ORAL DAILY
Qty: 90 TABLET | Refills: 3 | Status: SHIPPED | OUTPATIENT
Start: 2024-09-30

## 2024-09-30 NOTE — TELEPHONE ENCOUNTER
Received request via: Pharmacy    Was the patient seen in the last year in this department? Yes    Does the patient have an active prescription (recently filled or refills available) for medication(s) requested? No    Pharmacy Name:   [id number] - [place], [place] - [address] AT [facility] [address] [id number]  Phone: [phone] Fax: [phone]    Does the patient have alf Plus and need 100-day supply? (This applies to ALL medications) Patient does not have SCP

## 2024-11-07 DIAGNOSIS — R51.9 NONINTRACTABLE HEADACHE, UNSPECIFIED CHRONICITY PATTERN, UNSPECIFIED HEADACHE TYPE: ICD-10-CM

## 2024-11-07 RX ORDER — SUMATRIPTAN SUCCINATE 100 MG/1
100 TABLET ORAL
Qty: 10 TABLET | Refills: 11 | Status: SHIPPED | OUTPATIENT
Start: 2024-11-07

## 2024-11-07 NOTE — TELEPHONE ENCOUNTER
Received request via: Pharmacy    Was the patient seen in the last year in this department? Yes    Does the patient have an active prescription (recently filled or refills available) for medication(s) requested? No    Pharmacy Name:   To [facility] filled at: [id number] - [place], [place] - [address] AT [address]              Does the patient have [facility] Plus and need 100-day supply? (This applies to ALL medications) Patient does not have SCP

## 2024-11-12 ENCOUNTER — APPOINTMENT (OUTPATIENT)
Dept: RADIOLOGY | Facility: MEDICAL CENTER | Age: 39
End: 2024-11-12
Attending: STUDENT IN AN ORGANIZED HEALTH CARE EDUCATION/TRAINING PROGRAM
Payer: MEDICARE

## 2024-11-12 ENCOUNTER — HOSPITAL ENCOUNTER (EMERGENCY)
Facility: MEDICAL CENTER | Age: 39
End: 2024-11-12
Attending: STUDENT IN AN ORGANIZED HEALTH CARE EDUCATION/TRAINING PROGRAM
Payer: MEDICARE

## 2024-11-12 VITALS
TEMPERATURE: 99.3 F | OXYGEN SATURATION: 88 % | HEART RATE: 97 BPM | WEIGHT: 148 LBS | BODY MASS INDEX: 22.43 KG/M2 | SYSTOLIC BLOOD PRESSURE: 114 MMHG | RESPIRATION RATE: 16 BRPM | DIASTOLIC BLOOD PRESSURE: 64 MMHG | HEIGHT: 68 IN

## 2024-11-12 DIAGNOSIS — S92.351A CLOSED FRACTURE OF BASE OF FIFTH METATARSAL BONE OF RIGHT FOOT, INITIAL ENCOUNTER: ICD-10-CM

## 2024-11-12 PROCEDURE — 73630 X-RAY EXAM OF FOOT: CPT | Mod: LT

## 2024-11-12 PROCEDURE — 700102 HCHG RX REV CODE 250 W/ 637 OVERRIDE(OP): Mod: UD | Performed by: STUDENT IN AN ORGANIZED HEALTH CARE EDUCATION/TRAINING PROGRAM

## 2024-11-12 PROCEDURE — A9270 NON-COVERED ITEM OR SERVICE: HCPCS | Mod: UD | Performed by: STUDENT IN AN ORGANIZED HEALTH CARE EDUCATION/TRAINING PROGRAM

## 2024-11-12 PROCEDURE — 99284 EMERGENCY DEPT VISIT MOD MDM: CPT

## 2024-11-12 PROCEDURE — 73630 X-RAY EXAM OF FOOT: CPT | Mod: RT

## 2024-11-12 RX ORDER — ACETAMINOPHEN 500 MG
1000 TABLET ORAL ONCE
Status: DISCONTINUED | OUTPATIENT
Start: 2024-11-12 | End: 2024-11-12

## 2024-11-12 RX ORDER — IBUPROFEN 600 MG/1
600 TABLET, FILM COATED ORAL ONCE
Status: COMPLETED | OUTPATIENT
Start: 2024-11-12 | End: 2024-11-12

## 2024-11-12 RX ADMIN — IBUPROFEN 600 MG: 600 TABLET, FILM COATED ORAL at 20:18

## 2024-11-12 ASSESSMENT — FIBROSIS 4 INDEX: FIB4 SCORE: 0.82

## 2024-11-13 DIAGNOSIS — S92.351A CLOSED FRACTURE OF BASE OF FIFTH METATARSAL BONE, RIGHT, INITIAL ENCOUNTER: ICD-10-CM

## 2024-11-13 RX ORDER — OXYCODONE AND ACETAMINOPHEN 5; 325 MG/1; MG/1
1 TABLET ORAL 2 TIMES DAILY PRN
Qty: 14 TABLET | Refills: 0 | Status: SHIPPED | OUTPATIENT
Start: 2024-11-13 | End: 2024-11-20

## 2024-11-13 NOTE — ED PROVIDER NOTES
ER Provider Note    Scribed for Thierno Rouse by Juani Petit. 11/12/2024  6:51 PM    Primary Care Provider: Tylor Chowdhury M.D.    CHIEF COMPLAINT   Chief Complaint   Patient presents with    Leg Injury     PT fell from motorized wheelchair and got right ankle wedged under, discoloration noted to toes.      EXTERNAL RECORDS REVIEWED  Other No pertinent records available for review.     HPI/ROS  LIMITATION TO HISTORY   Select: : None  OUTSIDE HISTORIAN(S):  None    Griselda Swanson is a 39 y.o. female with a history of acute disseminated encephalomyelitis presents to the ED via EMS complaining of right foot pain onset prior to arrival. Patient states she fell from a motorized wheelchair and her right foot was lodged underneath the chair. She reports near syncopal episode at time of fall. She reports she is experiencing some pain in her right knee.     PAST MEDICAL HISTORY  Past Medical History:   Diagnosis Date    ADEM (acute disseminated encephalomyelitis)     Back pain     Breath shortness     since 2014 not an issue at this time (4/2018)    C. difficile colitis 2015    Coma ([facility]) August 2009    1 month, lesions on brain,  unknown etiology    Coma (HCC) 2008    Spontaneous -     Demyelinating disorder (HCC)     demyelinating encephpomyeltis     Encephalitis 2009    Heart burn     Indigestion     Lesion of brain     unknown cuase    MEDICAL HOME     Migraines     MS (multiple sclerosis) ([facility])     Other specified disorder of intestines     constipation    Pain     Psychiatric problem     depression and anxiety    Renal disorder     kidney stones    Urinary incontinence        SURGICAL HISTORY  Past Surgical History:   Procedure Laterality Date    IRRIGATION & DEBRIDEMENT GENERAL Right 10/3/2023    Procedure: IRRIGATION AND DEBRIDEMENT, WOUND;  Surgeon: Omar Simpson M.D.;  Location: SURGERY [facility];  Service: Orthopedics    HI UPPER GI ENDOSCOPY,DIAGNOSIS N/A 3/16/2023    Procedure:  GASTROSCOPY;  Surgeon: Evelin Bautista M.D.;  Location: SURGERY SAME DAY [facility];  Service: Gastroenterology    MD UPPER GI ENDOSCOPY,BIOPSY N/A 3/16/2023    Procedure: GASTROSCOPY, WITH BIOPSY;  Surgeon: Evelin Bautista M.D.;  Location: SURGERY SAME DAY [facility];  Service: Gastroenterology    PUMP INSERT/REMOVE Left 7/1/2016    Procedure: PUMP REMOVAL;  Surgeon: Pari Roberson M.D.;  Location: SURGERY [facility];  Service:     CATH PLACE PERM EPIDURAL Left 6/14/2016    Procedure: CATH PLACE PERM EPIDURAL - BACLOFEN PUMP AND CATHETER REPLACEMENT  - BACK AND ABDOMEN;  Surgeon: Pari Roberson M.D.;  Location: [facility];  Service:     MD BACLOFEN INTRATHECAL TRIAL  3/8/2016    Dr. Roberson  [facility]    CATH PLACE PERM EPIDURAL N/A 3/8/2016    Procedure: BACLOFEN PUMP TRIAL;  Surgeon: Pari Robreson M.D.;  Location: [facility];  Service:     PUMP REVISION  10/8/2013    Performed by Pari Roberson M.D. at [facility]    PUMP INSERT/REMOVE  3/12/2013    Performed by Pari Roberson M.D. at [facility]    CATH PLACE PERM EPIDURAL  6/26/2012    Performed by PARI ROBERSON at [facility]    CATH PLACE PERM EPIDURAL  3/6/2012    Performed by PARI ROBERSON at [facility]    MAMMOPLASTY AUGMENTATION  2002    TONSILLECTOMY         FAMILY HISTORY  Family History   Problem Relation Age of Onset    Cancer Mother         Sarcoma    Bipolar disorder Brother     Other Brother         spina bifida    Diabetes Maternal Grandmother     Other Daughter         Migrains       SOCIAL HISTORY   reports that she quit smoking about 16 years ago. Her smoking use included cigarettes. She started smoking about 28 years ago. She has a 12 pack-year smoking history. She has never used smokeless tobacco. She reports that she does not drink alcohol and does not use drugs.    CURRENT MEDICATIONS  Discharge Medication List as of 11/12/2024  7:41 PM         CONTINUE these medications which have NOT CHANGED    Details   sumatriptan (IMITREX) 100 MG tablet Take 1 Tablet by mouth one time as needed for Migraine., Disp-10 Tablet, R-11, Normal      meloxicam (MOBIC) 15 MG tablet Take 1 Tablet by mouth every day., Disp-90 Tablet, R-3, Normal      tamsulosin (FLOMAX) 0.4 MG capsule Take 1 Capsule by mouth every day., Disp-90 Capsule, R-3, Normal      mupirocin (BACTROBAN) 2 % Ointment Apply 1 Application topically 2 times a day., Disp-22 g, R-0, Normal      cyclobenzaprine (FLEXERIL) 10 mg Tab Take 1 Tablet by mouth 3 times a day as needed for Muscle Spasms., Disp-270 Tablet, R-3, Normal      docusate sodium (COLACE) 100 MG Cap Take 1 Capsule by mouth 2 times a day as needed for Constipation., Disp-180 Capsule, R-3, Normal      citalopram (CELEXA) 20 MG Tab Take 1 Tablet by mouth every day., Disp-90 Tablet, R-3, Normal      !! baclofen (LIORESAL) 10 MG Tab TAKE 3 TABLETS BY MOUTH FOUR TIMES DAILY, Disp-360 Tablet, R-11, Normal      !! baclofen (LIORESAL) 10 MG Tab TAKE 3 TABLETS BY MOUTH FOUR TIMES DAILY, Disp-360 Tablet, R-0, Normal      !! Misc. Devices Misc Please eval and fix electric W/C. Please eval also for W/C needs., Disp-1 Each, R-11, Print Plain Paper      !! Misc. Devices Misc 1 Each in the morning, at noon, and at bedtime. 1 Brief 3x/day for incontinence. Lifetime due to neurogenic bladderBenedictMetafor Software [address] [place] [address] [address] [place] [address] Office: [phone] Fax: [phone]-50 Each, R-11, Prin t Plain Paper      bisacodyl (DULCOLAX) 10 MG Suppos INSERT 1 SUPPOSITORY RECTALLY EVERY DAY, Disp-50 Suppository, R-11, Normal      polyethylene glycol 3350 (MIRALAX) 17 GM/SCOOP Powder Take 17 g by mouth every day., Disp-255 g, R-3, Normal      topiramate (TOPAMAX) 25 MG Tab TAKE 1 TABLET BY MOUTH TWICE DAILY, Disp-60 Tablet, R-11, Normal      omeprazole (PRILOSEC) 40 MG delayed-release capsule Take 1 Capsule by mouth 2 times a day.,  "Disp-60 Capsule, R-2, Normal      !! Misc. Devices Misc Please allow patient to have support/therapy dog in appt. Regardless of weight due to multiple chronic illnesses and debility., Disp-1 Each, R-11, Print Plain Paper       !! - Potential duplicate medications found. Please discuss with provider.          ALLERGIES  Fentanyl and Sulfamethoxazole w-trimethoprim    PHYSICAL EXAM  /70   Pulse (!) 111   Temp 37.4 °C (99.3 °F) (Temporal)   Resp 16   Ht 1.727 m (5' 8\")   Wt 67.1 kg (148 lb)   SpO2 96%   BMI 22.50 kg/m²   Constitutional: Alert in no apparent distress.  HENT: No signs of trauma, Bilateral external ears normal, Nose normal.   Eyes: Pupils are equal and reactive, Conjunctiva normal, Non-icteric.   Neck: Normal range of motion, No tenderness, Supple, No stridor.   Cardiovascular: Regular rate and rhythm, no murmurs.   Thorax & Lungs: Normal breath sounds, No respiratory distress, No wheezing, No chest tenderness.   Abdomen: Bowel sounds normal, Soft, No tenderness, No masses, No pulsatile masses.   Skin: Warm, Dry, No erythema, No rash.   Back: No bony tenderness, No CVA tenderness.   Extremities: Intact distal pulses, Small abrasions over bilateral feet. No gross deformity.   Musculoskeletal: Good range of motion in all major joints. No tenderness to palpation or major deformities noted.   Neurologic: Alert , Normal motor function, Normal sensory function, No focal deficits noted.     DIAGNOSTIC STUDIES    RADIOLOGY/PROCEDURES   The attending emergency physician has independently interpreted the diagnostic imaging associated with this visit and am waiting the final reading from the radiologist.   My preliminary interpretation is a follows: No fracture of the left foot    Radiologist interpretation:  DX-FOOT-COMPLETE 3+ LEFT   Final Result      1.  No fracture or dislocation of LEFT foot.   2.  Foot in extreme plantar flexion.      DX-FOOT-COMPLETE 3+ RIGHT   Final Result      1.  Minimally " displaced fracture at the base of 5th metatarsal.   2.  Diffuse osteopenia.   3.  Foot again in extreme plantar flexion.          COURSE & MEDICAL DECISION MAKING     ASSESSMENT, COURSE AND PLAN  Care Narrative:  6:54 PM - Patient presents to the ED following accident on a motorized scooter which caused her right foot to be lodged under the scooter. Patient seen and examined at bedside. Discussed plan of care, including diagnostic imaging. Patient agrees to the plan of care. Ordered for DX-foot to evaluate her symptoms.  Patient has a normal neurovascular exam no signs of compartment syndrome or laceration.  Patient has some pain in her right knee but no clear deformity or tenderness.    7:41 PM - I reevaluated the patient at bedside. I discussed the patient's diagnostic study results which show a minimally displaced fracture at the base of the 5th metatarsal.  Discussed with patient's caretaker plan for follow-up with primary care provider, rest, ice, compression and elevation.  I discussed plan for discharge and follow up as outlined below. The patient is stable for discharge at this time and will return for any new or worsening symptoms. Patient verbalizes understanding and support with my plan for discharge.       DISPOSITION AND DISCUSSIONS  I have discussed management of the patient with the following physicians and MAGY's:  None    Discussion of management with other [facility] or appropriate source(s): None       Barriers to care at this time, including but not limited to:  None .       DISPOSITION:  Patient will be discharged home in stable condition.    FOLLOW UP:  Tylor Chowdhury M.D.  [address] [place] [address]  [facility] [id number] [phone]    Schedule an appointment as soon as possible for a visit       [facility], Emergency Dept  [address] [facility] [id number] [phone]  Go to   If symptoms worsen      OUTPATIENT MEDICATIONS:  Discharge Medication List as of 11/12/2024   7:41 PM          FINAL DIAGNOSIS  1. Closed fracture of base of fifth metatarsal bone of right foot, initial encounter           The note accurately reflects work and decisions made by me.  SINDI HallO.  11/12/2024  11:26 PM     Juani NELSON (Scribe), am scribing for, and in the presence of, JUVENCIO Hall.    Electronically signed by: Juani Petit (Suzan), 11/12/2024    Thierno NELSON D* personally performed the services described in this documentation, as scribed by Juani Petit in my presence, and it is both accurate and complete.

## 2024-11-13 NOTE — ED TRIAGE NOTES
"Chief Complaint   Patient presents with    Leg Injury     PT fell from motorized wheelchair and got right ankle wedged under, discoloration noted to toes.        BIB EMS to [facility], pt on monitor.    Medications given en route: 1000mg acetaminophen    /70   Pulse (!) 111   Temp 37.4 °C (99.3 °F) (Temporal)   Resp 16   Ht 1.727 m (5' 8\")   Wt 67.1 kg (148 lb)   SpO2 96%   BMI 22.50 kg/m²     "

## 2024-11-23 ENCOUNTER — HOSPITAL ENCOUNTER (OUTPATIENT)
Dept: RADIOLOGY | Facility: MEDICAL CENTER | Age: 39
End: 2024-11-23
Attending: FAMILY MEDICINE
Payer: MEDICARE

## 2024-11-23 ENCOUNTER — APPOINTMENT (OUTPATIENT)
Dept: URGENT CARE | Facility: PHYSICIAN GROUP | Age: 39
End: 2024-11-23

## 2024-11-23 ENCOUNTER — OFFICE VISIT (OUTPATIENT)
Dept: URGENT CARE | Facility: PHYSICIAN GROUP | Age: 39
End: 2024-11-23
Payer: MEDICARE

## 2024-11-23 VITALS
HEIGHT: 67 IN | OXYGEN SATURATION: 95 % | RESPIRATION RATE: 12 BRPM | TEMPERATURE: 98.2 F | HEART RATE: 97 BPM | BODY MASS INDEX: 23.23 KG/M2 | WEIGHT: 148 LBS | SYSTOLIC BLOOD PRESSURE: 112 MMHG | DIASTOLIC BLOOD PRESSURE: 86 MMHG

## 2024-11-23 DIAGNOSIS — S92.114A CLOSED NONDISPLACED FRACTURE OF NECK OF RIGHT TALUS, INITIAL ENCOUNTER: ICD-10-CM

## 2024-11-23 DIAGNOSIS — L03.012 PARONYCHIA OF FINGER, LEFT: ICD-10-CM

## 2024-11-23 DIAGNOSIS — S93.401A SPRAIN OF RIGHT ANKLE, UNSPECIFIED LIGAMENT, INITIAL ENCOUNTER: ICD-10-CM

## 2024-11-23 PROCEDURE — 73610 X-RAY EXAM OF ANKLE: CPT | Mod: RT

## 2024-11-23 PROCEDURE — 3079F DIAST BP 80-89 MM HG: CPT | Performed by: FAMILY MEDICINE

## 2024-11-23 PROCEDURE — 99214 OFFICE O/P EST MOD 30 MIN: CPT | Performed by: FAMILY MEDICINE

## 2024-11-23 PROCEDURE — 3074F SYST BP LT 130 MM HG: CPT | Performed by: FAMILY MEDICINE

## 2024-11-23 RX ORDER — OXYCODONE AND ACETAMINOPHEN 5; 325 MG/1; MG/1
1 TABLET ORAL EVERY 4 HOURS PRN
Qty: 15 TABLET | Refills: 0 | Status: SHIPPED | OUTPATIENT
Start: 2024-11-23 | End: 2024-11-30

## 2024-11-23 RX ORDER — DOXYCYCLINE HYCLATE 100 MG
100 TABLET ORAL 2 TIMES DAILY
Qty: 14 TABLET | Refills: 0 | Status: SHIPPED | OUTPATIENT
Start: 2024-11-23 | End: 2024-11-30

## 2024-11-23 ASSESSMENT — FIBROSIS 4 INDEX: FIB4 SCORE: 0.82

## 2024-11-23 NOTE — PROGRESS NOTES
Chief Complaint   Patient presents with    Joint Injection     Left middle finger infection  And Right foot might be broken and possibly needs a xray           HISTORY OF PRESENT ILLNESS:    Pt c/o constant, throbbing pain at     left middle    Finger x  5 Days.         Area is tender to the touch.    Pain has been present for 1 wk, however recently has become progressively more painful        #2.   Rt foot pain - was seen in ED on  and x-rays showed nondisplaced fracture base of 5th metatarsal    Past Medical History:   Diagnosis Date    ADEM (acute disseminated encephalomyelitis)     Back pain     Breath shortness     since  not an issue at this time (2018)    C. difficile colitis     Coma ([facility]) 2009    1 month, lesions on brain,  unknown etiology    Coma ([facility])     Spontaneous -     Demyelinating disorder ([facility])     demyelinating encephpomyeltis     Encephalitis     Heart burn     Indigestion     Lesion of brain     unknown cuase    MEDICAL HOME     Migraines     MS (multiple sclerosis) ([facility])     Other specified disorder of intestines     constipation    Pain     Psychiatric problem     depression and anxiety    Renal disorder     kidney stones    Urinary incontinence          Social History     Tobacco Use    Smoking status: Former     Current packs/day: 0.00     Average packs/day: 1 pack/day for 12.0 years (12.0 ttl pk-yrs)     Types: Cigarettes     Start date: 1996     Quit date: 2008     Years since quittin.9    Smokeless tobacco: Never    Tobacco comments:     Started smoking at age 11   Vaping Use    Vaping status: Never Used   Substance Use Topics    Alcohol use: No    Drug use: No         Family History   Problem Relation Age of Onset    Cancer Mother         Sarcoma    Bipolar disorder Brother     Other Brother         spina bifida    Diabetes Maternal Grandmother     Other Daughter         Migrains       OBJECTIVE  /86 (BP Location: Right arm, Patient  "Position: Sitting, BP Cuff Size: Adult)   Pulse 97   Temp 36.8 °C (98.2 °F) (Temporal)   Resp 12   Ht 1.702 m (5' 7\")   Wt 67.1 kg (148 lb)   SpO2 95%        PHYSICAL EXAMINATION:     Gen:   in wheelchair.        MSK: She has significant spastic contractures of bilat upper ext and plantarflexion contractures of both feet.      RT foot:   contracted in severe plantarflexion.    + TTP base of 5th MTP.    + minor edema.    No erythema.             Left   4th digit - erythema and edema at lateral aspect of nail bed.    Point tenderness was localized to the distal left  middle finger.        DX-ANKLE 3+ VIEWS RIGHT  Order: [id number]   Status: Final result       Visible to patient: Yes (seen)       Next appt: None       Dx: Sprain of right ankle, unspecified li...    0 Result Notes  Details    Reading Physician Reading Date Result Priority   Gabriel Blevins M.D.  [phone] 11/23/2024      Narrative & Impression     11/23/2024 1:16 PM     HISTORY/REASON FOR EXAM:  pain; Pain/Deformity Following Trauma.  RIGHT foot and ankle injury, swelling.     TECHNIQUE/EXAM DESCRIPTION AND NUMBER OF VIEWS:  3 views of the RIGHT ankle.     COMPARISON: RIGHT foot 11/12/2024     FINDINGS:  Diffuse soft tissue swelling about the ankle.  Degenerative change of the ankle joint and midfoot.  Displaced articular fracture at the base of 5th metatarsal.  Probable fracture of the lateral talar process.  Diffuse osteopenia.     IMPRESSION:     1.  Displaced articular fracture of the base of RIGHT 5th metatarsal.  2.  Probable fracture of lateral talar process.  3.  Degenerative change of midfoot and ankle.  4.  Diffuse soft tissue swelling.        Exam Ended: 11/23/24  1:33 PM Last Resulted: 11/23/24  1:40 PM        IMPRESSION/ PLAN:        1. Paronychia, left     - sulfamethoxazole-trimethoprim (BACTRIM DS) 800-160 MG tablet; Take 1 Tab by mouth 2 times a day for 7 days.  Dispense: 14 Tab; Refill: 0         1. Paronychia of finger, left   "     - doxycycline (VIBRAMYCIN) 100 MG Tab; Take 1 Tablet by mouth 2 times a day for 7 days.  Dispense: 14 Tablet; Refill: 0         2. Closed nondisplaced fracture of neck of right talus, initial encounter  3. 5th metatarsal fxr       X-rays were personally reviewed by myself.   There is a questionable talus process fracture, as well as the previously-demonstrated 5th metatarsal fxr      Unable to place in boot or splint secondary to pain and severe deformity      Urgent referral to Ortho       - oxyCODONE-acetaminophen (PERCOCET) 5-325 MG Tab; Take 1 Tablet by mouth every four hours as needed for Moderate Pain for up to 7 days.  Dispense: 15 Tablet; Refill: 0  - Referral to Orthopedics      Narcotic use report obtained with no indication of narcotic overuse or misuse and deemed necessary for treaatment. Sedation, dependence, and constipation precautions given. Avoid use while driving or operating heavy machinery.     Differential diagnosis, natural history, supportive care, and indications for immediate follow-up discussed. All questions answered. Patient agrees with the plan of care.     Follow-up as needed if symptoms worsen or fail to improve to PCP, Urgent care or Emergency Room.     I have personally reviewed prior external notes and test results pertinent to today's visit.  I have independently reviewed and interpreted all diagnostics ordered during this urgent care acute visit.

## 2025-01-15 ENCOUNTER — OFFICE VISIT (OUTPATIENT)
Dept: MEDICAL GROUP | Facility: MEDICAL CENTER | Age: 40
End: 2025-01-15
Payer: MEDICARE

## 2025-01-15 VITALS
SYSTOLIC BLOOD PRESSURE: 122 MMHG | TEMPERATURE: 98.4 F | OXYGEN SATURATION: 93 % | BODY MASS INDEX: 23.23 KG/M2 | DIASTOLIC BLOOD PRESSURE: 78 MMHG | HEIGHT: 67 IN | HEART RATE: 96 BPM | WEIGHT: 148 LBS

## 2025-01-15 DIAGNOSIS — J18.9 COMMUNITY ACQUIRED PNEUMONIA, UNSPECIFIED LATERALITY: ICD-10-CM

## 2025-01-15 DIAGNOSIS — R05.1 ACUTE COUGH: ICD-10-CM

## 2025-01-15 DIAGNOSIS — J06.9 UPPER RESPIRATORY TRACT INFECTION, UNSPECIFIED TYPE: ICD-10-CM

## 2025-01-15 PROCEDURE — 3074F SYST BP LT 130 MM HG: CPT

## 2025-01-15 PROCEDURE — 99213 OFFICE O/P EST LOW 20 MIN: CPT

## 2025-01-15 PROCEDURE — 3078F DIAST BP <80 MM HG: CPT

## 2025-01-15 RX ORDER — AZITHROMYCIN 250 MG/1
TABLET, FILM COATED ORAL
Qty: 6 TABLET | Refills: 0 | Status: SHIPPED | OUTPATIENT
Start: 2025-01-15

## 2025-01-15 ASSESSMENT — FIBROSIS 4 INDEX: FIB4 SCORE: 0.82

## 2025-01-15 NOTE — PROGRESS NOTES
Chief Complaint   Patient presents with    Follow-Up     Pt states she has had chest congestions and a cough for 2 weeks.        HPI: Patient is a 39 y.o. female complaining of 14 days of illness including: chills, productive cough, shortness of breath, ear pain, fever, nasal congestion, rhinorrhea, sore throat, vomiting.   Mucus is: thick.  Similarly ill exposures: no.  Treatments tried: dayquil, tylenol  She  reports that she quit smoking about 17 years ago. Her smoking use included cigarettes. She started smoking about 29 years ago. She has a 12 pack-year smoking history. She has never used smokeless tobacco..     ROS:  No skin rashes.      I reviewed the patient's medications, allergies and medical history:  Current Outpatient Medications   Medication Sig Dispense Refill    amoxicillin-clavulanate (AUGMENTIN) 875-125 MG Tab Take 1 Tablet by mouth 2 times a day for 5 days. 10 Tablet 0    azithromycin (ZITHROMAX) 250 MG Tab Take 2 tablets (500 mg) PO on day 1 and then take 1 tablet (250 mg) daily on 2-5 6 Tablet 0    sumatriptan (IMITREX) 100 MG tablet Take 1 Tablet by mouth one time as needed for Migraine. 10 Tablet 11    meloxicam (MOBIC) 15 MG tablet Take 1 Tablet by mouth every day. 90 Tablet 3    tamsulosin (FLOMAX) 0.4 MG capsule Take 1 Capsule by mouth every day. 90 Capsule 3    mupirocin (BACTROBAN) 2 % Ointment Apply 1 Application topically 2 times a day. 22 g 0    cyclobenzaprine (FLEXERIL) 10 mg Tab Take 1 Tablet by mouth 3 times a day as needed for Muscle Spasms. 270 Tablet 3    docusate sodium (COLACE) 100 MG Cap Take 1 Capsule by mouth 2 times a day as needed for Constipation. 180 Capsule 3    citalopram (CELEXA) 20 MG Tab Take 1 Tablet by mouth every day. 90 Tablet 3    baclofen (LIORESAL) 10 MG Tab TAKE 3 TABLETS BY MOUTH FOUR TIMES DAILY 360 Tablet 11    baclofen (LIORESAL) 10 MG Tab TAKE 3 TABLETS BY MOUTH FOUR TIMES DAILY 360 Tablet 0    Misc. Devices Misc Please eval and fix electric W/C.  "Please eval also for W/C needs. 1 Each 11    Misc. Devices Misc 1 Each in the morning, at noon, and at bedtime. 1 Brief 3x/day for incontinence. Lifetime due to neurogenic bladder 90 Each 11    bisacodyl (DULCOLAX) 10 MG Suppos INSERT 1 SUPPOSITORY RECTALLY EVERY DAY 50 Suppository 11    polyethylene glycol 3350 (MIRALAX) 17 GM/SCOOP Powder Take 17 g by mouth every day. 255 g 3    topiramate (TOPAMAX) 25 MG Tab TAKE 1 TABLET BY MOUTH TWICE DAILY 60 Tablet 11    omeprazole (PRILOSEC) 40 MG delayed-release capsule Take 1 Capsule by mouth 2 times a day. 60 Capsule 2    Misc. Devices Misc Please allow patient to have support/therapy dog in appt. Regardless of weight due to multiple chronic illnesses and debility. 1 Each 11     No current facility-administered medications for this visit.     Fentanyl and Sulfamethoxazole w-trimethoprim  Past Medical History:   Diagnosis Date    ADEM (acute disseminated encephalomyelitis)     Back pain     Breath shortness     since 2014 not an issue at this time (4/2018)    C. difficile colitis 2015    Coma ([facility]) August 2009    1 month, lesions on brain,  unknown etiology    Coma ([facility]) 2008    Spontaneous -     Demyelinating disorder ([facility])     demyelinating encephpomyeltis     Encephalitis 2009    Heart burn     Indigestion     Lesion of brain     unknown cuase    MEDICAL HOME     Migraines     MS (multiple sclerosis) ([facility])     Other specified disorder of intestines     constipation    Pain     Psychiatric problem     depression and anxiety    Renal disorder     kidney stones    Urinary incontinence         EXAM:  /78   Pulse 96   Temp 36.9 °C (98.4 °F) (Temporal)   Ht 1.702 m (5' 7\")   Wt 67.1 kg (148 lb)   SpO2 93%   General: Alert, no conversational dyspnea or audible wheeze, non-toxic appearance.  Eyes: PERRL, conjunctiva slightly injected, no eye discharge.  Ears: Normal pinnae,TM's normal bilaterally.  Nares: Patent with thin mucus.  Sinuses: non tender over maxillary / " frontal sinuses.  Throat: Erythematous injection without exudate.   Neck: Supple, with no adenopathy.  Lungs: decreased lung sounds throughout, congested cough.   Heart: Regular rate without murmur.  Skin: Warm and dry without rash.     ASSESSMENT:   1. Community acquired pneumonia, unspecified laterality    2. Upper respiratory tract infection, unspecified type    3. Acute cough          PLAN:  1. Educated patient that majority of upper respiratory infections are viral and do not need antibiotics. As symptoms have been worsening over the last week, will treat with antibiotics.  2. Twice daily use of nasal saline rinse or Neti-Pot.  3. OTC anti-pyretics and decongestants as needed.  4. Follow-up in office or urgent care for worsening symptoms, difficulty breathing, lack of expected recovery, or should new symptoms or problems arise.  5. Chest xray ordered to confirm pneumonia.

## 2025-03-23 DIAGNOSIS — R25.2 SPASTICITY: ICD-10-CM

## 2025-03-24 RX ORDER — BACLOFEN 10 MG/1
TABLET ORAL
Qty: 360 TABLET | Refills: 11 | Status: SHIPPED | OUTPATIENT
Start: 2025-03-24

## 2025-03-24 NOTE — TELEPHONE ENCOUNTER
Received request via: Pharmacy    Was the patient seen in the last year in this department? Yes    Does the patient have an active prescription (recently filled or refills available) for medication(s) requested? No    Pharmacy Name:   : [id number] - [place] - [address] [facility] [address]          Does the patient have skilled nursing Plus and need 100-day supply? (This applies to ALL medications) Patient does not have SCP

## 2025-03-28 DIAGNOSIS — G04.00 ADEM (ACUTE DISSEMINATED ENCEPHALOMYELITIS): ICD-10-CM

## 2025-03-28 DIAGNOSIS — F33.42 RECURRENT MAJOR DEPRESSIVE DISORDER, IN FULL REMISSION (HCC): Chronic | ICD-10-CM

## 2025-03-28 DIAGNOSIS — R25.2 SPASTICITY: ICD-10-CM

## 2025-03-31 RX ORDER — CITALOPRAM HYDROBROMIDE 20 MG/1
20 TABLET ORAL
Qty: 90 TABLET | Refills: 3 | Status: SHIPPED | OUTPATIENT
Start: 2025-03-31

## 2025-03-31 RX ORDER — CYCLOBENZAPRINE HCL 10 MG
10 TABLET ORAL 3 TIMES DAILY PRN
Qty: 270 TABLET | Refills: 3 | Status: SHIPPED | OUTPATIENT
Start: 2025-03-31

## 2025-04-09 ENCOUNTER — OFFICE VISIT (OUTPATIENT)
Dept: MEDICAL GROUP | Facility: MEDICAL CENTER | Age: 40
End: 2025-04-09
Payer: MEDICARE

## 2025-04-09 VITALS
OXYGEN SATURATION: 98 % | RESPIRATION RATE: 16 BRPM | BODY MASS INDEX: 23.18 KG/M2 | TEMPERATURE: 99 F | SYSTOLIC BLOOD PRESSURE: 114 MMHG | HEIGHT: 67 IN | HEART RATE: 98 BPM | DIASTOLIC BLOOD PRESSURE: 70 MMHG

## 2025-04-09 DIAGNOSIS — G82.50 QUADRIPLEGIA (HCC): ICD-10-CM

## 2025-04-09 DIAGNOSIS — R25.2 SPASTICITY: ICD-10-CM

## 2025-04-09 DIAGNOSIS — M25.521 RIGHT ELBOW PAIN: ICD-10-CM

## 2025-04-09 DIAGNOSIS — G47.09 SECONDARY INSOMNIA: ICD-10-CM

## 2025-04-09 DIAGNOSIS — L03.116 CELLULITIS OF LEFT LOWER EXTREMITY: ICD-10-CM
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
## 2025-04-09 PROCEDURE — 3074F SYST BP LT 130 MM HG: CPT | Performed by: STUDENT IN AN ORGANIZED HEALTH CARE EDUCATION/TRAINING PROGRAM

## 2025-04-09 PROCEDURE — 99214 OFFICE O/P EST MOD 30 MIN: CPT | Performed by: STUDENT IN AN ORGANIZED HEALTH CARE EDUCATION/TRAINING PROGRAM

## 2025-04-09 PROCEDURE — 3078F DIAST BP <80 MM HG: CPT | Performed by: STUDENT IN AN ORGANIZED HEALTH CARE EDUCATION/TRAINING PROGRAM

## 2025-04-09 RX ORDER — ZOLPIDEM TARTRATE 5 MG/1
5 TABLET ORAL NIGHTLY PRN
Qty: 30 TABLET | Refills: 2 | Status: SHIPPED | OUTPATIENT
Start: 2025-04-09 | End: 2025-07-08

## 2025-04-09 ASSESSMENT — PATIENT HEALTH QUESTIONNAIRE - PHQ9: CLINICAL INTERPRETATION OF PHQ2 SCORE: 0

## 2025-04-09 NOTE — PROGRESS NOTES
"Subjective:     CC:     HPI:   Griselda presents today with    PMH history of disseminated encephalomyelitis with residual paralysis, hx of c diff, hx of gi bleed, hx of septic bursitis of elbow   - patient communicated via ipad  - sister reports via [employer] that patient has had more pain on her elbow and is concerned about possible infection    Patient presents for evaluation.  History of septic bursitis of the right elbow  Recently noted increase in discomfort in the right elbow presents for concern about possible infection.  On examination there is slight fluid buildup at the elbow joint however no evidence of warmth increased, erythema, or signs of infection there is no wound around the area.  Recommend continue conservative measures.    Patient report also about his kitten.  On examination of her left foot there is significant abrasions/cat scratch carlie and surrounding erythema.    Patient was previously on Ambien on a regular basis.  She reports she has difficulty getting her medications filled.  Last prescription was 6/2024 with 2 refills.  she reports he takes Ambien 5 mg every other night as needed and denies any adverse effect and would like to be placed back on medication.      [facility] [place]      Health Maintenance:       Objective:     Exam:  /70   Pulse 98   Temp 37.2 °C (99 °F) (Temporal)   Resp 16   Ht 1.702 m (5' 7\")   SpO2 98%   BMI 23.18 kg/m²  Body mass index is 23.18 kg/m².    Patient is on power wheelchair  Quadriplegic  On examination there is no erythema/increase in warmth/wound around the right elbow.  There may be some fluid collection at the elbow joint.  Examination of the left foot there is significant abrasion/catch scratch marks there is surrounding erythema in the forefoot.  Pulse palpable (pedal)      Labs:     Assessment & Plan:     39 y.o. female with the following -     1. Quadriplegia (HCC)  2. Spasticity  Chronic, stable  Requires a wheelchair-will assess my MA to call nu " motion to see if new orders need to be placed for her power wheelchair    - zolpidem (AMBIEN) 5 MG Tab; Take 1 Tablet by mouth at bedtime as needed for Sleep for up to 90 days.  Dispense: 30 Tablet; Refill: 2          3. Right elbow pain  Acute, stable history of right elbow septic bursitis requiring wound pump etc. patient noted in the past 2 weeks had noted slightly increase in right elbow pain.  On examination no evidence of significant infection or inflammation at this time.  Recommend continued conservative management    4. Cellulitis of left lower extremity  5. Cat scratch  She does have multiple scratch wounds on her left foot and some surrounding erythema concerning for possible cellulitis   - amoxicillin-clavulanate (AUGMENTIN) 875-125 MG Tab; Take 1 Tablet by mouth 2 times a day for 5 days.  Dispense: 10 Tablet; Refill: 0      6. Secondary insomnia  chronic, stable  was reviewed  UDS and controlled substance agreement not performed due to quadriplegia.  I do not see anything concerning based on the review of the  patient had previously been on Ambien 5 mg nightly as needed without any issues understand that it is a sedative.  Obtained and reviewed patient utilization report from [facility] pharmacy database on 4/9/2025 10:31 AM  prior to writing prescription for controlled substance II, III or IV per [place] bill . Based on assessment of the report, the prescription is medically necessary.     - zolpidem (AMBIEN) 5 MG Tab; Take 1 Tablet by mouth at bedtime as needed for Sleep for up to 90 days.  Dispense: 30 Tablet; Refill: 2        Return in about 4 months (around 8/9/2025) for Lab review, Med check.    Please note that this dictation was created using voice recognition software. I have made every reasonable attempt to correct obvious errors, but I expect that there are errors of grammar and possibly content that I did not discover before finalizing the note.

## 2025-04-29 ENCOUNTER — APPOINTMENT (OUTPATIENT)
Dept: MEDICAL GROUP | Facility: MEDICAL CENTER | Age: 40
End: 2025-04-29
Payer: MEDICARE

## 2025-04-29 VITALS
HEART RATE: 101 BPM | HEIGHT: 67 IN | SYSTOLIC BLOOD PRESSURE: 108 MMHG | DIASTOLIC BLOOD PRESSURE: 70 MMHG | TEMPERATURE: 98 F | OXYGEN SATURATION: 93 % | BODY MASS INDEX: 23.18 KG/M2 | RESPIRATION RATE: 14 BRPM

## 2025-04-29 DIAGNOSIS — R20.2 PARESTHESIA OF BOTH HANDS: ICD-10-CM

## 2025-04-29 DIAGNOSIS — G04.00 ADEM (ACUTE DISSEMINATED ENCEPHALOMYELITIS): ICD-10-CM

## 2025-04-29 DIAGNOSIS — N39.44 NOCTURNAL ENURESIS: ICD-10-CM

## 2025-04-29 DIAGNOSIS — M62.461 CONTRACTURE OF MUSCLE OF BOTH LOWER LEGS: ICD-10-CM

## 2025-04-29 DIAGNOSIS — M62.462 CONTRACTURE OF MUSCLE OF BOTH LOWER LEGS: ICD-10-CM

## 2025-04-29 DIAGNOSIS — G82.50 QUADRIPLEGIA (HCC): ICD-10-CM

## 2025-04-29 DIAGNOSIS — Z99.3 WHEELCHAIR DEPENDENCE: ICD-10-CM

## 2025-04-29 DIAGNOSIS — R21 RASH: ICD-10-CM

## 2025-04-29 RX ORDER — NYSTATIN 100000 U/G
1 CREAM TOPICAL 2 TIMES DAILY
Qty: 180 APPLICATION | Refills: 1 | Status: SHIPPED | OUTPATIENT
Start: 2025-04-29

## 2025-04-29 ASSESSMENT — ENCOUNTER SYMPTOMS
CHILLS: 0
COUGH: 0
FEVER: 0
ORTHOPNEA: 0
SHORTNESS OF BREATH: 0
PALPITATIONS: 0

## 2025-04-29 NOTE — PROGRESS NOTES
Subjective:     Verbal consent was acquired by the patient to use [employer] ambient listening note generation during this visit Yes    CC: Follow-Up (Pt needs new order for wheelchair)      HPI:   Griselda is a 39 y.o. female who presents today for:    History of Present Illness  The patient presents for evaluation of dermatitis, hand paresthesia, knee pain, and wheelchair replacement. Patient is non-verbal and communicated via iPad.     Dermatitis  The patient reports a persistent dermatitis localized to the inguinal folds, attributed to urinary incontinence. The rash is described as non-pruritic but associated with a burning sensation.  - Location: Inguinal folds.  - Character: Non-pruritic rash with a burning sensation.  - Alleviating/Aggravating Factors: Attributed to urinary incontinence.  - Duration: Persistent.    Hand Paresthesia  The patient experiences constant paresthesia in the hands, particularly when grasping objects. The onset of this symptom is indeterminate. It mainly occurs when she is using her iPad, which is her main source of communication, since she is non-verbal.   - Location: Hands.  - Character: Constant paresthesia, especially when grasping objects.  - Timing: Constant.    Muscle contractures/ knee pain  The patient reports knee instability, described as a sensation of the knees giving way, accompanied by pain upon standing to get up with her [place] lift, and an inability to flex the knees. She is wheelchair bound and has a contracture of her right ankle that is pain full. She dislikes that her legs are constantly extended and bowed out. She states that the appearance causes her emotional distress and she is wondering what treatment options she has.   - Location: Knees and right ankle.  - Character: Instability, sensation of knees giving way, pain upon standing, inability to flex knees.  - Timing: Pain upon standing.    Wheelchair Replacement  Additionally, the patient expresses a need for a  new wheelchair, having utilized the current one for the past 5 years. The existing wheelchair has the following problems, her toggle stick sometimes moves her forward when she moves it back.  This is very dangerous when she is on the life of her van. She uses [employer] for her wheelchair.             Allergies: Fentanyl and Sulfamethoxazole w-trimethoprim     Medications:   Current Outpatient Medications:     nystatin (MYCOSTATIN) 096251 UNIT/GM Cream topical cream, Apply 1 g topically 2 times a day., Disp: 180 Application, Rfl: 1    zolpidem (AMBIEN) 5 MG Tab, Take 1 Tablet by mouth at bedtime as needed for Sleep for up to 90 days., Disp: 30 Tablet, Rfl: 2    citalopram (CELEXA) 20 MG Tab, TAKE 1 TABLET BY MOUTH EVERY DAY, Disp: 90 Tablet, Rfl: 3    cyclobenzaprine (FLEXERIL) 10 mg Tab, TAKE 1 TABLET BY MOUTH THREE TIMES DAILY AS NEEDED FOR MUSCLE SPASMS, Disp: 270 Tablet, Rfl: 3    baclofen (LIORESAL) 10 MG Tab, TAKE 3 TABLETS BY MOUTH FOUR TIMES DAILY, Disp: 360 Tablet, Rfl: 11    sumatriptan (IMITREX) 100 MG tablet, Take 1 Tablet by mouth one time as needed for Migraine., Disp: 10 Tablet, Rfl: 11    meloxicam (MOBIC) 15 MG tablet, Take 1 Tablet by mouth every day., Disp: 90 Tablet, Rfl: 3    tamsulosin (FLOMAX) 0.4 MG capsule, Take 1 Capsule by mouth every day., Disp: 90 Capsule, Rfl: 3    mupirocin (BACTROBAN) 2 % Ointment, Apply 1 Application topically 2 times a day., Disp: 22 g, Rfl: 0    docusate sodium (COLACE) 100 MG Cap, Take 1 Capsule by mouth 2 times a day as needed for Constipation., Disp: 180 Capsule, Rfl: 3    baclofen (LIORESAL) 10 MG Tab, TAKE 3 TABLETS BY MOUTH FOUR TIMES DAILY, Disp: 360 Tablet, Rfl: 0    Misc. Devices Misc, Please eval and fix electric W/C. Please eval also for W/C needs., Disp: 1 Each, Rfl: 11    Misc. Devices Misc, 1 Each in the morning, at noon, and at bedtime. 1 Brief 3x/day for incontinence. Lifetime due to neurogenic bladder, Disp: 90 Each, Rfl: 11    bisacodyl (DULCOLAX)  "10 MG Suppos, INSERT 1 SUPPOSITORY RECTALLY EVERY DAY, Disp: 50 Suppository, Rfl: 11    polyethylene glycol 3350 (MIRALAX) 17 GM/SCOOP Powder, Take 17 g by mouth every day., Disp: 255 g, Rfl: 3    topiramate (TOPAMAX) 25 MG Tab, TAKE 1 TABLET BY MOUTH TWICE DAILY, Disp: 60 Tablet, Rfl: 11    omeprazole (PRILOSEC) 40 MG delayed-release capsule, Take 1 Capsule by mouth 2 times a day., Disp: 60 Capsule, Rfl: 2    Misc. Devices Misc, Please allow patient to have support/therapy dog in appt. Regardless of weight due to multiple chronic illnesses and debility., Disp: 1 Each, Rfl: 11    azithromycin (ZITHROMAX) 250 MG Tab, Take 2 tablets (500 mg) PO on day 1 and then take 1 tablet (250 mg) daily on 2-5 (Patient not taking: Reported on 4/29/2025), Disp: 6 Tablet, Rfl: 0      ROS:  Review of Systems   Constitutional:  Negative for chills and fever.   Respiratory:  Negative for cough and shortness of breath.    Cardiovascular:  Negative for chest pain, palpitations, orthopnea and leg swelling.   Musculoskeletal:  Positive for joint pain.       Objective:     Exam:  /70   Pulse (!) 101   Temp 36.7 °C (98 °F) (Temporal)   Resp 14   Ht 1.702 m (5' 7\")   SpO2 93%   BMI 23.18 kg/m²  Body mass index is 23.18 kg/m².    Physical Exam  Constitutional:       Appearance: Normal appearance.   Eyes:      Pupils: Pupils are equal, round, and reactive to light.   Cardiovascular:      Rate and Rhythm: Normal rate and regular rhythm.      Pulses: Normal pulses.      Heart sounds: Normal heart sounds.   Pulmonary:      Effort: Pulmonary effort is normal.      Breath sounds: Normal breath sounds.   Abdominal:      General: Bowel sounds are normal.      Palpations: Abdomen is soft.   Neurological:      Mental Status: She is alert and oriented to person, place, and time.   Psychiatric:         Mood and Affect: Mood normal.         Behavior: Behavior normal.           Assessment & Plan:     Griselda wilson 39 y.o. female with the following - "     Assessment & Plan       1. Quadriplegia (HCC)  2. ADEM (acute disseminated encephalomyelitis)  3. Wheelchair dependence  Chronic, stable  Current wheelchair used for 5 years; occasionally the toggle stick will work incorrectly. Patient is quadriplegic, and requires electric wheelchair for mobility.   Treatment plan: Order for a new wheelchair will be placed with [employer].  Clinical decision making: [employer] will contact to arrange the setup for the new wheelchair. Ensuring the new wheelchair meets her needs, including reclining functionality.  - DME Wheelchair    4. Rash  Acute, uncomplicated  Rash present in the creases of the groin area associated with urinary incontinence.  Treatment plan: Prescription for a topical cream will be provided to manage the rash.  Clinical decision making: Discussion regarding Medicare coverage for the [place] system; may need to be paid out of pocket.  - nystatin (MYCOSTATIN) 921577 UNIT/GM Cream topical cream; Apply 1 g topically 2 times a day.  Dispense: 180 Application; Refill: 1    5. Nocturnal enuresis  Chronic, stable  Patient requesting [employer] system for urinary incontinence at night. We discussed that I am uncertain if insurance will cover this device, and she may need to purchase it out of pocket.   - nystatin (MYCOSTATIN) 913355 UNIT/GM Cream topical cream; Apply 1 g topically 2 times a day.  Dispense: 180 Application; Refill: 1  - DME Other    6. Contracture of muscle of both lower legs  Chronic, stable  Reports knee pain and inability to bend knees. She also has a contracture of the left ankle.   Diagnostic plan: Referral to [facility] for further evaluation and management of knee pain/ contractures, and ankle contracture.  Clinical decision making: Orthopedic specialist consultation recommended.  - Referral to Orthopedics    7. Paresthesia of both hands  Chronic, stable  Constant hand numbness when holding objects.  Diagnostic plan: Referral to [facility] for further evaluation and management of hand numbness.  Clinical decision making: Orthopedic specialist consultation recommended.  - Referral to Orthopedics        Anticipatory guidance included the following: Patient counseled about skin care, diet, supplements, smoking, drugs/alcohol use, safe sex and exercise.     Return if symptoms worsen or fail to improve.    Please note that this dictation was created using voice recognition software. I have made every reasonable attempt to correct obvious errors, but I expect that there are errors of grammar and possibly content that I did not discover before finalizing the note.

## 2025-05-05 NOTE — Clinical Note
REFERRAL APPROVAL NOTICE         Sent on May 5, 2025                   Griselda Swanson  [address] [facility] [address]                   Dear Ms. Swanson,    After a careful review of the medical information and benefit coverage, [facility] has processed your referral. See below for additional details.    If applicable, you must be actively enrolled with your insurance for coverage of the authorized service. If you have any questions regarding your coverage, please contact your insurance directly.    REFERRAL INFORMATION   Referral #:  [id number]  Referred-To Department    Referred-By Provider:  Orthopedics    LAKE Carpenter   [facility]      [address] [facility] [address] [place] [id number]  [phone] [address] [facility] [id number] [phone]    Referral Start Date:  04/29/2025  Referral End Date:   04/29/2026             SCHEDULING  If you do not already have an appointment, please call [phone] to make an appointment.     MORE INFORMATION  If you do not already have a [employer] account, sign up at: [email or web address]  You can access your medical information, make appointments, see lab results, billing information, and more.  If you have questions regarding this referral, please contact  the [facility] Referrals department at:             [phone]. Monday - Friday 8:00AM - 5:00PM.     Sincerely,    [facility] [place]

## 2025-05-05 NOTE — Clinical Note
REFERRAL APPROVAL NOTICE         Sent on May 5, 2025                   Griselda Swanson  [address] [facility] [address]                   Dear Ms. Swanson,    After a careful review of the medical information and benefit coverage, [facility] has processed your referral. See below for additional details.    If applicable, you must be actively enrolled with your insurance for coverage of the authorized service. If you have any questions regarding your coverage, please contact your insurance directly.    REFERRAL INFORMATION   Referral #:  [id number]  Referred-To Department    Referred-By Provider:  Orthopedics    LAKE Carpenter   [facility]      [address] [facility] [address] [place] [id number]  [phone] [address] [facility] [address]  [phone]    Referral Start Date:  04/29/2025  Referral End Date:   04/29/2026             SCHEDULING  If you do not already have an appointment, please call [phone] to make an appointment.     MORE INFORMATION  If you do not already have a [employer] account, sign up at: [email or web address]  You can access your medical information, make appointments, see lab results, billing information, and more.  If you have questions regarding this referral, please contact  the [facility] Referrals department at:             [phone]. Monday - Friday 8:00AM - 5:00PM.     Sincerely,    [facility]

## 2025-06-02 DIAGNOSIS — N39.44 NOCTURNAL ENURESIS: ICD-10-CM

## 2025-06-02 DIAGNOSIS — R21 RASH: ICD-10-CM

## 2025-06-02 PROBLEM — M24.572 EQUINUS CONTRACTURE OF LEFT ANKLE: Status: ACTIVE | Noted: 2025-06-02

## 2025-06-02 RX ORDER — NYSTATIN 100000 U/G
1 CREAM TOPICAL 2 TIMES DAILY
Qty: 180 APPLICATION | Refills: 1 | Status: SHIPPED | OUTPATIENT
Start: 2025-06-02

## (undated) DEVICE — TOWEL STOP TIMEOUT SAFETY FLAG (40EA/CA)

## (undated) DEVICE — SENSOR OXIMETER ADULT SPO2 RD SET (20EA/BX)

## (undated) DEVICE — GOWN WARMING STANDARD FLEX - (30/CA)

## (undated) DEVICE — HANDPIECE 10FT INTPLS SCT PLS IRRIGATION HAND CONTROL SET (6/PK)

## (undated) DEVICE — CONTAINER, SPECIMEN, STERILE

## (undated) DEVICE — COVER LIGHT HANDLE ALC PLUS DISP (18EA/BX)

## (undated) DEVICE — TUBING CLEARLINK DUO-VENT - C-FLO (48EA/CA)

## (undated) DEVICE — KIT CUSTOM PROCEDURE SINGLE FOR ENDO  (15/CA)

## (undated) DEVICE — SODIUM CHL IRRIGATION 0.9% 1000ML (12EA/CA)

## (undated) DEVICE — TIP INTPLS HFLO ML ORFC BTRY - (12/CS) FOR SURGILAV

## (undated) DEVICE — ELECTRODE 850 FOAM ADHESIVE - HYDROGEL RADIOTRNSPRNT (50/PK)

## (undated) DEVICE — WATER IRRIGATION STERILE 1000ML (12EA/CA)

## (undated) DEVICE — MASK PANORAMIC OXYGEN PRO2 (30EA/CA)

## (undated) DEVICE — TUBE CONNECTING SUCTION - CLEAR PLASTIC STERILE 72 IN (50EA/CA)

## (undated) DEVICE — LACTATED RINGERS INJ 1000 ML - (14EA/CA 60CA/PF)

## (undated) DEVICE — SUCTION INSTRUMENT YANKAUER BULBOUS TIP W/O VENT (50EA/CA)

## (undated) DEVICE — CANISTER SUCTION 3000ML MECHANICAL FILTER AUTO SHUTOFF MEDI-VAC NONSTERILE LF DISP (40EA/CA)

## (undated) DEVICE — FORCEP RADIAL JAW 4 STANDARD CAPACITY W/NEEDLE 240CM (40EA/BX)

## (undated) DEVICE — SUTURE GENERAL

## (undated) DEVICE — SET LEADWIRE 5 LEAD BEDSIDE DISPOSABLE ECG (1SET OF 5/EA)

## (undated) DEVICE — FILM CASSETTE ENDO

## (undated) DEVICE — NEPTUNE 4 PORT MANIFOLD - (20/PK)

## (undated) DEVICE — BITE BLOCK, DISP.

## (undated) DEVICE — SET EXTENSION WITH 2 PORTS (48EA/CA) ***PART #2C8610 IS A SUBSTITUTE*****

## (undated) DEVICE — ELECTRODE DUAL RETURN W/ CORD - (50/PK)

## (undated) DEVICE — CANISTER SUCTION RIGID RED 1500CC (40EA/CA)